# Patient Record
Sex: FEMALE | Race: BLACK OR AFRICAN AMERICAN | NOT HISPANIC OR LATINO | Employment: OTHER | URBAN - METROPOLITAN AREA
[De-identification: names, ages, dates, MRNs, and addresses within clinical notes are randomized per-mention and may not be internally consistent; named-entity substitution may affect disease eponyms.]

---

## 2017-06-01 ENCOUNTER — LAB (OUTPATIENT)
Dept: INTERNAL MEDICINE | Facility: CLINIC | Age: 77
End: 2017-06-01

## 2017-06-01 DIAGNOSIS — E78.5 HYPERLIPIDEMIA, UNSPECIFIED HYPERLIPIDEMIA TYPE: Primary | ICD-10-CM

## 2017-06-01 DIAGNOSIS — E11.9 TYPE 2 DIABETES MELLITUS WITHOUT COMPLICATION, UNSPECIFIED LONG TERM INSULIN USE STATUS: ICD-10-CM

## 2017-06-01 LAB
ALBUMIN SERPL-MCNC: 4.2 G/DL (ref 3.5–5.2)
ALBUMIN/GLOB SERPL: 1.5 G/DL
ALP SERPL-CCNC: 55 U/L (ref 40–129)
ALT SERPL-CCNC: 11 U/L (ref 5–33)
AST SERPL-CCNC: 16 U/L (ref 5–32)
BASOPHILS # BLD AUTO: 0.02 10*3/MM3 (ref 0–0.2)
BASOPHILS NFR BLD AUTO: 0.5 % (ref 0–2)
BILIRUB SERPL-MCNC: 0.8 MG/DL (ref 0.2–1.2)
BUN SERPL-MCNC: 21 MG/DL (ref 8–23)
BUN/CREAT SERPL: 20 (ref 7–25)
CALCIUM SERPL-MCNC: 9.6 MG/DL (ref 8.8–10.5)
CHLORIDE SERPL-SCNC: 101 MMOL/L (ref 98–107)
CHOLEST SERPL-MCNC: 161 MG/DL (ref 0–200)
CHOLEST/HDLC SERPL: 2.01 {RATIO}
CO2 SERPL-SCNC: 27.7 MMOL/L (ref 22–29)
CREAT SERPL-MCNC: 1.05 MG/DL (ref 0.57–1)
EOSINOPHIL # BLD AUTO: 0.13 10*3/MM3 (ref 0.1–0.3)
EOSINOPHIL NFR BLD AUTO: 3.1 % (ref 0–4)
ERYTHROCYTE [DISTWIDTH] IN BLOOD BY AUTOMATED COUNT: 13.1 % (ref 11.5–14.5)
GLOBULIN SER CALC-MCNC: 2.8 GM/DL
GLUCOSE SERPL-MCNC: 124 MG/DL (ref 65–99)
HBA1C MFR BLD: 6.2 % (ref 4.8–5.6)
HCT VFR BLD AUTO: 40.3 % (ref 37–47)
HDLC SERPL-MCNC: 80 MG/DL (ref 40–60)
HGB BLD-MCNC: 13.6 G/DL (ref 12–16)
IMM GRANULOCYTES # BLD: 0.01 10*3/MM3 (ref 0–0.03)
IMM GRANULOCYTES NFR BLD: 0.2 % (ref 0–0.5)
LDLC SERPL CALC-MCNC: 69 MG/DL (ref 0–100)
LYMPHOCYTES # BLD AUTO: 1.8 10*3/MM3 (ref 0.6–4.8)
LYMPHOCYTES NFR BLD AUTO: 43.6 % (ref 20–45)
MCH RBC QN AUTO: 27.8 PG (ref 27–31)
MCHC RBC AUTO-ENTMCNC: 33.7 G/DL (ref 31–37)
MCV RBC AUTO: 82.2 FL (ref 81–99)
MONOCYTES # BLD AUTO: 0.35 10*3/MM3 (ref 0–1)
MONOCYTES NFR BLD AUTO: 8.5 % (ref 3–8)
NEUTROPHILS # BLD AUTO: 1.82 10*3/MM3 (ref 1.5–8.3)
NEUTROPHILS NFR BLD AUTO: 44.1 % (ref 45–70)
NRBC BLD AUTO-RTO: 0 /100 WBC (ref 0–0)
PLATELET # BLD AUTO: 202 10*3/MM3 (ref 140–500)
POTASSIUM SERPL-SCNC: 3.7 MMOL/L (ref 3.5–5.2)
PROT SERPL-MCNC: 7 G/DL (ref 6–8.5)
RBC # BLD AUTO: 4.9 10*6/MM3 (ref 4.2–5.4)
SODIUM SERPL-SCNC: 142 MMOL/L (ref 136–145)
TRIGL SERPL-MCNC: 60 MG/DL (ref 0–150)
VLDLC SERPL CALC-MCNC: 12 MG/DL (ref 7–27)
WBC # BLD AUTO: 4.13 10*3/MM3 (ref 4.8–10.8)

## 2017-06-02 ENCOUNTER — TELEPHONE (OUTPATIENT)
Dept: INTERNAL MEDICINE | Facility: CLINIC | Age: 77
End: 2017-06-02

## 2017-06-02 NOTE — TELEPHONE ENCOUNTER
----- Message from Cyril Quiroga MD sent at 6/1/2017  4:43 PM EDT -----  Labs are really good, need to watch the carbs.  Still under diabetes range. Chol excellent.

## 2017-06-08 ENCOUNTER — HOSPITAL ENCOUNTER (OUTPATIENT)
Dept: CARDIOLOGY | Facility: HOSPITAL | Age: 77
Discharge: HOME | End: 2017-06-08
Admitting: INTERNAL MEDICINE

## 2017-06-08 ENCOUNTER — OFFICE VISIT (OUTPATIENT)
Dept: INTERNAL MEDICINE | Facility: CLINIC | Age: 77
End: 2017-06-08

## 2017-06-08 ENCOUNTER — HOSPITAL ENCOUNTER (OUTPATIENT)
Dept: CARDIOLOGY | Facility: HOSPITAL | Age: 77
Discharge: HOME | End: 2017-06-08

## 2017-06-08 ENCOUNTER — APPOINTMENT (OUTPATIENT)
Dept: CARDIOLOGY | Facility: HOSPITAL | Age: 77
End: 2017-06-08

## 2017-06-08 VITALS
OXYGEN SATURATION: 95 % | WEIGHT: 210.8 LBS | HEART RATE: 92 BPM | RESPIRATION RATE: 18 BRPM | DIASTOLIC BLOOD PRESSURE: 82 MMHG | SYSTOLIC BLOOD PRESSURE: 130 MMHG | HEIGHT: 67 IN | BODY MASS INDEX: 33.09 KG/M2

## 2017-06-08 DIAGNOSIS — R06.02 SHORTNESS OF BREATH: Primary | ICD-10-CM

## 2017-06-08 DIAGNOSIS — I49.3 FREQUENT PVCS: ICD-10-CM

## 2017-06-08 DIAGNOSIS — R06.02 SHORTNESS OF BREATH: ICD-10-CM

## 2017-06-08 DIAGNOSIS — E78.2 MIXED HYPERLIPIDEMIA: ICD-10-CM

## 2017-06-08 DIAGNOSIS — E11.9 TYPE 2 DIABETES MELLITUS WITHOUT COMPLICATION, WITHOUT LONG-TERM CURRENT USE OF INSULIN (HCC): ICD-10-CM

## 2017-06-08 LAB
BH CV ECHO MEAS - AO MAX PG (FULL): 3.6 MMHG
BH CV ECHO MEAS - AO MAX PG: 8.6 MMHG
BH CV ECHO MEAS - AO MEAN PG (FULL): 2 MMHG
BH CV ECHO MEAS - AO MEAN PG: 4 MMHG
BH CV ECHO MEAS - AO ROOT AREA (BSA CORRECTED): 1.6
BH CV ECHO MEAS - AO ROOT AREA: 9.1 CM^2
BH CV ECHO MEAS - AO ROOT DIAM: 3.4 CM
BH CV ECHO MEAS - AO V2 MAX: 147 CM/SEC
BH CV ECHO MEAS - AO V2 MEAN: 87.3 CM/SEC
BH CV ECHO MEAS - AO V2 VTI: 29.5 CM
BH CV ECHO MEAS - ASC AORTA: 2.8 CM
BH CV ECHO MEAS - AVA(I,A): 3.3 CM^2
BH CV ECHO MEAS - AVA(I,D): 3.3 CM^2
BH CV ECHO MEAS - AVA(V,A): 3.2 CM^2
BH CV ECHO MEAS - AVA(V,D): 3.2 CM^2
BH CV ECHO MEAS - BSA(HAYCOCK): 2.2 M^2
BH CV ECHO MEAS - BSA: 2.1 M^2
BH CV ECHO MEAS - BZI_BMI: 33 KILOGRAMS/M^2
BH CV ECHO MEAS - BZI_METRIC_HEIGHT: 170.2 CM
BH CV ECHO MEAS - BZI_METRIC_WEIGHT: 95.7 KG
BH CV ECHO MEAS - CONTRAST EF (2CH): 55.6 ML/M^2
BH CV ECHO MEAS - CONTRAST EF 4CH: 53 ML/M^2
BH CV ECHO MEAS - EDV(CUBED): 101.2 ML
BH CV ECHO MEAS - EDV(MOD-SP2): 81.8 ML
BH CV ECHO MEAS - EDV(MOD-SP4): 74.2 ML
BH CV ECHO MEAS - EDV(TEICH): 100.3 ML
BH CV ECHO MEAS - EF(CUBED): 84 %
BH CV ECHO MEAS - EF(MOD-SP2): 55.6 %
BH CV ECHO MEAS - EF(MOD-SP4): 53 %
BH CV ECHO MEAS - EF(TEICH): 77.1 %
BH CV ECHO MEAS - ESV(CUBED): 16.2 ML
BH CV ECHO MEAS - ESV(MOD-SP2): 36.3 ML
BH CV ECHO MEAS - ESV(MOD-SP4): 34.9 ML
BH CV ECHO MEAS - ESV(TEICH): 23 ML
BH CV ECHO MEAS - FS: 45.7 %
BH CV ECHO MEAS - IVS/LVPW: 1.1
BH CV ECHO MEAS - IVSD: 0.78 CM
BH CV ECHO MEAS - LA DIMENSION: 3.6 CM
BH CV ECHO MEAS - LA/AO: 1.1
BH CV ECHO MEAS - LAT PEAK E' VEL: 7 CM/SEC
BH CV ECHO MEAS - LV DIASTOLIC VOL/BSA (35-75): 35.9 ML/M^2
BH CV ECHO MEAS - LV MASS(C)D: 112.1 GRAMS
BH CV ECHO MEAS - LV MASS(C)DI: 54.2 GRAMS/M^2
BH CV ECHO MEAS - LV MAX PG: 5 MMHG
BH CV ECHO MEAS - LV MEAN PG: 2 MMHG
BH CV ECHO MEAS - LV SYSTOLIC VOL/BSA (12-30): 16.9 ML/M^2
BH CV ECHO MEAS - LV V1 MAX: 112 CM/SEC
BH CV ECHO MEAS - LV V1 MEAN: 60.8 CM/SEC
BH CV ECHO MEAS - LV V1 VTI: 23.5 CM
BH CV ECHO MEAS - LVIDD: 4.7 CM
BH CV ECHO MEAS - LVIDS: 2.5 CM
BH CV ECHO MEAS - LVLD AP2: 7.5 CM
BH CV ECHO MEAS - LVLD AP4: 7 CM
BH CV ECHO MEAS - LVLS AP2: 6.1 CM
BH CV ECHO MEAS - LVLS AP4: 6.1 CM
BH CV ECHO MEAS - LVOT AREA (M): 4.2 CM^2
BH CV ECHO MEAS - LVOT AREA: 4.2 CM^2
BH CV ECHO MEAS - LVOT DIAM: 2.3 CM
BH CV ECHO MEAS - LVPWD: 0.74 CM
BH CV ECHO MEAS - MED PEAK E' VEL: 6 CM/SEC
BH CV ECHO MEAS - MV A DUR: 0.15 SEC
BH CV ECHO MEAS - MV A MAX VEL: 82.5 CM/SEC
BH CV ECHO MEAS - MV DEC SLOPE: 592 CM/SEC^2
BH CV ECHO MEAS - MV DEC TIME: 0.13 SEC
BH CV ECHO MEAS - MV E MAX VEL: 81 CM/SEC
BH CV ECHO MEAS - MV E/A: 0.98
BH CV ECHO MEAS - MV MAX PG: 3.9 MMHG
BH CV ECHO MEAS - MV MEAN PG: 2 MMHG
BH CV ECHO MEAS - MV P1/2T MAX VEL: 98.2 CM/SEC
BH CV ECHO MEAS - MV P1/2T: 48.6 MSEC
BH CV ECHO MEAS - MV V2 MAX: 99.1 CM/SEC
BH CV ECHO MEAS - MV V2 MEAN: 64.6 CM/SEC
BH CV ECHO MEAS - MV V2 VTI: 23.6 CM
BH CV ECHO MEAS - MVA P1/2T LCG: 2.2 CM^2
BH CV ECHO MEAS - MVA(P1/2T): 4.5 CM^2
BH CV ECHO MEAS - MVA(VTI): 4.1 CM^2
BH CV ECHO MEAS - PA ACC TIME: 0.13 SEC
BH CV ECHO MEAS - PA MAX PG (FULL): 3.1 MMHG
BH CV ECHO MEAS - PA MAX PG: 4.5 MMHG
BH CV ECHO MEAS - PA PR(ACCEL): 18.7 MMHG
BH CV ECHO MEAS - PA V2 MAX: 106 CM/SEC
BH CV ECHO MEAS - PI END-D VEL: 78.2 CM/SEC
BH CV ECHO MEAS - PULM A REVS DUR: 0.14 SEC
BH CV ECHO MEAS - PULM A REVS VEL: 51.9 CM/SEC
BH CV ECHO MEAS - PULM DIAS VEL: 48.5 CM/SEC
BH CV ECHO MEAS - PULM S/D: 1.4
BH CV ECHO MEAS - PULM SYS VEL: 70.3 CM/SEC
BH CV ECHO MEAS - PVA(V,A): 2.3 CM^2
BH CV ECHO MEAS - PVA(V,D): 2.3 CM^2
BH CV ECHO MEAS - QP/QS: 0.45
BH CV ECHO MEAS - RAP SYSTOLE: 8 MMHG
BH CV ECHO MEAS - RV MAX PG: 1.4 MMHG
BH CV ECHO MEAS - RV MEAN PG: 1 MMHG
BH CV ECHO MEAS - RV V1 MAX: 59.1 CM/SEC
BH CV ECHO MEAS - RV V1 MEAN: 37.1 CM/SEC
BH CV ECHO MEAS - RV V1 VTI: 10.6 CM
BH CV ECHO MEAS - RVOT AREA: 4.2 CM^2
BH CV ECHO MEAS - RVOT DIAM: 2.3 CM
BH CV ECHO MEAS - RVSP: 49.5 MMHG
BH CV ECHO MEAS - SI(AO): 129.5 ML/M^2
BH CV ECHO MEAS - SI(CUBED): 41.1 ML/M^2
BH CV ECHO MEAS - SI(LVOT): 47.2 ML/M^2
BH CV ECHO MEAS - SI(MOD-SP2): 22 ML/M^2
BH CV ECHO MEAS - SI(MOD-SP4): 19 ML/M^2
BH CV ECHO MEAS - SI(TEICH): 37.4 ML/M^2
BH CV ECHO MEAS - SV(AO): 267.8 ML
BH CV ECHO MEAS - SV(CUBED): 85 ML
BH CV ECHO MEAS - SV(LVOT): 97.6 ML
BH CV ECHO MEAS - SV(MOD-SP2): 45.5 ML
BH CV ECHO MEAS - SV(MOD-SP4): 39.3 ML
BH CV ECHO MEAS - SV(RVOT): 44 ML
BH CV ECHO MEAS - SV(TEICH): 77.3 ML
BH CV ECHO MEAS - TAPSE (>1.6): 2.9 CM2
BH CV ECHO MEAS - TR MAX VEL: 322 CM/SEC
BH CV VAS BP RIGHT ARM: NORMAL MMHG
BH CV XLRA - RV BASE: 4.1 CM
BH CV XLRA - RV LENGTH: 6.5 CM
BH CV XLRA - RV MID: 3.4 CM
BH CV XLRA - TDI S': 26 CM/SEC
E/E' RATIO: 12
LEFT ATRIUM VOLUME INDEX: 30 ML/M2
LEFT ATRIUM VOLUME: 57 CM3
LV EF 2D ECHO EST: 53 %

## 2017-06-08 PROCEDURE — 93306 TTE W/DOPPLER COMPLETE: CPT

## 2017-06-08 PROCEDURE — 93226 XTRNL ECG REC<48 HR SCAN A/R: CPT

## 2017-06-08 PROCEDURE — 0399T HC MYOCARDL STRAIN IMAG QUAN ASSMT PER SESS: CPT

## 2017-06-08 PROCEDURE — 93000 ELECTROCARDIOGRAM COMPLETE: CPT | Performed by: INTERNAL MEDICINE

## 2017-06-08 PROCEDURE — 93225 XTRNL ECG REC<48 HRS REC: CPT

## 2017-06-08 PROCEDURE — 93306 TTE W/DOPPLER COMPLETE: CPT | Performed by: INTERNAL MEDICINE

## 2017-06-08 PROCEDURE — 0399T ADULT TRANSTHORACIC ECHO COMPLETE: CPT | Performed by: INTERNAL MEDICINE

## 2017-06-08 PROCEDURE — 99215 OFFICE O/P EST HI 40 MIN: CPT | Performed by: INTERNAL MEDICINE

## 2017-06-08 NOTE — PROGRESS NOTES
Subjective   Radha Silverio is a 76 y.o. female.     History of Present Illness   77 yo female with HTN and HL. Having intermittent episodes of shortness of breath, often yawning to help her get a good breath. She denies chest pain.  Her episodes of shortness of breath that are lasting longer, occurring more at rest.     She is having touble with her lower back, does not take any regular medication. She is having trouble walking regularly due to her pain but does not want to take any medications. Her legs are better this week.   The following portions of the patient's history were reviewed and updated as appropriate: allergies, current medications, past family history, past medical history, past social history, past surgical history and problem list.    Review of Systems   Constitutional: Negative.  Negative for appetite change, fatigue, fever and unexpected weight change.   HENT: Negative.  Negative for sinus pressure and trouble swallowing.    Respiratory: Positive for shortness of breath. Negative for cough and chest tightness.    Cardiovascular: Positive for palpitations. Negative for chest pain and leg swelling.   Gastrointestinal: Negative.  Negative for constipation and diarrhea.   Endocrine:        Poor diabettes compliance, knows what to eat   Genitourinary: Negative for dysuria, frequency, hematuria, pelvic pain and vaginal discharge.   Musculoskeletal: Negative.    Skin: Negative.    Neurological: Negative.  Negative for dizziness, light-headedness and headaches.   Hematological: Negative.    Psychiatric/Behavioral: Negative.        Objective   Physical Exam   Constitutional: She is oriented to person, place, and time. She appears well-developed and well-nourished.   HENT:   Head: Normocephalic and atraumatic.   Right Ear: External ear normal.   Left Ear: External ear normal.   Eyes: EOM are normal. Pupils are equal, round, and reactive to light.   Neck: Normal range of motion. Neck supple.    Cardiovascular: Normal rate, regular rhythm and normal heart sounds.  Exam reveals no friction rub.    No murmur heard.  Pulmonary/Chest: Effort normal and breath sounds normal. No respiratory distress.   Neurological: She is alert and oriented to person, place, and time.   Skin: Skin is warm and dry.   Psychiatric: She has a normal mood and affect. Her behavior is normal.   Nursing note and vitals reviewed.       ECG 12 Lead  Date/Time: 6/8/2017 12:58 PM  Performed by: MELISSA MORENO  Authorized by: MELISSA MORENO   Comparison: compared with previous ECG from 6/1/2015  Similar to previous ECG  Rhythm: sinus rhythm  Ectopy: PVCs and frequent PVCs  Rate: normal  Conduction: conduction normal  T Waves: T waves normal  QRS axis: normal  Comments: PVcs frequent, with more symptoms            Assessment/Plan   Radha was seen today for hypertension, hyperlipidemia and leg pain.    Diagnoses and all orders for this visit:    Shortness of breath  -     Ambulatory Referral to Cardiology  -     Holter monitor - 24 hour; Future  -     Ambulatory Referral to Cardiology  -     Echocardiogram stress test; Future    Frequent PVCs  -     Ambulatory Referral to Cardiology  -     Holter monitor - 24 hour; Future  -     Ambulatory Referral to Cardiology  -     Echocardiogram stress test; Future    Mixed hyperlipidemia    Type 2 diabetes mellitus without complication, without long-term current use of insulin             I called [place] [facility]  Will see Dr. Urbina June 15th 2 pm at [place]   i personally arranged

## 2017-06-08 NOTE — PATIENT INSTRUCTIONS
Tylenol arthritis when needed for low back pain  Start prilosec 20 mg daily  Avoid ibuprofen because of risk to stomach pain/bleed.        Radha was seen today for hypertension, hyperlipidemia and leg pain.     Diagnoses and all orders for this visit:     Shortness of breath  - Ambulatory Referral to Cardiology  - Holter monitor - 24 hour; Future  - Ambulatory Referral to Cardiology  - Echocardiogram stress test; Future     Frequent PVCs  - Ambulatory Referral to Cardiology  - Holter monitor - 24 hour; Future  - Ambulatory Referral to Cardiology  - Echocardiogram stress test; Future     Mixed hyperlipidemia     Type 2 diabetes mellitus without complication, without long-term current use of insulin                    I called [facility]  Will see Dr. Urbina June 15th 2 pm at [place]   i personally arranged

## 2017-06-13 PROCEDURE — 93227 XTRNL ECG REC<48 HR R&I: CPT | Performed by: INTERNAL MEDICINE

## 2017-06-14 ENCOUNTER — TELEPHONE (OUTPATIENT)
Dept: INTERNAL MEDICINE | Facility: CLINIC | Age: 77
End: 2017-06-14

## 2017-06-14 NOTE — TELEPHONE ENCOUNTER
Patient notified, she has an appointment tomorrow with Dr. Trudi Urbina, cardiologist and she plans to keep that appt.  ----- Message from Cyril Quiroga MD sent at 6/14/2017 12:53 PM EDT -----  Echo is good.  She has some findings. Make sure she has a cardiology appt.  I think its scheduled.

## 2017-06-15 ENCOUNTER — OFFICE VISIT (OUTPATIENT)
Dept: CARDIOLOGY | Facility: CLINIC | Age: 77
End: 2017-06-15

## 2017-06-15 VITALS
WEIGHT: 210 LBS | HEART RATE: 68 BPM | SYSTOLIC BLOOD PRESSURE: 136 MMHG | DIASTOLIC BLOOD PRESSURE: 90 MMHG | HEIGHT: 67 IN | RESPIRATION RATE: 16 BRPM | BODY MASS INDEX: 32.96 KG/M2

## 2017-06-15 DIAGNOSIS — I49.8 ATRIAL BIGEMINY: ICD-10-CM

## 2017-06-15 DIAGNOSIS — I49.1 PAC (PREMATURE ATRIAL CONTRACTION): Primary | ICD-10-CM

## 2017-06-15 DIAGNOSIS — I49.3 FREQUENT PVCS: ICD-10-CM

## 2017-06-15 DIAGNOSIS — E11.9 TYPE 2 DIABETES MELLITUS WITHOUT COMPLICATION, WITHOUT LONG-TERM CURRENT USE OF INSULIN (HCC): ICD-10-CM

## 2017-06-15 DIAGNOSIS — R06.02 SHORTNESS OF BREATH: ICD-10-CM

## 2017-06-15 DIAGNOSIS — I10 ESSENTIAL HYPERTENSION: ICD-10-CM

## 2017-06-15 DIAGNOSIS — E78.2 MIXED HYPERLIPIDEMIA: ICD-10-CM

## 2017-06-15 PROCEDURE — 99204 OFFICE O/P NEW MOD 45 MIN: CPT | Performed by: INTERNAL MEDICINE

## 2017-06-15 PROCEDURE — 93000 ELECTROCARDIOGRAM COMPLETE: CPT | Performed by: INTERNAL MEDICINE

## 2017-06-15 RX ORDER — METOPROLOL SUCCINATE 25 MG/1
25 TABLET, EXTENDED RELEASE ORAL DAILY
Qty: 30 TABLET | Refills: 11 | Status: SHIPPED | OUTPATIENT
Start: 2017-06-15 | End: 2018-06-16 | Stop reason: SDUPTHER

## 2017-06-15 NOTE — PROGRESS NOTES
PATIENTINFORMATION    Date of Office Visit: 06/15/2017  Encounter Provider: Trudi Urbina MD  Place of Service: [facility] CARDIOLOGY  Patient Name: Radha Silverio  : 1940    Subjective:     Encounter Date:06/15/2017      Patient ID: Radha Silverio is a 76 y.o. female.      History of Present Illness     This is a pleasant lady with diabetes, hypertension and hyperlipidemia who is referred to see me for irregular heart rhythm. She denies any palpitation sensation, but she does have these episodes of breathlessness where she feels short of breath. If she yawns, she feels better. She has not noted any exacerbating factors. About a month ago she was having a lot more of these, but they have settled back down. It occurs multiple times a day. She also describes fatigue and dyspnea on exertion. She does not exercise on a regular basis. Dr. Quiroga saw her and noted the irregularity and checked an EKG which showed frequent premature ventricular contractions. The patient had an echocardiogram on 2017 which showed normal LV systolic and diastolic function with mild-to-moderate tricuspid regurgitation with a right ventricular systolic pressure of about 50 mmHg. She wore a Holter monitor which showed frequent premature atrial contractions at about 23% of the tracing. There were frequent bouts of atrial bigeminy. She had some PVCs, but the PACs far outweighed it. She had a little focal stinging pain that she made note of in her diary which did not correspond to any arrhythmia. She did not make note of the breathless episodes in her diary.      Review of Systems   Constitution: Positive for malaise/fatigue and weight gain. Negative for fever and weight loss.   HENT: Positive for headaches. Negative for ear pain, hearing loss, nosebleeds and sore throat.    Eyes: Positive for vision loss in left eye and vision loss in right eye. Negative for double vision and pain.  "  Cardiovascular:        See history of present illness.   Respiratory: Positive for cough and shortness of breath. Negative for sleep disturbances due to breathing, snoring and wheezing.    Endocrine: Negative for cold intolerance, heat intolerance and polyuria.   Skin: Negative for itching, poor wound healing and rash.   Musculoskeletal: Negative for joint pain, joint swelling and myalgias.   Gastrointestinal: Negative for abdominal pain, diarrhea, hematochezia, nausea and vomiting.   Genitourinary: Negative for hematuria and hesitancy.   Neurological: Negative for numbness, paresthesias and seizures.   Psychiatric/Behavioral: Negative for depression. The patient is not nervous/anxious.            ECG 12 Lead  Date/Time: 6/15/2017 3:35 PM  Performed by: HERRERA ABBOTT  Authorized by: HERRERA ABBOTT   Comparison: compared with previous ECG from 6/8/2017  Comparison to previous ECG: No longer having premature ventricular contractions  Rhythm: sinus rhythm  BPM: 68  Conduction: conduction normal  ST Segments: ST segments normal  T Waves: T waves normal  Clinical impression: normal ECG               Objective:     /90 (BP Location: Right arm, Patient Position: Sitting, Cuff Size: Adult)  Pulse 68  Resp 16  Ht 67\" (170.2 cm)  Wt 210 lb (95.3 kg)  BMI 32.89 kg/m2 Body mass index is 32.89 kg/(m^2).     Physical Exam   Constitutional: She appears well-developed.   HENT:   Head: Normocephalic and atraumatic.   Eyes: Conjunctivae and lids are normal. Pupils are equal, round, and reactive to light. Lids are everted and swept, no foreign bodies found.   Neck: Normal range of motion. No JVD present. Carotid bruit is not present. No tracheal deviation present. No thyroid mass present.   Cardiovascular: Normal rate, regular rhythm and normal heart sounds.  Frequent extrasystoles are present.   Pulses:       Dorsalis pedis pulses are 2+ on the right side, and 2+ on the left side.   Mild nonpitting edema of the " right lower extremity   Pulmonary/Chest: Effort normal and breath sounds normal.   Abdominal: Normal appearance and bowel sounds are normal.   Musculoskeletal: Normal range of motion.   Neurological: She is alert. She has normal strength.   Skin: Skin is warm, dry and intact.   Psychiatric: She has a normal mood and affect. Her behavior is normal.   Vitals reviewed.         Assessment/Plan:        1. Episodes of breathlessness. I wonder if these may be during her times of atrial bigeminy and yawning helps to break it. I am going to stop the amlodipine and put her on Toprol-XL 25 mg a day and see how she does with that. She will come back and see me in 6 weeks.   2. Hypertension.   3. Hyperlipidemia.   4. Diabetes.   5. Obesity. This certainly can be contributing to her ectopy, and I recommended that she exercise more and try to lose some weight.     I will see her back in 6 weeks.       Orders Placed This Encounter   Procedures   • ECG 12 Lead     This order was created via procedure documentation      Radha Silverio   Home Medication Instructions DELGADO:    Printed on:06/15/17 2239   Medication Information                      aspirin tablet  Take 81 mg by mouth daily.             hydrocortisone-pramoxine (ANALPRAM HC) 2.5-1 % rectal cream  Insert  into the rectum 3 (Three) Times a Day.             lisinopril-hydrochlorothiazide (PRINZIDE,ZESTORETIC) 20-25 MG per tablet  Take 1 tablet by mouth daily.             metFORMIN (GLUCOPHAGE) 500 MG tablet  Take 1 tablet by mouth 2 (two) times a day with meals.             metoprolol succinate XL (TOPROL-XL) 25 MG 24 hr tablet  Take 1 tablet by mouth Daily.             simvastatin (ZOCOR) 40 MG tablet  Take 1 tablet by mouth every night.                        Trudi Urbina MD  06/15/17  3:37 PM

## 2017-07-17 RX ORDER — LISINOPRIL AND HYDROCHLOROTHIAZIDE 25; 20 MG/1; MG/1
TABLET ORAL
Qty: 90 TABLET | Refills: 1 | Status: SHIPPED | OUTPATIENT
Start: 2017-07-17 | End: 2018-01-24 | Stop reason: SDUPTHER

## 2017-08-03 ENCOUNTER — OFFICE VISIT (OUTPATIENT)
Dept: CARDIOLOGY | Facility: CLINIC | Age: 77
End: 2017-08-03

## 2017-08-03 VITALS
HEIGHT: 68 IN | WEIGHT: 207.5 LBS | HEART RATE: 61 BPM | SYSTOLIC BLOOD PRESSURE: 104 MMHG | DIASTOLIC BLOOD PRESSURE: 64 MMHG | BODY MASS INDEX: 31.45 KG/M2

## 2017-08-03 DIAGNOSIS — I49.3 FREQUENT PVCS: ICD-10-CM

## 2017-08-03 DIAGNOSIS — I49.1 PAC (PREMATURE ATRIAL CONTRACTION): Primary | ICD-10-CM

## 2017-08-03 DIAGNOSIS — I10 ESSENTIAL HYPERTENSION: ICD-10-CM

## 2017-08-03 DIAGNOSIS — R06.02 SHORTNESS OF BREATH: ICD-10-CM

## 2017-08-03 DIAGNOSIS — I49.8 ATRIAL BIGEMINY: ICD-10-CM

## 2017-08-03 PROCEDURE — 93000 ELECTROCARDIOGRAM COMPLETE: CPT | Performed by: INTERNAL MEDICINE

## 2017-08-03 PROCEDURE — 99213 OFFICE O/P EST LOW 20 MIN: CPT | Performed by: INTERNAL MEDICINE

## 2017-08-03 NOTE — PROGRESS NOTES
PATIENTINFORMATION    Date of Office Visit: 2017  Encounter Provider: Trudi Urbina MD  Place of Service: [facility] CARDIOLOGY  Patient Name: Radha Silverio  : 1940    Subjective:     Encounter Date:2017      Patient ID: Radha Silverio is a 76 y.o. female.      History of Present Illness    This is a pleasant lady with diabetes, hypertension and hyperlipidemia who is referred to see me for irregular heart rhythm. She denies any palpitation sensation, but she does have these episodes of breathlessness where she feels short of breath. If she yawns, she feels better. She has not noted any exacerbating factors. About a month ago she was having a lot more of these, but they have settled back down. It occurs multiple times a day. She also describes fatigue and dyspnea on exertion. She does not exercise on a regular basis. Dr. Quiroga saw her and noted the irregularity and checked an EKG which showed frequent premature ventricular contractions. The patient had an echocardiogram on 2017 which showed normal LV systolic and diastolic function with mild-to-moderate tricuspid regurgitation with a right ventricular systolic pressure of about 50 mmHg. She wore a Holter monitor which showed frequent premature atrial contractions at about 23% of the tracing. There were frequent bouts of atrial bigeminy. She had some PVCs, but the PACs far outweighed it. She had a little focal stinging pain that she made note of in her diary which did not correspond to any arrhythmia. She did not make note of the breathless episodes in her diary.    I suspected that her episodes of breathlessness were related to atrial bigeminy.  I stopped amlodipine and put her on Toprol-XL 25 mg a day.  She comes in today for follow-up and is feeling well.  Her blood pressure is controlled.  She is no longer having episodes of breathlessness.  She denies palpitations or shortness of breath.  She  "does still have some mild lower extremity edema.  She has some lightheadedness on occasion but nothing sustained.    Review of Systems   Constitution: Negative for fever, malaise/fatigue, weight gain and weight loss.   HENT: Negative for ear pain, hearing loss, nosebleeds and sore throat.    Eyes: Negative for double vision, pain, vision loss in left eye and vision loss in right eye.   Cardiovascular:        See history of present illness.   Respiratory: Negative for cough, shortness of breath, sleep disturbances due to breathing, snoring and wheezing.    Endocrine: Negative for cold intolerance, heat intolerance and polyuria.   Skin: Negative for itching, poor wound healing and rash.   Musculoskeletal: Negative for joint pain, joint swelling and myalgias.   Gastrointestinal: Negative for abdominal pain, diarrhea, hematochezia, nausea and vomiting.   Genitourinary: Negative for hematuria and hesitancy.   Neurological: Negative for numbness, paresthesias and seizures.   Psychiatric/Behavioral: Negative for depression. The patient is not nervous/anxious.            ECG 12 Lead  Date/Time: 8/3/2017 12:50 PM  Performed by: HERRERA ABBOTT  Authorized by: HERRERA ABBOTT   Comparison: compared with previous ECG from 6/15/2017  Similar to previous ECG  Rhythm: sinus rhythm  Ectopy: infrequent PVCs and atrial premature contractions  BPM: 61  Conduction: conduction normal  ST Segments: ST segments normal  Clinical impression: abnormal ECG               Objective:     /64  Pulse 61  Ht 67.5\" (171.5 cm)  Wt 207 lb 8 oz (94.1 kg)  BMI 32.02 kg/m2 Body mass index is 32.02 kg/(m^2).     Physical Exam   Constitutional: She appears well-developed.   HENT:   Head: Normocephalic and atraumatic.   Eyes: Conjunctivae and lids are normal. Pupils are equal, round, and reactive to light. Lids are everted and swept, no foreign bodies found.   Neck: Normal range of motion. No JVD present. Carotid bruit is not present. No " tracheal deviation present. No thyroid mass present.   Cardiovascular: Normal rate, regular rhythm and normal heart sounds.   Extrasystoles are present.   Pulses:       Dorsalis pedis pulses are 2+ on the right side, and 2+ on the left side.   Pulmonary/Chest: Effort normal and breath sounds normal.   Abdominal: Normal appearance and bowel sounds are normal.   Musculoskeletal: Normal range of motion.   Neurological: She is alert. She has normal strength.   Skin: Skin is warm, dry and intact.   Psychiatric: She has a normal mood and affect. Her behavior is normal.   Vitals reviewed.      Lab Review:       Assessment/Plan:       1.  Atrial bigeminy.  I suspect this was symptomatic and associated with her feeling breathless which got better when she yawns.  She has not had this episode since starting Toprol-XL.  I am not going to adjust her medications today.  2.  Hypertension  3.  Hyperlipidemia  4.  Diabetes  5.  Obesity.    I will see her back as needed.  She will follow-up with Dr. Junaid Venegas Placed This Encounter   Procedures   • ECG 12 Lead     This order was created via procedure documentation      Radha Silverio   Home Medication Instructions DELGADO:    Printed on:08/03/17 9741   Medication Information                      aspirin tablet  Take 81 mg by mouth daily.             lisinopril-hydrochlorothiazide (PRINZIDE,ZESTORETIC) 20-25 MG per tablet  TAKE ONE TABLET BY MOUTH ONCE DAILY             metFORMIN (GLUCOPHAGE) 500 MG tablet  Take 1 tablet by mouth 2 (two) times a day with meals.             metoprolol succinate XL (TOPROL-XL) 25 MG 24 hr tablet  Take 1 tablet by mouth Daily.             simvastatin (ZOCOR) 40 MG tablet  Take 1 tablet by mouth every night.                        Turdi Urbina MD  08/03/17  12:53 PM

## 2017-10-16 RX ORDER — SIMVASTATIN 40 MG
TABLET ORAL
Qty: 90 TABLET | Refills: 2 | Status: SHIPPED | OUTPATIENT
Start: 2017-10-16 | End: 2018-10-27 | Stop reason: SDUPTHER

## 2017-11-13 ENCOUNTER — TRANSCRIBE ORDERS (OUTPATIENT)
Dept: INTERNAL MEDICINE | Facility: CLINIC | Age: 77
End: 2017-11-13

## 2017-11-13 DIAGNOSIS — Z12.31 SCREENING MAMMOGRAM, ENCOUNTER FOR: Primary | ICD-10-CM

## 2017-11-20 RX ORDER — BLOOD SUGAR DIAGNOSTIC
STRIP MISCELLANEOUS
Qty: 50 EACH | Refills: 11 | Status: SHIPPED | OUTPATIENT
Start: 2017-11-20 | End: 2019-03-01 | Stop reason: SDUPTHER

## 2017-12-08 ENCOUNTER — HOSPITAL ENCOUNTER (OUTPATIENT)
Dept: GENERAL RADIOLOGY | Facility: HOSPITAL | Age: 77
Discharge: HOME | End: 2017-12-08
Admitting: NURSE PRACTITIONER

## 2017-12-08 ENCOUNTER — TELEPHONE (OUTPATIENT)
Dept: INTERNAL MEDICINE | Facility: CLINIC | Age: 77
End: 2017-12-08

## 2017-12-08 ENCOUNTER — OFFICE VISIT (OUTPATIENT)
Dept: INTERNAL MEDICINE | Facility: CLINIC | Age: 77
End: 2017-12-08

## 2017-12-08 VITALS
DIASTOLIC BLOOD PRESSURE: 80 MMHG | HEART RATE: 71 BPM | SYSTOLIC BLOOD PRESSURE: 140 MMHG | BODY MASS INDEX: 31.71 KG/M2 | WEIGHT: 202 LBS | OXYGEN SATURATION: 99 % | TEMPERATURE: 98.3 F | HEIGHT: 67 IN

## 2017-12-08 DIAGNOSIS — M17.0 PRIMARY OSTEOARTHRITIS OF BOTH KNEES: ICD-10-CM

## 2017-12-08 DIAGNOSIS — M17.0 PRIMARY OSTEOARTHRITIS OF BOTH KNEES: Primary | ICD-10-CM

## 2017-12-08 PROBLEM — M17.10 PRIMARY OSTEOARTHRITIS OF KNEE: Status: ACTIVE | Noted: 2017-12-08

## 2017-12-08 PROCEDURE — 99213 OFFICE O/P EST LOW 20 MIN: CPT | Performed by: NURSE PRACTITIONER

## 2017-12-08 PROCEDURE — 73560 X-RAY EXAM OF KNEE 1 OR 2: CPT

## 2017-12-08 NOTE — PATIENT INSTRUCTIONS
Osteoarthritis  Osteoarthritis is a disease that causes soreness and inflammation of a joint. It occurs when the cartilage at the affected joint wears down. Cartilage acts as a cushion, covering the ends of bones where they meet to form a joint. Osteoarthritis is the most common form of arthritis. It often occurs in older people. The joints affected most often by this condition include those in the:  · Ends of the fingers.  · Thumbs.  · Neck.  · Lower back.  · Knees.  · Hips.  CAUSES   Over time, the cartilage that covers the ends of bones begins to wear away. This causes bone to rub on bone, producing pain and stiffness in the affected joints.   RISK FACTORS  Certain factors can increase your chances of having osteoarthritis, including:  · Older age.  · Excessive body weight.  · Overuse of joints.  · Previous joint injury.  SIGNS AND SYMPTOMS   · Pain, swelling, and stiffness in the joint.  · Over time, the joint may lose its normal shape.  · Small deposits of bone (osteophytes) may grow on the edges of the joint.  · Bits of bone or cartilage can break off and float inside the joint space. This may cause more pain and damage.  DIAGNOSIS   Your health care provider will do a physical exam and ask about your symptoms. Various tests may be ordered, such as:  · X-rays of the affected joint.  · Blood tests to rule out other types of arthritis.  Additional tests may be used to diagnose your condition.  TREATMENT   Goals of treatment are to control pain and improve joint function. Treatment plans may include:  · A prescribed exercise program that allows for rest and joint relief.  · A weight control plan.  · Pain relief techniques, such as:    Properly applied heat and cold.    Electric pulses delivered to nerve endings under the skin (transcutaneous electrical nerve stimulation [TENS]).    Massage.    Certain nutritional supplements.  · Medicines to control pain, such as:    Acetaminophen.    Nonsteroidal  anti-inflammatory drugs (NSAIDs), such as naproxen.    Narcotic or central-acting agents, such as tramadol.    Corticosteroids. These can be given orally or as an injection.  · Surgery to reposition the bones and relieve pain (osteotomy) or to remove loose pieces of bone and cartilage. Joint replacement may be needed in advanced states of osteoarthritis.  HOME CARE INSTRUCTIONS   · Take medicines only as directed by your health care provider.  · Maintain a healthy weight. Follow your health care provider's instructions for weight control. This may include dietary instructions.  · Exercise as directed. Your health care provider can recommend specific types of exercise. These may include:    Strengthening exercises. These are done to strengthen the muscles that support joints affected by arthritis. They can be performed with weights or with exercise bands to add resistance.    Aerobic activities. These are exercises, such as brisk walking or low-impact aerobics, that get your heart pumping.    Range-of-motion activities. These keep your joints limber.    Balance and agility exercises. These help you maintain daily living skills.  · Rest your affected joints as directed by your health care provider.  · Keep all follow-up visits as directed by your health care provider.  SEEK MEDICAL CARE IF:   · Your skin turns red.  · You develop a rash in addition to your joint pain.  · You have worsening joint pain.  · You have a fever along with joint or muscle aches.  SEEK IMMEDIATE MEDICAL CARE IF:  · You have a significant loss of weight or appetite.  · You have night sweats.  FOR MORE INFORMATION   · National Cross of Arthritis and Musculoskeletal and Skin Diseases: www.niams.nih.gov  · National Cross on Aging: www.monika.nih.gov  · American College of Rheumatology: www.rheumatology.org     This information is not intended to replace advice given to you by your health care provider. Make sure you discuss any questions you  have with your health care provider.     Document Released: 12/18/2006 Document Revised: 01/08/2016 Document Reviewed: 08/25/2014  Elsevier Interactive Patient Education ©2017 [employer]

## 2017-12-08 NOTE — TELEPHONE ENCOUNTER
----- Message from SLAVA Ruiz sent at 12/8/2017  2:45 PM EST -----  She cannot take oral meds due to risk of kidney issues. She may use OTC biofreeze and await ortho eval.  ----- Message -----     From: Shayla Camarillo MA     Sent: 12/8/2017   2:33 PM       To: SLAVA Ruiz    Pt  Went to get  volataren gel insurance   Wanted  51.00 dollars for this gel.  Do you  Think pill would work better.       37.00 out of pocket   Can do teir  Exception  [facility]   [phone]     Cell#  [phone]       Wants to get   teir   Exception    To 15.00  Pt  Aware

## 2017-12-08 NOTE — TELEPHONE ENCOUNTER
Patient advised.     ---- Message from SLAVA Ruiz sent at 12/8/2017 11:46 AM EST -----  XR of knee consistent with Arthritis as discussed. Sent to ortho

## 2017-12-08 NOTE — PROGRESS NOTES
"Chief Complaint   Patient presents with   • Joint Swelling     x 1week had some in right knee before       Subjective     Radha Silverio is a 77 y.o. female being seen for a follow up appointment today regarding knee pain and swelling. She is a patient of Dr. Quiroga, seen acutely today. She reports daily bilateral knee pain for 1 weeks. Described as \"stiff \" in the morning and \"soreness thru the day\". Pain rated 6 of 10. Worse in the morning. She is taking tylenol as needed without relief.       History of Present Illness     No Known Allergies      Current Outpatient Prescriptions:   •  Acetaminophen (TYLENOL ARTHRITIS EXT RELIEF PO), Take  by mouth., Disp: , Rfl:   •  aspirin tablet, Take 81 mg by mouth daily., Disp: , Rfl:   •  lisinopril-hydrochlorothiazide (PRINZIDE,ZESTORETIC) 20-25 MG per tablet, TAKE ONE TABLET BY MOUTH ONCE DAILY, Disp: 90 tablet, Rfl: 1  •  metFORMIN (GLUCOPHAGE) 500 MG tablet, Take 1 tablet by mouth 2 (two) times a day with meals., Disp: 180 tablet, Rfl: 3  •  metoprolol succinate XL (TOPROL-XL) 25 MG 24 hr tablet, Take 1 tablet by mouth Daily., Disp: 30 tablet, Rfl: 11  •  PRODIGY NO CODING BLOOD GLUC test strip, USE ONE STRIP TO CHECK GLUCOSE ONCE DAILY AS DIRECTED, Disp: 50 each, Rfl: 11  •  simvastatin (ZOCOR) 40 MG tablet, TAKE ONE TABLET BY MOUTH ONCE DAILY AT NIGHT, Disp: 90 tablet, Rfl: 2    The following portions of the patient's history were reviewed and updated as appropriate: allergies, current medications, past family history, past medical history, past social history, past surgical history and problem list.    Review of Systems   Cardiovascular: Negative for palpitations and leg swelling.   Endocrine: Negative.    Musculoskeletal: Positive for arthralgias and joint swelling.   Skin: Negative.    Allergic/Immunologic: Negative.    Neurological: Negative.    Hematological: Negative.    Psychiatric/Behavioral: Negative.        Assessment     Physical Exam   Constitutional: " She appears well-developed and well-nourished.   Cardiovascular: Normal rate, regular rhythm and normal heart sounds.    No murmur heard.  Pulmonary/Chest: Effort normal and breath sounds normal.   Musculoskeletal:        Right knee: She exhibits decreased range of motion and swelling (PPP). She exhibits no effusion, no deformity, no erythema, normal alignment, no LCL laxity, no bony tenderness and normal meniscus. No tenderness found. No medial joint line tenderness noted.   Skin: Skin is warm and dry. No erythema.   Psychiatric: She has a normal mood and affect. Her behavior is normal.   Vitals reviewed.      Tiffani Garcia was seen today for joint swelling.    Diagnoses and all orders for this visit:    Primary osteoarthritis of both knees       Diagnosis Plan   1. Primary osteoarthritis of both knees  XR Knee 1 or 2 View Right    diclofenac (VOLTAREN) 1 % gel gel    Ambulatory Referral to Orthopedic Surgery       Start on stationary bike 10 minutes daily. Unable to tolerate oral  NSAIDS (diabetic).

## 2017-12-13 ENCOUNTER — OFFICE VISIT (OUTPATIENT)
Dept: ORTHOPEDIC SURGERY | Facility: CLINIC | Age: 77
End: 2017-12-13

## 2017-12-13 VITALS
HEART RATE: 78 BPM | SYSTOLIC BLOOD PRESSURE: 156 MMHG | BODY MASS INDEX: 31.86 KG/M2 | HEIGHT: 67 IN | WEIGHT: 203 LBS | DIASTOLIC BLOOD PRESSURE: 81 MMHG

## 2017-12-13 DIAGNOSIS — M17.11 PRIMARY OSTEOARTHRITIS OF RIGHT KNEE: Primary | ICD-10-CM

## 2017-12-13 PROCEDURE — 99213 OFFICE O/P EST LOW 20 MIN: CPT | Performed by: NURSE PRACTITIONER

## 2017-12-27 ENCOUNTER — HOSPITAL ENCOUNTER (OUTPATIENT)
Dept: MAMMOGRAPHY | Facility: HOSPITAL | Age: 77
Discharge: HOME | End: 2017-12-27
Attending: INTERNAL MEDICINE | Admitting: INTERNAL MEDICINE

## 2017-12-27 DIAGNOSIS — Z12.31 SCREENING MAMMOGRAM, ENCOUNTER FOR: ICD-10-CM

## 2017-12-27 PROCEDURE — G0202 SCR MAMMO BI INCL CAD: HCPCS

## 2017-12-27 PROCEDURE — 77063 BREAST TOMOSYNTHESIS BI: CPT

## 2017-12-29 ENCOUNTER — TELEPHONE (OUTPATIENT)
Dept: INTERNAL MEDICINE | Facility: CLINIC | Age: 77
End: 2017-12-29

## 2017-12-29 NOTE — TELEPHONE ENCOUNTER
----- Message from Cyril Quiroga MD sent at 12/29/2017 10:43 AM EST -----  mammmogram normal    Pt given results.dg

## 2018-01-24 RX ORDER — LISINOPRIL AND HYDROCHLOROTHIAZIDE 25; 20 MG/1; MG/1
TABLET ORAL
Qty: 90 TABLET | Refills: 1 | Status: SHIPPED | OUTPATIENT
Start: 2018-01-24 | End: 2018-08-22 | Stop reason: SDUPTHER

## 2018-04-26 ENCOUNTER — OFFICE VISIT (OUTPATIENT)
Dept: INTERNAL MEDICINE | Facility: CLINIC | Age: 78
End: 2018-04-26

## 2018-04-26 ENCOUNTER — HOSPITAL ENCOUNTER (OUTPATIENT)
Dept: GENERAL RADIOLOGY | Facility: HOSPITAL | Age: 78
Discharge: HOME | End: 2018-04-26
Attending: INTERNAL MEDICINE | Admitting: INTERNAL MEDICINE

## 2018-04-26 VITALS
HEIGHT: 67 IN | BODY MASS INDEX: 31.8 KG/M2 | SYSTOLIC BLOOD PRESSURE: 128 MMHG | WEIGHT: 202.6 LBS | OXYGEN SATURATION: 96 % | RESPIRATION RATE: 18 BRPM | DIASTOLIC BLOOD PRESSURE: 80 MMHG | HEART RATE: 72 BPM

## 2018-04-26 DIAGNOSIS — I10 ESSENTIAL HYPERTENSION: Primary | ICD-10-CM

## 2018-04-26 DIAGNOSIS — N30.00 ACUTE CYSTITIS WITHOUT HEMATURIA: ICD-10-CM

## 2018-04-26 DIAGNOSIS — L08.9 FOREIGN BODY OF LEFT INDEX FINGER WITH INFECTION: ICD-10-CM

## 2018-04-26 DIAGNOSIS — E11.9 TYPE 2 DIABETES MELLITUS WITHOUT COMPLICATION, WITHOUT LONG-TERM CURRENT USE OF INSULIN (HCC): ICD-10-CM

## 2018-04-26 DIAGNOSIS — S60.451A FOREIGN BODY OF LEFT INDEX FINGER WITH INFECTION: ICD-10-CM

## 2018-04-26 DIAGNOSIS — E78.2 MIXED HYPERLIPIDEMIA: ICD-10-CM

## 2018-04-26 PROBLEM — H93.13 TINNITUS OF BOTH EARS: Status: ACTIVE | Noted: 2018-04-26

## 2018-04-26 LAB
ALBUMIN SERPL-MCNC: 4.4 G/DL (ref 3.5–5.2)
ALBUMIN/GLOB SERPL: 1.4 G/DL
ALP SERPL-CCNC: 55 U/L (ref 40–129)
ALT SERPL-CCNC: 11 U/L (ref 5–33)
AST SERPL-CCNC: 15 U/L (ref 5–32)
BASOPHILS # BLD AUTO: 0.03 10*3/MM3 (ref 0–0.2)
BASOPHILS NFR BLD AUTO: 0.7 % (ref 0–2)
BILIRUB BLD-MCNC: NEGATIVE MG/DL
BILIRUB SERPL-MCNC: 0.3 MG/DL (ref 0.2–1.2)
BUN SERPL-MCNC: 16 MG/DL (ref 8–23)
BUN/CREAT SERPL: 14.5 (ref 7–25)
CALCIUM SERPL-MCNC: 10.2 MG/DL (ref 8.8–10.5)
CHLORIDE SERPL-SCNC: 99 MMOL/L (ref 98–107)
CHOLEST SERPL-MCNC: 202 MG/DL (ref 0–200)
CLARITY, POC: CLEAR
CO2 SERPL-SCNC: 30.6 MMOL/L (ref 22–29)
COLOR UR: YELLOW
CREAT SERPL-MCNC: 1.1 MG/DL (ref 0.57–1)
EOSINOPHIL # BLD AUTO: 0.14 10*3/MM3 (ref 0.1–0.3)
EOSINOPHIL NFR BLD AUTO: 3.4 % (ref 0–4)
ERYTHROCYTE [DISTWIDTH] IN BLOOD BY AUTOMATED COUNT: 13.2 % (ref 11.5–14.5)
GFR SERPLBLD CREATININE-BSD FMLA CKD-EPI: 48 ML/MIN/1.73
GFR SERPLBLD CREATININE-BSD FMLA CKD-EPI: 59 ML/MIN/1.73
GLOBULIN SER CALC-MCNC: 3.2 GM/DL
GLUCOSE SERPL-MCNC: 124 MG/DL (ref 65–99)
GLUCOSE UR STRIP-MCNC: NEGATIVE MG/DL
HBA1C MFR BLD: 6.2 % (ref 4.8–5.6)
HCT VFR BLD AUTO: 42.1 % (ref 37–47)
HDLC SERPL-MCNC: 85 MG/DL (ref 40–60)
HGB BLD-MCNC: 14.1 G/DL (ref 12–16)
IMM GRANULOCYTES # BLD: 0.01 10*3/MM3 (ref 0–0.03)
IMM GRANULOCYTES NFR BLD: 0.2 % (ref 0–0.5)
KETONES UR QL: NEGATIVE
LDLC SERPL CALC-MCNC: 101 MG/DL (ref 0–100)
LDLC/HDLC SERPL: 1.19 {RATIO}
LEUKOCYTE EST, POC: NEGATIVE
LYMPHOCYTES # BLD AUTO: 1.67 10*3/MM3 (ref 0.6–4.8)
LYMPHOCYTES NFR BLD AUTO: 40 % (ref 20–45)
MCH RBC QN AUTO: 28.8 PG (ref 27–31)
MCHC RBC AUTO-ENTMCNC: 33.5 G/DL (ref 31–37)
MCV RBC AUTO: 85.9 FL (ref 81–99)
MONOCYTES # BLD AUTO: 0.33 10*3/MM3 (ref 0–1)
MONOCYTES NFR BLD AUTO: 7.9 % (ref 3–8)
NEUTROPHILS # BLD AUTO: 1.99 10*3/MM3 (ref 1.5–8.3)
NEUTROPHILS NFR BLD AUTO: 47.8 % (ref 45–70)
NITRITE UR-MCNC: NEGATIVE MG/ML
NRBC BLD AUTO-RTO: 0 /100 WBC (ref 0–0)
PH UR: 6.5 [PH] (ref 5–8)
PLATELET # BLD AUTO: 194 10*3/MM3 (ref 140–500)
POC CREATININE URINE: 300
POC MICROALBUMIN URINE: 30
POTASSIUM SERPL-SCNC: 4.3 MMOL/L (ref 3.5–5.2)
PROT SERPL-MCNC: 7.6 G/DL (ref 6–8.5)
PROT UR STRIP-MCNC: NEGATIVE MG/DL
RBC # BLD AUTO: 4.9 10*6/MM3 (ref 4.2–5.4)
RBC # UR STRIP: NEGATIVE /UL
SODIUM SERPL-SCNC: 139 MMOL/L (ref 136–145)
SP GR UR: 1.03 (ref 1–1.03)
TRIGL SERPL-MCNC: 79 MG/DL (ref 0–150)
TSH SERPL DL<=0.005 MIU/L-ACNC: 1.84 MIU/ML (ref 0.27–4.2)
UROBILINOGEN UR QL: NORMAL
VLDLC SERPL CALC-MCNC: 15.8 MG/DL (ref 7–27)
WBC # BLD AUTO: 4.17 10*3/MM3 (ref 4.8–10.8)

## 2018-04-26 PROCEDURE — 99214 OFFICE O/P EST MOD 30 MIN: CPT | Performed by: INTERNAL MEDICINE

## 2018-04-26 PROCEDURE — 73140 X-RAY EXAM OF FINGER(S): CPT

## 2018-04-26 PROCEDURE — G0439 PPPS, SUBSEQ VISIT: HCPCS | Performed by: INTERNAL MEDICINE

## 2018-04-26 PROCEDURE — 81003 URINALYSIS AUTO W/O SCOPE: CPT | Performed by: INTERNAL MEDICINE

## 2018-04-26 PROCEDURE — 90471 IMMUNIZATION ADMIN: CPT | Performed by: INTERNAL MEDICINE

## 2018-04-26 PROCEDURE — 90632 HEPA VACCINE ADULT IM: CPT | Performed by: INTERNAL MEDICINE

## 2018-04-26 PROCEDURE — 82044 UR ALBUMIN SEMIQUANTITATIVE: CPT | Performed by: INTERNAL MEDICINE

## 2018-04-26 NOTE — PATIENT INSTRUCTIONS
Medicare Wellness  Personal Prevention Plan of Service     Date of Office Visit:  2018  Encounter Provider:  Cyril Quiroga MD  Place of Service:  [facility]  Patient Name: Radha Silverio  :  1940    As part of the Medicare Wellness portion of your visit today, we are providing you with this personalized preventive plan of services (PPPS). This plan is based upon recommendations of the [place] Preventive Services Task Force (USPSTF) and the Advisory Committee on Immunization Practices (ACIP).    This lists the preventive care services that should be considered, and provides dates of when you are due. Items listed as completed are up-to-date and do not require any further intervention.    Health Maintenance   Topic Date Due   • TDAP/TD VACCINES (1 - Tdap) 1961   • ZOSTER VACCINE  2016   • PNEUMOCOCCAL VACCINES (65+ LOW/MEDIUM RISK) (2 of 2 - PPSV23) 2017   • LIPID PANEL  2018   • INFLUENZA VACCINE  2018   • HEMOGLOBIN A1C  10/26/2018   • DIABETIC EYE EXAM  2019   • MEDICARE ANNUAL WELLNESS  2019   • DIABETIC FOOT EXAM  2019   • URINE MICROALBUMIN  2019   • MAMMOGRAM  2019   • COLONOSCOPY  10/11/2026       Orders Placed This Encounter   Procedures   • Microalbumin / Creatinine Urine Ratio - Urine, Clean Catch   • Comprehensive metabolic panel   • Lipid Panel With LDL/HDL Ratio   • TSH   • Hemoglobin A1c   • POC Urinalysis Dipstick, Automated   • CBC w AUTO Differential     Order Specific Question:   Manual Differential     Answer:   No       Return in about 6 months (around 10/26/2018) for Recheck.

## 2018-04-26 NOTE — PROGRESS NOTES
QUICK REFERENCE INFORMATION:  The ABCs of the Annual Wellness Visit    Subsequent Medicare Wellness Visit    HEALTH RISK ASSESSMENT    1940    Recent Hospitalizations:  No hospitalization(s) within the last year..        Current Medical Providers:  Patient Care Team:  Cyril Quiroga MD as PCP - General  Cyril Quiroga MD as PCP - Family Medicine  Cyril Quiroga MD as PCP - Claims Attributed        Smoking Status:  History   Smoking Status   • Never Smoker   Smokeless Tobacco   • Never Used       Alcohol Consumption:  History   Alcohol Use No       Depression Screen:   PHQ-2/PHQ-9 Depression Screening 12/6/2016   Little interest or pleasure in doing things 0   Feeling down, depressed, or hopeless 0   Trouble falling or staying asleep, or sleeping too much 3   Feeling tired or having little energy 3   Poor appetite or overeating 1   Feeling bad about yourself - or that you are a failure or have let yourself or your family down 0   Trouble concentrating on things, such as reading the newspaper or watching television 2   Moving or speaking so slowly that other people could have noticed. Or the opposite - being so fidgety or restless that you have been moving around a lot more than usual 0   Thoughts that you would be better off dead, or of hurting yourself in some way 0   Total Score 9   If you checked off any problems, how difficult have these problems made it for you to do your work, take care of things at home, or get along with other people? Somewhat difficult       Health Habits and Functional and Cognitive Screening:  Functional & Cognitive Status 12/6/2016   Do you have difficulty preparing food and eating? No   Do you have difficulty bathing yourself, getting dressed or grooming yourself? No   Do you have difficulty using the toilet? No   Do you have difficulty moving around from place to place? No   In the past year have you fallen or experienced a near fall? Yes   Do you need help using the phone?  No    Are you deaf or do you have serious difficulty hearing?  Yes   Do you need help with transportation? No   Do you need help shopping? No   Do you need help preparing meals?  No   Do you need help with housework?  No   Do you need help with laundry? No   Do you need help taking your medications? No   Do you need help managing money? No   Do you have difficulty concentrating, remembering or making decisions? Yes           Does the patient have evidence of cognitive impairment? No    Aspirin use counseling: Taking ASA appropriately as indicated      Recent Lab Results:  CMP:  Lab Results   Component Value Date     (H) 06/01/2017    BUN 21 06/01/2017    CREATININE 1.05 (H) 06/01/2017    EGFRIFNONA 51 (L) 06/01/2017    EGFRIFAFRI 62 06/01/2017    BCR 20.0 06/01/2017     06/01/2017    K 3.7 06/01/2017    CO2 27.7 06/01/2017    CALCIUM 9.6 06/01/2017    PROTENTOTREF 7.0 06/01/2017    ALBUMIN 4.20 06/01/2017    LABGLOBREF 2.8 06/01/2017    LABIL2 1.5 06/01/2017    BILITOT 0.8 06/01/2017    ALKPHOS 55 06/01/2017    AST 16 06/01/2017    ALT 11 06/01/2017     Lipid Panel:  Lab Results   Component Value Date    TRIG 60 06/01/2017    HDL 80 (H) 06/01/2017    VLDL 12 06/01/2017     HbA1c:  Lab Results   Component Value Date    HGBA1C 6.20 (H) 06/01/2017       Visual Acuity:  No exam data present    Age-appropriate Screening Schedule:  Refer to the list below for future screening recommendations based on patient's age, sex and/or medical conditions. Orders for these recommended tests are listed in the plan section. The patient has been provided with a written plan.    Health Maintenance   Topic Date Due   • TDAP/TD VACCINES (1 - Tdap) 11/19/1961   • ZOSTER VACCINE  05/24/2016   • PNEUMOCOCCAL VACCINES (65+ LOW/MEDIUM RISK) (2 of 2 - PPSV23) 12/06/2017   • LIPID PANEL  06/01/2018   • INFLUENZA VACCINE  08/01/2018   • HEMOGLOBIN A1C  10/26/2018   • DIABETIC EYE EXAM  04/19/2019   • DIABETIC FOOT EXAM  04/26/2019   •  URINE MICROALBUMIN  04/26/2019   • MAMMOGRAM  12/27/2019   • COLONOSCOPY  10/11/2026        Subjective   History of Present Illness    Radha Silverio is a 77 y.o. female who presents for an Subsequent Wellness Visit.  78 yo female here for both medicare wellness, FU of chronic conditions including dm, hl, htn    Also has new acute problem of L finger injury - 3 years ago while messing with [place] decor.  Intermittent episodes x 3 since that time of pustule formation, better with drainage, minimal sorenss, but always reappears. No significant tenderness.     No cp or dyspnea,     Having new trouble with bilateral tinnitus as well.  She does not have vertigo.  No wax that she knows of. She is living alone.  Does have fatigue but no congestion  She has stopped taking her medication.     The following portions of the patient's history were reviewed and updated as appropriate: allergies, current medications, past family history, past medical history, past social history, past surgical history and problem list.    Outpatient Medications Prior to Visit   Medication Sig Dispense Refill   • Acetaminophen (TYLENOL ARTHRITIS EXT RELIEF PO) Take  by mouth.     • aspirin tablet Take 81 mg by mouth daily.     • diclofenac (VOLTAREN) 1 % gel gel Apply 4 g topically 4 (Four) Times a Day As Needed (knee pain). 100 g 2   • lisinopril-hydrochlorothiazide (PRINZIDE,ZESTORETIC) 20-25 MG per tablet TAKE ONE TABLET BY MOUTH ONCE DAILY 90 tablet 1   • metFORMIN (GLUCOPHAGE) 500 MG tablet TAKE ONE TABLET BY MOUTH TWICE DAILY WITH MEALS 180 tablet 3   • metoprolol succinate XL (TOPROL-XL) 25 MG 24 hr tablet Take 1 tablet by mouth Daily. 30 tablet 11   • PRODIGY NO CODING BLOOD GLUC test strip USE ONE STRIP TO CHECK GLUCOSE ONCE DAILY AS DIRECTED 50 each 11   • simvastatin (ZOCOR) 40 MG tablet TAKE ONE TABLET BY MOUTH ONCE DAILY AT NIGHT 90 tablet 2     No facility-administered medications prior to visit.        Patient Active Problem  "List   Diagnosis   • Essential hypertension   • Hyperlipidemia   • Dysuria   • Urinary tract infection without hematuria   • Chronic constipation   • Acid reflux   • Solis esophagus   • Hematochezia   • Type 2 diabetes mellitus without complication, without long-term current use of insulin   • Hemorrhoids   • Atypical chest pain   • Shortness of breath   • Frequent PVCs   • Atrial bigeminy   • PAC (premature atrial contraction)   • Primary osteoarthritis of knee   • Primary osteoarthritis of right knee   • Tinnitus of both ears   • Foreign body of left index finger with infection       Advance Care Planning:  power of  for healthcare on file, daughter    Identification of Risk Factors:  Risk factors include: weight  and inactivity.    Review of Systems    Compared to one year ago, the patient feels her physical health is the same.  Compared to one year ago, the patient feels her mental health is the same.    Objective     Physical Exam   Constitutional: She appears well-developed and well-nourished.   HENT:   Head: Normocephalic and atraumatic.   Right Ear: External ear normal.   Left Ear: External ear normal.   Eyes: Conjunctivae are normal. Pupils are equal, round, and reactive to light. Right eye exhibits no discharge. Left eye exhibits no discharge.   Cardiovascular: Normal rate and regular rhythm.    No murmur heard.  Pulmonary/Chest: Effort normal.   Abdominal: Soft.   Musculoskeletal:   Small punctum nail bed L 3rd digit, painful     Neurological: She is alert.   Skin: Skin is warm and dry. Capillary refill takes less than 2 seconds.   Psychiatric: She has a normal mood and affect. Her behavior is normal.   Nursing note and vitals reviewed.      Vitals:    04/26/18 0806   BP: 128/80   Pulse: 72   Resp: 18   SpO2: 96%   Weight: 91.9 kg (202 lb 9.6 oz)   Height: 170.2 cm (67\")       Patient's Body mass index is 31.73 kg/m². BMI is above normal parameters. Follow-up plan includes:  nutrition counseling " and continue healthy diet.      Assessment/Plan   Patient Self-Management and Personalized Health Advice  The patient has been provided with information about: fall prevention, supplements and mental health concerns and preventive services including:   · Diabetes screening, see lab orders.    Visit Diagnoses:    ICD-10-CM ICD-9-CM   1. Essential hypertension I10 401.9   2. Mixed hyperlipidemia E78.2 272.2   3. Type 2 diabetes mellitus without complication, without long-term current use of insulin E11.9 250.00   4. Acute cystitis without hematuria N30.00 595.0   5. Foreign body of left index finger with infection S60.451A 915.7    L08.9        Orders Placed This Encounter   Procedures   • XR Finger 2+ View Left (In Office)     Scheduling Instructions:      3rd digit     Order Specific Question:   Reason for Exam:     Answer:   recurrent infection, concern fb   • Hepatitis A Vaccine Adult IM   • Comprehensive metabolic panel   • Lipid Panel With LDL/HDL Ratio   • TSH   • Hemoglobin A1c   • POC Urinalysis Dipstick, Automated   • POCT microalbumin   • CBC w AUTO Differential     Order Specific Question:   Manual Differential     Answer:   No       Outpatient Encounter Prescriptions as of 4/26/2018   Medication Sig Dispense Refill   • Acetaminophen (TYLENOL ARTHRITIS EXT RELIEF PO) Take  by mouth.     • aspirin tablet Take 81 mg by mouth daily.     • diclofenac (VOLTAREN) 1 % gel gel Apply 4 g topically 4 (Four) Times a Day As Needed (knee pain). 100 g 2   • lisinopril-hydrochlorothiazide (PRINZIDE,ZESTORETIC) 20-25 MG per tablet TAKE ONE TABLET BY MOUTH ONCE DAILY 90 tablet 1   • metFORMIN (GLUCOPHAGE) 500 MG tablet TAKE ONE TABLET BY MOUTH TWICE DAILY WITH MEALS 180 tablet 3   • metoprolol succinate XL (TOPROL-XL) 25 MG 24 hr tablet Take 1 tablet by mouth Daily. 30 tablet 11   • PRODIGY NO CODING BLOOD GLUC test strip USE ONE STRIP TO CHECK GLUCOSE ONCE DAILY AS DIRECTED 50 each 11   • simvastatin (ZOCOR) 40 MG tablet  TAKE ONE TABLET BY MOUTH ONCE DAILY AT NIGHT 90 tablet 2     No facility-administered encounter medications on file as of 4/26/2018.        Reviewed use of high risk medication in the elderly: yes  Reviewed for potential of harmful drug interactions in the elderly: yes    Follow Up:  Return in about 6 months (around 10/26/2018) for Recheck.     An After Visit Summary and PPPS with all of these plans were given to the patient.    Hepatitis A  Prevnar done 2016

## 2018-04-27 ENCOUNTER — TELEPHONE (OUTPATIENT)
Dept: INTERNAL MEDICINE | Facility: CLINIC | Age: 78
End: 2018-04-27

## 2018-04-27 NOTE — TELEPHONE ENCOUNTER
04/27/2018 Pt given the info and also the info for the doctor and a copy of report faxed to her house.[email or web address]

## 2018-05-01 ENCOUNTER — TELEPHONE (OUTPATIENT)
Dept: INTERNAL MEDICINE | Facility: CLINIC | Age: 78
End: 2018-05-01

## 2018-05-01 NOTE — TELEPHONE ENCOUNTER
Patient has been advised and voiced understanding. Results mailed to patient at her request.     ----- Message from Cyril Quiroga MD sent at 5/1/2018  8:50 AM EDT -----  Labs are okay. Her kidney and sugar are stable.

## 2018-06-17 RX ORDER — METOPROLOL SUCCINATE 25 MG/1
TABLET, EXTENDED RELEASE ORAL
Qty: 30 TABLET | Refills: 1 | Status: SHIPPED | OUTPATIENT
Start: 2018-06-17 | End: 2018-08-22 | Stop reason: SDUPTHER

## 2018-06-20 ENCOUNTER — OFFICE VISIT (OUTPATIENT)
Dept: INTERNAL MEDICINE | Facility: CLINIC | Age: 78
End: 2018-06-20

## 2018-06-20 VITALS
OXYGEN SATURATION: 98 % | SYSTOLIC BLOOD PRESSURE: 118 MMHG | RESPIRATION RATE: 16 BRPM | HEIGHT: 67 IN | HEART RATE: 78 BPM | BODY MASS INDEX: 31.99 KG/M2 | WEIGHT: 203.8 LBS | DIASTOLIC BLOOD PRESSURE: 70 MMHG

## 2018-06-20 DIAGNOSIS — R82.998 LEUKOCYTES IN URINE: ICD-10-CM

## 2018-06-20 DIAGNOSIS — R82.90 ABNORMAL URINALYSIS: ICD-10-CM

## 2018-06-20 DIAGNOSIS — M17.11 PRIMARY OSTEOARTHRITIS OF RIGHT KNEE: ICD-10-CM

## 2018-06-20 DIAGNOSIS — R60.9 DEPENDENT EDEMA: ICD-10-CM

## 2018-06-20 DIAGNOSIS — I10 ESSENTIAL HYPERTENSION: Primary | ICD-10-CM

## 2018-06-20 DIAGNOSIS — R05.3 CHRONIC COUGH: ICD-10-CM

## 2018-06-20 DIAGNOSIS — I49.1 PAC (PREMATURE ATRIAL CONTRACTION): ICD-10-CM

## 2018-06-20 LAB
BILIRUB BLD-MCNC: NEGATIVE MG/DL
CLARITY, POC: ABNORMAL
COLOR UR: YELLOW
GLUCOSE UR STRIP-MCNC: NEGATIVE MG/DL
KETONES UR QL: NEGATIVE
LEUKOCYTE EST, POC: ABNORMAL
NITRITE UR-MCNC: NEGATIVE MG/ML
PH UR: 6 [PH] (ref 5–8)
PROT UR STRIP-MCNC: NEGATIVE MG/DL
RBC # UR STRIP: ABNORMAL /UL
SP GR UR: 1.01 (ref 1–1.03)
UROBILINOGEN UR QL: NORMAL

## 2018-06-20 PROCEDURE — 99214 OFFICE O/P EST MOD 30 MIN: CPT | Performed by: INTERNAL MEDICINE

## 2018-06-20 PROCEDURE — 81003 URINALYSIS AUTO W/O SCOPE: CPT | Performed by: INTERNAL MEDICINE

## 2018-06-20 PROCEDURE — 93000 ELECTROCARDIOGRAM COMPLETE: CPT | Performed by: INTERNAL MEDICINE

## 2018-06-20 RX ORDER — FUROSEMIDE 20 MG/1
20 TABLET ORAL DAILY
Qty: 30 TABLET | Refills: 1 | Status: SHIPPED | OUTPATIENT
Start: 2018-06-20 | End: 2019-02-08

## 2018-06-20 NOTE — PATIENT INSTRUCTIONS
Radha was seen today for foot swelling.    Diagnoses and all orders for this visit:    Essential hypertension    PAC (premature atrial contraction)    Dependent edema  -     furosemide (LASIX) 20 MG tablet; Take 1 tablet by mouth Daily. At 2-5 pm as needed for leg swelling    Primary osteoarthritis of right knee    Abnormal urinalysis    Chronic cough  -     Full Pulmonary Function Test With Bronchodilator; Future      Will add diuretic as needed at 2-5 pm, add banana daily if take water pill.   If taking 2-3 times per week, let me know because we need to check potassium  Check weights several times per week.    EKG today with sinus arrhythmia.    No need for x ray at this time.   UA with trace blood, send for counts.

## 2018-06-23 LAB
APPEARANCE UR: CLEAR
BACTERIA #/AREA URNS HPF: ABNORMAL /HPF
BACTERIA UR CULT: NORMAL
BACTERIA UR CULT: NORMAL
BILIRUB UR QL STRIP: NEGATIVE
CASTS URNS MICRO: ABNORMAL
CASTS URNS QL MICRO: PRESENT /LPF
COLOR UR: YELLOW
EPI CELLS #/AREA URNS HPF: ABNORMAL /HPF
GLUCOSE UR QL: NEGATIVE
HGB UR QL STRIP: NEGATIVE
KETONES UR QL STRIP: NEGATIVE
LEUKOCYTE ESTERASE UR QL STRIP: ABNORMAL
MICRO URNS: ABNORMAL
MUCOUS THREADS URNS QL MICRO: PRESENT /HPF
NITRITE UR QL STRIP: NEGATIVE
PH UR STRIP: 6 [PH] (ref 5–7.5)
PROT UR QL STRIP: NEGATIVE
RBC #/AREA URNS HPF: ABNORMAL /HPF
SP GR UR: 1.01 (ref 1–1.03)
URINALYSIS REFLEX: ABNORMAL
UROBILINOGEN UR STRIP-MCNC: 0.2 MG/DL (ref 0.2–1)
WBC #/AREA URNS HPF: ABNORMAL /HPF

## 2018-06-25 ENCOUNTER — OFFICE VISIT (OUTPATIENT)
Dept: ORTHOPEDIC SURGERY | Facility: CLINIC | Age: 78
End: 2018-06-25

## 2018-06-25 VITALS — WEIGHT: 203 LBS | BODY MASS INDEX: 31.86 KG/M2 | HEIGHT: 67 IN

## 2018-06-25 DIAGNOSIS — M17.11 PRIMARY OSTEOARTHRITIS OF RIGHT KNEE: Primary | ICD-10-CM

## 2018-06-25 PROCEDURE — 99212 OFFICE O/P EST SF 10 MIN: CPT | Performed by: NURSE PRACTITIONER

## 2018-06-25 PROCEDURE — 73562 X-RAY EXAM OF KNEE 3: CPT | Performed by: NURSE PRACTITIONER

## 2018-06-25 NOTE — PROGRESS NOTES
Subjective:     Patient ID: Radha Silverio is a 77 y.o. female.    Chief Complaint: Follow-up right knee pain, primary osteoarthritis right knee    History of Present Illness    Ms. Bermudez resents to clinic for follow-up of right knee.  Reports approximately 1 month ago after tripping over weed eater.  Reports that her right knee bent back behind her and she landed on the top of her left knee.  States the pain has decreased and continues to experience mild swelling over bilateral knees.  She does report popping and grinding at bilateral knees and mild pain present over the medial joint line.  Denies that the knees are giving out on her, buckling or locking.  Denies that she is falling because of the knee pain or giving out on her.  She does not wish to proceed with any steroid injections secondary to possible muscle mass decrease when she is received steroid injections in the past.  Denies that she has been wearing any knee braces her knee sleeves.  Denies presence of numbness and tingling at right lower extremity.  Denies other concerns present this time.     Social History     Occupational History   • homemaker      Social History Main Topics   • Smoking status: Never Smoker   • Smokeless tobacco: Never Used   • Alcohol use No   • Drug use: No   • Sexual activity: No      Past Medical History:   Diagnosis Date   • Abdominal pain, LLQ (left lower quadrant)     with radiation to right back. Burning, cramping and sharp & is 4/10 in intensity.   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   • GERD (gastroesophageal reflux disease)    • GI problem    • Headache    • Hematochezia 12/6/2016   • High cholesterol    • Hyperlipidemia  5/24/2016   • Hypertension    • Lumbosacral disc disease    • Palpitations    • SOB (shortness of breath)    • Urinary tract infection without hematuria 5/24/2016     Past Surgical History:   Procedure Laterality Date   • BREAST BIOPSY Left     cyst at 11 y/o   • CHOLECYSTECTOMY     • COLONOSCOPY N/A 10/11/2016    Procedure: COLONOSCOPY TO CECUM, TO TERMINAL ILEUM WITH HOT SNARE POLYPECTOMY;  Surgeon: Palmira Calix MD;  Location: [facility] ENDOSCOPY;  Service:    • CYST REMOVAL Right     breast   • ENDOSCOPY N/A 10/11/2016    Procedure: ESOPHAGOGASTRODUODENOSCOPY WITH BIOPSY;  Surgeon: Palmira Calix MD;  Location: [facility] ENDOSCOPY;  Service:    • LAPAROSCOPIC CHOLECYSTECTOMY W/ CHOLANGIOGRAPHY     • MOLE REMOVAL      on foot    • OOPHORECTOMY Left    • TUBAL ABDOMINAL LIGATION         Family History   Problem Relation Age of Onset   • Diabetes Mother    • Heart disease Father    • Diabetes Sister    • Heart disease Sister    • Breast cancer Sister    • Cancer Sister    • Stroke Sister    • Diabetes Brother    • Heart disease Brother    • Cancer Brother    • Breast cancer Sister          Review of Systems   Constitutional: Negative for chills, diaphoresis, fever and unexpected weight change.   HENT: Negative for hearing loss, nosebleeds, sore throat and tinnitus.    Eyes: Negative for pain and visual disturbance.   Respiratory: Negative for cough, shortness of breath and wheezing.    Cardiovascular: Negative for chest pain and palpitations.   Gastrointestinal: Negative for abdominal pain, diarrhea, nausea and vomiting.   Endocrine: Negative for cold intolerance, heat intolerance and polydipsia.   Genitourinary: Negative for difficulty urinating, dysuria and hematuria.   Musculoskeletal: Positive for arthralgias and myalgias. Negative for joint swelling.   Skin: Negative for rash and wound.   Allergic/Immunologic: Negative for environmental allergies.   Neurological: Negative for dizziness, syncope and numbness.  "  Hematological: Does not bruise/bleed easily.   Psychiatric/Behavioral: Negative for dysphoric mood and sleep disturbance. The patient is not nervous/anxious.            Objective:  Physical Exam    General: No acute distress.  Eyes: conjunctiva clear; pupils equally round and reactive  ENT: external ears and nose atraumatic; oropharynx clear  CV: no peripheral edema  Resp: normal respiratory effort  Skin: no rashes or wounds; normal turgor  Psych: mood and affect appropriate; recent and remote memory intact    Vitals:    06/25/18 1019   Weight: 92.1 kg (203 lb)   Height: 170.2 cm (67\")     1    06/25/18  1019   Weight: 92.1 kg (203 lb)     Body mass index is 31.79 kg/m².     Right Knee Exam     Range of Motion   Extension: 5   Flexion: 120     Tests   Chao:  Medial - negative Lateral - negative  Lachman:  Anterior - positive    Posterior - negative  Drawer:       Anterior - negative    Posterior - negative  Varus: negative  Valgus: negative    Other   Erythema: absent  Sensation: normal  Pulse: present  Swelling: mild    Comments:  Mild crepitus throughout arc of motion           Imaging:  Right Knee X-Ray  Indication: Pain    AP, Lateral, and [place] views    Findings:  No fracture  No bony lesion  Normal soft tissues  Osteoarthritis, no changes since last x-ray right knee completed 12/8/2017    Prior studies were available for comparison.    Assessment:       1. Primary osteoarthritis of right knee          Plan:  1.  Discussed plan of care with patient.  Again she does not wish to proceed with any steroid injections.  Did discuss briefly about monovisc encouraged her to call back to clinic for pricing.  Did again remind her to  an over-the-counter knee sleeve for support to assist with pain.  We'll plan to see her back as needed.  Patient verbalized understanding of all information agrees with plan of care.  Denies other concerns present this time.  Orders:  Orders Placed This Encounter   Procedures "   • XR Knee 3 View Right     Dictated utilizing Dragon dictation

## 2018-07-18 ENCOUNTER — HOSPITAL ENCOUNTER (OUTPATIENT)
Dept: PULMONOLOGY | Facility: HOSPITAL | Age: 78
Discharge: HOME | End: 2018-07-18
Attending: INTERNAL MEDICINE | Admitting: INTERNAL MEDICINE

## 2018-07-18 DIAGNOSIS — R05.3 CHRONIC COUGH: ICD-10-CM

## 2018-07-18 PROCEDURE — 94729 DIFFUSING CAPACITY: CPT

## 2018-07-18 PROCEDURE — 94726 PLETHYSMOGRAPHY LUNG VOLUMES: CPT

## 2018-07-18 PROCEDURE — 94010 BREATHING CAPACITY TEST: CPT

## 2018-07-20 ENCOUNTER — TELEPHONE (OUTPATIENT)
Dept: INTERNAL MEDICINE | Facility: CLINIC | Age: 78
End: 2018-07-20

## 2018-08-22 RX ORDER — METOPROLOL SUCCINATE 25 MG/1
TABLET, EXTENDED RELEASE ORAL
Qty: 30 TABLET | Refills: 1 | Status: SHIPPED | OUTPATIENT
Start: 2018-08-22 | End: 2018-10-27 | Stop reason: SDUPTHER

## 2018-08-22 RX ORDER — LISINOPRIL AND HYDROCHLOROTHIAZIDE 25; 20 MG/1; MG/1
TABLET ORAL
Qty: 90 TABLET | Refills: 1 | Status: SHIPPED | OUTPATIENT
Start: 2018-08-22 | End: 2019-03-01 | Stop reason: SDUPTHER

## 2018-10-04 ENCOUNTER — HOSPITAL ENCOUNTER (OUTPATIENT)
Dept: GENERAL RADIOLOGY | Facility: HOSPITAL | Age: 78
Discharge: HOME | End: 2018-10-04
Attending: INTERNAL MEDICINE | Admitting: INTERNAL MEDICINE

## 2018-10-04 ENCOUNTER — OFFICE VISIT (OUTPATIENT)
Dept: INTERNAL MEDICINE | Facility: CLINIC | Age: 78
End: 2018-10-04

## 2018-10-04 VITALS
BODY MASS INDEX: 31.55 KG/M2 | HEIGHT: 67 IN | HEART RATE: 78 BPM | RESPIRATION RATE: 14 BRPM | DIASTOLIC BLOOD PRESSURE: 86 MMHG | WEIGHT: 201 LBS | SYSTOLIC BLOOD PRESSURE: 142 MMHG | OXYGEN SATURATION: 98 %

## 2018-10-04 DIAGNOSIS — M25.552 LEFT HIP PAIN: ICD-10-CM

## 2018-10-04 DIAGNOSIS — Z23 NEEDS FLU SHOT: ICD-10-CM

## 2018-10-04 DIAGNOSIS — M25.552 LEFT HIP PAIN: Primary | ICD-10-CM

## 2018-10-04 PROCEDURE — G0008 ADMIN INFLUENZA VIRUS VAC: HCPCS | Performed by: INTERNAL MEDICINE

## 2018-10-04 PROCEDURE — 73502 X-RAY EXAM HIP UNI 2-3 VIEWS: CPT

## 2018-10-04 PROCEDURE — 99214 OFFICE O/P EST MOD 30 MIN: CPT | Performed by: INTERNAL MEDICINE

## 2018-10-04 PROCEDURE — 90662 IIV NO PRSV INCREASED AG IM: CPT | Performed by: INTERNAL MEDICINE

## 2018-10-04 RX ORDER — METHYLPREDNISOLONE 4 MG/1
TABLET ORAL
Qty: 1 EACH | Refills: 0 | Status: SHIPPED | OUTPATIENT
Start: 2018-10-04 | End: 2018-10-11

## 2018-10-04 NOTE — PROGRESS NOTES
Radha Silverio is a 77 y.o. female, who presents with a chief complaint of   Chief Complaint   Patient presents with   • Sciatica     left side       HPI   78 yo female with pmhx as noted. Here for acute visit for new L sided pain in buttock. Pain started last Thursday, unable to sleep on that side. She hurts into the groin.  She has pain when. Sitting   She had fall in June that hurt her R side (ankle and knee sprain).      Chronic LBP taking tylenol only. Very resistent to any medication. Did take prn diuretic with banana on none now.     R ankle much better.    The following portions of the patient's history were reviewed and updated as appropriate: allergies, current medications, past family history, past medical history, past social history, past surgical history and problem list.    Allergies: Patient has no known allergies.    Current Outpatient Prescriptions:   •  Acetaminophen (TYLENOL ARTHRITIS EXT RELIEF PO), Take  by mouth., Disp: , Rfl:   •  aspirin tablet, Take 81 mg by mouth daily., Disp: , Rfl:   •  diclofenac (VOLTAREN) 1 % gel gel, Apply 4 g topically 4 (Four) Times a Day As Needed (knee pain)., Disp: 100 g, Rfl: 2  •  furosemide (LASIX) 20 MG tablet, Take 1 tablet by mouth Daily. At 2-5 pm as needed for leg swelling, Disp: 30 tablet, Rfl: 1  •  lisinopril-hydrochlorothiazide (PRINZIDE,ZESTORETIC) 20-25 MG per tablet, TAKE ONE TABLET BY MOUTH ONCE DAILY, Disp: 90 tablet, Rfl: 1  •  metFORMIN (GLUCOPHAGE) 500 MG tablet, TAKE ONE TABLET BY MOUTH TWICE DAILY WITH MEALS, Disp: 180 tablet, Rfl: 3  •  metoprolol succinate XL (TOPROL-XL) 25 MG 24 hr tablet, TAKE 1 TABLET BY MOUTH ONCE DAILY, Disp: 30 tablet, Rfl: 1  •  PRODIGY NO CODING BLOOD GLUC test strip, USE ONE STRIP TO CHECK GLUCOSE ONCE DAILY AS DIRECTED, Disp: 50 each, Rfl: 11  •  simvastatin (ZOCOR) 40 MG tablet, TAKE ONE TABLET BY MOUTH ONCE DAILY AT NIGHT, Disp: 90 tablet, Rfl: 2  •  MethylPREDNISolone (MEDROL, GALDINO,) 4 MG tablet,  "Take as directed on package instructions., Disp: 1 each, Rfl: 0  There are no discontinued medications.    Review of Systems   Constitutional: Negative.    HENT: Negative.    Respiratory: Negative.    Cardiovascular: Negative for leg swelling.   Genitourinary: Negative.    Musculoskeletal: Positive for back pain.        Pain anterior L hip.  FROM but pain with flexed adduction   Skin: Negative.    Neurological: Negative for dizziness and headaches.   All other systems reviewed and are negative.            /86   Pulse 78   Resp 14   Ht 170.2 cm (67\")   Wt 91.2 kg (201 lb)   SpO2 98%   BMI 31.48 kg/m²       Physical Exam   Constitutional: She appears well-developed and well-nourished.   HENT:   Head: Normocephalic and atraumatic.   Right Ear: External ear normal.   Left Ear: External ear normal.   Mouth/Throat: No oropharyngeal exudate.   Pulmonary/Chest: Effort normal. No respiratory distress.   Abdominal: Soft.   No hernia identified.   Musculoskeletal: She exhibits no edema.   FRom L hip but pain with flexed adduction, neg slr, no buttock pain. NO trochanteric pain. No knee pain. =+patellar crepitus L and R   Neurological: She is alert.   Skin: Capillary refill takes less than 2 seconds. No rash noted. No erythema.   Psychiatric: She has a normal mood and affect. Her behavior is normal.   Vitals reviewed.      Lab Results (most recent)     None          Results for orders placed or performed in visit on 06/20/18   Urine culture, Comprehensive   Result Value Ref Range    Urine Culture Final report     Result 1 Comment    Urinalysis With / Culture If Indicated - Urine, Clean Catch   Result Value Ref Range    Specific Gravity, UA 1.013 1.005 - 1.030    pH, UA 6.0 5.0 - 7.5    Color, UA Yellow Yellow    Appearance, UA Clear Clear    Leukocytes, UA 1+ (A) Negative    Protein Negative Negative/Trace    Glucose, UA Negative Negative    Ketones Negative Negative    Blood, UA Negative Negative    Bilirubin, UA " Negative Negative    Urobilinogen, UA 0.2 0.2 - 1.0 mg/dL    Nitrite, UA Negative Negative    Microscopic Examination See below:     Urinalysis Reflex Comment    Microscopic Examination   Result Value Ref Range    WBC, UA 0-5 0 - 5 /hpf    RBC, UA 0-2 0 - 2 /hpf    Epithelial Cells (non renal) 0-10 0 - 10 /hpf    Casts Present (A) None seen /lpf    Cast Type Hyaline casts N/A    Mucus, UA Present Not Estab. /hpf    Bacteria, UA None seen None seen/Few /hpf   POC Urinalysis Dipstick, Automated   Result Value Ref Range    Color Yellow Yellow, Straw, Dark Yellow, Rina    Clarity, UA Cloudy (A) Clear    Specific Gravity  1.015 1.005 - 1.030    pH, Urine 6.0 5.0 - 8.0    Leukocytes Small (1+) (A) Negative    Nitrite, UA Negative Negative    Protein, POC Negative Negative mg/dL    Glucose, UA Negative Negative, 1000 mg/dL (3+) mg/dL    Ketones, UA Negative Negative    Urobilinogen, UA Normal Normal    Bilirubin Negative Negative    Blood, UA Trace (A) Negative           Radha was seen today for sciatica.    Diagnoses and all orders for this visit:    Left hip pain  -     XR Hip With or Without Pelvis 2 - 3 View Left; Future  -     MethylPREDNISolone (MEDROL, GALDINO,) 4 MG tablet; Take as directed on package instructions.  -     Ambulatory Referral to Orthopedic Surgery    Needs flu shot  -     Fluzone High Dose =>65Years    Discussed need for ortho consult  Medrol today reassurance will not gain weight  Will keep her care local.       No Follow-up on file.    Cyril Quiroga MD  10/04/2018

## 2018-10-08 ENCOUNTER — TELEPHONE (OUTPATIENT)
Dept: INTERNAL MEDICINE | Facility: CLINIC | Age: 78
End: 2018-10-08

## 2018-10-08 NOTE — TELEPHONE ENCOUNTER
Patient advised of results she is willing to go through with ortho consult. Referral placed.     ----- Message from Cyril Quiroga MD sent at 10/5/2018  1:25 PM EDT -----  X ray actually looks pretty good.  Has OA there as I suspected.I think should proceed with ortho consult as we talked about.

## 2018-10-11 ENCOUNTER — OFFICE VISIT (OUTPATIENT)
Dept: ORTHOPEDIC SURGERY | Facility: CLINIC | Age: 78
End: 2018-10-11

## 2018-10-11 DIAGNOSIS — M70.62 GREATER TROCHANTERIC BURSITIS OF LEFT HIP: ICD-10-CM

## 2018-10-11 DIAGNOSIS — M16.12 PRIMARY OSTEOARTHRITIS OF LEFT HIP: Primary | ICD-10-CM

## 2018-10-11 PROCEDURE — 99213 OFFICE O/P EST LOW 20 MIN: CPT | Performed by: NURSE PRACTITIONER

## 2018-10-11 NOTE — PROGRESS NOTES
Subjective:     Patient ID: Radha Silverio is a 77 y.o. female.    Chief Complaint:  Primary osteoarthritis left hip  History of Present Illness  Radha Silverio presents to clinic with left hip pain.  She was seen by her primary care provider who sent her for x-rays at [facility], started her on steroid pack and encouraged her to follow-up in our office.  Positive for pain radiating into groin, left side as well as pain on the lateral aspect of her leg which does radiate down to her knee.  Denies presence of numbness and tingling radiating down the entire aspect of the left lower extremity.  Denies that the hip is giving out on her.  Rates pain today at a 4 out of a 10 mainly aching in nature but has significantly improved after the corticosteroid pack.  She does not wish to proceed with any corticosteroid injections as was stated at her last visit.  States that the knee has significantly improved since she was last seen by this APRN on 6/25/2018.  Denies that the hip is giving out on her, locking and does not feel unstable ambulating at the left lower extremity.  Denies all other concerns present this time.       Social History     Occupational History   • homemaker      Social History Main Topics   • Smoking status: Never Smoker   • Smokeless tobacco: Never Used   • Alcohol use No   • Drug use: No   • Sexual activity: No      Past Medical History:   Diagnosis Date   • Abdominal pain, LLQ (left lower quadrant)     with radiation to right back. Burning, cramping and sharp & is 4/10 in intensity.   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   • GERD (gastroesophageal reflux disease)    • GI problem    •  Headache    • Hematochezia 12/6/2016   • High cholesterol    • Hyperlipidemia 5/24/2016   • Hypertension    • Lumbosacral disc disease    • Palpitations    • SOB (shortness of breath)    • Urinary tract infection without hematuria 5/24/2016     Past Surgical History:   Procedure Laterality Date   • BREAST BIOPSY Left     cyst at 11 y/o   • CHOLECYSTECTOMY     • COLONOSCOPY N/A 10/11/2016    Procedure: COLONOSCOPY TO CECUM, TO TERMINAL ILEUM WITH HOT SNARE POLYPECTOMY;  Surgeon: Palmira Calix MD;  Location: [facility] ENDOSCOPY;  Service:    • CYST REMOVAL Right     breast   • ENDOSCOPY N/A 10/11/2016    Procedure: ESOPHAGOGASTRODUODENOSCOPY WITH BIOPSY;  Surgeon: Palmira Calix MD;  Location: [facility] ENDOSCOPY;  Service:    • LAPAROSCOPIC CHOLECYSTECTOMY W/ CHOLANGIOGRAPHY     • MOLE REMOVAL      on foot    • OOPHORECTOMY Left    • TUBAL ABDOMINAL LIGATION         Family History   Problem Relation Age of Onset   • Diabetes Mother    • Heart disease Father    • Diabetes Sister    • Heart disease Sister    • Breast cancer Sister    • Cancer Sister    • Stroke Sister    • Diabetes Brother    • Heart disease Brother    • Cancer Brother    • Breast cancer Sister          Review of Systems   Constitutional: Negative for chills, diaphoresis, fever and unexpected weight change.   HENT: Negative for hearing loss, nosebleeds, sore throat and tinnitus.    Eyes: Negative for pain and visual disturbance.   Respiratory: Negative for cough, shortness of breath and wheezing.    Cardiovascular: Negative for chest pain and palpitations.   Gastrointestinal: Negative for abdominal pain, diarrhea, nausea and vomiting.   Endocrine: Negative for cold intolerance, heat intolerance and polydipsia.   Genitourinary: Negative for difficulty urinating, dysuria and hematuria.   Musculoskeletal: Positive for arthralgias. Negative for joint swelling and myalgias.   Skin: Negative for rash and wound.   Allergic/Immunologic: Negative for  environmental allergies.   Neurological: Negative for dizziness, syncope and numbness.   Hematological: Does not bruise/bleed easily.   Psychiatric/Behavioral: Negative for dysphoric mood and sleep disturbance. The patient is not nervous/anxious.            Objective:  Physical Exam    General: No acute distress.  Eyes: conjunctiva clear; pupils equally round and reactive  ENT: external ears and nose atraumatic; oropharynx clear  CV: no peripheral edema  Resp: normal respiratory effort  Skin: no rashes or wounds; normal turgor  Psych: mood and affect appropriate; recent and remote memory intact    There were no vitals filed for this visit.  There were no vitals filed for this visit.  There is no height or weight on file to calculate BMI.      Left Hip Exam     Tenderness   The patient is experiencing tenderness in the greater trochanter and anterior.    Range of Motion   Extension: 0   Flexion: 90   Internal Rotation: 20   External Rotation: 40   Abduction: 45   Adduction: 25     Muscle Strength   Abduction: 4/5   Adduction: 4/5   Flexion: 4/5     Tests   KASEY: positive  Maryana: positive  Fadir:  Positive FADIR test    Other   Erythema: absent  Scars: absent  Sensation: normal  Pulse: present    Comments:  Positive logroll exam  positive stinchfield exam                 Imaging:  Reviewed x-rays previously completed BH lag  LEFT HIP, 10/4/2018         HISTORY:  77-year-old female complaining of one week history of left hip pain.     TECHNIQUE:  Two view left hip series.     FINDINGS:  No acute or chronic fracture deformity is demonstrated. No evidence of  insufficiency fracture. Mild degenerative changes at the hip joint. Exam  otherwise negative.     IMPRESSION:  1. No acute osseous abnormality.  2. Mild degenerative arthropathy at the hip joint.     This report was finalized on 10/4/2018 1:00 PM by Dr. Romain Ordoñez MD.       Assessment:        1. Primary osteoarthritis of left hip    2. Greater trochanteric  bursitis of left hip           Plan:  1.  Discussed the care with patient.  Since she is improved significantly does not wish to proceed with any corticosteroid injections.  Discussed with patient that the local corticosteroid injection that she would receive does not cause weight gain edges her care.  2.  Did discuss with patient that she can apply Aspercreme topical over-the-counter pain reliever to the lateral aspect of the hip for symptom relief.  We'll plan to see her back as needed.  Patient verbalized understanding of all information agrees plan of care.  Denies other concerns that she has at this time.  Orders:  No orders of the defined types were placed in this encounter.      Dictated utilizing Dragon dictation

## 2018-10-25 ENCOUNTER — TELEPHONE (OUTPATIENT)
Dept: OBSTETRICS AND GYNECOLOGY | Facility: CLINIC | Age: 78
End: 2018-10-25

## 2018-10-25 ENCOUNTER — OFFICE VISIT (OUTPATIENT)
Dept: INTERNAL MEDICINE | Facility: CLINIC | Age: 78
End: 2018-10-25

## 2018-10-25 VITALS
SYSTOLIC BLOOD PRESSURE: 152 MMHG | RESPIRATION RATE: 14 BRPM | OXYGEN SATURATION: 98 % | DIASTOLIC BLOOD PRESSURE: 82 MMHG | WEIGHT: 202.6 LBS | HEIGHT: 67 IN | HEART RATE: 79 BPM | BODY MASS INDEX: 31.8 KG/M2

## 2018-10-25 DIAGNOSIS — N81.10 VAGINAL PROLAPSE: ICD-10-CM

## 2018-10-25 DIAGNOSIS — E78.2 MIXED HYPERLIPIDEMIA: ICD-10-CM

## 2018-10-25 DIAGNOSIS — E11.9 TYPE 2 DIABETES MELLITUS WITHOUT COMPLICATION, WITHOUT LONG-TERM CURRENT USE OF INSULIN (HCC): Primary | ICD-10-CM

## 2018-10-25 DIAGNOSIS — I10 ESSENTIAL HYPERTENSION: ICD-10-CM

## 2018-10-25 DIAGNOSIS — N18.30 STAGE 3 CHRONIC KIDNEY DISEASE (HCC): ICD-10-CM

## 2018-10-25 LAB
25(OH)D3+25(OH)D2 SERPL-MCNC: 24.6 NG/ML
ALBUMIN SERPL-MCNC: 4.5 G/DL (ref 3.5–5.2)
ALBUMIN/GLOB SERPL: 1.9 G/DL
ALP SERPL-CCNC: 51 U/L (ref 40–129)
ALT SERPL-CCNC: 21 U/L (ref 5–33)
AST SERPL-CCNC: 21 U/L (ref 5–32)
BASOPHILS # BLD AUTO: 0.02 10*3/MM3 (ref 0–0.2)
BASOPHILS NFR BLD AUTO: 0.6 % (ref 0–2)
BILIRUB SERPL-MCNC: 0.5 MG/DL (ref 0.2–1.2)
BUN SERPL-MCNC: 13 MG/DL (ref 8–23)
BUN/CREAT SERPL: 12.3 (ref 7–25)
CALCIUM SERPL-MCNC: 9.8 MG/DL (ref 8.8–10.5)
CHLORIDE SERPL-SCNC: 99 MMOL/L (ref 98–107)
CO2 SERPL-SCNC: 30.8 MMOL/L (ref 22–29)
CREAT SERPL-MCNC: 1.06 MG/DL (ref 0.57–1)
EOSINOPHIL # BLD AUTO: 0.1 10*3/MM3 (ref 0.1–0.3)
EOSINOPHIL NFR BLD AUTO: 3 % (ref 0–4)
ERYTHROCYTE [DISTWIDTH] IN BLOOD BY AUTOMATED COUNT: 12.9 % (ref 11.5–14.5)
GLOBULIN SER CALC-MCNC: 2.4 GM/DL
GLUCOSE SERPL-MCNC: 110 MG/DL (ref 65–99)
HBA1C MFR BLD: 6.3 % (ref 4.8–5.6)
HCT VFR BLD AUTO: 38.6 % (ref 37–47)
HGB BLD-MCNC: 12.9 G/DL (ref 12–16)
IMM GRANULOCYTES # BLD: 0 10*3/MM3 (ref 0–0.03)
IMM GRANULOCYTES NFR BLD: 0 % (ref 0–0.5)
LYMPHOCYTES # BLD AUTO: 1.45 10*3/MM3 (ref 0.6–4.8)
LYMPHOCYTES NFR BLD AUTO: 43.5 % (ref 20–45)
MAGNESIUM SERPL-MCNC: 1.9 MG/DL (ref 1.7–2.5)
MCH RBC QN AUTO: 28.9 PG (ref 27–31)
MCHC RBC AUTO-ENTMCNC: 33.4 G/DL (ref 31–37)
MCV RBC AUTO: 86.5 FL (ref 81–99)
MONOCYTES # BLD AUTO: 0.28 10*3/MM3 (ref 0–1)
MONOCYTES NFR BLD AUTO: 8.4 % (ref 3–8)
NEUTROPHILS # BLD AUTO: 1.48 10*3/MM3 (ref 1.5–8.3)
NEUTROPHILS NFR BLD AUTO: 44.5 % (ref 45–70)
NRBC BLD AUTO-RTO: 0 /100 WBC (ref 0–0)
PHOSPHATE SERPL-MCNC: 2.8 MG/DL (ref 2.7–4.5)
PLATELET # BLD AUTO: 181 10*3/MM3 (ref 140–500)
POTASSIUM SERPL-SCNC: 4 MMOL/L (ref 3.5–5.2)
PROT SERPL-MCNC: 6.9 G/DL (ref 6–8.5)
RBC # BLD AUTO: 4.46 10*6/MM3 (ref 4.2–5.4)
SODIUM SERPL-SCNC: 140 MMOL/L (ref 136–145)
TSH SERPL DL<=0.005 MIU/L-ACNC: 2.01 MIU/ML (ref 0.27–4.2)
WBC # BLD AUTO: 3.33 10*3/MM3 (ref 4.8–10.8)

## 2018-10-25 PROCEDURE — 99214 OFFICE O/P EST MOD 30 MIN: CPT | Performed by: INTERNAL MEDICINE

## 2018-10-25 NOTE — PROGRESS NOTES
Radha Silverio is a 77 y.o. female, who presents with a chief complaint of   Chief Complaint   Patient presents with   • Female  Problem   • Hypertension       HPI   76yo female with pmhx HTN, HL, DM - all well controlled, here for 6 month evaluation - last labs 4/2018 and reviewed.    She has new problem as well.   She is noticing her female parts may be falling out. Now noticing her rectum may be falling as well. Last gyn care with Dr. Gautam.    She would like to limit number of perineal exams. Knows she has hemorrhoids but this is definitely not the same sensatin.             The following portions of the patient's history were reviewed and updated as appropriate: allergies, current medications, past family history, past medical history, past social history, past surgical history and problem list.    Allergies: Patient has no known allergies.    Current Outpatient Prescriptions:   •  Acetaminophen (TYLENOL ARTHRITIS EXT RELIEF PO), Take  by mouth., Disp: , Rfl:   •  aspirin tablet, Take 81 mg by mouth daily., Disp: , Rfl:   •  furosemide (LASIX) 20 MG tablet, Take 1 tablet by mouth Daily. At 2-5 pm as needed for leg swelling, Disp: 30 tablet, Rfl: 1  •  lisinopril-hydrochlorothiazide (PRINZIDE,ZESTORETIC) 20-25 MG per tablet, TAKE ONE TABLET BY MOUTH ONCE DAILY, Disp: 90 tablet, Rfl: 1  •  metFORMIN (GLUCOPHAGE) 500 MG tablet, TAKE ONE TABLET BY MOUTH TWICE DAILY WITH MEALS, Disp: 180 tablet, Rfl: 3  •  metoprolol succinate XL (TOPROL-XL) 25 MG 24 hr tablet, TAKE 1 TABLET BY MOUTH ONCE DAILY, Disp: 30 tablet, Rfl: 1  •  PRODIGY NO CODING BLOOD GLUC test strip, USE ONE STRIP TO CHECK GLUCOSE ONCE DAILY AS DIRECTED, Disp: 50 each, Rfl: 11  •  simvastatin (ZOCOR) 40 MG tablet, TAKE ONE TABLET BY MOUTH ONCE DAILY AT NIGHT, Disp: 90 tablet, Rfl: 2  There are no discontinued medications.    Review of Systems   Constitutional: Negative.    HENT: Negative.    Gastrointestinal: Positive for constipation.  "       Using stool softeners several times per week with good result.   Genitourinary: Positive for pelvic pain. Negative for dysuria, urgency, vaginal bleeding, vaginal discharge and vaginal pain.   Musculoskeletal: Negative.              /82   Pulse 79   Resp 14   Ht 170.2 cm (67\")   Wt 91.9 kg (202 lb 9.6 oz)   SpO2 98%   BMI 31.73 kg/m²       Physical Exam   Constitutional: She is oriented to person, place, and time. She appears well-developed and well-nourished.   HENT:   Head: Normocephalic and atraumatic.   Right Ear: External ear normal.   Left Ear: External ear normal.   Mouth/Throat: Oropharynx is clear and moist.   Eyes: Pupils are equal, round, and reactive to light. EOM are normal.   Neck: Normal range of motion. Neck supple. No thyromegaly present.   Cardiovascular: Normal rate, regular rhythm and normal heart sounds.    No murmur heard.  Pulmonary/Chest: Effort normal and breath sounds normal.   Abdominal: Soft. She exhibits no distension.   Neurological: She is alert and oriented to person, place, and time.   Skin: Skin is warm and dry. Capillary refill takes less than 2 seconds.   Psychiatric: She has a normal mood and affect. Her behavior is normal.   Vitals reviewed.      Lab Results (most recent)     None          Results for orders placed or performed in visit on 06/20/18   Urine culture, Comprehensive   Result Value Ref Range    Urine Culture Final report     Result 1 Comment    Urinalysis With / Culture If Indicated - Urine, Clean Catch   Result Value Ref Range    Specific Gravity, UA 1.013 1.005 - 1.030    pH, UA 6.0 5.0 - 7.5    Color, UA Yellow Yellow    Appearance, UA Clear Clear    Leukocytes, UA 1+ (A) Negative    Protein Negative Negative/Trace    Glucose, UA Negative Negative    Ketones Negative Negative    Blood, UA Negative Negative    Bilirubin, UA Negative Negative    Urobilinogen, UA 0.2 0.2 - 1.0 mg/dL    Nitrite, UA Negative Negative    Microscopic Examination See " below:     Urinalysis Reflex Comment    Microscopic Examination   Result Value Ref Range    WBC, UA 0-5 0 - 5 /hpf    RBC, UA 0-2 0 - 2 /hpf    Epithelial Cells (non renal) 0-10 0 - 10 /hpf    Casts Present (A) None seen /lpf    Cast Type Hyaline casts N/A    Mucus, UA Present Not Estab. /hpf    Bacteria, UA None seen None seen/Few /hpf   POC Urinalysis Dipstick, Automated   Result Value Ref Range    Color Yellow Yellow, Straw, Dark Yellow, Rina    Clarity, UA Cloudy (A) Clear    Specific Gravity  1.015 1.005 - 1.030    pH, Urine 6.0 5.0 - 8.0    Leukocytes Small (1+) (A) Negative    Nitrite, UA Negative Negative    Protein, POC Negative Negative mg/dL    Glucose, UA Negative Negative, 1000 mg/dL (3+) mg/dL    Ketones, UA Negative Negative    Urobilinogen, UA Normal Normal    Bilirubin Negative Negative    Blood, UA Trace (A) Negative           Radha was seen today for female gu problem and hypertension.    Diagnoses and all orders for this visit:    Type 2 diabetes mellitus without complication, without long-term current use of insulin (CMS/[facility])  -     Hemoglobin A1c    Essential hypertension  -     Comprehensive Metabolic Panel  -     CBC & Differential  -     TSH  -     Magnesium  -     Phosphorus  -     Vitamin D 25 Hydroxy    Mixed hyperlipidemia    Vaginal prolapse    Stage 3 chronic kidney disease (CMS/[facility])  -     Comprehensive Metabolic Panel  -     CBC & Differential  -     TSH  -     Magnesium  -     Phosphorus  -     Vitamin D 25 Hydroxy      Will scheduled with Dr. Gautam.  12/10/2018  2:30 here in [place]. Will be on cancellation list.  Defer GYN exam, set up appt with GYN myself for patient.   Return in about 3 months (around 1/25/2019).    Cyril Quiroga MD  10/25/2018

## 2018-10-25 NOTE — TELEPHONE ENCOUNTER
Dr. Quiroga called and scheduled this patient for vaginal and possible rectal prolapse. First available for NEW GYN was 12/10/18. I gave her that and she stated the patient should be fine with it, but I wanted to check and see if you felt like that was to long?

## 2018-10-29 RX ORDER — METOPROLOL SUCCINATE 25 MG/1
TABLET, EXTENDED RELEASE ORAL
Qty: 30 TABLET | Refills: 0 | Status: SHIPPED | OUTPATIENT
Start: 2018-10-29 | End: 2018-12-02 | Stop reason: SDUPTHER

## 2018-10-29 RX ORDER — SIMVASTATIN 40 MG
TABLET ORAL
Qty: 90 TABLET | Refills: 2 | Status: SHIPPED | OUTPATIENT
Start: 2018-10-29 | End: 2020-03-03 | Stop reason: SDUPTHER

## 2018-11-01 ENCOUNTER — TELEPHONE (OUTPATIENT)
Dept: INTERNAL MEDICINE | Facility: CLINIC | Age: 78
End: 2018-11-01

## 2018-11-01 NOTE — TELEPHONE ENCOUNTER
[facility] to call me back.      ----- Message from Marizol Hinds sent at 10/29/2018  4:49 PM EDT -----  Regarding: call back  jerardo    Requesting to have a call back at [phone]

## 2018-11-06 ENCOUNTER — TELEPHONE (OUTPATIENT)
Dept: INTERNAL MEDICINE | Facility: CLINIC | Age: 78
End: 2018-11-06

## 2018-11-06 NOTE — TELEPHONE ENCOUNTER
----- Message from Cyril Quiroga MD sent at 10/26/2018  9:23 AM EDT -----  Labs are really good, no changes based on these labs. Make sure to stay hydrated.  Calcium and vitamin D supplements    Patient made aware of lab results.dg

## 2018-11-19 ENCOUNTER — TRANSCRIBE ORDERS (OUTPATIENT)
Dept: INTERNAL MEDICINE | Facility: CLINIC | Age: 78
End: 2018-11-19

## 2018-11-19 DIAGNOSIS — Z12.31 SCREENING MAMMOGRAM, ENCOUNTER FOR: Primary | ICD-10-CM

## 2018-11-30 NOTE — PROGRESS NOTES
Subjective:     Patient ID: Radha Silverio is a 77 y.o. female.    Chief Complaint: right knee pain    History of Present Illness    Ms. Bermudez s a 77-year-old female presents with a reported two-week history of pain at her right knee.  Greatest pain present at the medial joint line, over patella,t posterior aspect. Increased pain noted with changes of position such as from lying down and attempting to stand and walk.  She also notices it when she is seated with knees flexed for any length of time and attempts to walk.  Reports increased pain noted at posterior aspect of her right knee with deep flexion.  Denies known injury to her right knee  She's been seen by her primary care who has referred her to our office. She has completed x-rays at [facility] which are available for viewing the Epic. She was also started on Voltaren gel topical use 4 times a day however has not been able to  medication from pharmacy due to pricing.he reports that she's having difficulty transferring into her car with a twisting motion which seems to aggravate the knee. Reports that this weeks and he has actually improved and is not experiencing the severity of pain that she was within the last week.  Rates pain at a 6-7 out of a 10 aching in nature.  She does also report that she's had bursitis of her right hip in the past and has received corticosteroid injections which she is not interested in receiving due to fear of weight gain. She denies presence of numbness and tingling radiating down her right lower extremity.  She denies other concerns present this time.     Social History     Occupational History   • homemaker      Social History Main Topics   • Smoking status: Never Smoker   • Smokeless tobacco: Never Used   • Alcohol use No   • Drug use: No   • Sexual activity: No      Past Medical History:   Diagnosis Date   • Abdominal pain, LLQ (left lower quadrant)     with radiation to right back. Burning,  cramping and sharp & is 4/10 in intensity.   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   • GERD (gastroesophageal reflux disease)    • GI problem    • Headache    • Hematochezia 12/6/2016   • High cholesterol    • Hyperlipidemia 5/24/2016   • Hypertension    • Lumbosacral disc disease    • Palpitations    • SOB (shortness of breath)    • Urinary tract infection without hematuria 5/24/2016     Past Surgical History:   Procedure Laterality Date   • BREAST BIOPSY Left     cyst at 9 y/o   • CHOLECYSTECTOMY     • COLONOSCOPY N/A 10/11/2016    Procedure: COLONOSCOPY TO CECUM, TO TERMINAL ILEUM WITH HOT SNARE POLYPECTOMY;  Surgeon: Palmira Calix MD;  Location: [facility] ENDOSCOPY;  Service:    • CYST REMOVAL Right     breast   • ENDOSCOPY N/A 10/11/2016    Procedure: ESOPHAGOGASTRODUODENOSCOPY WITH BIOPSY;  Surgeon: Palmira Calix MD;  Location: [facility] ENDOSCOPY;  Service:    • LAPAROSCOPIC CHOLECYSTECTOMY W/ CHOLANGIOGRAPHY     • MOLE REMOVAL      on foot    • OOPHORECTOMY Left    • TUBAL ABDOMINAL LIGATION         Family History   Problem Relation Age of Onset   • Diabetes Mother    • Heart disease Father    • Diabetes Sister    • Heart disease Sister    • Breast cancer Sister    • Cancer Sister    • Stroke Sister    • Diabetes Brother    • Heart disease Brother    • Cancer Brother          Review of Systems   Constitutional: Negative for chills, diaphoresis, fever and unexpected weight change.   HENT: Positive for tinnitus. Negative for hearing loss, nosebleeds and sore throat.    Eyes: Positive for pain. Negative for visual disturbance.   Respiratory: Positive for shortness of breath. Negative for cough and wheezing.   "  Cardiovascular: Negative for chest pain and palpitations.   Gastrointestinal: Negative for abdominal pain, diarrhea, nausea and vomiting.   Endocrine: Negative for cold intolerance, heat intolerance and polydipsia.   Genitourinary: Negative for difficulty urinating, dysuria and hematuria.   Musculoskeletal: Positive for arthralgias and joint swelling. Negative for myalgias.   Skin: Negative for rash and wound.   Allergic/Immunologic: Negative for environmental allergies.   Neurological: Positive for numbness. Negative for dizziness and syncope.   Hematological: Bruises/bleeds easily.   Psychiatric/Behavioral: Negative for dysphoric mood and sleep disturbance. The patient is not nervous/anxious.            Objective:  Physical Exam    Vital signs reviewed.   General: No acute distress.  Eyes: conjunctiva clear; pupils equally round and reactive  ENT: external ears and nose atraumatic; oropharynx clear  CV: no peripheral edema  Resp: normal respiratory effort  Skin: no rashes or wounds; normal turgor  Psych: mood and affect appropriate; recent and remote memory intact    Vitals:    12/13/17 0846   BP: 156/81   Pulse: 78   Weight: 92.1 kg (203 lb)   Height: 170.2 cm (67\")     Last 2 weights    12/13/17  0846   Weight: 92.1 kg (203 lb)     Body mass index is 31.79 kg/(m^2).     Right Knee Exam     Tenderness   The patient is experiencing tenderness in the medial joint line and patella.    Range of Motion   Extension: 0   Flexion: 120     Tests   Chao:  Medial - negative Lateral - negative  Lachman:  Anterior - negative    Posterior - negative  Drawer:       Anterior - negative    Posterior - negative  Varus: negative  Valgus: negative  Patellar Apprehension: negative    Other   Erythema: absent  Scars: absent  Sensation: normal  Pulse: present  Swelling: mild  Other tests: no effusion present    Comments:  Mild crepitus throughout arc of motion               Imaging:  Reviewed all imaging previously completed at " [facility] on 12/082017 reviewed imaging and report by this APRN:  INDICATION: Right knee pain and swelling for 7 days.      COMPARISON: 03/22/2011.      FINDINGS:  2 view(s) of the right knee.  No fracture, dislocation, or effusion.  There is mild osteoarthritis of the right knee..  No foreign body.      IMPRESSION:  No acute findings. Mild osteoarthritis.      This report was finalized on 12/8/2017 10:59 AM by Dr. Tristian Eddy MD.    Assessment:       1. Primary osteoarthritis of right knee          Plan:  1.  Discussed plan of care with patient.  She does not wish to proceed with corticosteroid injections at this time.  She does not wish to proceed with injections at all until that the pain gets worse. Discussed that the cortisone injections that she's been a received in our office do not week to weight gain therefore she that is not one thing she is to worry about. Since she is feeling better, we will proceed with the application of topical diclofenac that she is to  her pharmacy.  She was also provided with sample packets of NSAID that were provided in our office to apply twice daily however instructed with the Voltaren she will need to apply that 4 times daily.  Encouraged to continue taking the Tylenol arthritis as needed for symptom relief.  2.  We'll plan to see her back when pain increases.  Also encouraged her to  an over-the-counter knee sleeve that she can wear for support.  She was also provided with home strengthening activities and encouraged to continue with her bike activity.  Patient verbalized understanding of all information agrees with plan of care.  Denies other concerns present this time.  Orders:  No orders of the defined types were placed in this encounter.      MARY query complete.    Dragon transcription disclaimer     Much of this encounter note is an electronic transcription/translation of spoken language to printed text. The electronic translation of spoken language may  Detail Level: Detailed permit erroneous, or at times, nonsensical words or phrases to be inadvertently transcribed. Although I have reviewed the note for such errors, some may still exist.   Include Location In Plan?: No Detail Level: Generalized

## 2018-12-03 RX ORDER — METOPROLOL SUCCINATE 25 MG/1
TABLET, EXTENDED RELEASE ORAL
Qty: 30 TABLET | Refills: 0 | Status: SHIPPED | OUTPATIENT
Start: 2018-12-03 | End: 2019-01-03 | Stop reason: SDUPTHER

## 2018-12-10 ENCOUNTER — OFFICE VISIT (OUTPATIENT)
Dept: OBSTETRICS AND GYNECOLOGY | Facility: CLINIC | Age: 78
End: 2018-12-10

## 2018-12-10 VITALS — HEIGHT: 67 IN | BODY MASS INDEX: 31.48 KG/M2 | WEIGHT: 200.6 LBS

## 2018-12-10 DIAGNOSIS — Z12.39 SCREENING FOR BREAST CANCER: ICD-10-CM

## 2018-12-10 DIAGNOSIS — R10.2 PELVIC PRESSURE IN FEMALE: ICD-10-CM

## 2018-12-10 DIAGNOSIS — N81.9 FEMALE GENITAL PROLAPSE, UNSPECIFIED TYPE: ICD-10-CM

## 2018-12-10 DIAGNOSIS — R14.0 BLOATING SYMPTOM: Primary | ICD-10-CM

## 2018-12-10 PROCEDURE — 99203 OFFICE O/P NEW LOW 30 MIN: CPT | Performed by: OBSTETRICS & GYNECOLOGY

## 2018-12-10 NOTE — PROGRESS NOTES
"New GYN Exam    CC- Here for prolapse    Radha Silverio is a 78 y.o. female previous patient not seen in the last three years who presents for vaginal prolapse. She has felt some low back pain and \"something falling out\" over the past few weeks. She has had no bladder issues and feels that her constipation is unchanged. She has had no VB or discharge.She is having some bloating.        OB History      Para Term  AB Living    2 2 2     2    SAB TAB Ectopic Molar Multiple Live Births                       Obstetric Comments    2           Menarche:14  Menopause:53  HRT:none  Current contraception: post menopausal status and tubal ligation  History of abnormal Pap smear: no  History of abnormal mammogram: yes - s/p B9 breast bx  Family history of uterine, colon or ovarian cancer: no  Family history of breast cancer: yes - sister age 44, no genetics. second sister age 74  STD's: none    Health Maintenance   Topic Date Due   • TDAP/TD VACCINES (1 - Tdap) 1961   • ZOSTER VACCINE (1 of 2) 1992   • PNEUMOCOCCAL VACCINES (65+ LOW/MEDIUM RISK) (2 of 2 - PPSV23) 2017   • HEPATITIS A VACCINE ADULT (2 of 2) 10/26/2018   • HEMOGLOBIN A1C  2019   • MEDICARE ANNUAL WELLNESS  2019   • LIPID PANEL  2019   • URINE MICROALBUMIN  2019   • MAMMOGRAM  2019   • COLONOSCOPY  10/11/2026   • INFLUENZA VACCINE  Completed       Past Medical History:   Diagnosis Date   • Abdominal pain, LLQ (left lower quadrant)     with radiation to right back. Burning, cramping and sharp & is 4/10 in intensity.   • Acid reflux    • Anxiety    • Arthritis    • Arthritis of back    • Solis's esophagus    • Cataract    • Chest pain    • Chronic constipation    • Colon polyps    • Diabetes type 2, controlled (CMS/HCC)    • Dizziness    • DVT (deep venous thrombosis) (CMS/[facility])    • Dysuria 2016   • Essential hypertension 2016   • Frequent PVCs 2017   • GERD (gastroesophageal reflux " disease)    • GI problem    • Headache    • Hematochezia 12/6/2016   • High cholesterol    • Hyperlipidemia 5/24/2016   • Hypertension    • Lumbosacral disc disease    • Palpitations    • SOB (shortness of breath)    • Urinary tract infection without hematuria 5/24/2016       Past Surgical History:   Procedure Laterality Date   • BREAST BIOPSY Left     cyst at 11 y/o   • CHOLECYSTECTOMY     • COLONOSCOPY N/A 10/11/2016    Procedure: COLONOSCOPY TO CECUM, TO TERMINAL ILEUM WITH HOT SNARE POLYPECTOMY;  Surgeon: Palmira Calix MD;  Location: [facility] ENDOSCOPY;  Service:    • CYST REMOVAL Right     breast   • ENDOSCOPY N/A 10/11/2016    Procedure: ESOPHAGOGASTRODUODENOSCOPY WITH BIOPSY;  Surgeon: Palmira Calix MD;  Location: [facility] ENDOSCOPY;  Service:    • LAPAROSCOPIC CHOLECYSTECTOMY W/ CHOLANGIOGRAPHY     • MOLE REMOVAL      on foot    • OOPHORECTOMY Left     B9   • TUBAL ABDOMINAL LIGATION           Current Outpatient Medications:   •  Acetaminophen (TYLENOL ARTHRITIS EXT RELIEF PO), Take  by mouth., Disp: , Rfl:   •  aspirin tablet, Take 81 mg by mouth daily., Disp: , Rfl:   •  furosemide (LASIX) 20 MG tablet, Take 1 tablet by mouth Daily. At 2-5 pm as needed for leg swelling, Disp: 30 tablet, Rfl: 1  •  lisinopril-hydrochlorothiazide (PRINZIDE,ZESTORETIC) 20-25 MG per tablet, TAKE ONE TABLET BY MOUTH ONCE DAILY, Disp: 90 tablet, Rfl: 1  •  metFORMIN (GLUCOPHAGE) 500 MG tablet, TAKE ONE TABLET BY MOUTH TWICE DAILY WITH MEALS, Disp: 180 tablet, Rfl: 3  •  metoprolol succinate XL (TOPROL-XL) 25 MG 24 hr tablet, TAKE 1 TABLET BY MOUTH ONCE DAILY, Disp: 30 tablet, Rfl: 0  •  PRODIGY NO CODING BLOOD GLUC test strip, USE ONE STRIP TO CHECK GLUCOSE ONCE DAILY AS DIRECTED, Disp: 50 each, Rfl: 11  •  simvastatin (ZOCOR) 40 MG tablet, TAKE ONE TABLET BY MOUTH ONCE DAILY IN THE EVENING, Disp: 90 tablet, Rfl: 2    No Known Allergies    Social History     Tobacco Use   • Smoking status: Never Smoker   • Smokeless tobacco:  "Never Used   Substance Use Topics   • Alcohol use: No   • Drug use: No       Family History   Problem Relation Age of Onset   • Diabetes Mother    • Heart disease Father    • Diabetes Sister    • Heart disease Sister    • Breast cancer Sister    • Cancer Sister    • Stroke Sister    • Diabetes Brother    • Heart disease Brother    • Cancer Brother    • Breast cancer Sister    • Ovarian cancer Neg Hx    • Colon cancer Neg Hx    • Deep vein thrombosis Neg Hx    • Pulmonary embolism Neg Hx        Review of Systems   Gastrointestinal: Positive for abdominal distention and constipation. Negative for diarrhea, nausea and vomiting.   Genitourinary: Positive for pelvic pain and vaginal pain. Negative for dyspareunia, frequency, menstrual problem, vaginal bleeding and vaginal discharge.   Musculoskeletal: Positive for back pain.   All other systems reviewed and are negative.      Ht 170.2 cm (67.01\")   Wt 91 kg (200 lb 9.6 oz)   BMI 31.41 kg/m²     Physical Exam   Constitutional: She is oriented to person, place, and time. She appears well-developed and well-nourished.   HENT:   Head: Normocephalic and atraumatic.   Eyes: Conjunctivae are normal. No scleral icterus.   Neck: Neck supple. No thyromegaly present.   Cardiovascular: Normal rate, regular rhythm and normal heart sounds.   Pulmonary/Chest: Effort normal and breath sounds normal.   Abdominal: Soft. Bowel sounds are normal. She exhibits no distension and no mass. There is no tenderness. There is no rebound and no guarding. No hernia.   Genitourinary: Uterus normal. Pelvic exam was performed with patient supine. There is no rash, tenderness, lesion or injury on the right labia. There is no rash, tenderness, lesion or injury on the left labia. Cervix exhibits no motion tenderness and no friability. Right adnexum displays no mass, no tenderness and no fullness. Left adnexum displays no mass, no tenderness and no fullness. No erythema, tenderness or bleeding in the " vagina. No foreign body in the vagina. No signs of injury around the vagina. No vaginal discharge found.   Genitourinary Comments: Grade 2 prolapse  No masses, mild TTP in midline   Neurological: She is alert and oriented to person, place, and time.   Skin: Skin is warm and dry.   Psychiatric: She has a normal mood and affect. Her behavior is normal. Judgment and thought content normal.   Nursing note and vitals reviewed.         Assessment/Plan    1) POP with bloating- check TVUS and visit.   2) Family h/o breast cancer- pt declines genetic testing  3) STD screening: declines Condoms encouraged.  4) Bone health - Weight bearing exercise, dietary calcium recommendations and vitamin D reviewed.   5) Diet and Exercise discussed  6) Smoking Status: non smoker  7) Social: no issues  8)MMG: dustin Garcia was seen today for vaginal prolapse.    Diagnoses and all orders for this visit:    Bloating symptom    Female genital prolapse, unspecified type    Pelvic pressure in female    Screening for breast cancer  -     Mammo Screening Bilateral With CAD; Future        Frances Gautam MD  12/10/18  8:04 PM

## 2018-12-25 PROBLEM — N81.9 FEMALE GENITAL PROLAPSE: Status: ACTIVE | Noted: 2018-12-25

## 2018-12-27 ENCOUNTER — PROCEDURE VISIT (OUTPATIENT)
Dept: OBSTETRICS AND GYNECOLOGY | Facility: CLINIC | Age: 78
End: 2018-12-27

## 2018-12-27 ENCOUNTER — TELEPHONE (OUTPATIENT)
Dept: OBSTETRICS AND GYNECOLOGY | Facility: CLINIC | Age: 78
End: 2018-12-27

## 2018-12-27 ENCOUNTER — OFFICE VISIT (OUTPATIENT)
Dept: OBSTETRICS AND GYNECOLOGY | Facility: CLINIC | Age: 78
End: 2018-12-27

## 2018-12-27 VITALS
SYSTOLIC BLOOD PRESSURE: 140 MMHG | BODY MASS INDEX: 31.23 KG/M2 | WEIGHT: 199 LBS | HEIGHT: 67 IN | DIASTOLIC BLOOD PRESSURE: 82 MMHG

## 2018-12-27 DIAGNOSIS — R10.2 PELVIC PRESSURE IN FEMALE: Primary | ICD-10-CM

## 2018-12-27 DIAGNOSIS — N81.2 INCOMPLETE UTEROVAGINAL PROLAPSE: Primary | ICD-10-CM

## 2018-12-27 DIAGNOSIS — N85.8 ABNORMAL COLLECTION OF FLUID IN UTERINE CAVITY: ICD-10-CM

## 2018-12-27 DIAGNOSIS — N88.2 CERVICAL STENOSIS (UTERINE CERVIX): ICD-10-CM

## 2018-12-27 PROCEDURE — 76830 TRANSVAGINAL US NON-OB: CPT | Performed by: OBSTETRICS & GYNECOLOGY

## 2018-12-27 PROCEDURE — 99213 OFFICE O/P EST LOW 20 MIN: CPT | Performed by: OBSTETRICS & GYNECOLOGY

## 2018-12-27 PROCEDURE — 58100 BIOPSY OF UTERUS LINING: CPT | Performed by: OBSTETRICS & GYNECOLOGY

## 2018-12-27 NOTE — TELEPHONE ENCOUNTER
Patient would like for you to call and talk to her daughter about your plans as she says that she gets mixed up and won't tell her correctly.

## 2018-12-31 ENCOUNTER — HOSPITAL ENCOUNTER (OUTPATIENT)
Dept: MAMMOGRAPHY | Facility: HOSPITAL | Age: 78
Discharge: HOME | End: 2018-12-31
Attending: INTERNAL MEDICINE | Admitting: INTERNAL MEDICINE

## 2018-12-31 DIAGNOSIS — Z12.31 SCREENING MAMMOGRAM, ENCOUNTER FOR: ICD-10-CM

## 2018-12-31 PROCEDURE — 77067 SCR MAMMO BI INCL CAD: CPT

## 2018-12-31 PROCEDURE — 77063 BREAST TOMOSYNTHESIS BI: CPT

## 2019-01-02 NOTE — PROGRESS NOTES
PROCEDURE NOTE    Procedures      Diagnosis  pelvic pressure, flui in cervix on US       No LMP recorded. Patient is not currently having periods (Reason: Other).         UCG  Not done    Menopausal Yes   HRT  negative  Contraception:      Applying Universal Precautions I properly identified the patient and sought her signature with informed consent.  The reason for the biopsy was discussed.  Using a betadine prep on the cervix the cervix was grasped with a tenaculum and the cervix was extremely stenotic. Dilators were used to try to open the cervix without success. After multiple attempts the procedure was abandoned.     Additional procedures necessary were Cervical Dilators      Instructions were given on vaginal rest, OTC tylenol, Motrin or Aleve, and expected future bleeding.  Resulting and follow up were discussed.    Frances Gautam MD

## 2019-01-03 RX ORDER — METOPROLOL SUCCINATE 25 MG/1
TABLET, EXTENDED RELEASE ORAL
Qty: 30 TABLET | Refills: 0 | Status: SHIPPED | OUTPATIENT
Start: 2019-01-03 | End: 2019-01-08

## 2019-01-04 RX ORDER — METOPROLOL SUCCINATE 25 MG/1
TABLET, EXTENDED RELEASE ORAL
Qty: 30 TABLET | Refills: 0 | Status: SHIPPED | OUTPATIENT
Start: 2019-01-04 | End: 2019-01-08

## 2019-01-08 ENCOUNTER — OFFICE VISIT (OUTPATIENT)
Dept: CARDIOLOGY | Facility: CLINIC | Age: 79
End: 2019-01-08

## 2019-01-08 VITALS
BODY MASS INDEX: 31.08 KG/M2 | DIASTOLIC BLOOD PRESSURE: 86 MMHG | HEIGHT: 67 IN | WEIGHT: 198 LBS | SYSTOLIC BLOOD PRESSURE: 124 MMHG | RESPIRATION RATE: 16 BRPM | HEART RATE: 58 BPM

## 2019-01-08 DIAGNOSIS — I49.1 PAC (PREMATURE ATRIAL CONTRACTION): Primary | ICD-10-CM

## 2019-01-08 DIAGNOSIS — I49.3 PVC (PREMATURE VENTRICULAR CONTRACTION): ICD-10-CM

## 2019-01-08 DIAGNOSIS — E78.2 MIXED HYPERLIPIDEMIA: ICD-10-CM

## 2019-01-08 DIAGNOSIS — I10 ESSENTIAL HYPERTENSION: ICD-10-CM

## 2019-01-08 PROCEDURE — 99213 OFFICE O/P EST LOW 20 MIN: CPT | Performed by: INTERNAL MEDICINE

## 2019-01-08 PROCEDURE — 93000 ELECTROCARDIOGRAM COMPLETE: CPT | Performed by: INTERNAL MEDICINE

## 2019-01-08 RX ORDER — METOPROLOL SUCCINATE 25 MG/1
25 TABLET, EXTENDED RELEASE ORAL DAILY
Qty: 90 TABLET | Refills: 3 | Status: SHIPPED | OUTPATIENT
Start: 2019-01-08 | End: 2020-02-14

## 2019-01-08 NOTE — PROGRESS NOTES
PATIENTINFORMATION    Date of Office Visit: 2019  Encounter Provider: Trudi Urbina MD  Place of Service: [facility] CARDIOLOGY  Patient Name: Radha Silverio  : 1940    Subjective:     Encounter Date:2019      Patient ID: Radha Silverio is a 78 y.o. female.      History of Present Illness    This is a lady I saw for an irregular heartbeat and episodes of breathlessness 6/15/17.  Dr. Quiroga saw her and noted the irregularity and checked an EKG which showed frequent premature ventricular contractions. The patient had an echocardiogram on 2017 which showed normal LV systolic and diastolic function with mild-to-moderate tricuspid regurgitation with a right ventricular systolic pressure of about 50 mmHg. She wore a Holter monitor which showed frequent premature atrial contractions at about 23% of the tracing. There were frequent bouts of atrial bigeminy. She had some PVCs, but the PACs far outweighed it. She had a little focal stinging pain that she made note of in her diary which did not correspond to any arrhythmia. She did not make note of the breathless episodes in her diary. I suspected that her episodes of breathlessness were related to atrial bigeminy.  I stopped amlodipine and put her on Toprol-XL 25 mg a day.    She comes in today for follow-up and for a refill of her metoprolol.  She has remained asymptomatic from a heart standpoint.  She does not have any palpitation sensation.  She does feel like her shortness of breath has improved, though she does still have dyspnea with talking too much.  She has not had any chest pains.  She has had some mild swelling in her ankles.  Her biggest complaint is arthritis pain in her hip and leg.      Review of Systems   Constitution: Negative for fever, malaise/fatigue, weight gain and weight loss.   HENT: Positive for ear pain. Negative for hearing loss, nosebleeds and sore throat.    Eyes: Positive for blurred  "vision. Negative for double vision, vision loss in left eye and vision loss in right eye.   Cardiovascular:        See history of present illness.   Respiratory: Positive for cough and shortness of breath. Negative for sleep disturbances due to breathing, snoring and wheezing.    Endocrine: Negative for cold intolerance, heat intolerance and polyuria.   Skin: Negative for itching, poor wound healing and rash.   Musculoskeletal: Negative for joint pain, joint swelling and myalgias.   Gastrointestinal: Negative for abdominal pain, diarrhea, hematochezia, nausea and vomiting.   Genitourinary: Negative for hematuria and hesitancy.   Neurological: Negative for numbness, paresthesias and seizures.   Psychiatric/Behavioral: Negative for depression. The patient is not nervous/anxious.            ECG 12 Lead  Date/Time: 1/8/2019 12:42 PM  Performed by: Trudi Urbina MD  Authorized by: Trudi Urbina MD   Comparison: compared with previous ECG from 6/20/2018  Similar to previous ECG  Rhythm: sinus rhythm  Ectopy: infrequent PVCs and atrial premature contractions  BPM: 58  Clinical impression: normal ECG               Objective:     /86 (BP Location: Right arm, Patient Position: Sitting, Cuff Size: Adult)   Pulse 58   Resp 16   Ht 170.2 cm (67\")   Wt 89.8 kg (198 lb)   BMI 31.01 kg/m²  Body mass index is 31.01 kg/m².     Physical Exam   Constitutional: She appears well-developed.   HENT:   Head: Normocephalic and atraumatic.   Eyes: Conjunctivae and lids are normal. Pupils are equal, round, and reactive to light. Lids are everted and swept, no foreign bodies found.   Neck: Normal range of motion. No JVD present. Carotid bruit is not present. No tracheal deviation present. No thyroid mass present.   Cardiovascular: Normal rate, regular rhythm and normal heart sounds.  Extrasystoles are present.   Pulses:       Dorsalis pedis pulses are 2+ on the right side, and 2+ on the left side.   Pulmonary/Chest: Effort " normal and breath sounds normal.   Abdominal: Normal appearance and bowel sounds are normal.   Musculoskeletal: Normal range of motion.   Neurological: She is alert. She has normal strength.   Skin: Skin is warm, dry and intact.   Psychiatric: She has a normal mood and affect. Her behavior is normal.   Vitals reviewed.          Assessment/Plan:       1.  Atrial bigeminy.  She continues to have some atrial and ventricular extrasystoles.  These are asymptomatic.  Continue current dose of metoprolol.  2.  Hypertension  3.  Hyperlipidemia  4.  Diabetes  5.  Obesity.     I will see her back as needed.  She will follow-up with Dr. Quiroga    Orders Placed This Encounter   Procedures   • ECG 12 Lead     This order was created via procedure documentation        Discharge Medications           Accurate as of 1/8/19  1:06 PM. If you have any questions, ask your nurse or doctor.               Changes to Medications      Instructions Start Date   metoprolol succinate XL 25 MG 24 hr tablet  Commonly known as:  TOPROL-XL  What changed:  Another medication with the same name was removed. Continue taking this medication, and follow the directions you see here.  Changed by:  Trudi Urbina MD   25 mg, Oral, Daily         Continue These Medications      Instructions Start Date   aspirin tablet   81 mg, Oral, Daily      furosemide 20 MG tablet  Commonly known as:  LASIX   20 mg, Oral, Daily, At 2-5 pm as needed for leg swelling      lisinopril-hydrochlorothiazide 20-25 MG per tablet  Commonly known as:  PRINZIDE,ZESTORETIC   TAKE ONE TABLET BY MOUTH ONCE DAILY      metFORMIN 500 MG tablet  Commonly known as:  GLUCOPHAGE   TAKE ONE TABLET BY MOUTH TWICE DAILY WITH MEALS      PRODIGY NO CODING BLOOD GLUC test strip  Generic drug:  glucose blood   USE ONE STRIP TO CHECK GLUCOSE ONCE DAILY AS DIRECTED      simvastatin 40 MG tablet  Commonly known as:  ZOCOR   TAKE ONE TABLET BY MOUTH ONCE DAILY IN THE EVENING      TYLENOL ARTHRITIS EXT  RELIEF PO   Oral                    Trudi Urbina MD  01/08/19  1:06 PM

## 2019-01-15 ENCOUNTER — TELEPHONE (OUTPATIENT)
Dept: OBSTETRICS AND GYNECOLOGY | Facility: CLINIC | Age: 79
End: 2019-01-15

## 2019-01-15 NOTE — TELEPHONE ENCOUNTER
OK, that makes sense. I have not had any luck reaching her. Can she come and see me this Friday to discuss options? Ok to overbook me to get this done. Thanks, AKR

## 2019-01-15 NOTE — TELEPHONE ENCOUNTER
Lizette Strong called to get info about her mothers visit from 12/27. And her mothers options for treatment. She is a therapist and has appts until Friday. She said you could call her after pts on Friday or she could come in and meet with you.Please let me know what u would like to do and I will call her and let her know.

## 2019-01-18 ENCOUNTER — TELEPHONE (OUTPATIENT)
Dept: OBSTETRICS AND GYNECOLOGY | Facility: CLINIC | Age: 79
End: 2019-01-18

## 2019-01-18 ENCOUNTER — OFFICE VISIT (OUTPATIENT)
Dept: OBSTETRICS AND GYNECOLOGY | Facility: CLINIC | Age: 79
End: 2019-01-18

## 2019-01-18 DIAGNOSIS — N81.2 INCOMPLETE UTEROVAGINAL PROLAPSE: Primary | ICD-10-CM

## 2019-01-18 DIAGNOSIS — N81.10 VAGINAL PROLAPSE: ICD-10-CM

## 2019-01-18 PROCEDURE — 99214 OFFICE O/P EST MOD 30 MIN: CPT | Performed by: OBSTETRICS & GYNECOLOGY

## 2019-01-20 DIAGNOSIS — N81.2 INCOMPLETE UTEROVAGINAL PROLAPSE: Primary | ICD-10-CM

## 2019-01-21 NOTE — PROGRESS NOTES
Radha Silverio is a 78 y.o. patient who presents for follow up of   Chief Complaint   Patient presents with   • Pre-op Exam     DISCUSSIN WITH PT DAUGHTER     77 yo est pt here for discussion about treatment options. Her daughter is present to consider all options and help her mom decide. She has some prolapse and pelvic pressure and pain. No  VB. Her US showed an intrauterine fluid collection.         The following portions of the patient's history were reviewed and updated as appropriate: allergies, current medications and problem list.    Review of Systems   Genitourinary: Positive for pelvic pain and vaginal pain. Negative for decreased urine volume, genital sores, hematuria, menstrual problem, vaginal bleeding and vaginal discharge.   Musculoskeletal: Positive for back pain.   All other systems reviewed and are negative.      There were no vitals taken for this visit.    Physical Exam   Constitutional: She is oriented to person, place, and time. She appears well-developed and well-nourished.   HENT:   Head: Normocephalic and atraumatic.   Abdominal: Soft. Bowel sounds are normal. She exhibits no distension and no mass. There is no tenderness. There is no rebound and no guarding. No hernia.   Neurological: She is alert and oriented to person, place, and time.   Skin: Skin is warm and dry.   Psychiatric: She has a normal mood and affect. Her behavior is normal. Judgment and thought content normal.   Nursing note and vitals reviewed.    A/P:  1. Pelvic prolapse- d/w pt and her daughter options including HS D&C and with evacuation of intrauterine fluid collection, pessary , pelvic floor PT and/or referral to urogyn for surgical evaluation. Risks, benefits and alternatives of each option were discussed with the patient and her daughter in detail. They would like some time to think about it and will let me know what they decide to do. Counseling was given to patient and family for the following topics:  diagnostic results, patient and family education, impressions and risks and benefits of treatment options . Total time of the encounter was 30 minutes and 25 minutes was spend counseling.    Assessment/Plan   Radha was seen today for pre-op exam.    Diagnoses and all orders for this visit:    Incomplete uterovaginal prolapse    Vaginal prolapse                   No Follow-up on file.      Frances Gautam MD    1/18/19  7:58 PM

## 2019-02-08 ENCOUNTER — OFFICE VISIT (OUTPATIENT)
Dept: INTERNAL MEDICINE | Facility: CLINIC | Age: 79
End: 2019-02-08

## 2019-02-08 VITALS
BODY MASS INDEX: 31.23 KG/M2 | WEIGHT: 199 LBS | OXYGEN SATURATION: 99 % | DIASTOLIC BLOOD PRESSURE: 84 MMHG | HEART RATE: 65 BPM | SYSTOLIC BLOOD PRESSURE: 136 MMHG | RESPIRATION RATE: 16 BRPM | HEIGHT: 67 IN

## 2019-02-08 DIAGNOSIS — R06.02 SHORTNESS OF BREATH: ICD-10-CM

## 2019-02-08 DIAGNOSIS — I49.8 ATRIAL BIGEMINY: ICD-10-CM

## 2019-02-08 DIAGNOSIS — K59.09 CHRONIC CONSTIPATION: ICD-10-CM

## 2019-02-08 DIAGNOSIS — E11.9 TYPE 2 DIABETES MELLITUS WITHOUT COMPLICATION, WITHOUT LONG-TERM CURRENT USE OF INSULIN (HCC): Primary | ICD-10-CM

## 2019-02-08 DIAGNOSIS — E78.2 MIXED HYPERLIPIDEMIA: ICD-10-CM

## 2019-02-08 DIAGNOSIS — I10 ESSENTIAL HYPERTENSION: ICD-10-CM

## 2019-02-08 LAB
ALBUMIN SERPL-MCNC: 4.6 G/DL (ref 3.5–5.2)
ALBUMIN/GLOB SERPL: 1.7 G/DL
ALP SERPL-CCNC: 55 U/L (ref 40–129)
ALT SERPL-CCNC: 15 U/L (ref 5–33)
AST SERPL-CCNC: 18 U/L (ref 5–32)
BASOPHILS # BLD AUTO: 0.01 10*3/MM3 (ref 0–0.2)
BASOPHILS NFR BLD AUTO: 0.3 % (ref 0–2)
BILIRUB SERPL-MCNC: 0.5 MG/DL (ref 0.2–1.2)
BUN SERPL-MCNC: 15 MG/DL (ref 8–23)
BUN/CREAT SERPL: 15.2 (ref 7–25)
CALCIUM SERPL-MCNC: 9.9 MG/DL (ref 8.8–10.5)
CHLORIDE SERPL-SCNC: 101 MMOL/L (ref 98–107)
CO2 SERPL-SCNC: 28.1 MMOL/L (ref 22–29)
CREAT SERPL-MCNC: 0.99 MG/DL (ref 0.57–1)
EOSINOPHIL # BLD AUTO: 0.09 10*3/MM3 (ref 0.1–0.3)
EOSINOPHIL NFR BLD AUTO: 2.7 % (ref 0–4)
ERYTHROCYTE [DISTWIDTH] IN BLOOD BY AUTOMATED COUNT: 12.7 % (ref 11.5–14.5)
GLOBULIN SER CALC-MCNC: 2.7 GM/DL
GLUCOSE SERPL-MCNC: 115 MG/DL (ref 65–99)
HBA1C MFR BLD: 6.8 %
HCT VFR BLD AUTO: 41.9 % (ref 37–47)
HGB BLD-MCNC: 13.8 G/DL (ref 12–16)
IMM GRANULOCYTES # BLD AUTO: 0 10*3/MM3 (ref 0–0.03)
IMM GRANULOCYTES NFR BLD AUTO: 0 % (ref 0–0.5)
LYMPHOCYTES # BLD AUTO: 1.44 10*3/MM3 (ref 0.6–4.8)
LYMPHOCYTES NFR BLD AUTO: 42.6 % (ref 20–45)
MCH RBC QN AUTO: 28.2 PG (ref 27–31)
MCHC RBC AUTO-ENTMCNC: 32.9 G/DL (ref 31–37)
MCV RBC AUTO: 85.5 FL (ref 81–99)
MONOCYTES # BLD AUTO: 0.3 10*3/MM3 (ref 0–1)
MONOCYTES NFR BLD AUTO: 8.9 % (ref 3–8)
NEUTROPHILS # BLD AUTO: 1.54 10*3/MM3 (ref 1.5–8.3)
NEUTROPHILS NFR BLD AUTO: 45.5 % (ref 45–70)
NRBC BLD AUTO-RTO: 0 /100 WBC (ref 0–0)
PLATELET # BLD AUTO: 215 10*3/MM3 (ref 140–500)
POTASSIUM SERPL-SCNC: 4.1 MMOL/L (ref 3.5–5.2)
PROT SERPL-MCNC: 7.3 G/DL (ref 6–8.5)
RBC # BLD AUTO: 4.9 10*6/MM3 (ref 4.2–5.4)
SODIUM SERPL-SCNC: 140 MMOL/L (ref 136–145)
TSH SERPL DL<=0.005 MIU/L-ACNC: 1.63 MIU/ML (ref 0.27–4.2)
WBC # BLD AUTO: 3.38 10*3/MM3 (ref 4.8–10.8)

## 2019-02-08 PROCEDURE — 99214 OFFICE O/P EST MOD 30 MIN: CPT | Performed by: INTERNAL MEDICINE

## 2019-02-08 PROCEDURE — 83036 HEMOGLOBIN GLYCOSYLATED A1C: CPT | Performed by: INTERNAL MEDICINE

## 2019-02-08 NOTE — PATIENT INSTRUCTIONS
Radha was seen today for diabetes.    Diagnoses and all orders for this visit:    Type 2 diabetes mellitus without complication, without long-term current use of insulin (CMS/[facility])  -     POC Glycosylated Hemoglobin (Hb A1C)    Shortness of breath  -     CBC & Differential  -     Comprehensive Metabolic Panel    Essential hypertension  -     Comprehensive Metabolic Panel    Mixed hyperlipidemia    Atrial bigeminy  -     TSH    Chronic constipation      HTN - well controlled   DM - well controlled Hba1c 6.8 today  Would like her to start silver sneakers  Will help bowels  miralax daily will help with constipation  Calcium and vitamin D - chewable viactiv  B complex - hair skin and nails

## 2019-02-08 NOTE — PROGRESS NOTES
Radha Silverio is a 78 y.o. female, who presents with a chief complaint of   Chief Complaint   Patient presents with   • Diabetes       HPI     79 yo female with pmhx HTN, HL, PAcs.  Complaining again of dyspnea.  Has atrial bigeminy on metoprolol.  Just saw Dr. Urbina 1/8/19.  PFTs  In January were normal.   Her dyspnea is only with exertion. She blames it on her weight.     Has frequent waking and poor sleep duration.  Going to bathroom twice at night. Would like urine checked due to frequency.     The following portions of the patient's history were reviewed and updated as appropriate: allergies, current medications, past family history, past medical history, past social history, past surgical history and problem list.    Allergies: Patient has no known allergies.    Current Outpatient Medications:   •  Acetaminophen (TYLENOL ARTHRITIS EXT RELIEF PO), Take  by mouth., Disp: , Rfl:   •  aspirin tablet, Take 81 mg by mouth daily., Disp: , Rfl:   •  lisinopril-hydrochlorothiazide (PRINZIDE,ZESTORETIC) 20-25 MG per tablet, TAKE ONE TABLET BY MOUTH ONCE DAILY, Disp: 90 tablet, Rfl: 1  •  metFORMIN (GLUCOPHAGE) 500 MG tablet, TAKE ONE TABLET BY MOUTH TWICE DAILY WITH MEALS, Disp: 180 tablet, Rfl: 3  •  metoprolol succinate XL (TOPROL-XL) 25 MG 24 hr tablet, Take 1 tablet by mouth Daily., Disp: 90 tablet, Rfl: 3  •  PRODIGY NO CODING BLOOD GLUC test strip, USE ONE STRIP TO CHECK GLUCOSE ONCE DAILY AS DIRECTED, Disp: 50 each, Rfl: 11  •  simvastatin (ZOCOR) 40 MG tablet, TAKE ONE TABLET BY MOUTH ONCE DAILY IN THE EVENING, Disp: 90 tablet, Rfl: 2  Medications Discontinued During This Encounter   Medication Reason   • furosemide (LASIX) 20 MG tablet *Therapy completed       Review of Systems   Constitutional: Negative.    HENT: Positive for congestion.    Eyes:        Eye exam due   Respiratory: Positive for shortness of breath.         Exertional   Gastrointestinal: Positive for blood in stool and constipation.       "  Known hemorrhoid   Musculoskeletal: Negative.    Neurological: Positive for headaches.        Occasional   Psychiatric/Behavioral: Negative.    All other systems reviewed and are negative.            /84   Pulse 65   Resp 16   Ht 170.2 cm (67\")   Wt 90.3 kg (199 lb)   SpO2 99%   BMI 31.17 kg/m²       Physical Exam   Constitutional: She is oriented to person, place, and time. She appears well-developed and well-nourished.   HENT:   Head: Normocephalic and atraumatic.   Right Ear: External ear normal.   Left Ear: External ear normal.   Eyes: EOM are normal. Pupils are equal, round, and reactive to light.   Neck: Normal range of motion. Neck supple. No JVD present. No tracheal deviation present. No thyromegaly present.   Cardiovascular: Normal rate, regular rhythm and normal heart sounds.   No murmur heard.  Pulmonary/Chest: Effort normal and breath sounds normal. No respiratory distress.   Abdominal: Soft. She exhibits no distension.   Lymphadenopathy:     She has no cervical adenopathy.   Neurological: She is alert and oriented to person, place, and time.   Skin: Skin is warm and dry. Capillary refill takes less than 2 seconds.   Psychiatric: She has a normal mood and affect. Her behavior is normal.   Vitals reviewed.      Lab Results (most recent)     None          Results for orders placed or performed in visit on 02/08/19   POC Glycosylated Hemoglobin (Hb A1C)   Result Value Ref Range    Hemoglobin A1C 6.8 %           Radha was seen today for diabetes.    Diagnoses and all orders for this visit:    Type 2 diabetes mellitus without complication, without long-term current use of insulin (CMS/[facility])  -     POC Glycosylated Hemoglobin (Hb A1C)    Shortness of breath  -     CBC & Differential  -     Comprehensive Metabolic Panel    Essential hypertension  -     Comprehensive Metabolic Panel    Mixed hyperlipidemia    Atrial bigeminy  -     TSH    Chronic constipation      HTN - well controlled   DM - well " controlled Hba1c 6.8 today  Would like her to start silver sneakers  Will help bowels  miralax daily will help with constipation  Calcium and vitamin D - chewable viactiv  B complex - hair skin and nails  I have sent a message to Dr. Urbina regarding her MOHAN.      Return in about 3 months (around 5/8/2019).    Cyril Quiroga MD  02/08/2019

## 2019-02-11 ENCOUNTER — TELEPHONE (OUTPATIENT)
Dept: INTERNAL MEDICINE | Facility: CLINIC | Age: 79
End: 2019-02-11

## 2019-02-11 NOTE — TELEPHONE ENCOUNTER
Patient has been advised and voiced understanding.     ----- Message from Cyril Quiroga MD sent at 2/9/2019 11:02 AM EST -----  White count is stable. Rest of her labs are good. Kidney function and GFR stable. Would not recommend nephrology at this point.  Remind her to take her miralax daily.

## 2019-02-12 ENCOUNTER — TELEPHONE (OUTPATIENT)
Dept: INTERNAL MEDICINE | Facility: CLINIC | Age: 79
End: 2019-02-12

## 2019-02-12 DIAGNOSIS — R06.09 OTHER FORM OF DYSPNEA: Primary | ICD-10-CM

## 2019-02-12 NOTE — TELEPHONE ENCOUNTER
Unable to reach patient,  left on home phone. Per dr quiroga - patients urine was normal and she can take the suppository, however, should be taking miralax DAILY (was not doing previously).    ----- Message from Najma Molina CMA sent at 2/11/2019  1:35 PM EST -----  To ask Dr. Quiroga tomorrow. Patient asking if she can use a suppository with her daily Miralax. Last BM was Friday.     Patient also asking about POC urine dip done at last visit, I didn't see this in her chart so I was unsure. Please advise    Thank you dear!

## 2019-02-13 DIAGNOSIS — R06.00 DYSPNEA, UNSPECIFIED TYPE: Primary | ICD-10-CM

## 2019-02-20 ENCOUNTER — HOSPITAL ENCOUNTER (OUTPATIENT)
Dept: CARDIOLOGY | Facility: HOSPITAL | Age: 79
Discharge: HOME | End: 2019-02-20
Admitting: INTERNAL MEDICINE

## 2019-02-20 VITALS
DIASTOLIC BLOOD PRESSURE: 78 MMHG | OXYGEN SATURATION: 96 % | HEART RATE: 58 BPM | HEIGHT: 67 IN | BODY MASS INDEX: 31.23 KG/M2 | WEIGHT: 199 LBS | SYSTOLIC BLOOD PRESSURE: 144 MMHG

## 2019-02-20 DIAGNOSIS — R06.00 DYSPNEA, UNSPECIFIED TYPE: ICD-10-CM

## 2019-02-20 LAB
BH CV ECHO MEAS - ACS: 2 CM
BH CV ECHO MEAS - AO MAX PG (FULL): 1.1 MMHG
BH CV ECHO MEAS - AO MAX PG: 5.5 MMHG
BH CV ECHO MEAS - AO MEAN PG (FULL): 1 MMHG
BH CV ECHO MEAS - AO MEAN PG: 3 MMHG
BH CV ECHO MEAS - AO ROOT AREA (BSA CORRECTED): 1.4
BH CV ECHO MEAS - AO ROOT AREA: 6.6 CM^2
BH CV ECHO MEAS - AO ROOT DIAM: 2.9 CM
BH CV ECHO MEAS - AO V2 MAX: 117 CM/SEC
BH CV ECHO MEAS - AO V2 MEAN: 79.5 CM/SEC
BH CV ECHO MEAS - AO V2 VTI: 29.6 CM
BH CV ECHO MEAS - AVA(I,A): 3.1 CM^2
BH CV ECHO MEAS - AVA(I,D): 3.1 CM^2
BH CV ECHO MEAS - AVA(V,A): 3.7 CM^2
BH CV ECHO MEAS - AVA(V,D): 3.7 CM^2
BH CV ECHO MEAS - BSA(HAYCOCK): 2.1 M^2
BH CV ECHO MEAS - BSA: 2 M^2
BH CV ECHO MEAS - BZI_BMI: 31.2 KILOGRAMS/M^2
BH CV ECHO MEAS - BZI_METRIC_HEIGHT: 170.2 CM
BH CV ECHO MEAS - BZI_METRIC_WEIGHT: 90.3 KG
BH CV ECHO MEAS - EDV(CUBED): 80.6 ML
BH CV ECHO MEAS - EDV(MOD-SP2): 65.8 ML
BH CV ECHO MEAS - EDV(MOD-SP4): 95.1 ML
BH CV ECHO MEAS - EDV(TEICH): 84 ML
BH CV ECHO MEAS - EF(CUBED): 77.4 %
BH CV ECHO MEAS - EF(MOD-BP): 62 %
BH CV ECHO MEAS - EF(MOD-SP2): 60.6 %
BH CV ECHO MEAS - EF(MOD-SP4): 64 %
BH CV ECHO MEAS - EF(TEICH): 69.9 %
BH CV ECHO MEAS - ESV(CUBED): 18.2 ML
BH CV ECHO MEAS - ESV(MOD-SP2): 25.9 ML
BH CV ECHO MEAS - ESV(MOD-SP4): 34.2 ML
BH CV ECHO MEAS - ESV(TEICH): 25.3 ML
BH CV ECHO MEAS - FS: 39.1 %
BH CV ECHO MEAS - IVS/LVPW: 1.4
BH CV ECHO MEAS - IVSD: 1.2 CM
BH CV ECHO MEAS - LA DIMENSION: 3.3 CM
BH CV ECHO MEAS - LA/AO: 1.1
BH CV ECHO MEAS - LAT PEAK E' VEL: 6 CM/SEC
BH CV ECHO MEAS - LV DIASTOLIC VOL/BSA (35-75): 47.1 ML/M^2
BH CV ECHO MEAS - LV MASS(C)D: 153.4 GRAMS
BH CV ECHO MEAS - LV MASS(C)DI: 76 GRAMS/M^2
BH CV ECHO MEAS - LV MAX PG: 4.3 MMHG
BH CV ECHO MEAS - LV MEAN PG: 2 MMHG
BH CV ECHO MEAS - LV SYSTOLIC VOL/BSA (12-30): 16.9 ML/M^2
BH CV ECHO MEAS - LV V1 MAX: 104 CM/SEC
BH CV ECHO MEAS - LV V1 MEAN: 55.1 CM/SEC
BH CV ECHO MEAS - LV V1 VTI: 21.9 CM
BH CV ECHO MEAS - LVIDD: 4.3 CM
BH CV ECHO MEAS - LVIDS: 2.6 CM
BH CV ECHO MEAS - LVLD AP2: 6.3 CM
BH CV ECHO MEAS - LVLD AP4: 6.6 CM
BH CV ECHO MEAS - LVLS AP2: 5.9 CM
BH CV ECHO MEAS - LVLS AP4: 5.5 CM
BH CV ECHO MEAS - LVOT AREA (M): 4.2 CM^2
BH CV ECHO MEAS - LVOT AREA: 4.2 CM^2
BH CV ECHO MEAS - LVOT DIAM: 2.3 CM
BH CV ECHO MEAS - LVPWD: 0.86 CM
BH CV ECHO MEAS - MED PEAK E' VEL: 4 CM/SEC
BH CV ECHO MEAS - MV A DUR: 0.09 SEC
BH CV ECHO MEAS - MV A MAX VEL: 68.4 CM/SEC
BH CV ECHO MEAS - MV DEC SLOPE: 260 CM/SEC^2
BH CV ECHO MEAS - MV DEC TIME: 0.16 SEC
BH CV ECHO MEAS - MV E MAX VEL: 57.2 CM/SEC
BH CV ECHO MEAS - MV E/A: 0.84
BH CV ECHO MEAS - MV MAX PG: 3.9 MMHG
BH CV ECHO MEAS - MV MEAN PG: 2 MMHG
BH CV ECHO MEAS - MV P1/2T MAX VEL: 80.2 CM/SEC
BH CV ECHO MEAS - MV P1/2T: 90.3 MSEC
BH CV ECHO MEAS - MV V2 MAX: 99.2 CM/SEC
BH CV ECHO MEAS - MV V2 MEAN: 58.2 CM/SEC
BH CV ECHO MEAS - MV V2 VTI: 27.4 CM
BH CV ECHO MEAS - MVA P1/2T LCG: 2.7 CM^2
BH CV ECHO MEAS - MVA(P1/2T): 2.4 CM^2
BH CV ECHO MEAS - MVA(VTI): 3.3 CM^2
BH CV ECHO MEAS - PA ACC TIME: 0.08 SEC
BH CV ECHO MEAS - PA MAX PG (FULL): 1.1 MMHG
BH CV ECHO MEAS - PA MAX PG: 2.1 MMHG
BH CV ECHO MEAS - PA PR(ACCEL): 44.4 MMHG
BH CV ECHO MEAS - PA V2 MAX: 71.8 CM/SEC
BH CV ECHO MEAS - PULM A REVS DUR: 0.12 SEC
BH CV ECHO MEAS - PULM A REVS VEL: 27.6 CM/SEC
BH CV ECHO MEAS - PULM DIAS VEL: 41.2 CM/SEC
BH CV ECHO MEAS - PULM S/D: 0.97
BH CV ECHO MEAS - PULM SYS VEL: 39.8 CM/SEC
BH CV ECHO MEAS - PVA(V,A): 2.9 CM^2
BH CV ECHO MEAS - PVA(V,D): 2.9 CM^2
BH CV ECHO MEAS - QP/QS: 0.47
BH CV ECHO MEAS - RAP SYSTOLE: 3 MMHG
BH CV ECHO MEAS - RV MAX PG: 0.98 MMHG
BH CV ECHO MEAS - RV MEAN PG: 1 MMHG
BH CV ECHO MEAS - RV V1 MAX: 49.5 CM/SEC
BH CV ECHO MEAS - RV V1 MEAN: 33.6 CM/SEC
BH CV ECHO MEAS - RV V1 VTI: 10.2 CM
BH CV ECHO MEAS - RVOT AREA: 4.2 CM^2
BH CV ECHO MEAS - RVOT DIAM: 2.3 CM
BH CV ECHO MEAS - RVSP: 26 MMHG
BH CV ECHO MEAS - SI(AO): 96.9 ML/M^2
BH CV ECHO MEAS - SI(CUBED): 30.9 ML/M^2
BH CV ECHO MEAS - SI(LVOT): 45.1 ML/M^2
BH CV ECHO MEAS - SI(MOD-SP2): 19.8 ML/M^2
BH CV ECHO MEAS - SI(MOD-SP4): 30.2 ML/M^2
BH CV ECHO MEAS - SI(TEICH): 29.1 ML/M^2
BH CV ECHO MEAS - SV(AO): 195.5 ML
BH CV ECHO MEAS - SV(CUBED): 62.4 ML
BH CV ECHO MEAS - SV(LVOT): 91 ML
BH CV ECHO MEAS - SV(MOD-SP2): 39.9 ML
BH CV ECHO MEAS - SV(MOD-SP4): 60.9 ML
BH CV ECHO MEAS - SV(RVOT): 42.4 ML
BH CV ECHO MEAS - SV(TEICH): 58.7 ML
BH CV ECHO MEAS - TAPSE (>1.6): 2.3 CM2
BH CV ECHO MEAS - TR MAX VEL: 256 CM/SEC
BH CV ECHO MEASUREMENTS AVERAGE E/E' RATIO: 11.44
BH CV STRESS BP STAGE 1: NORMAL
BH CV STRESS BP STAGE 2: NORMAL
BH CV STRESS DURATION MIN STAGE 1: 3
BH CV STRESS DURATION MIN STAGE 2: 1
BH CV STRESS DURATION SEC STAGE 1: 0
BH CV STRESS DURATION SEC STAGE 2: 26
BH CV STRESS GRADE STAGE 1: 10
BH CV STRESS GRADE STAGE 2: 12
BH CV STRESS HR STAGE 1: 118
BH CV STRESS HR STAGE 2: 143
BH CV STRESS METS STAGE 1: 5
BH CV STRESS METS STAGE 2: 7.5
BH CV STRESS PROTOCOL 1: NORMAL
BH CV STRESS RECOVERY BP: NORMAL MMHG
BH CV STRESS RECOVERY HR: 70 BPM
BH CV STRESS SPEED STAGE 1: 1.7
BH CV STRESS SPEED STAGE 2: 2.5
BH CV STRESS STAGE 1: 1
BH CV STRESS STAGE 2: 2
BH CV VAS BP RIGHT ARM: NORMAL MMHG
BH CV XLRA - RV BASE: 3.6 CM
BH CV XLRA - TDI S': 14 CM/SEC
LEFT ATRIUM VOLUME INDEX: 24 ML/M2
LV EF 2D ECHO EST: 62 %
MAXIMAL PREDICTED HEART RATE: 144 BPM
PERCENT MAX PREDICTED HR: 111.11 %
STRESS BASELINE BP: NORMAL MMHG
STRESS BASELINE HR: 58 BPM
STRESS O2 SAT REST: 96 %
STRESS PERCENT HR: 131 %
STRESS POST ESTIMATED WORKLOAD: 6.4 METS
STRESS POST EXERCISE DUR MIN: 4 MIN
STRESS POST EXERCISE DUR SEC: 26 SEC
STRESS POST O2 SAT PEAK: 96 %
STRESS POST PEAK BP: NORMAL MMHG
STRESS POST PEAK HR: 160 BPM
STRESS TARGET HR: 122 BPM

## 2019-02-20 PROCEDURE — 93325 DOPPLER ECHO COLOR FLOW MAPG: CPT | Performed by: INTERNAL MEDICINE

## 2019-02-20 PROCEDURE — 93350 STRESS TTE ONLY: CPT | Performed by: INTERNAL MEDICINE

## 2019-02-20 PROCEDURE — 93325 DOPPLER ECHO COLOR FLOW MAPG: CPT

## 2019-02-20 PROCEDURE — 93350 STRESS TTE ONLY: CPT

## 2019-02-20 PROCEDURE — 93017 CV STRESS TEST TRACING ONLY: CPT

## 2019-02-20 PROCEDURE — 93016 CV STRESS TEST SUPVJ ONLY: CPT | Performed by: INTERNAL MEDICINE

## 2019-02-20 PROCEDURE — 93320 DOPPLER ECHO COMPLETE: CPT

## 2019-02-20 PROCEDURE — 93320 DOPPLER ECHO COMPLETE: CPT | Performed by: INTERNAL MEDICINE

## 2019-02-20 PROCEDURE — 93018 CV STRESS TEST I&R ONLY: CPT | Performed by: INTERNAL MEDICINE

## 2019-02-22 ENCOUNTER — TELEPHONE (OUTPATIENT)
Dept: INTERNAL MEDICINE | Facility: CLINIC | Age: 79
End: 2019-02-22

## 2019-02-22 NOTE — TELEPHONE ENCOUNTER
LVM to call me back.     ----- Message from Cyril Quiroga MD sent at 2/20/2019 12:05 PM EST -----  Great news her stress testing is normal. This means dyspnea may be deconditioning but very unlikely her heart. She may want further testing or even some reconditioning PT if she is willing.  Should have FU in a few months.

## 2019-03-01 RX ORDER — LISINOPRIL AND HYDROCHLOROTHIAZIDE 25; 20 MG/1; MG/1
TABLET ORAL
Qty: 90 TABLET | Refills: 1 | Status: SHIPPED | OUTPATIENT
Start: 2019-03-01 | End: 2019-09-17 | Stop reason: SDUPTHER

## 2019-03-01 RX ORDER — BLOOD SUGAR DIAGNOSTIC
STRIP MISCELLANEOUS
Qty: 100 EACH | Refills: 3 | Status: SHIPPED | OUTPATIENT
Start: 2019-03-01 | End: 2020-08-27

## 2019-04-12 ENCOUNTER — OFFICE VISIT (OUTPATIENT)
Dept: INTERNAL MEDICINE | Facility: CLINIC | Age: 79
End: 2019-04-12

## 2019-04-12 VITALS
HEART RATE: 54 BPM | TEMPERATURE: 98 F | SYSTOLIC BLOOD PRESSURE: 130 MMHG | RESPIRATION RATE: 16 BRPM | DIASTOLIC BLOOD PRESSURE: 72 MMHG | BODY MASS INDEX: 31.23 KG/M2 | WEIGHT: 199 LBS | OXYGEN SATURATION: 99 % | HEIGHT: 67 IN

## 2019-04-12 DIAGNOSIS — Z86.19 HISTORY OF HELICOBACTER PYLORI INFECTION: ICD-10-CM

## 2019-04-12 DIAGNOSIS — K29.50 CHRONIC GASTRITIS WITHOUT BLEEDING, UNSPECIFIED GASTRITIS TYPE: ICD-10-CM

## 2019-04-12 DIAGNOSIS — N81.10 VAGINAL PROLAPSE: ICD-10-CM

## 2019-04-12 DIAGNOSIS — R05.3 CHRONIC COUGH: ICD-10-CM

## 2019-04-12 DIAGNOSIS — Z01.810 ENCOUNTER FOR PRE-OPERATIVE CARDIOVASCULAR CLEARANCE: Primary | ICD-10-CM

## 2019-04-12 PROCEDURE — 99214 OFFICE O/P EST MOD 30 MIN: CPT | Performed by: INTERNAL MEDICINE

## 2019-04-12 RX ORDER — OMEPRAZOLE 40 MG/1
40 CAPSULE, DELAYED RELEASE ORAL DAILY
Qty: 30 CAPSULE | Refills: 3 | Status: SHIPPED | OUTPATIENT
Start: 2019-04-12 | End: 2019-11-13 | Stop reason: SDUPTHER

## 2019-04-12 NOTE — PROGRESS NOTES
"      Radha Silverio is a 78 y.o. female, who presents with a chief complaint of   Chief Complaint   Patient presents with   • Pre-op Exam     Dr. Quiroga patient, EKG in chart    • Cough     patient worried cough is caused by lisinopril        79 yo F with HTN, Type 2 diabetes, Solis's esophagus, and HLD here for cardiac clearance for D&C and hysterectomy in preparation for prolapse repair. She has never had a MI and doesn't use insulin. No CP currently. She had stress test in 2/2019 that was negative and EKG that showed PVC's and PAC's. Labs showed some minor lower kidney function. No CP or SOB.     She has had cough for close to 12 years that worries her. Doesn't cough every week or day, just intermittently.Her voice has changed. It sounds deeper and huskier to her. Dry cough. She doesn't feel SOB. No labored breathing. Not worse during times of the day. No drainage that she has noted. She does occasionally does have mucous in her throat. She is not taking PPI daily. EGD noted gastritis and H. Pylori and she never test of cure for this two years ago. Her stomach does \"growl\" a lot per her. No reflux symptoms.                  The following portions of the patient's history were reviewed and updated as appropriate: allergies, current medications, past family history, past medical history, past social history, past surgical history and problem list.    Allergies: Patient has no known allergies.    Current Outpatient Medications:   •  ACCU-CHEK KATHARINE PLUS test strip, USE ONE STRIP TO CHECK GLUCOSE ONCE DAILY, Disp: 100 each, Rfl: 3  •  Acetaminophen (TYLENOL ARTHRITIS EXT RELIEF PO), Take  by mouth., Disp: , Rfl:   •  aspirin tablet, Take 81 mg by mouth daily., Disp: , Rfl:   •  lisinopril-hydrochlorothiazide (PRINZIDE,ZESTORETIC) 20-25 MG per tablet, TAKE 1 TABLET BY MOUTH ONCE DAILY, Disp: 90 tablet, Rfl: 1  •  metFORMIN (GLUCOPHAGE) 500 MG tablet, TAKE ONE TABLET BY MOUTH TWICE DAILY WITH MEALS, Disp: 180 " "tablet, Rfl: 3  •  metoprolol succinate XL (TOPROL-XL) 25 MG 24 hr tablet, Take 1 tablet by mouth Daily., Disp: 90 tablet, Rfl: 3  •  Polyethylene Glycol 3350 (MIRALAX PO), Take  by mouth Daily., Disp: , Rfl:   •  simvastatin (ZOCOR) 40 MG tablet, TAKE ONE TABLET BY MOUTH ONCE DAILY IN THE EVENING, Disp: 90 tablet, Rfl: 2  •  omeprazole (PRILOSEC) 40 MG capsule, Take 1 capsule by mouth Daily., Disp: 30 capsule, Rfl: 3  There are no discontinued medications.    Review of Systems   Constitutional: Negative for chills, fatigue and fever.   HENT: Negative for congestion.    Respiratory: Positive for cough. Negative for shortness of breath.    Cardiovascular: Negative for chest pain, palpitations and leg swelling.             /72 (BP Location: Left arm, Patient Position: Sitting, Cuff Size: Large Adult)   Pulse 54   Temp 98 °F (36.7 °C) (Oral)   Resp 16   Ht 170.2 cm (67\")   Wt 90.3 kg (199 lb)   SpO2 99%   BMI 31.17 kg/m²       Physical Exam   Constitutional: She is oriented to person, place, and time. She appears well-developed and well-nourished. No distress.   HENT:   Head: Normocephalic and atraumatic.   Right Ear: Hearing, tympanic membrane and external ear normal.   Left Ear: Hearing, tympanic membrane and external ear normal.   Nose: Nose normal.   Mouth/Throat: Uvula is midline and mucous membranes are normal. Posterior oropharyngeal erythema present. No oropharyngeal exudate or posterior oropharyngeal edema. Tonsils are 0 on the right. Tonsils are 0 on the left. No tonsillar exudate.   Eyes: Conjunctivae are normal. Right eye exhibits no discharge. Left eye exhibits no discharge. No scleral icterus.   Cardiovascular: Normal rate, regular rhythm and normal heart sounds. Exam reveals no gallop and no friction rub.   No murmur heard.  Pulmonary/Chest: Effort normal and breath sounds normal. No respiratory distress. She has no wheezes. She has no rales.   Neurological: She is alert and oriented to " person, place, and time.   Skin: Skin is warm. No rash noted.   Psychiatric: She has a normal mood and affect. Her behavior is normal.   Nursing note and vitals reviewed.        Results for orders placed or performed during the hospital encounter of 02/20/19   Adult Stress Echo W/ Cont or Stress Agent if Necessary Per Protocol   Result Value Ref Range    BH CV STRESS PROTOCOL 1 Richard     Stage 1 1     Duration Min Stage 1 3     Duration Sec Stage 1 0     Grade Stage 1 10     Speed Stage 1 1.7     BH CV STRESS METS STAGE 1 5     HR Stage 1 118     BP Stage 1 133/72     Stage 2 2     HR Stage 2 143     BP Stage 2 159/85     Duration Min Stage 2 1     Duration Sec Stage 2 26     Grade Stage 2 12     Speed Stage 2 2.5     BH CV STRESS METS STAGE 2 7.5     Baseline HR 58 bpm    Baseline /78 mmHg    O2 sat rest 96 %    Peak  bpm    Peak /106 mmHg    O2 sat peak 96 %    Recovery HR 70 bpm    Recovery /97 mmHg    Exercise duration (min) 4 min    Exercise duration (sec) 26 sec    Estimated workload 6.4 METS    BSA 2.0 m^2    IVSd 1.2 cm    LVIDd 4.3 cm    LVIDs 2.6 cm    LVPWd 0.86 cm    IVS/LVPW 1.4     FS 39.1 %    EDV(Teich) 84.0 ml    ESV(Teich) 25.3 ml    EF(Teich) 69.9 %    EDV(cubed) 80.6 ml    ESV(cubed) 18.2 ml    EF(cubed) 77.4 %    LV mass(C)d 153.4 grams    LV mass(C)dI 76.0 grams/m^2    SV(Teich) 58.7 ml    SI(Teich) 29.1 ml/m^2    SV(cubed) 62.4 ml    SI(cubed) 30.9 ml/m^2    Ao root diam 2.9 cm    Ao root area 6.6 cm^2    ACS 2.0 cm    LA dimension 3.3 cm    LA/Ao 1.1     LVOT diam 2.3 cm    LVOT area 4.2 cm^2    LVOT area(traced) 4.2 cm^2    RVOT diam 2.3 cm    RVOT area 4.2 cm^2    LVLd ap4 6.6 cm    EDV(MOD-sp4) 95.1 ml    LVLs ap4 5.5 cm    ESV(MOD-sp4) 34.2 ml    EF(MOD-sp4) 64.0 %    LVLd ap2 6.3 cm    EDV(MOD-sp2) 65.8 ml    LVLs ap2 5.9 cm    ESV(MOD-sp2) 25.9 ml    EF(MOD-sp2) 60.6 %    SV(MOD-sp4) 60.9 ml    SI(MOD-sp4) 30.2 ml/m^2    SV(MOD-sp2) 39.9 ml    SI(MOD-sp2) 19.8  ml/m^2    Ao root area (BSA corrected) 1.4     LV [place] Vol (BSA corrected) 47.1 ml/m^2    LV Sys Vol (BSA corrected) 16.9 ml/m^2    MV A dur 0.09 sec    MV E max angel 57.2 cm/sec    MV A max angel 68.4 cm/sec    MV E/A 0.84     MV V2 max 99.2 cm/sec    MV max PG 3.9 mmHg    MV V2 mean 58.2 cm/sec    MV mean PG 2.0 mmHg    MV V2 VTI 27.4 cm    MVA(VTI) 3.3 cm^2    MV P1/2t max angel 80.2 cm/sec    MV P1/2t 90.3 msec    MVA(P1/2t) 2.4 cm^2    MV dec slope 260.0 cm/sec^2    MV dec time 0.16 sec    Ao pk angel 117.0 cm/sec    Ao max PG 5.5 mmHg    Ao max PG (full) 1.1 mmHg    Ao V2 mean 79.5 cm/sec    Ao mean PG 3.0 mmHg    Ao mean PG (full) 1.0 mmHg    Ao V2 VTI 29.6 cm    QUENTIN(I,A) 3.1 cm^2    QUENTIN(I,D) 3.1 cm^2    QUENTIN(V,A) 3.7 cm^2    QUENTIN(V,D) 3.7 cm^2    LV V1 max PG 4.3 mmHg    LV V1 mean PG 2.0 mmHg    LV V1 max 104.0 cm/sec    LV V1 mean 55.1 cm/sec    LV V1 VTI 21.9 cm    SV(Ao) 195.5 ml    SI(Ao) 96.9 ml/m^2    SV(LVOT) 91.0 ml    SV(RVOT) 42.4 ml    SI(LVOT) 45.1 ml/m^2    PA V2 max 71.8 cm/sec    PA max PG 2.1 mmHg    PA max PG (full) 1.1 mmHg    BH CV ECHO CORTEZ - PVA(V,A) 2.9 cm^2    BH CV ECHO CORTEZ - PVA(V,D) 2.9 cm^2    PA acc time 0.08 sec    RV V1 max PG 0.98 mmHg    RV V1 mean PG 1.0 mmHg    RV V1 max 49.5 cm/sec    RV V1 mean 33.6 cm/sec    RV V1 VTI 10.2 cm    TR max angel 256.0 cm/sec    PA pr(Accel) 44.4 mmHg    Pulm Sys Angel 39.8 cm/sec    Pulm [place] Angel 41.2 cm/sec    Pulm S/D 0.97     Qp/Qs 0.47     Pulm A Revs Dur 0.12 sec    Pulm A Revs Angel 27.6 cm/sec    MVA P1/2T LCG 2.7 cm^2    BH CV ECHO CORTEZ - BZI_BMI 31.2 kilograms/m^2     CV ECHO CORTEZ - BSA(HAYCOCK) 2.1 m^2     CV ECHO CORTEZ - BZI_METRIC_WEIGHT 90.3 kg     CV ECHO CORTEZ - BZI_METRIC_HEIGHT 170.2 cm    Percent Target  %    Percent Max Pred .11 %    Target HR (85%) 122 bpm    Max. Pred. HR (100%) 144 bpm     CV VAS BP RIGHT /84 mmHg    TDI S' 14.00 cm/sec    RV Base 3.60 cm    LA Volume Index 24.0 mL/m2    Avg E/e' ratio 11.44      EF(MOD-bp) 62.0 %    Lat Peak E' Angel 6.0 cm/sec    Med Peak E' Angel 4.00 cm/sec    RAP systole 3 mmHg    RVSP(TR) 26 mmHg    TAPSE (>1.6) 2.30 cm2    Echo EF Estimated 62 %           Radha was seen today for pre-op exam and cough.    Diagnoses and all orders for this visit:    Encounter for pre-operative cardiovascular clearance    Vaginal prolapse    Chronic cough    History of Helicobacter pylori infection  -     H. Pylori Antigen, Stool - Stool, Per Rectum    Chronic gastritis without bleeding, unspecified gastritis type  -     H. Pylori Antigen, Stool - Stool, Per Rectum    Other orders  -     omeprazole (PRILOSEC) 40 MG capsule; Take 1 capsule by mouth Daily.    She is cleared for surgery. Reviewed labs, EKG and stress test that were all done in the last 2 months. RCRI is 0 which correlates to a 3.9% risks of death, MI or cardiac event in 30 days after surgery which is the lowest risks. Discussed this with patient.     I think that her cough is related to gastritis and GERD based on her EGD in 11/2016. Will test her for H. Pylori again as well with her vague abdominal pain and the fact that she never had test of cure. Will see back as scheduled next month. ACE is not causing this cough since it's not occurring every day.     Return for Next scheduled follow up.    Miranda Huynh MD  04/12/2019

## 2019-04-17 ENCOUNTER — TELEPHONE (OUTPATIENT)
Dept: INTERNAL MEDICINE | Facility: CLINIC | Age: 79
End: 2019-04-17

## 2019-04-17 LAB — H PYLORI AG STL QL IA: NEGATIVE

## 2019-04-17 NOTE — PROGRESS NOTES
Bacteria test for her stomach is all normal. Stay on stomach pill that I prescribed and will see her back as scheduled.

## 2019-04-17 NOTE — TELEPHONE ENCOUNTER
Patient has been advised and voiced understanding.     ----- Message from Miranda Huynh MD sent at 4/17/2019 12:47 PM EDT -----  Bacteria test for her stomach is all normal. Stay on stomach pill that I prescribed and will see her back as scheduled.

## 2019-05-06 ENCOUNTER — TELEPHONE (OUTPATIENT)
Dept: INTERNAL MEDICINE | Facility: CLINIC | Age: 79
End: 2019-05-06

## 2019-05-06 NOTE — TELEPHONE ENCOUNTER
Patient confirmed appointment at 2:45      ----- Message from Najma Molina CMA sent at 5/6/2019  9:14 AM EDT -----  Regarding: RE: PAIN UNDER LEFT BREAST  Contact: [phone]  245 tomorrow on aleta schedule.   Please call and schedule. Thanks     ----- Message -----  From: Nash Lieberman  Sent: 5/6/2019   8:55 AM  To: [facility]  Subject: PAIN UNDER LEFT BREAST                           [facility]    Patient called to say that she wanted to come in to see dr teague. She has been sick since last Wednesday. She said that she has been having a pain under her left breast down her side when she lays down. It is not a constant pain.  She has an appointment on may 16th. Can we see her sooner, or can she see someone else here?    Thanks!  nash

## 2019-05-16 ENCOUNTER — OFFICE VISIT (OUTPATIENT)
Dept: INTERNAL MEDICINE | Facility: CLINIC | Age: 79
End: 2019-05-16

## 2019-05-16 VITALS
SYSTOLIC BLOOD PRESSURE: 130 MMHG | BODY MASS INDEX: 31.23 KG/M2 | HEIGHT: 67 IN | DIASTOLIC BLOOD PRESSURE: 78 MMHG | RESPIRATION RATE: 16 BRPM | HEART RATE: 52 BPM | OXYGEN SATURATION: 98 % | WEIGHT: 199 LBS | TEMPERATURE: 98 F

## 2019-05-16 DIAGNOSIS — R05.3 CHRONIC COUGH: ICD-10-CM

## 2019-05-16 DIAGNOSIS — K21.9 GASTROESOPHAGEAL REFLUX DISEASE WITHOUT ESOPHAGITIS: ICD-10-CM

## 2019-05-16 DIAGNOSIS — R10.12 LUQ PAIN: Primary | ICD-10-CM

## 2019-05-16 PROCEDURE — 99214 OFFICE O/P EST MOD 30 MIN: CPT | Performed by: INTERNAL MEDICINE

## 2019-05-16 NOTE — PROGRESS NOTES
"      Radha Silverio is a 78 y.o. female, who presents with a chief complaint of   Chief Complaint   Patient presents with   • Follow-up     3 x month f/u   • Diabetes       79 yo F with diabetes and HTN here for follow up of her cough. She had a belly pain on the left side that moved up into her LUQ that started on 4/24/2018. Hurt for two weeks and went away on it's own. The pain was bothersome. Hurt all day long and slept okay, but still there at night. She has been taking Omeprazole and stopped for a few days and it didn't help (started this 4/12). The pain stopped last week on it's own and hasn't come back. No n/v/d. Left side doesn't feel the same as the right side. She had c-scope in 10/2016 that showed one polyp and some hemorrhoids, but no diverticulosis. H. plyori test was negative.     She does feel that her cough is better. The \"grumbling\" in her stomach is better.              The following portions of the patient's history were reviewed and updated as appropriate: allergies, current medications, past family history, past medical history, past social history, past surgical history and problem list.    Allergies: Patient has no known allergies.    Current Outpatient Medications:   •  ACCU-CHEK KATHARINE PLUS test strip, USE ONE STRIP TO CHECK GLUCOSE ONCE DAILY, Disp: 100 each, Rfl: 3  •  Acetaminophen (TYLENOL ARTHRITIS EXT RELIEF PO), Take  by mouth., Disp: , Rfl:   •  aspirin tablet, Take 81 mg by mouth daily., Disp: , Rfl:   •  lisinopril-hydrochlorothiazide (PRINZIDE,ZESTORETIC) 20-25 MG per tablet, TAKE 1 TABLET BY MOUTH ONCE DAILY, Disp: 90 tablet, Rfl: 1  •  metFORMIN (GLUCOPHAGE) 500 MG tablet, Take 1 tablet by mouth 2 (Two) Times a Day With Meals., Disp: 180 tablet, Rfl: 3  •  metoprolol succinate XL (TOPROL-XL) 25 MG 24 hr tablet, Take 1 tablet by mouth Daily., Disp: 90 tablet, Rfl: 3  •  omeprazole (PRILOSEC) 40 MG capsule, Take 1 capsule by mouth Daily., Disp: 30 capsule, Rfl: 3  •  " "Polyethylene Glycol 3350 (MIRALAX PO), Take  by mouth Daily., Disp: , Rfl:   •  simvastatin (ZOCOR) 40 MG tablet, TAKE ONE TABLET BY MOUTH ONCE DAILY IN THE EVENING, Disp: 90 tablet, Rfl: 2  There are no discontinued medications.    Review of Systems   Constitutional: Negative for chills and fatigue.   Respiratory: Positive for cough (better on PPI ).    Gastrointestinal: Positive for abdominal pain (resolved now ) and constipation (better on Miralax ). Negative for diarrhea, nausea and vomiting.   Genitourinary: Negative for difficulty urinating and dysuria.             /78 (BP Location: Left arm, Patient Position: Sitting, Cuff Size: Large Adult)   Pulse 52   Temp 98 °F (36.7 °C) (Oral)   Resp 16   Ht 170.2 cm (67\")   Wt 90.3 kg (199 lb)   SpO2 98%   BMI 31.17 kg/m²       Physical Exam   Constitutional: She is oriented to person, place, and time. She appears well-developed and well-nourished. No distress.   HENT:   Head: Normocephalic and atraumatic.   Right Ear: External ear normal.   Left Ear: External ear normal.   Mouth/Throat: Oropharynx is clear and moist. No oropharyngeal exudate.   Eyes: Conjunctivae are normal. Right eye exhibits no discharge. Left eye exhibits no discharge. No scleral icterus.   Neck: Neck supple.   Cardiovascular: Normal rate, regular rhythm and normal heart sounds. Exam reveals no gallop and no friction rub.   No murmur heard.  Pulmonary/Chest: Effort normal and breath sounds normal. No respiratory distress. She has no wheezes. She has no rales.   Abdominal: Soft. Bowel sounds are normal. She exhibits no distension and no mass. There is no tenderness. There is no guarding.   Lymphadenopathy:     She has no cervical adenopathy.   Neurological: She is alert and oriented to person, place, and time.   Skin: Skin is warm. No rash noted.   Psychiatric: She has a normal mood and affect. Her behavior is normal.   Nursing note and vitals reviewed.        Results for orders placed " or performed in visit on 04/12/19   H. Pylori Antigen, Stool - Stool, Per Rectum   Result Value Ref Range    H pylori Ag, Stl Negative Negative           Radha was seen today for follow-up and diabetes.    Diagnoses and all orders for this visit:    LUQ pain    Gastroesophageal reflux disease without esophagitis    Chronic cough      I think that her LUQ now that it has resolved, may have been gas or constipation. If returns, will check u/s.She knows to call me if fever, chills or other worrisome abdominal pain. Stay on Miralax for her constipation.     Continue her PPI for her chronic cough and GERD. Based on what she is telling me today, she feels better on PPI. Will call me if worsens. H. Pylori was negative.     Return in about 2 months (around 7/30/2019) for Recheck, Medicare Wellness.    Miranda Huynh MD  05/16/2019

## 2019-06-17 ENCOUNTER — OFFICE VISIT (OUTPATIENT)
Dept: OBSTETRICS AND GYNECOLOGY | Facility: CLINIC | Age: 79
End: 2019-06-17

## 2019-06-17 VITALS
DIASTOLIC BLOOD PRESSURE: 76 MMHG | WEIGHT: 202 LBS | SYSTOLIC BLOOD PRESSURE: 132 MMHG | BODY MASS INDEX: 31.71 KG/M2 | HEIGHT: 67 IN

## 2019-06-17 DIAGNOSIS — Z01.419 PAP SMEAR, LOW-RISK: Primary | ICD-10-CM

## 2019-06-17 DIAGNOSIS — Z12.31 ENCOUNTER FOR SCREENING MAMMOGRAM FOR MALIGNANT NEOPLASM OF BREAST: ICD-10-CM

## 2019-06-17 DIAGNOSIS — Z78.0 ASYMPTOMATIC MENOPAUSAL STATE: ICD-10-CM

## 2019-06-17 DIAGNOSIS — N81.10 VAGINAL PROLAPSE: ICD-10-CM

## 2019-06-17 DIAGNOSIS — Z13.9 SCREENING FOR CONDITION: ICD-10-CM

## 2019-06-17 DIAGNOSIS — N81.2 INCOMPLETE UTEROVAGINAL PROLAPSE: ICD-10-CM

## 2019-06-17 DIAGNOSIS — Z01.411 ENCOUNTER FOR GYNECOLOGICAL EXAMINATION WITH ABNORMAL FINDING: ICD-10-CM

## 2019-06-17 LAB
BILIRUB BLD-MCNC: NEGATIVE MG/DL
CLARITY, POC: CLEAR
COLOR UR: YELLOW
GLUCOSE UR STRIP-MCNC: NEGATIVE MG/DL
KETONES UR QL: NEGATIVE
LEUKOCYTE EST, POC: NEGATIVE
NITRITE UR-MCNC: NEGATIVE MG/ML
PH UR: 5 [PH] (ref 5–8)
PROT UR STRIP-MCNC: NEGATIVE MG/DL
RBC # UR STRIP: NEGATIVE /UL
SP GR UR: 1.02 (ref 1–1.03)
UROBILINOGEN UR QL: NORMAL

## 2019-06-17 PROCEDURE — 81002 URINALYSIS NONAUTO W/O SCOPE: CPT | Performed by: OBSTETRICS & GYNECOLOGY

## 2019-06-17 PROCEDURE — G0101 CA SCREEN;PELVIC/BREAST EXAM: HCPCS | Performed by: OBSTETRICS & GYNECOLOGY

## 2019-06-17 NOTE — PROGRESS NOTES
GYN Annual Exam     CC- Here for annual exam.     Radha Silverio is a 78 y.o. female established patient who presents for annual well woman exam.  She has had an evaluation by Dr. Gleason for possible prolapse surgery.  She states she is not sure when the surgery is going to occur, she started to feel a little bit better.  She denies any postmenopausal vaginal bleeding, bloating or pressure. She still has occ dribbling if her bladder is very full.       OB History      Para Term  AB Living    2 2 2     2    SAB TAB Ectopic Molar Multiple Live Births                       Obstetric Comments    2           Menarche:14  Menopause:53  HRT:non  Current contraception: post menopausal status and tubal ligation  History of abnormal Pap smear: no  History of abnormal mammogram: yes - s/p B9 breast bx  Family history of uterine, colon or ovarian cancer: no  Family history of breast cancer: yes - sister age 44, no genettics done, second sister age 74  STD's: none  Last pap smear:?   RAFAELA: none   POP      Health Maintenance   Topic Date Due   • ANNUAL GYN EXAM  1940   • TDAP/TD VACCINES (1 - Tdap) 1961   • ZOSTER VACCINE (1 of 2) 1992   • PNEUMOCOCCAL VACCINES (65+ LOW/MEDIUM RISK) (2 of 2 - PPSV23) 2017   • MEDICARE ANNUAL WELLNESS  2019   • LIPID PANEL  2019   • URINE MICROALBUMIN  2019   • INFLUENZA VACCINE  2019   • HEMOGLOBIN A1C  2019   • MAMMOGRAM  2020   • COLONOSCOPY  10/11/2021       Past Medical History:   Diagnosis Date   • Abdominal pain, LLQ (left lower quadrant)     with radiation to right back. Burning, cramping and sharp & is 4/10 in intensity.   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   • GERD (gastroesophageal reflux disease)    • GI problem    • Headache    • Hematochezia 12/6/2016   • High cholesterol    • Hyperlipidemia 5/24/2016   • Hypertension    • Lumbosacral disc disease    • Palpitations    • Pelvic prolapse    • SOB (shortness of breath)    • Urinary tract infection without hematuria 5/24/2016       Past Surgical History:   Procedure Laterality Date   • BREAST BIOPSY Left     cyst at 9 y/o   • CHOLECYSTECTOMY     • COLONOSCOPY N/A 10/11/2016    Procedure: COLONOSCOPY TO CECUM, TO TERMINAL ILEUM WITH HOT SNARE POLYPECTOMY;  Surgeon: Palmira Calix MD;  Location: [facility] ENDOSCOPY;  Service:    • CYST REMOVAL Right     breast   • ENDOSCOPY N/A 10/11/2016    Procedure: ESOPHAGOGASTRODUODENOSCOPY WITH BIOPSY;  Surgeon: Palmira Calix MD;  Location: [facility] ENDOSCOPY;  Service:    • LAPAROSCOPIC CHOLECYSTECTOMY W/ CHOLANGIOGRAPHY     • MOLE REMOVAL      on foot    • OOPHORECTOMY Left     B9   • TUBAL ABDOMINAL LIGATION           Current Outpatient Medications:   •  ACCU-CHEK KATHARINE PLUS test strip, USE ONE STRIP TO CHECK GLUCOSE ONCE DAILY, Disp: 100 each, Rfl: 3  •  Acetaminophen (TYLENOL ARTHRITIS EXT RELIEF PO), Take  by mouth., Disp: , Rfl:   •  aspirin tablet, Take 81 mg by mouth daily., Disp: , Rfl:   •  lisinopril-hydrochlorothiazide (PRINZIDE,ZESTORETIC) 20-25 MG per tablet, TAKE 1 TABLET BY MOUTH ONCE DAILY, Disp: 90 tablet, Rfl: 1  •  metFORMIN (GLUCOPHAGE) 500 MG tablet, Take 1 tablet by mouth 2 (Two) Times a Day With Meals., Disp: 180 tablet, Rfl: 3  •  metoprolol succinate XL (TOPROL-XL) 25 MG 24 hr tablet, Take 1 tablet by mouth Daily., Disp: 90 tablet, Rfl: 3  •  omeprazole (PRILOSEC) 40 MG capsule, Take 1 capsule by mouth Daily., Disp: 30 capsule, Rfl: 3  •  Polyethylene Glycol 3350 (MIRALAX PO), Take  by mouth Daily., Disp: , Rfl:   •  simvastatin (ZOCOR) 40 MG tablet, TAKE ONE TABLET BY MOUTH ONCE DAILY IN THE  "EVENING, Disp: 90 tablet, Rfl: 2    No Known Allergies    Social History     Tobacco Use   • Smoking status: Never Smoker   • Smokeless tobacco: Never Used   • Tobacco comment: no caffiene   Substance Use Topics   • Alcohol use: No   • Drug use: No       Family History   Problem Relation Age of Onset   • Diabetes Mother    • Heart disease Father    • Diabetes Sister    • Heart disease Sister    • Breast cancer Sister    • Cancer Sister    • Stroke Sister    • Diabetes Brother    • Heart disease Brother    • Cancer Brother    • Breast cancer Sister    • Ovarian cancer Neg Hx    • Colon cancer Neg Hx    • Deep vein thrombosis Neg Hx    • Pulmonary embolism Neg Hx        Review of Systems   Constitutional: Positive for unexpected weight change (gain). Negative for appetite change, fatigue and fever.   Eyes: Negative for photophobia and visual disturbance.   Respiratory: Positive for shortness of breath (with exercise, unchanged.). Negative for cough.    Cardiovascular: Negative for chest pain and palpitations.   Gastrointestinal: Positive for constipation (unchanged). Negative for abdominal distention, abdominal pain, diarrhea and nausea.   Endocrine: Negative for cold intolerance and heat intolerance.   Genitourinary: Positive for urgency (if bladder full). Negative for dyspareunia, dysuria, flank pain, hematuria, menstrual problem, pelvic pain, vaginal discharge and vaginal pain.   Musculoskeletal: Negative for back pain.   Skin: Negative for color change and rash.   Neurological: Negative for headaches.   Hematological: Negative for adenopathy. Does not bruise/bleed easily.   Psychiatric/Behavioral: Negative for dysphoric mood. The patient is not nervous/anxious.    All other systems reviewed and are negative.      /76   Ht 170.2 cm (67\")   Wt 91.6 kg (202 lb)   BMI 31.64 kg/m²     Physical Exam   Constitutional: She is oriented to person, place, and time. She appears well-developed and well-nourished. "   HENT:   Head: Normocephalic and atraumatic.   Eyes: Conjunctivae are normal. No scleral icterus.   Neck: Neck supple. No thyromegaly present.   Cardiovascular: Normal rate and regular rhythm.   Pulmonary/Chest: Effort normal and breath sounds normal. Right breast exhibits no inverted nipple, no mass, no nipple discharge, no skin change and no tenderness. Left breast exhibits no inverted nipple, no mass, no nipple discharge, no skin change and no tenderness.   Abdominal: Soft. Bowel sounds are normal. She exhibits no distension and no mass. There is no tenderness. There is no rebound and no guarding. No hernia.   Genitourinary: Pelvic exam was performed with patient supine. There is no rash, tenderness or lesion on the right labia. There is no rash, tenderness or lesion on the left labia. Uterus is not deviated, not enlarged, not fixed and not tender. Cervix exhibits no motion tenderness, no discharge and no friability. Right adnexum displays no mass, no tenderness and no fullness. Left adnexum displays no mass, no tenderness and no fullness. No erythema, tenderness or bleeding in the vagina. No foreign body in the vagina. No signs of injury around the vagina. No vaginal discharge found.   Genitourinary Comments: Grade 2 prolapse, unchanged  No TTP, no masses   Neurological: She is alert and oriented to person, place, and time.   Skin: Skin is warm and dry.   Psychiatric: She has a normal mood and affect. Her behavior is normal. Judgment and thought content normal.   Nursing note and vitals reviewed.         Assessment/Plan    1) GYN HM: pap  SBE demonstrated and encouraged. Last pap  2) STD screening: declines Condoms encouraged.  3) Bone health - Weight bearing exercise, dietary calcium recommendations and vitamin D reviewed.   4) Diet and Exercise discussed  5) Smoking Status: No  6) POP- exam unchanged but uterus is not tender this time.  She is seen Dr. Gleason and was set up for prolapse surgery, however, she  may put this off for a while S her symptoms of prolapse seem to have resolved without intervention.  We also discussed again doing pelvic floor physical therapy she feels like her symptoms are starting to come back.  She is trying to avoid surgery if possible.  7)MMG: UTD 12/31/18- repeat 1/2020  8) DEXA-UTD 2014, repeat 1/2020  9)C scope-UTD 10/2016- repeat 5 years, Dr Hurt   Follow up prn and 1 year       Radha was seen today for gynecologic exam.    Diagnoses and all orders for this visit:    Pap smear, low-risk  -     Pap IG (Image Guided)    Screening for condition  -     POC Urinalysis Dipstick    Vaginal prolapse    Incomplete uterovaginal prolapse    Encounter for gynecological examination with abnormal finding        Frances Gautam MD  6/17/2019  2:53 PM

## 2019-06-19 LAB
CYTOLOGIST CVX/VAG CYTO: NORMAL
CYTOLOGY CVX/VAG DOC CYTO: NORMAL
CYTOLOGY CVX/VAG DOC THIN PREP: NORMAL
DX ICD CODE: NORMAL
HIV 1 & 2 AB SER-IMP: NORMAL
OTHER STN SPEC: NORMAL
STAT OF ADQ CVX/VAG CYTO-IMP: NORMAL

## 2019-06-20 RX ORDER — METRONIDAZOLE 7.5 MG/G
GEL VAGINAL NIGHTLY
Qty: 70 G | Refills: 0 | Status: SHIPPED | OUTPATIENT
Start: 2019-06-20 | End: 2019-06-25

## 2019-07-02 RX ORDER — METRONIDAZOLE 500 MG/1
500 TABLET ORAL 2 TIMES DAILY
Qty: 14 TABLET | Refills: 0 | Status: SHIPPED | OUTPATIENT
Start: 2019-07-02 | End: 2019-07-07 | Stop reason: SDUPTHER

## 2019-07-08 RX ORDER — METRONIDAZOLE 500 MG/1
TABLET ORAL
Qty: 14 TABLET | Refills: 0 | Status: SHIPPED | OUTPATIENT
Start: 2019-07-08 | End: 2019-11-21

## 2019-07-23 ENCOUNTER — OFFICE VISIT (OUTPATIENT)
Dept: INTERNAL MEDICINE | Facility: CLINIC | Age: 79
End: 2019-07-23

## 2019-07-23 VITALS
DIASTOLIC BLOOD PRESSURE: 76 MMHG | OXYGEN SATURATION: 98 % | TEMPERATURE: 98 F | RESPIRATION RATE: 16 BRPM | WEIGHT: 201 LBS | HEART RATE: 49 BPM | SYSTOLIC BLOOD PRESSURE: 126 MMHG | BODY MASS INDEX: 30.46 KG/M2 | HEIGHT: 68 IN

## 2019-07-23 DIAGNOSIS — R06.02 SHORTNESS OF BREATH: ICD-10-CM

## 2019-07-23 DIAGNOSIS — E78.2 MIXED HYPERLIPIDEMIA: ICD-10-CM

## 2019-07-23 DIAGNOSIS — K21.9 GASTROESOPHAGEAL REFLUX DISEASE WITHOUT ESOPHAGITIS: ICD-10-CM

## 2019-07-23 DIAGNOSIS — I10 ESSENTIAL HYPERTENSION: ICD-10-CM

## 2019-07-23 DIAGNOSIS — Z00.00 MEDICARE ANNUAL WELLNESS VISIT, SUBSEQUENT: Primary | ICD-10-CM

## 2019-07-23 DIAGNOSIS — E11.9 TYPE 2 DIABETES MELLITUS WITHOUT COMPLICATION, WITHOUT LONG-TERM CURRENT USE OF INSULIN (HCC): ICD-10-CM

## 2019-07-23 PROCEDURE — 99214 OFFICE O/P EST MOD 30 MIN: CPT | Performed by: INTERNAL MEDICINE

## 2019-07-23 PROCEDURE — G0439 PPPS, SUBSEQ VISIT: HCPCS | Performed by: INTERNAL MEDICINE

## 2019-07-23 NOTE — PROGRESS NOTES
QUICK REFERENCE INFORMATION:  The ABCs of the Annual Wellness Visit    Subsequent Medicare Wellness Visit     HEALTH RISK ASSESSMENT    : 1940    Recent Hospitalizations:  No hospitalization(s) within the last year..        Current Medical Providers:  Patient Care Team:  Miranda Huynh MD as PCP - General (Internal Medicine)  Cyril Quiroga MD as PCP - Family Medicine  Cyril Quiroga MD as PCP - Claims Attributed  Raisa Gleason MD as Consulting Physician (Urogynecology)  Trudi Urbina MD as Consulting Physician (Cardiology)        Smoking Status:  Social History     Tobacco Use   Smoking Status Never Smoker   Smokeless Tobacco Never Used   Tobacco Comment    no caffiene       Alcohol Consumption:  Social History     Substance and Sexual Activity   Alcohol Use No       Depression Screen:   PHQ-2/PHQ-9 Depression Screening 2019   Little interest or pleasure in doing things 1   Feeling down, depressed, or hopeless 0   Trouble falling or staying asleep, or sleeping too much -   Feeling tired or having little energy -   Poor appetite or overeating -   Feeling bad about yourself - or that you are a failure or have let yourself or your family down -   Trouble concentrating on things, such as reading the newspaper or watching television -   Moving or speaking so slowly that other people could have noticed. Or the opposite - being so fidgety or restless that you have been moving around a lot more than usual -   Thoughts that you would be better off dead, or of hurting yourself in some way -   Total Score 1   If you checked off any problems, how difficult have these problems made it for you to do your work, take care of things at home, or get along with other people? -       Health Habits and Functional and Cognitive Screening:  Functional & Cognitive Status 2019   Do you have difficulty preparing food and eating? No   Do you have difficulty bathing yourself, getting dressed or grooming  yourself? No   Do you have difficulty using the toilet? No   Do you have difficulty moving around from place to place? No   Do you have trouble with steps or getting out of a bed or a chair? No   Current Diet Unhealthy Diet   Dental Exam Not up to date   Eye Exam Up to date   Exercise (times per week) 2 times per week   Current Exercise Activities Include Housecleaning   Do you need help using the phone?  No   Are you deaf or do you have serious difficulty hearing?  No   Do you need help with transportation? No   Do you need help shopping? No   Do you need help preparing meals?  No   Do you need help with housework?  No   Do you need help with laundry? No   Do you need help taking your medications? No   Do you need help managing money? No   Do you ever drive or ride in a car without wearing a seat belt? No   Have you felt unusual stress, anger or loneliness in the last month? No   Who do you live with? Alone   If you need help, do you have trouble finding someone available to you? No   Have you been bothered in the last four weeks by sexual problems? No   Do you have difficulty concentrating, remembering or making decisions? Yes           Does the patient have evidence of cognitive impairment? No    Asiprin use counseling: Taking ASA appropriately as indicated      Recent Lab Results:    Lab Results   Component Value Date     (H) 02/08/2019     Lab Results   Component Value Date    HGBA1C 6.8 02/08/2019     Lab Results   Component Value Date    TRIG 79 04/26/2018    HDL 85 (H) 04/26/2018    VLDL 15.8 04/26/2018    LDLHDL 1.19 04/26/2018           Age-appropriate Screening Schedule:  Refer to the list below for future screening recommendations based on patient's age, sex and/or medical conditions. Orders for these recommended tests are listed in the plan section. The patient has been provided with a written plan.    Health Maintenance   Topic Date Due   • TDAP/TD VACCINES (1 - Tdap) 11/19/1961   • ZOSTER  VACCINE (1 of 2) 11/19/1992   • PNEUMOCOCCAL VACCINES (65+ LOW/MEDIUM RISK) (2 of 2 - PPSV23) 12/06/2017   • LIPID PANEL  04/26/2019   • URINE MICROALBUMIN  04/26/2019   • INFLUENZA VACCINE  08/01/2019   • HEMOGLOBIN A1C  08/08/2019   • MAMMOGRAM  12/31/2020   • COLONOSCOPY  10/11/2021        Subjective   History of Present Illness    Radha Silverio is a 78 y.o. female who presents for an Annual Wellness Visit.    She is doing well today with no concerns.     She has chronic HTN and is doing well on Prinzide. No CP or SOB. No dizziness.     She has Type 2 diabetes. She is taking 500mg BID (usually mises three times per week) and is not eating as well as she should. She is not exercising.     She is being followed by Dr. Urbina for heart. No palpations.     She is doing great in her stomach pill. Her LUQ pain has totally resolved on PPI. No abdominal pain.       The following portions of the patient's history were reviewed and updated as appropriate: allergies, current medications, past family history, past medical history, past social history, past surgical history and problem list.    Outpatient Medications Prior to Visit   Medication Sig Dispense Refill   • ACCU-CHEK KATHARINE PLUS test strip USE ONE STRIP TO CHECK GLUCOSE ONCE DAILY 100 each 3   • Acetaminophen (TYLENOL ARTHRITIS EXT RELIEF PO) Take  by mouth.     • aspirin tablet Take 81 mg by mouth daily.     • lisinopril-hydrochlorothiazide (PRINZIDE,ZESTORETIC) 20-25 MG per tablet TAKE 1 TABLET BY MOUTH ONCE DAILY 90 tablet 1   • metFORMIN (GLUCOPHAGE) 500 MG tablet Take 1 tablet by mouth 2 (Two) Times a Day With Meals. 180 tablet 3   • metoprolol succinate XL (TOPROL-XL) 25 MG 24 hr tablet Take 1 tablet by mouth Daily. 90 tablet 3   • metroNIDAZOLE (FLAGYL) 500 MG tablet TAKE 1 TABLET BY MOUTH TWICE DAILY FOR 7 DAYS 14 tablet 0   • omeprazole (PRILOSEC) 40 MG capsule Take 1 capsule by mouth Daily. 30 capsule 3   • Polyethylene Glycol 3350 (MIRALAX PO) Take  by  mouth Daily.     • simvastatin (ZOCOR) 40 MG tablet TAKE ONE TABLET BY MOUTH ONCE DAILY IN THE EVENING 90 tablet 2     No facility-administered medications prior to visit.        Patient Active Problem List   Diagnosis   • Essential hypertension   • Hyperlipidemia   • Dysuria   • Urinary tract infection without hematuria   • Chronic constipation   • Acid reflux   • Solis esophagus   • Hematochezia   • Type 2 diabetes mellitus without complication, without long-term current use of insulin (CMS/[facility])   • Hemorrhoids   • Atypical chest pain   • Shortness of breath   • Frequent PVCs   • Atrial bigeminy   • PAC (premature atrial contraction)   • Primary osteoarthritis of knee   • Primary osteoarthritis of right knee   • Tinnitus of both ears   • Foreign body of left index finger with infection   • Abnormal urinalysis   • Dependent edema   • Chronic cough   • Left hip pain   • Primary osteoarthritis of left hip   • Greater trochanteric bursitis of left hip   • Vaginal prolapse   • Stage 3 chronic kidney disease (CMS/[facility])   • Female genital prolapse   • History of Helicobacter pylori infection   • Chronic gastritis without bleeding       Advance Care Planning:  Patient has an advance directive - a copy has not been provided. Have asked the patient to send this to us to add to record    Identification of Risk Factors:  Risk factors include: Advance Directive Discussion  Cardiovascular risk  Fall Risk  Polypharmacy.    Review of Systems   Constitutional: Negative for chills and fatigue.   HENT: Negative for congestion and rhinorrhea.    Respiratory: Positive for shortness of breath (occasionally on exertion and with sitting, normal stress test). Negative for cough.    Cardiovascular: Negative for chest pain, palpitations and leg swelling.   Gastrointestinal: Negative for abdominal pain, constipation, diarrhea, nausea and vomiting.   Endocrine: Negative for cold intolerance and heat intolerance.   Genitourinary: Negative for  difficulty urinating and dysuria.   Musculoskeletal: Negative for arthralgias.   Skin: Negative for rash.   Allergic/Immunologic: Negative for environmental allergies.   Neurological: Positive for numbness (occasionally in hands and feet) and headaches (off and on for years at the back of her head. Not worsening. Go away on own. ). Negative for dizziness, tremors and weakness.       Compared to one year ago, the patient feels her physical health is the same.  Compared to one year ago, the patient feels her mental health is the same.    Objective     Physical Exam   Constitutional: She is oriented to person, place, and time. She appears well-developed and well-nourished. No distress.   HENT:   Head: Normocephalic and atraumatic.   Right Ear: Hearing, tympanic membrane, external ear and ear canal normal.   Left Ear: Hearing, tympanic membrane, external ear and ear canal normal.   Nose: Nose normal.   Mouth/Throat: Uvula is midline, oropharynx is clear and moist and mucous membranes are normal. No oropharyngeal exudate, posterior oropharyngeal edema or posterior oropharyngeal erythema. Tonsils are 0 on the right. Tonsils are 0 on the left. No tonsillar exudate.   Eyes: Conjunctivae are normal. Right eye exhibits no discharge. Left eye exhibits no discharge. No scleral icterus.   Neck: Neck supple. Carotid bruit is not present. No thyroid mass and no thyromegaly present.   Cardiovascular: Normal rate, regular rhythm and normal heart sounds.  Occasional extrasystoles are present. Exam reveals no gallop and no friction rub.   No murmur heard.  Pulmonary/Chest: Effort normal and breath sounds normal. No respiratory distress. She has no wheezes. She has no rales.   Abdominal: Soft. Bowel sounds are normal. She exhibits no distension and no mass. There is no tenderness. There is no guarding.   Lymphadenopathy:     She has no cervical adenopathy.   Neurological: She is alert and oriented to person, place, and time.   Skin:  "Skin is warm. No rash noted.   Psychiatric: She has a normal mood and affect. Her behavior is normal.   Nursing note and vitals reviewed.      Vitals:    07/23/19 1016   BP: 126/76   BP Location: Left arm   Patient Position: Sitting   Cuff Size: Large Adult   Pulse: (!) 49   Resp: 16   Temp: 98 °F (36.7 °C)   TempSrc: Oral   SpO2: 98%   Weight: 91.2 kg (201 lb)   Height: 171.5 cm (67.5\")   PainSc: 0-No pain       Patient's Body mass index is 31.02 kg/m². BMI is above normal parameters. Recommendations include: exercise counseling and nutrition counseling.      Assessment/Plan   Patient Self-Management and Personalized Health Advice  The patient has been provided with information about: diet, exercise, weight management, prevention of cardiac or vascular disease, the relationship between weight and GERD, fall prevention and designing advance directives and preventive services including:   · Annual Wellness Visit (AWV)  · Diabetes Screening-Lab Order for either glucose quantitative blood (except reagent strip), glucose;post glucose dose(includes glucose), or glucose tolerance test-3 specimens(includes glucose)  · Screening Mammography .    Visit Diagnoses:    ICD-10-CM ICD-9-CM   1. Medicare annual wellness visit, subsequent Z00.00 V70.0   2. Essential hypertension I10 401.9   3. Mixed hyperlipidemia E78.2 272.2   4. Gastroesophageal reflux disease without esophagitis K21.9 530.81   5. Type 2 diabetes mellitus without complication, without long-term current use of insulin (CMS/[facility]) E11.9 250.00   6. Shortness of breath R06.02 786.05     HTN is under good control and patient will continue to monitor with home BP cuff. No side effects from medications other than borderline bradycardia that is not symptomatic and is stable. HR has been around 50 on my review since starting Toprol and tolerating well. Will continue current regimen of Prinzide and Toprol. Will follow up in 6 months      Patient's cholesterol is under good " control. Will continue current regimen of Zocor. No side effects from medications reported. Will follow up in 6 months to monitor levels.     Patient's type 2 diabetes is  under good control . Will continue current regimen of Metformin and can take once per day due to her age. Check B12 with future labs due to tingling of her hands/feet occasionally.No reported side effects from medications. Will follow up in 6 months     Stay on PPI. She is doing well on this. Will continue.     Shingrix at local pharmacy. Will clarify if she has had all her pneumonia shots.     Monitor these head twinges. Sound as though they may be [place]. MRI normal in 2014.     SOB is stable. Stress test, CXR, and PFT's have all been normal. Discussed continued exercise and weight loss.     With neck labs needs CBC, CMP, B12, HgA1c, TSH with reflex, and micro albumin.     No orders of the defined types were placed in this encounter.      Outpatient Encounter Medications as of 7/23/2019   Medication Sig Dispense Refill   • ACCU-CHEK KATHARINE PLUS test strip USE ONE STRIP TO CHECK GLUCOSE ONCE DAILY 100 each 3   • Acetaminophen (TYLENOL ARTHRITIS EXT RELIEF PO) Take  by mouth.     • aspirin tablet Take 81 mg by mouth daily.     • lisinopril-hydrochlorothiazide (PRINZIDE,ZESTORETIC) 20-25 MG per tablet TAKE 1 TABLET BY MOUTH ONCE DAILY 90 tablet 1   • metFORMIN (GLUCOPHAGE) 500 MG tablet Take 1 tablet by mouth 2 (Two) Times a Day With Meals. 180 tablet 3   • metoprolol succinate XL (TOPROL-XL) 25 MG 24 hr tablet Take 1 tablet by mouth Daily. 90 tablet 3   • metroNIDAZOLE (FLAGYL) 500 MG tablet TAKE 1 TABLET BY MOUTH TWICE DAILY FOR 7 DAYS 14 tablet 0   • omeprazole (PRILOSEC) 40 MG capsule Take 1 capsule by mouth Daily. 30 capsule 3   • Polyethylene Glycol 3350 (MIRALAX PO) Take  by mouth Daily.     • simvastatin (ZOCOR) 40 MG tablet TAKE ONE TABLET BY MOUTH ONCE DAILY IN THE EVENING 90 tablet 2     No facility-administered encounter  medications on file as of 7/23/2019.        Reviewed use of high risk medication in the elderly: yes  Reviewed for potential of harmful drug interactions in the elderly: yes    Follow Up:  Return in about 6 months (around 1/23/2020) for Recheck.     An After Visit Summary and PPPS with all of these plans were given to the patient.

## 2019-09-17 RX ORDER — LISINOPRIL AND HYDROCHLOROTHIAZIDE 25; 20 MG/1; MG/1
1 TABLET ORAL DAILY
Qty: 90 TABLET | Refills: 1 | Status: SHIPPED | OUTPATIENT
Start: 2019-09-17 | End: 2020-03-02

## 2019-10-15 ENCOUNTER — CLINICAL SUPPORT (OUTPATIENT)
Dept: INTERNAL MEDICINE | Facility: CLINIC | Age: 79
End: 2019-10-15

## 2019-10-15 DIAGNOSIS — Z23 IMMUNIZATION, PNEUMOCOCCUS AND INFLUENZA: ICD-10-CM

## 2019-10-15 PROCEDURE — G0008 ADMIN INFLUENZA VIRUS VAC: HCPCS | Performed by: INTERNAL MEDICINE

## 2019-10-15 PROCEDURE — 90653 IIV ADJUVANT VACCINE IM: CPT | Performed by: INTERNAL MEDICINE

## 2019-11-14 RX ORDER — OMEPRAZOLE 40 MG/1
CAPSULE, DELAYED RELEASE ORAL
Qty: 30 CAPSULE | Refills: 3 | Status: SHIPPED | OUTPATIENT
Start: 2019-11-14 | End: 2020-11-20

## 2019-11-21 ENCOUNTER — OFFICE VISIT (OUTPATIENT)
Dept: ORTHOPEDIC SURGERY | Facility: CLINIC | Age: 79
End: 2019-11-21

## 2019-11-21 VITALS
HEART RATE: 62 BPM | HEIGHT: 67 IN | WEIGHT: 201 LBS | SYSTOLIC BLOOD PRESSURE: 118 MMHG | BODY MASS INDEX: 31.55 KG/M2 | DIASTOLIC BLOOD PRESSURE: 74 MMHG

## 2019-11-21 DIAGNOSIS — R52 PAIN: ICD-10-CM

## 2019-11-21 DIAGNOSIS — M51.36 DEGENERATIVE DISC DISEASE, LUMBAR: ICD-10-CM

## 2019-11-21 DIAGNOSIS — M16.12 PRIMARY OSTEOARTHRITIS OF LEFT HIP: Primary | ICD-10-CM

## 2019-11-21 DIAGNOSIS — R29.898 WEAKNESS OF LEFT LOWER EXTREMITY: ICD-10-CM

## 2019-11-21 PROBLEM — M51.369 DEGENERATIVE DISC DISEASE, LUMBAR: Status: ACTIVE | Noted: 2019-11-21

## 2019-11-21 PROCEDURE — 73502 X-RAY EXAM HIP UNI 2-3 VIEWS: CPT | Performed by: NURSE PRACTITIONER

## 2019-11-21 PROCEDURE — 99214 OFFICE O/P EST MOD 30 MIN: CPT | Performed by: NURSE PRACTITIONER

## 2019-11-21 PROCEDURE — 72100 X-RAY EXAM L-S SPINE 2/3 VWS: CPT | Performed by: NURSE PRACTITIONER

## 2019-11-21 NOTE — PROGRESS NOTES
Subjective:     Patient ID: Radha Silverio is a 79 y.o. female.    Chief Complaint:  Left hip pain  Low back pain, new issue to examiner  History of Present Illness  Radha Silverio presents to clinic for evaluation of left hip pain, posterior aspect, lateral aspect radiating to the anterior aspect present on and off for the last 1 year with associated weakness left lower extremity.  She was seen approximately 1 year ago pain had subsided the left hip and has refused any steroid injections.  States over the last year she has noted pain which initially began after a fall she had in the 1990s but the last 2 weeks she fell again while trying to put up lights on a tree striking the low back and ever since has been experiencing significant pain at the anterior aspect of the thigh.  Pain is not radiating to her groin.  She is not experiencing numbness or tingling radiating down the left lower extremity but is experiencing significant pain with transitional changes such as from seated standing attempting to walk, ambulating long distances and again weakness left lower extremity.  Denies any recent x-ray of the hip nor the lumbar spine denies any recent MRI or CT of hip and lumbar spine.  She is continue taking Tylenol with mild symptom relief.  Rates discomfort an 8-9 aching throbbing in nature.  Denies other concerns present this time.       Social History     Occupational History   • Occupation: homemaker   Tobacco Use   • Smoking status: Never Smoker   • Smokeless tobacco: Never Used   • Tobacco comment: no caffiene   Substance and Sexual Activity   • Alcohol use: No   • Drug use: No   • Sexual activity: No     Partners: Male     Birth control/protection: Post-menopausal, Surgical     Comment: BTL      Past Medical History:   Diagnosis Date   • Abdominal pain, LLQ (left lower quadrant)     with radiation to right back. Burning, cramping and sharp & is 4/10 in intensity.   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   • GERD (gastroesophageal reflux disease)    • GI problem    • Headache    • Hematochezia 12/6/2016   • High cholesterol    • Hyperlipidemia 5/24/2016   • Hypertension    • Lumbosacral disc disease    • Palpitations    • Pelvic prolapse    • SOB (shortness of breath)    • Urinary tract infection without hematuria 5/24/2016     Past Surgical History:   Procedure Laterality Date   • BREAST BIOPSY Left     cyst at 11 y/o   • CHOLECYSTECTOMY     • COLONOSCOPY N/A 10/11/2016    Procedure: COLONOSCOPY TO CECUM, TO TERMINAL ILEUM WITH HOT SNARE POLYPECTOMY;  Surgeon: Palmira Calix MD;  Location: [facility] ENDOSCOPY;  Service:    • CYST REMOVAL Right     breast   • ENDOSCOPY N/A 10/11/2016    Procedure: ESOPHAGOGASTRODUODENOSCOPY WITH BIOPSY;  Surgeon: Palmira Calix MD;  Location: [facility] ENDOSCOPY;  Service:    • LAPAROSCOPIC CHOLECYSTECTOMY W/ CHOLANGIOGRAPHY     • MOLE REMOVAL      on foot    • OOPHORECTOMY Left     B9   • TUBAL ABDOMINAL LIGATION         Family History   Problem Relation Age of Onset   • Diabetes Mother    • Heart disease Father    • Diabetes Sister    • Heart disease Sister    • Breast cancer Sister    • Cancer Sister    • Stroke Sister    • Diabetes Brother    • Heart disease Brother    • Cancer Brother    • Breast cancer Sister    • Ovarian cancer Neg Hx    • Colon cancer Neg Hx    • Deep vein thrombosis Neg Hx    • Pulmonary embolism Neg Hx          Review of Systems   Constitutional: Negative for chills, diaphoresis, fever and unexpected weight change.   HENT: Negative for hearing loss, nosebleeds, sore throat and tinnitus.    Eyes: Negative for pain and visual disturbance.   Respiratory:  "Negative for cough, shortness of breath and wheezing.    Cardiovascular: Negative for chest pain and palpitations.   Gastrointestinal: Negative for abdominal pain, diarrhea, nausea and vomiting.   Endocrine: Negative for cold intolerance, heat intolerance and polydipsia.   Genitourinary: Negative for difficulty urinating, dysuria and hematuria.   Musculoskeletal: Positive for arthralgias and myalgias. Negative for joint swelling.   Skin: Negative for rash and wound.   Allergic/Immunologic: Negative for environmental allergies.   Neurological: Negative for dizziness, syncope and numbness.   Hematological: Does not bruise/bleed easily.   Psychiatric/Behavioral: Negative for dysphoric mood and sleep disturbance. The patient is not nervous/anxious.            Objective:  Physical Exam    Vital signs reviewed.   General: No acute distress.  Eyes: conjunctiva clear; pupils equally round and reactive  ENT: external ears and nose atraumatic; oropharynx clear  CV: no peripheral edema  Resp: normal respiratory effort  Skin: no rashes or wounds; normal turgor  Psych: mood and affect appropriate; recent and remote memory intact    Vitals:    11/21/19 1110   BP: 118/74   BP Location: Left arm   Pulse: 62   Weight: 91.2 kg (201 lb)   Height: 170.2 cm (67\")         11/21/19  1110   Weight: 91.2 kg (201 lb)     Body mass index is 31.48 kg/m².      Left Hip Exam     Tenderness   The patient is experiencing tenderness in the greater trochanter, anterior and posterior.    Range of Motion   Abduction: 45   Adduction: 30   Extension: 0   Flexion: 90   External rotation: 40   Internal rotation: 25     Muscle Strength   Abduction: 3/5   Adduction: 3/5   Flexion: 3/5     Tests   KASEY: negative  Maryana: negative  Fadir:  Negative FADIR test    Other   Erythema: absent  Scars: absent  Sensation: normal  Pulse: present    Comments:  Negative logroll exam  negative stinchfield exam             Imaging:  Lumbar Spine X-Ray  Indication: Pain  AP " and Lateral views    Findings:  No fracture  No bony lesion  Normal soft tissues  Multilevel degenerative changes    No prior studies were available for comparison.  Left Hip X-Ray  Indication: Pain  AP and Frog Leg views    Findings:  No fracture  No bony lesion  Normal soft tissues  Mild degenerative arthropathy at the hip joint  prior studies were available for comparison.    Assessment:        1. Primary osteoarthritis of left hip    2. Pain    3. Degenerative disc disease, lumbar    4. Weakness of left lower extremity           Plan:  1.  Discussed plan of care with patient.  She wishes to proceed with MRI of the lumbar spine to evaluate nerve involvement.  Plan to see her back in clinic after completion of testing to discuss results and further plan of care.  I do recommend that she continue taking the Tylenol as needed for symptom relief.  She verbalized understanding of all information agrees with plan of care.  Denies of concerns present at this time.  Orders:  Orders Placed This Encounter   Procedures   • XR Hip With or Without Pelvis 2 - 3 View Left   • XR Spine Lumbar 2 or 3 View   • MRI Lumbar Spine Without Contrast         I ordered and reviewed the MARY today.     Dictated utilizing Dragon dictation

## 2019-12-06 ENCOUNTER — HOSPITAL ENCOUNTER (OUTPATIENT)
Dept: MRI IMAGING | Facility: HOSPITAL | Age: 79
Discharge: HOME | End: 2019-12-06
Admitting: NURSE PRACTITIONER

## 2019-12-06 DIAGNOSIS — R29.898 WEAKNESS OF LEFT LOWER EXTREMITY: ICD-10-CM

## 2019-12-06 DIAGNOSIS — M51.36 DEGENERATIVE DISC DISEASE, LUMBAR: ICD-10-CM

## 2019-12-06 PROCEDURE — 72148 MRI LUMBAR SPINE W/O DYE: CPT

## 2019-12-10 ENCOUNTER — TELEPHONE (OUTPATIENT)
Dept: ORTHOPEDIC SURGERY | Facility: CLINIC | Age: 79
End: 2019-12-10

## 2019-12-10 NOTE — TELEPHONE ENCOUNTER
Daughter, Ligia Silverio would like for you to call her about her mothers MRI. Said her mom will not understand. At your convenience

## 2019-12-12 DIAGNOSIS — M54.16 LUMBAR RADICULOPATHY: ICD-10-CM

## 2019-12-12 DIAGNOSIS — M51.36 DEGENERATIVE DISC DISEASE, LUMBAR: Primary | ICD-10-CM

## 2019-12-12 DIAGNOSIS — R29.898 WEAKNESS OF LEFT LOWER EXTREMITY: ICD-10-CM

## 2019-12-16 DIAGNOSIS — R29.898 WEAKNESS OF LEFT LOWER EXTREMITY: ICD-10-CM

## 2019-12-16 DIAGNOSIS — M54.16 LUMBAR RADICULOPATHY: ICD-10-CM

## 2019-12-16 DIAGNOSIS — M51.36 DEGENERATIVE DISC DISEASE, LUMBAR: Primary | ICD-10-CM

## 2019-12-17 ENCOUNTER — TELEPHONE (OUTPATIENT)
Dept: ORTHOPEDIC SURGERY | Facility: CLINIC | Age: 79
End: 2019-12-17

## 2019-12-17 DIAGNOSIS — M51.36 DEGENERATIVE DISC DISEASE, LUMBAR: ICD-10-CM

## 2019-12-17 DIAGNOSIS — M54.16 LUMBAR RADICULOPATHY: Primary | ICD-10-CM

## 2019-12-17 DIAGNOSIS — M51.36 DEGENERATIVE DISC DISEASE, LUMBAR: Primary | ICD-10-CM

## 2019-12-17 DIAGNOSIS — M54.16 LUMBAR RADICULOPATHY: ICD-10-CM

## 2020-01-02 ENCOUNTER — HOSPITAL ENCOUNTER (OUTPATIENT)
Dept: MAMMOGRAPHY | Facility: HOSPITAL | Age: 80
Discharge: HOME | End: 2020-01-02
Admitting: OBSTETRICS & GYNECOLOGY

## 2020-01-02 ENCOUNTER — APPOINTMENT (OUTPATIENT)
Dept: BONE DENSITY | Facility: HOSPITAL | Age: 80
End: 2020-01-02

## 2020-01-02 DIAGNOSIS — Z12.31 ENCOUNTER FOR SCREENING MAMMOGRAM FOR MALIGNANT NEOPLASM OF BREAST: ICD-10-CM

## 2020-01-02 DIAGNOSIS — Z13.9 SCREENING FOR CONDITION: ICD-10-CM

## 2020-01-02 DIAGNOSIS — Z78.0 ASYMPTOMATIC MENOPAUSAL STATE: ICD-10-CM

## 2020-01-02 PROCEDURE — 77063 BREAST TOMOSYNTHESIS BI: CPT

## 2020-01-02 PROCEDURE — 77080 DXA BONE DENSITY AXIAL: CPT

## 2020-01-02 PROCEDURE — 77067 SCR MAMMO BI INCL CAD: CPT

## 2020-01-22 NOTE — PROGRESS NOTES
Subjective   Patient ID: Radha Silverio is a 79 y.o. female is being seen for consultation today at the request of SLAVA Shane for low back, left buttock and left leg pain, she is unsure of leg weakness.  She has numbness and tingling toes on right foot.  She denies problems with her balance and gait and no urinary incontinence. She has not had PT, MARLYN or pain management.    History of Present Illness    This patient has been having pain in her left buttock radiating down her posterior lateral thigh posterior lateral calf to the anterior lateral aspect of her left ankle.  This is been going on for several years.  The pain will come on and last for a month or 2 and then go away for a while.  It began last October and was quite severe until Christmas when he began to improve again.  Currently she has some pain across her lower back but not a lot of radiating pain.  The right leg is okay and she has no difficulty with bowel bladder control or other associated symptoms.  She has been treated with some oral medications but no other treatment so far.  Sometimes sitting for a long time will make the pain worse.    The following portions of the patient's history were reviewed and updated as appropriate: allergies, current medications, past family history, past medical history, past social history, past surgical history and problem list.    Review of Systems   Musculoskeletal: Positive for back pain. Negative for arthralgias, gait problem and myalgias.   Neurological: Positive for numbness. Negative for weakness.   All other systems reviewed and are negative.      Objective   Physical Exam   Constitutional: She is oriented to person, place, and time. She appears well-developed and well-nourished.   Neurological: She is oriented to person, place, and time.     Neurologic Exam     Mental Status   Oriented to person, place, and time.       Assessment/Plan   Independent Review of Radiographic Studies:      I reviewed  an MRI done at [facility] on 6 December of last year.  This shows some degenerative change at L5-S1 with some disc bulging but no obvious compression of the nerves.  The foramina are fairly open.  L4-5 shows a left-sided disc herniation that is fairly large.  L3-4, L2-3 and L1 to all look okay for the most part.    Medical Decision Making:      I told the patient and her family about the imaging.  I told him that from my point of view I would consider surgery simply because she has had the problem for so long and she is wax and wane in terms of the pain.  I explained that the primary purpose of nonsurgical treatment of pain is to break the pain cycle but she is already done that on her own.  Consequently the chances of nonsurgical treatment helping her long-term is pretty low.  I told her the other option would be to proceed with surgery.  I told the patient about the risks, complications and expected outcome of the lumbar surgery.  I explained that there was an 80% chance of getting rid of the pain in the leg.  I explained that there would still be back pain after the surgery.  Initially this will be quite severe but will improve over time.  There is a 2 or 3% chance of infection, bleeding, CSF leak, damage to the nerve as a result of surgery, paralysis, as well as anesthetic risk.  There is a 5% chance of late instability which could require a fusion later on and a 10% chance of recurrent disc herniation.  We discussed the postoperative hospital and home course.  Since he is not hurting that much right now I recommended that she go ahead and try some physical therapy.  I told her to call me if the pain gets worse or less tolerable or if she decides it is become bad enough that she wants to consider surgery.  We can always get her back into the office.    Radha was seen today for back pain, leg pain and numbness.    Diagnoses and all orders for this visit:    Neuritis or radiculitis due to rupture of lumbar  intervertebral disc  -     Ambulatory Referral to Physical Therapy      Return for Recheck and call after treatment or consultation.

## 2020-01-23 ENCOUNTER — OFFICE VISIT (OUTPATIENT)
Dept: NEUROSURGERY | Facility: CLINIC | Age: 80
End: 2020-01-23

## 2020-01-23 VITALS
BODY MASS INDEX: 30.48 KG/M2 | HEART RATE: 64 BPM | SYSTOLIC BLOOD PRESSURE: 140 MMHG | DIASTOLIC BLOOD PRESSURE: 77 MMHG | HEIGHT: 67 IN | WEIGHT: 194.2 LBS

## 2020-01-23 DIAGNOSIS — M51.16 NEURITIS OR RADICULITIS DUE TO RUPTURE OF LUMBAR INTERVERTEBRAL DISC: Primary | ICD-10-CM

## 2020-01-23 PROCEDURE — 99202 OFFICE O/P NEW SF 15 MIN: CPT | Performed by: NEUROLOGICAL SURGERY

## 2020-01-24 ENCOUNTER — OFFICE VISIT (OUTPATIENT)
Dept: INTERNAL MEDICINE | Facility: CLINIC | Age: 80
End: 2020-01-24

## 2020-01-24 VITALS
SYSTOLIC BLOOD PRESSURE: 120 MMHG | HEIGHT: 67 IN | TEMPERATURE: 98.1 F | HEART RATE: 59 BPM | OXYGEN SATURATION: 98 % | DIASTOLIC BLOOD PRESSURE: 80 MMHG | WEIGHT: 195 LBS | RESPIRATION RATE: 16 BRPM | BODY MASS INDEX: 30.61 KG/M2

## 2020-01-24 DIAGNOSIS — E11.9 TYPE 2 DIABETES MELLITUS WITHOUT COMPLICATION, WITHOUT LONG-TERM CURRENT USE OF INSULIN (HCC): Primary | ICD-10-CM

## 2020-01-24 DIAGNOSIS — E55.9 VITAMIN D DEFICIENCY: ICD-10-CM

## 2020-01-24 DIAGNOSIS — E78.2 MIXED HYPERLIPIDEMIA: ICD-10-CM

## 2020-01-24 DIAGNOSIS — R42 DIZZINESS: ICD-10-CM

## 2020-01-24 DIAGNOSIS — K21.9 GASTROESOPHAGEAL REFLUX DISEASE WITHOUT ESOPHAGITIS: ICD-10-CM

## 2020-01-24 DIAGNOSIS — I10 ESSENTIAL HYPERTENSION: ICD-10-CM

## 2020-01-24 DIAGNOSIS — L85.3 DRY SKIN DERMATITIS: ICD-10-CM

## 2020-01-24 DIAGNOSIS — R05.3 CHRONIC COUGH: ICD-10-CM

## 2020-01-24 PROBLEM — R60.9 DEPENDENT EDEMA: Status: RESOLVED | Noted: 2018-06-20 | Resolved: 2020-01-24

## 2020-01-24 PROBLEM — N81.10 VAGINAL PROLAPSE: Status: RESOLVED | Noted: 2018-10-25 | Resolved: 2020-01-24

## 2020-01-24 PROBLEM — L08.9: Status: RESOLVED | Noted: 2018-04-26 | Resolved: 2020-01-24

## 2020-01-24 PROBLEM — R82.90 ABNORMAL URINALYSIS: Status: RESOLVED | Noted: 2018-06-20 | Resolved: 2020-01-24

## 2020-01-24 PROBLEM — S60.451A: Status: RESOLVED | Noted: 2018-04-26 | Resolved: 2020-01-24

## 2020-01-24 PROBLEM — Z86.19 HISTORY OF HELICOBACTER PYLORI INFECTION: Status: RESOLVED | Noted: 2019-04-12 | Resolved: 2020-01-24

## 2020-01-24 PROCEDURE — 99214 OFFICE O/P EST MOD 30 MIN: CPT | Performed by: NURSE PRACTITIONER

## 2020-01-24 NOTE — PROGRESS NOTES
Subjective    Radha Silverio is a 79 y.o. female presenting today for   Chief Complaint   Patient presents with   • Follow-up     Lino transfer, fasting for lab work today    • Hyperlipidemia   • Hypertension   • Diabetes       History of Present Illness     Radha Silverio presents today as a new patient to me to establish care.   Prior PCP was Miranda Huynh, and her current/chronic health conditions include:    Patient Active Problem List   Diagnosis   • Essential hypertension   • Hyperlipidemia   • Dysuria   • Urinary tract infection without hematuria   • Chronic constipation   • Acid reflux   • Solis esophagus   • Hematochezia   • Type 2 diabetes mellitus without complication, without long-term current use of insulin (CMS/[facility])   • Hemorrhoids   • Atypical chest pain   • Shortness of breath   • Frequent PVCs   • Atrial bigeminy   • PAC (premature atrial contraction)   • Primary osteoarthritis of knee   • Primary osteoarthritis of right knee   • Tinnitus of both ears   • Foreign body of left index finger with infection   • Abnormal urinalysis   • Dependent edema   • Chronic cough   • Left hip pain   • Primary osteoarthritis of left hip   • Greater trochanteric bursitis of left hip   • Vaginal prolapse   • Stage 3 chronic kidney disease (CMS/[facility])   • Female genital prolapse   • History of Helicobacter pylori infection   • Chronic gastritis without bleeding   • Degenerative disc disease, lumbar   • Weakness of left lower extremity   • Neuritis or radiculitis due to rupture of lumbar intervertebral disc       Patient Care Team:  Arielle Johnston APRN as PCP - General (Family Medicine)  Raisa Gleason MD as Consulting Physician (Urogynecology)  Trudi Urbina MD as Consulting Physician (Cardiology)      Current Outpatient Medications:   •  ACCU-CHEK KATHARINE PLUS test strip, USE ONE STRIP TO CHECK GLUCOSE ONCE DAILY, Disp: 100 each, Rfl: 3  •  Acetaminophen (TYLENOL ARTHRITIS EXT RELIEF PO), Take  by  "mouth., Disp: , Rfl:   •  Calcium Carb-Cholecalciferol (CALCIUM 1000 + D PO), Take  by mouth., Disp: , Rfl:   •  lisinopril-hydrochlorothiazide (PRINZIDE,ZESTORETIC) 20-25 MG per tablet, Take 1 tablet by mouth Daily., Disp: 90 tablet, Rfl: 1  •  metFORMIN (GLUCOPHAGE) 500 MG tablet, Take 1 tablet by mouth 2 (Two) Times a Day With Meals., Disp: 180 tablet, Rfl: 3  •  metoprolol succinate XL (TOPROL-XL) 25 MG 24 hr tablet, Take 1 tablet by mouth Daily., Disp: 90 tablet, Rfl: 3  •  omeprazole (priLOSEC) 40 MG capsule, TAKE 1 CAPSULE BY MOUTH ONCE DAILY, Disp: 30 capsule, Rfl: 3  •  Polyethylene Glycol 3350 (MIRALAX PO), Take  by mouth Daily., Disp: , Rfl:   •  simvastatin (ZOCOR) 40 MG tablet, TAKE ONE TABLET BY MOUTH ONCE DAILY IN THE EVENING, Disp: 90 tablet, Rfl: 2  •  vitamin k 100 MCG tablet, Take 100 mcg by mouth Daily., Disp: , Rfl:     She has not been checking blood sugars in a while d/t issues w/ her meter. She has gotten this fixed and was \"120-something\" fasting this morning. \"I am not a good eater llike I should.\" Snacking often and consuming sugary foods and beverages. Sedentary a lot.    She is not checking blood pressures at home although she does have a cuff. Denies CP, palpitations or SOB.    LBP for 2 yrs. Radiating to L leg. Worse with standing for along time. This always seems to flare up in the fall. Saw Dr. Lyles yesterday for her back and L leg pain. He has ordered PT prior to considering injections or surgery.    Rarely takes tylenol for OA    H/O Solis's. Taking PPI consistently and cough has subsided.      New complaints today include:    Skin problem - face and legs flaking began over the summer. She exfoliates regularly but does not moisturize.    Balance problem - this has been intermittent but chronic for yrs. She has noticed it more in the last few weeks. There is a ringing in her L ear. She has be dizzy lying down, sitting up, standing, and rolling over in bed.    The following " "portions of the patient's history were reviewed and updated as appropriate: allergies, current medications, past family history, past medical history, past social history, past surgical history and problem list.    Review of Systems   Constitutional: Negative.    HENT: Negative.    Eyes: Negative.    Respiratory: Negative.    Cardiovascular: Negative.    Gastrointestinal: Negative.    Endocrine: Negative.    Genitourinary: Negative.    Musculoskeletal: Positive for arthralgias, back pain and myalgias.   Skin: Positive for dry skin.   Neurological: Positive for dizziness and headache.   Hematological: Negative.    Psychiatric/Behavioral: Negative.        Objective    Vitals:    01/24/20 1002   BP: 120/80   BP Location: Left arm   Patient Position: Sitting   Cuff Size: Large Adult   Pulse: 59   Resp: 16   Temp: 98.1 °F (36.7 °C)   TempSrc: Oral   SpO2: 98%   Weight: 88.5 kg (195 lb)   Height: 170.2 cm (67.01\")     Body mass index is 30.53 kg/m².  Nursing notes and vitals reviewed.    Physical Exam   Constitutional: She is oriented to person, place, and time. She appears well-developed and well-nourished. No distress.   HENT:   Right Ear: Hearing, tympanic membrane, external ear and ear canal normal.   Left Ear: Hearing, tympanic membrane, external ear and ear canal normal.   Nose: Nose normal.   Mouth/Throat: Uvula is midline, oropharynx is clear and moist and mucous membranes are normal.   Neck: Neck supple. No thyroid mass and no thyromegaly present.   Cardiovascular: Regular rhythm, S1 normal, S2 normal and normal pulses. Exam reveals no gallop and no friction rub.   No murmur heard.  Pulmonary/Chest: Effort normal and breath sounds normal. She has no wheezes. She has no rhonchi. She has no rales.   Lymphadenopathy:     She has no cervical adenopathy.   Neurological: She is alert and oriented to person, place, and time. No cranial nerve deficit or sensory deficit.   Psychiatric: She has a normal mood and affect. " Her speech is normal and behavior is normal. She is attentive.       Assessment and Plan    Radha was seen today for follow-up, hyperlipidemia, hypertension and diabetes.    Diagnoses and all orders for this visit:    Type 2 diabetes mellitus without complication, without long-term current use of insulin (CMS/[facility])  -     CBC & Differential  -     Comprehensive Metabolic Panel  -     Hemoglobin A1c  -     Microalbumin / Creatinine Urine Ratio - Urine, Clean Catch  -     Vitamin B12  -     Urinalysis With Culture If Indicated -  -     Thyroid Cascade Profile    Essential hypertension    Mixed hyperlipidemia  -     Lipid Panel With / Chol / HDL Ratio    Gastroesophageal reflux disease without esophagitis    Chronic cough    Vitamin D deficiency  -     Vitamin D 25 Hydroxy    Dizziness  -     MRI Brain With & Without Contrast; Future  -     US carotid bilateral; Future    Dry skin dermatitis        Medications Discontinued During This Encounter   Medication Reason   • aspirin tablet *Therapy completed     No labs in almost a yr. Will get these drawn today.  Advised to try Amalactin.  Shingles and Tdap at pharmacy.  Except as noted above, pt will continue current medications as noted in the medication list.  Continue to follow with specialty care as directed by them.      I would like her to follow up after diagnostic imaging.

## 2020-01-27 ENCOUNTER — TELEPHONE (OUTPATIENT)
Dept: INTERNAL MEDICINE | Facility: CLINIC | Age: 80
End: 2020-01-27

## 2020-01-29 LAB
25(OH)D3+25(OH)D2 SERPL-MCNC: 27.6 NG/ML (ref 30–100)
ALBUMIN SERPL-MCNC: 4.6 G/DL (ref 3.5–5.2)
ALBUMIN/CREAT UR: 4 MG/G CREAT (ref 0–29)
ALBUMIN/GLOB SERPL: 1.9 G/DL
ALP SERPL-CCNC: 55 U/L (ref 39–117)
ALT SERPL-CCNC: 16 U/L (ref 1–33)
APPEARANCE UR: CLEAR
AST SERPL-CCNC: 20 U/L (ref 1–32)
BACTERIA #/AREA URNS HPF: NORMAL /HPF
BACTERIA UR CULT: NORMAL
BACTERIA UR CULT: NORMAL
BASOPHILS # BLD AUTO: 0.01 10*3/MM3 (ref 0–0.2)
BASOPHILS NFR BLD AUTO: 0.2 % (ref 0–1.5)
BILIRUB SERPL-MCNC: 0.7 MG/DL (ref 0.2–1.2)
BILIRUB UR QL STRIP: NEGATIVE
BUN SERPL-MCNC: 14 MG/DL (ref 8–23)
BUN/CREAT SERPL: 11.8 (ref 7–25)
CALCIUM SERPL-MCNC: 10.1 MG/DL (ref 8.6–10.5)
CHLORIDE SERPL-SCNC: 100 MMOL/L (ref 98–107)
CHOLEST SERPL-MCNC: 175 MG/DL (ref 0–200)
CHOLEST/HDLC SERPL: 2.13 {RATIO}
CO2 SERPL-SCNC: 27.8 MMOL/L (ref 22–29)
COLOR UR: YELLOW
CREAT SERPL-MCNC: 1.19 MG/DL (ref 0.57–1)
CREAT UR-MCNC: 138.7 MG/DL
EOSINOPHIL # BLD AUTO: 0.07 10*3/MM3 (ref 0–0.4)
EOSINOPHIL NFR BLD AUTO: 1.7 % (ref 0.3–6.2)
EPI CELLS #/AREA URNS HPF: NORMAL /HPF (ref 0–10)
ERYTHROCYTE [DISTWIDTH] IN BLOOD BY AUTOMATED COUNT: 12.8 % (ref 12.3–15.4)
GLOBULIN SER CALC-MCNC: 2.4 GM/DL
GLUCOSE SERPL-MCNC: 123 MG/DL (ref 65–99)
GLUCOSE UR QL: NEGATIVE
HBA1C MFR BLD: 6.6 % (ref 4.8–5.6)
HCT VFR BLD AUTO: 41 % (ref 34–46.6)
HDLC SERPL-MCNC: 82 MG/DL (ref 40–60)
HGB BLD-MCNC: 13.4 G/DL (ref 12–15.9)
HGB UR QL STRIP: NEGATIVE
IMM GRANULOCYTES # BLD AUTO: 0 10*3/MM3 (ref 0–0.05)
IMM GRANULOCYTES NFR BLD AUTO: 0 % (ref 0–0.5)
KETONES UR QL STRIP: NEGATIVE
LDLC SERPL CALC-MCNC: 78 MG/DL (ref 0–100)
LEUKOCYTE ESTERASE UR QL STRIP: ABNORMAL
LYMPHOCYTES # BLD AUTO: 1.57 10*3/MM3 (ref 0.7–3.1)
LYMPHOCYTES NFR BLD AUTO: 39.2 % (ref 19.6–45.3)
MCH RBC QN AUTO: 28 PG (ref 26.6–33)
MCHC RBC AUTO-ENTMCNC: 32.7 G/DL (ref 31.5–35.7)
MCV RBC AUTO: 85.8 FL (ref 79–97)
MICRO URNS: ABNORMAL
MICROALBUMIN UR-MCNC: 6 UG/ML
MONOCYTES # BLD AUTO: 0.3 10*3/MM3 (ref 0.1–0.9)
MONOCYTES NFR BLD AUTO: 7.5 % (ref 5–12)
MUCOUS THREADS URNS QL MICRO: PRESENT /HPF
NEUTROPHILS # BLD AUTO: 2.06 10*3/MM3 (ref 1.7–7)
NEUTROPHILS NFR BLD AUTO: 51.4 % (ref 42.7–76)
NITRITE UR QL STRIP: NEGATIVE
NRBC BLD AUTO-RTO: 0 /100 WBC (ref 0–0.2)
PH UR STRIP: 6 [PH] (ref 5–7.5)
PLATELET # BLD AUTO: 208 10*3/MM3 (ref 140–450)
POTASSIUM SERPL-SCNC: 4.2 MMOL/L (ref 3.5–5.2)
PROT SERPL-MCNC: 7 G/DL (ref 6–8.5)
PROT UR QL STRIP: NEGATIVE
RBC # BLD AUTO: 4.78 10*6/MM3 (ref 3.77–5.28)
RBC #/AREA URNS HPF: NORMAL /HPF (ref 0–2)
SODIUM SERPL-SCNC: 143 MMOL/L (ref 136–145)
SP GR UR: 1.01 (ref 1–1.03)
TRIGL SERPL-MCNC: 73 MG/DL (ref 0–150)
TSH SERPL DL<=0.005 MIU/L-ACNC: 1.34 UIU/ML (ref 0.45–4.5)
URINALYSIS REFLEX: ABNORMAL
UROBILINOGEN UR STRIP-MCNC: 0.2 MG/DL (ref 0.2–1)
VIT B12 SERPL-MCNC: 558 PG/ML (ref 211–946)
VLDLC SERPL CALC-MCNC: 14.6 MG/DL
WBC # BLD AUTO: 4.01 10*3/MM3 (ref 3.4–10.8)
WBC #/AREA URNS HPF: NORMAL /HPF (ref 0–5)

## 2020-01-31 ENCOUNTER — APPOINTMENT (OUTPATIENT)
Dept: MRI IMAGING | Facility: HOSPITAL | Age: 80
End: 2020-01-31

## 2020-02-05 ENCOUNTER — APPOINTMENT (OUTPATIENT)
Dept: ULTRASOUND IMAGING | Facility: HOSPITAL | Age: 80
End: 2020-02-05

## 2020-02-10 ENCOUNTER — APPOINTMENT (OUTPATIENT)
Dept: PHYSICAL THERAPY | Facility: HOSPITAL | Age: 80
End: 2020-02-10

## 2020-02-11 ENCOUNTER — TELEPHONE (OUTPATIENT)
Dept: INTERNAL MEDICINE | Facility: CLINIC | Age: 80
End: 2020-02-11

## 2020-02-11 NOTE — TELEPHONE ENCOUNTER
Patient called to get results from her labs. After we talk to her about the results, she would like for us to mail her a copy of them.

## 2020-02-12 NOTE — TELEPHONE ENCOUNTER
Yes, I had asked her to f/u after her labs a diagnostic imaging. I had ordered and MRI and carotid studies. It looks like she cancelled both. Does she intend to get these done?

## 2020-02-13 NOTE — TELEPHONE ENCOUNTER
"In cancel comments it states \"Pt cancelled, she has a lot going on medically will call back to schedule in a few months.\" Pending scheduled f/u apt with Arielle to discuss labs and why imaging cancelled.   "

## 2020-02-14 RX ORDER — METOPROLOL SUCCINATE 25 MG/1
TABLET, EXTENDED RELEASE ORAL
Qty: 90 TABLET | Refills: 2 | Status: SHIPPED | OUTPATIENT
Start: 2020-02-14 | End: 2020-12-31 | Stop reason: SDUPTHER

## 2020-03-02 RX ORDER — LISINOPRIL AND HYDROCHLOROTHIAZIDE 25; 20 MG/1; MG/1
TABLET ORAL
Qty: 90 TABLET | Refills: 0 | Status: SHIPPED | OUTPATIENT
Start: 2020-03-02 | End: 2020-07-13

## 2020-03-03 RX ORDER — SIMVASTATIN 40 MG
40 TABLET ORAL EVERY EVENING
Qty: 90 TABLET | Refills: 0 | Status: SHIPPED | OUTPATIENT
Start: 2020-03-03 | End: 2020-09-24

## 2020-06-18 ENCOUNTER — OFFICE VISIT (OUTPATIENT)
Dept: OBSTETRICS AND GYNECOLOGY | Facility: CLINIC | Age: 80
End: 2020-06-18

## 2020-06-18 VITALS
DIASTOLIC BLOOD PRESSURE: 82 MMHG | HEIGHT: 67 IN | SYSTOLIC BLOOD PRESSURE: 132 MMHG | BODY MASS INDEX: 31.08 KG/M2 | WEIGHT: 198 LBS

## 2020-06-18 DIAGNOSIS — Z01.419 ROUTINE GYNECOLOGICAL EXAMINATION: Primary | ICD-10-CM

## 2020-06-18 LAB
BILIRUB BLD-MCNC: NEGATIVE MG/DL
CLARITY, POC: CLEAR
COLOR UR: YELLOW
GLUCOSE UR STRIP-MCNC: NEGATIVE MG/DL
KETONES UR QL: NEGATIVE
LEUKOCYTE EST, POC: NEGATIVE
NITRITE UR-MCNC: NEGATIVE MG/ML
PH UR: 5 [PH] (ref 5–8)
PROT UR STRIP-MCNC: NEGATIVE MG/DL
RBC # UR STRIP: NEGATIVE /UL
SP GR UR: 1 (ref 1–1.03)
UROBILINOGEN UR QL: NORMAL

## 2020-06-18 PROCEDURE — G0101 CA SCREEN;PELVIC/BREAST EXAM: HCPCS | Performed by: OBSTETRICS & GYNECOLOGY

## 2020-06-18 PROCEDURE — 81002 URINALYSIS NONAUTO W/O SCOPE: CPT | Performed by: OBSTETRICS & GYNECOLOGY

## 2020-06-18 NOTE — PROGRESS NOTES
GYN Annual Exam     CC- Here for annual exam.     Radha Silverio is a 79 y.o. female established patient who presents for annual well woman exam.  She has had an evaluation by Dr. Gleason for possible prolapse surgery but she has decided to hold off on that as her prolapse has stopped bothering her.  She has had some back and hip pain.  She plans on going to do some physical therapy but COVID-19 has interrupted that plan.  He denies any postmenopausal vaginal bleeding.  Her oldest sister recently .    OB History        2    Para   2    Term   2            AB        Living   2       SAB        TAB        Ectopic        Molar        Multiple        Live Births              Obstetric Comments   2              Menarche:14  Menopause:53  HRT:non  Current contraception: post menopausal status and tubal ligation  History of abnormal Pap smear: no  History of abnormal mammogram: yes - s/p B9 breast bx  Family history of uterine, colon or ovarian cancer: no  Family history of breast cancer: yes - sister age 44, no genettics done, second sister age 74  STD's: none  Last pap smear: 2019-normal Pap smear  RAFAELA: none   POP      Health Maintenance   Topic Date Due   • TDAP/TD VACCINES (1 - Tdap) 1953   • HEPATITIS C SCREENING  2016   • ZOSTER VACCINE (1 of 2) 2021 (Originally 1992)   • MEDICARE ANNUAL WELLNESS  2020   • HEMOGLOBIN A1C  2020   • INFLUENZA VACCINE  2020   • DIABETIC EYE EXAM  2021   • LIPID PANEL  2021   • URINE MICROALBUMIN  2021   • COLONOSCOPY  10/11/2021   • MAMMOGRAM  2022   • Pneumococcal Vaccine Once at 65 Years Old  Completed       Past Medical History:   Diagnosis Date   • Anxiety    • Arthritis    • Arthritis of back    • Solis's esophagus    • Cataract    • Chronic constipation    • Colon polyps    • Diabetes type 2, controlled (CMS/HCC)    • Dizziness    • DVT (deep venous thrombosis) (CMS/HCC)    • Essential  hypertension 5/24/2016   • 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   • GERD (gastroesophageal reflux disease)    • Hematochezia 12/6/2016   • Hyperlipidemia 5/24/2016   • Hypertension    • Lumbosacral disc disease    • Palpitations    • Pelvic prolapse        Past Surgical History:   Procedure Laterality Date   • BREAST BIOPSY Left     cyst at 11 y/o   • CHOLECYSTECTOMY     • COLONOSCOPY N/A 10/11/2016    Procedure: COLONOSCOPY TO CECUM, TO TERMINAL ILEUM WITH HOT SNARE POLYPECTOMY;  Surgeon: Palmira Calix MD;  Location:  [facility];  Service:    • CYST REMOVAL Right     breast   • ENDOSCOPY N/A 10/11/2016    Procedure: ESOPHAGOGASTRODUODENOSCOPY WITH BIOPSY;  Surgeon: Palmira Calix MD;  Location:  [facility];  Service:    • LAPAROSCOPIC CHOLECYSTECTOMY W/ CHOLANGIOGRAPHY     • MOLE REMOVAL      on foot    • OOPHORECTOMY Left     B9   • TUBAL ABDOMINAL LIGATION           Current Outpatient Medications:   •  ACCU-CHEK KATHARINE PLUS test strip, USE ONE STRIP TO CHECK GLUCOSE ONCE DAILY, Disp: 100 each, Rfl: 3  •  Acetaminophen (TYLENOL ARTHRITIS EXT RELIEF PO), Take  by mouth., Disp: , Rfl:   •  Calcium Carb-Cholecalciferol (CALCIUM 1000 + D PO), Take  by mouth., Disp: , Rfl:   •  lisinopril-hydrochlorothiazide (PRINZIDE,ZESTORETIC) 20-25 MG per tablet, Take 1 tablet by mouth once daily, Disp: 90 tablet, Rfl: 0  •  metFORMIN (GLUCOPHAGE) 500 MG tablet, TAKE 1 TABLET BY MOUTH TWICE DAILY WITH MEALS, Disp: 180 tablet, Rfl: 0  •  metoprolol succinate XL (TOPROL-XL) 25 MG 24 hr tablet, TAKE 1 TABLET BY MOUTH ONCE DAILY, Disp: 90 tablet, Rfl: 2  •  omeprazole (priLOSEC) 40 MG capsule, TAKE 1 CAPSULE BY MOUTH ONCE DAILY, Disp: 30 capsule, Rfl: 3  •  Polyethylene Glycol 3350 (MIRALAX PO), Take  by mouth Daily., Disp: , Rfl:   •  simvastatin (ZOCOR) 40 MG tablet, Take 1 tablet by mouth Every Evening., Disp: 90 tablet, Rfl: 0  •  vitamin k 100 MCG tablet, Take 100 mcg by mouth Daily., Disp: , Rfl:     No Known Allergies    Social  "History     Tobacco Use   • Smoking status: Never Smoker   • Smokeless tobacco: Never Used   • Tobacco comment: no caffiene   Substance Use Topics   • Alcohol use: No   • Drug use: No       Family History   Problem Relation Age of Onset   • Diabetes Mother    • Heart disease Father    • Diabetes Sister    • Heart disease Sister    • Breast cancer Sister    • Cancer Sister    • Stroke Sister    • Diabetes Brother    • Heart disease Brother    • Cancer Brother    • Breast cancer Sister    • Ovarian cancer Neg Hx    • Colon cancer Neg Hx    • Deep vein thrombosis Neg Hx    • Pulmonary embolism Neg Hx        Review of Systems   Constitutional: Positive for activity change. Negative for appetite change, fatigue, fever and unexpected weight change.   Eyes: Negative for photophobia and visual disturbance.   Respiratory: Negative for cough.    Cardiovascular: Negative for chest pain and palpitations.   Gastrointestinal: Positive for constipation (unchanged). Negative for abdominal distention, abdominal pain, diarrhea and nausea.   Endocrine: Negative for cold intolerance and heat intolerance.   Genitourinary: Negative for dyspareunia, dysuria, flank pain, hematuria, menstrual problem, pelvic pain, urgency, vaginal discharge and vaginal pain.   Musculoskeletal: Positive for back pain and gait problem.   Skin: Negative for color change and rash.   Neurological: Negative for headaches.   Hematological: Negative for adenopathy. Does not bruise/bleed easily.   Psychiatric/Behavioral: Negative for dysphoric mood. The patient is not nervous/anxious.    All other systems reviewed and are negative.      /82   Ht 170.2 cm (67\")   Wt 89.8 kg (198 lb)   Breastfeeding No   BMI 31.01 kg/m²     Physical Exam   Constitutional: She is oriented to person, place, and time. She appears well-developed and well-nourished.   HENT:   Head: Normocephalic and atraumatic.   Eyes: Conjunctivae are normal. No scleral icterus.   Neck: Neck " supple. No thyromegaly present.   Cardiovascular: Normal rate and regular rhythm.   Pulmonary/Chest: Effort normal and breath sounds normal. Right breast exhibits no inverted nipple, no mass, no nipple discharge, no skin change and no tenderness. Left breast exhibits no inverted nipple, no mass, no nipple discharge, no skin change and no tenderness.   Abdominal: Soft. Bowel sounds are normal. She exhibits no distension and no mass. There is no tenderness. There is no rebound and no guarding. No hernia.   Genitourinary: Pelvic exam was performed with patient supine. There is no rash, tenderness or lesion on the right labia. There is no rash, tenderness or lesion on the left labia. Uterus is not deviated, not enlarged, not fixed and not tender. Cervix exhibits no motion tenderness, no discharge and no friability. Right adnexum displays no mass, no tenderness and no fullness. Left adnexum displays no mass, no tenderness and no fullness. No erythema, tenderness or bleeding in the vagina. No foreign body in the vagina. No signs of injury around the vagina. No vaginal discharge found.   Genitourinary Comments: Grade 2 prolapse, unchanged  No TTP, no masses   Neurological: She is alert and oriented to person, place, and time.   Skin: Skin is warm and dry.   Psychiatric: She has a normal mood and affect. Her behavior is normal. Judgment and thought content normal.   Nursing note and vitals reviewed.         Assessment/Plan    1) GYN HM: normal pap smear 6/2019.  SBE demonstrated and encouraged. Last pap  2) STD screening: declines Condoms encouraged.  3) Bone health - Weight bearing exercise, dietary calcium recommendations and vitamin D reviewed.   4) Diet and Exercise discussed  5) Smoking Status: No  6) POP- exam unchanged. She is trying to avoid surgery if possible.  7)MMG: UTD 1/2020- B1  8) DEXA- UTD 1/2020- normal , repeat 3-5 years  9)C scope-UTD 10/2016- repeat 5 years, Dr Hurt   10)I saw the patient with a face  mask, gloves and a face shield.  The patient herself was masked.  Social distancing was observed as appropriate.  11) Parts of this document have been copied or forwarded from her previous visits and have been reviewed, updated and edited as indicated.   12)Follow up prn and 2 years annual       Radha was seen today for gynecologic exam.    Diagnoses and all orders for this visit:    Routine gynecological examination  -     POC Urinalysis Dipstick        Frances Gautam MD  6/18/2020  14:32

## 2020-07-13 RX ORDER — LISINOPRIL AND HYDROCHLOROTHIAZIDE 25; 20 MG/1; MG/1
TABLET ORAL
Qty: 30 TABLET | Refills: 0 | Status: SHIPPED | OUTPATIENT
Start: 2020-07-13 | End: 2020-08-18

## 2020-07-28 ENCOUNTER — LAB (OUTPATIENT)
Dept: INTERNAL MEDICINE | Facility: CLINIC | Age: 80
End: 2020-07-28

## 2020-07-28 DIAGNOSIS — E11.8 CONTROLLED DIABETES MELLITUS TYPE 2 WITH COMPLICATIONS, UNSPECIFIED WHETHER LONG TERM INSULIN USE (HCC): ICD-10-CM

## 2020-07-28 DIAGNOSIS — Z79.899 HIGH RISK MEDICATION USE: ICD-10-CM

## 2020-07-28 DIAGNOSIS — N18.9 CHRONIC KIDNEY DISEASE, UNSPECIFIED CKD STAGE: ICD-10-CM

## 2020-07-28 DIAGNOSIS — E78.5 HYPERLIPIDEMIA, UNSPECIFIED HYPERLIPIDEMIA TYPE: ICD-10-CM

## 2020-07-28 DIAGNOSIS — E11.8 CONTROLLED DIABETES MELLITUS TYPE 2 WITH COMPLICATIONS, UNSPECIFIED WHETHER LONG TERM INSULIN USE (HCC): Primary | ICD-10-CM

## 2020-07-28 DIAGNOSIS — I10 HYPERTENSION, ESSENTIAL: ICD-10-CM

## 2020-07-29 LAB
ALBUMIN SERPL-MCNC: 4.5 G/DL (ref 3.5–5.2)
ALBUMIN/GLOB SERPL: 1.8 G/DL
ALP SERPL-CCNC: 50 U/L (ref 39–117)
ALT SERPL-CCNC: 11 U/L (ref 1–33)
AST SERPL-CCNC: 13 U/L (ref 1–32)
BASOPHILS # BLD AUTO: 0.03 10*3/MM3 (ref 0–0.2)
BASOPHILS NFR BLD AUTO: 0.9 % (ref 0–1.5)
BILIRUB SERPL-MCNC: 0.5 MG/DL (ref 0–1.2)
BUN SERPL-MCNC: 14 MG/DL (ref 8–23)
BUN/CREAT SERPL: 13.6 (ref 7–25)
CALCIUM SERPL-MCNC: 9.8 MG/DL (ref 8.6–10.5)
CHLORIDE SERPL-SCNC: 101 MMOL/L (ref 98–107)
CHOLEST SERPL-MCNC: 220 MG/DL (ref 0–200)
CHOLEST/HDLC SERPL: 2.89 {RATIO}
CO2 SERPL-SCNC: 26.6 MMOL/L (ref 22–29)
CREAT SERPL-MCNC: 1.03 MG/DL (ref 0.57–1)
EOSINOPHIL # BLD AUTO: 0.2 10*3/MM3 (ref 0–0.4)
EOSINOPHIL NFR BLD AUTO: 5.7 % (ref 0.3–6.2)
ERYTHROCYTE [DISTWIDTH] IN BLOOD BY AUTOMATED COUNT: 13.1 % (ref 12.3–15.4)
GLOBULIN SER CALC-MCNC: 2.5 GM/DL
GLUCOSE SERPL-MCNC: 138 MG/DL (ref 65–99)
HBA1C MFR BLD: 6.7 % (ref 4.8–5.6)
HCT VFR BLD AUTO: 40.9 % (ref 34–46.6)
HDLC SERPL-MCNC: 76 MG/DL (ref 40–60)
HGB BLD-MCNC: 13.8 G/DL (ref 12–15.9)
IMM GRANULOCYTES # BLD AUTO: 0 10*3/MM3 (ref 0–0.05)
IMM GRANULOCYTES NFR BLD AUTO: 0 % (ref 0–0.5)
LDLC SERPL CALC-MCNC: 130 MG/DL (ref 0–100)
LYMPHOCYTES # BLD AUTO: 1.6 10*3/MM3 (ref 0.7–3.1)
LYMPHOCYTES NFR BLD AUTO: 45.8 % (ref 19.6–45.3)
MCH RBC QN AUTO: 28.8 PG (ref 26.6–33)
MCHC RBC AUTO-ENTMCNC: 33.7 G/DL (ref 31.5–35.7)
MCV RBC AUTO: 85.4 FL (ref 79–97)
MONOCYTES # BLD AUTO: 0.28 10*3/MM3 (ref 0.1–0.9)
MONOCYTES NFR BLD AUTO: 8 % (ref 5–12)
NEUTROPHILS # BLD AUTO: 1.38 10*3/MM3 (ref 1.7–7)
NEUTROPHILS NFR BLD AUTO: 39.6 % (ref 42.7–76)
NRBC BLD AUTO-RTO: 0 /100 WBC (ref 0–0.2)
PLATELET # BLD AUTO: 180 10*3/MM3 (ref 140–450)
POTASSIUM SERPL-SCNC: 3.9 MMOL/L (ref 3.5–5.2)
PROT SERPL-MCNC: 7 G/DL (ref 6–8.5)
RBC # BLD AUTO: 4.79 10*6/MM3 (ref 3.77–5.28)
SODIUM SERPL-SCNC: 140 MMOL/L (ref 136–145)
T4 FREE SERPL-MCNC: 1.27 NG/DL (ref 0.93–1.7)
TRIGL SERPL-MCNC: 71 MG/DL (ref 0–150)
TSH SERPL DL<=0.005 MIU/L-ACNC: 1.48 UIU/ML (ref 0.27–4.2)
VLDLC SERPL CALC-MCNC: 14.2 MG/DL
WBC # BLD AUTO: 3.49 10*3/MM3 (ref 3.4–10.8)

## 2020-07-31 ENCOUNTER — TELEPHONE (OUTPATIENT)
Dept: INTERNAL MEDICINE | Facility: CLINIC | Age: 80
End: 2020-07-31

## 2020-07-31 NOTE — TELEPHONE ENCOUNTER
PATIENT CALLED TO REQUEST A HARD COPY OF THE LAB RESULTS FROM THE BLOOD WORK THAT SHE HAD PERFORMED ON TUESDAY (7/28/20). THE PATIENT STATED THAT SHE WOULD LIKE FOR THIS TO BE READY FOR HER TO  WHEN SHE COMES IN OFFICE NEXT TUESDAY (8/4/20) FOR HER APPOINTMENT WITH [facility] AT 9:45 AM.     IF THERE ARE ANY QUESTIONS OR CONCERNS PLEASE CALL THE PATIENT [phone].

## 2020-08-04 ENCOUNTER — OFFICE VISIT (OUTPATIENT)
Dept: INTERNAL MEDICINE | Facility: CLINIC | Age: 80
End: 2020-08-04

## 2020-08-04 VITALS
TEMPERATURE: 97.8 F | WEIGHT: 195 LBS | SYSTOLIC BLOOD PRESSURE: 130 MMHG | OXYGEN SATURATION: 99 % | BODY MASS INDEX: 30.61 KG/M2 | HEART RATE: 55 BPM | RESPIRATION RATE: 16 BRPM | HEIGHT: 67 IN | DIASTOLIC BLOOD PRESSURE: 82 MMHG

## 2020-08-04 DIAGNOSIS — N18.30 STAGE 3 CHRONIC KIDNEY DISEASE (HCC): ICD-10-CM

## 2020-08-04 DIAGNOSIS — R35.0 URINARY FREQUENCY: ICD-10-CM

## 2020-08-04 DIAGNOSIS — I10 ESSENTIAL HYPERTENSION: Primary | ICD-10-CM

## 2020-08-04 DIAGNOSIS — E11.9 TYPE 2 DIABETES MELLITUS WITHOUT COMPLICATION, WITHOUT LONG-TERM CURRENT USE OF INSULIN (HCC): ICD-10-CM

## 2020-08-04 DIAGNOSIS — K21.9 GASTROESOPHAGEAL REFLUX DISEASE WITHOUT ESOPHAGITIS: ICD-10-CM

## 2020-08-04 DIAGNOSIS — E78.2 MIXED HYPERLIPIDEMIA: ICD-10-CM

## 2020-08-04 DIAGNOSIS — G25.81 RESTLESS LEG SYNDROME: ICD-10-CM

## 2020-08-04 DIAGNOSIS — R42 DIZZINESS: ICD-10-CM

## 2020-08-04 DIAGNOSIS — H93.12 TINNITUS OF LEFT EAR: ICD-10-CM

## 2020-08-04 DIAGNOSIS — K22.70 BARRETT'S ESOPHAGUS WITHOUT DYSPLASIA: ICD-10-CM

## 2020-08-04 DIAGNOSIS — Z00.00 ROUTINE HEALTH MAINTENANCE: ICD-10-CM

## 2020-08-04 LAB
BILIRUB BLD-MCNC: NEGATIVE MG/DL
CLARITY, POC: CLEAR
COLOR UR: YELLOW
GLUCOSE UR STRIP-MCNC: NEGATIVE MG/DL
KETONES UR QL: NEGATIVE
LEUKOCYTE EST, POC: ABNORMAL
NITRITE UR-MCNC: NEGATIVE MG/ML
PH UR: 5 [PH] (ref 5–8)
PROT UR STRIP-MCNC: NEGATIVE MG/DL
RBC # UR STRIP: ABNORMAL /UL
SP GR UR: 1.01 (ref 1–1.03)
UROBILINOGEN UR QL: NORMAL

## 2020-08-04 PROCEDURE — 99214 OFFICE O/P EST MOD 30 MIN: CPT | Performed by: NURSE PRACTITIONER

## 2020-08-04 PROCEDURE — 81003 URINALYSIS AUTO W/O SCOPE: CPT | Performed by: NURSE PRACTITIONER

## 2020-08-04 RX ORDER — ROPINIROLE 0.5 MG/1
0.5 TABLET, FILM COATED ORAL NIGHTLY
Qty: 90 TABLET | Refills: 1 | Status: SHIPPED | OUTPATIENT
Start: 2020-08-04 | End: 2021-05-18

## 2020-08-04 NOTE — PROGRESS NOTES
Subjective   Radha Silverio is a 79 y.o. female presenting today for follow up of   Chief Complaint   Patient presents with   • Follow-up   • Hypertension   • Hyperlipidemia   • Diabetes       History of Present Illness     Patient Active Problem List   Diagnosis   • Essential hypertension   • Hyperlipidemia   • Chronic constipation   • Acid reflux   • Solis esophagus   • Type 2 diabetes mellitus without complication, without long-term current use of insulin (CMS/HCC)   • Hemorrhoids   • Shortness of breath   • Frequent PVCs   • Atrial bigeminy   • PAC (premature atrial contraction)   • Primary osteoarthritis of knee   • Primary osteoarthritis of right knee   • Tinnitus of both ears   • Chronic cough   • Left hip pain   • Primary osteoarthritis of left hip   • Greater trochanteric bursitis of left hip   • Stage 3 chronic kidney disease (CMS/HCC)   • Female genital prolapse   • Chronic gastritis without bleeding   • Degenerative disc disease, lumbar   • Weakness of left lower extremity   • Neuritis or radiculitis due to rupture of lumbar intervertebral disc         Current Outpatient Medications:   •  ACCU-CHEK KATHARINE PLUS test strip, USE ONE STRIP TO CHECK GLUCOSE ONCE DAILY, Disp: 100 each, Rfl: 3  •  Acetaminophen (TYLENOL ARTHRITIS EXT RELIEF PO), Take  by mouth., Disp: , Rfl:   •  Calcium Carb-Cholecalciferol (CALCIUM 1000 + D PO), Take  by mouth., Disp: , Rfl:   •  lisinopril-hydrochlorothiazide (PRINZIDE,ZESTORETIC) 20-25 MG per tablet, Take 1 tablet by mouth once daily, Disp: 30 tablet, Rfl: 0  •  metFORMIN (GLUCOPHAGE) 500 MG tablet, TAKE 1 TABLET BY MOUTH TWICE DAILY WITH MEALS, Disp: 180 tablet, Rfl: 0  •  metoprolol succinate XL (TOPROL-XL) 25 MG 24 hr tablet, TAKE 1 TABLET BY MOUTH ONCE DAILY, Disp: 90 tablet, Rfl: 2  •  omeprazole (priLOSEC) 40 MG capsule, TAKE 1 CAPSULE BY MOUTH ONCE DAILY, Disp: 30 capsule, Rfl: 3  •  Polyethylene Glycol 3350 (MIRALAX PO), Take  by mouth Daily., Disp: , Rfl:   •   "rOPINIRole (Requip) 0.5 MG tablet, Take 1 tablet by mouth Every Night. Take 1 hour before bedtime., Disp: 90 tablet, Rfl: 1  •  simvastatin (ZOCOR) 40 MG tablet, Take 1 tablet by mouth Every Evening., Disp: 90 tablet, Rfl: 0  •  vitamin k 100 MCG tablet, Take 100 mcg by mouth Daily., Disp: , Rfl:     Ms. Silverio is an AA female w/ HTN, HLD, DM, CKD, and GERD w/ Solis's.    She is tolerating her medication regimen w/o difficulty.    Pt denies CP, SOB, HA, or dizziness. She does not self monitor BP or BG. No episodes of hypoglycemia.    New c/o today:  Pt reports her urine is a dark yellow and she reports increased frequency over the last 3-4 weeks. + urgency, - burning, - fever/chills, - hematuria, - abd pain    Pt c/o tinnitus in her L ear for many years.    Pt c/o intermittent dizziness.    Pt c/o of restless legs in the evening. This is every night. Duration is years.      The following portions of the patient's history were reviewed and updated as appropriate: allergies, current medications, past family history, past medical history, past social history, past surgical history and problem list.    Review of Systems   Constitutional: Negative.    HENT:        Ringing in L ear   Eyes: Negative.    Respiratory: Negative.    Cardiovascular: Negative.    Gastrointestinal: Negative.    Endocrine: Negative.    Genitourinary: Positive for frequency and urgency. Negative for dysuria.   Musculoskeletal: Negative.    Skin: Negative.    Neurological: Positive for dizziness.   Hematological: Negative.    Psychiatric/Behavioral: Negative.        Objective   Vitals:    08/04/20 0954   BP: 130/82   BP Location: Left arm   Patient Position: Sitting   Cuff Size: Large Adult   Pulse: 55   Resp: 16   Temp: 97.8 °F (36.6 °C)   TempSrc: Temporal   SpO2: 99%   Weight: 88.5 kg (195 lb)   Height: 170.2 cm (67.01\")     Body mass index is 30.53 kg/m².  Nursing notes and vital reviewed.    Physical Exam   Constitutional: She is oriented " to person, place, and time. She appears well-developed and well-nourished. No distress.   HENT:   Right Ear: Hearing, tympanic membrane, external ear and ear canal normal.   Left Ear: Hearing, tympanic membrane, external ear and ear canal normal.   Nose: Nose normal.   Mouth/Throat: Uvula is midline, oropharynx is clear and moist and mucous membranes are normal.   Neck: Neck supple. No thyroid mass and no thyromegaly present.   Cardiovascular: Regular rhythm, S1 normal, S2 normal and normal pulses. Exam reveals no gallop and no friction rub.   No murmur heard.  Pulmonary/Chest: Effort normal and breath sounds normal. She has no wheezes. She has no rhonchi. She has no rales.   Lymphadenopathy:     She has no cervical adenopathy.   Neurological: She is alert and oriented to person, place, and time. No cranial nerve deficit or sensory deficit.   Psychiatric: She has a normal mood and affect. Her speech is normal and behavior is normal. She is attentive.       Recent Results (from the past 672 hour(s))   T4, free    Collection Time: 07/28/20 10:18 AM   Result Value Ref Range    Free T4 1.27 0.93 - 1.70 ng/dL   TSH    Collection Time: 07/28/20 10:18 AM   Result Value Ref Range    TSH 1.480 0.270 - 4.200 uIU/mL   Lipid Panel With / Chol / HDL Ratio    Collection Time: 07/28/20 10:18 AM   Result Value Ref Range    Total Cholesterol 220 (H) 0 - 200 mg/dL    Triglycerides 71 0 - 150 mg/dL    HDL Cholesterol 76 (H) 40 - 60 mg/dL    VLDL Cholesterol 14.2 mg/dL    LDL Cholesterol  130 (H) 0 - 100 mg/dL    Chol/HDL Ratio 2.89    CBC & Differential    Collection Time: 07/28/20 10:18 AM   Result Value Ref Range    WBC 3.49 3.40 - 10.80 10*3/mm3    RBC 4.79 3.77 - 5.28 10*6/mm3    Hemoglobin 13.8 12.0 - 15.9 g/dL    Hematocrit 40.9 34.0 - 46.6 %    MCV 85.4 79.0 - 97.0 fL    MCH 28.8 26.6 - 33.0 pg    MCHC 33.7 31.5 - 35.7 g/dL    RDW 13.1 12.3 - 15.4 %    Platelets 180 140 - 450 10*3/mm3    Neutrophil Rel % 39.6 (L) 42.7 - 76.0 %     Lymphocyte Rel % 45.8 (H) 19.6 - 45.3 %    Monocyte Rel % 8.0 5.0 - 12.0 %    Eosinophil Rel % 5.7 0.3 - 6.2 %    Basophil Rel % 0.9 0.0 - 1.5 %    Neutrophils Absolute 1.38 (L) 1.70 - 7.00 10*3/mm3    Lymphocytes Absolute 1.60 0.70 - 3.10 10*3/mm3    Monocytes Absolute 0.28 0.10 - 0.90 10*3/mm3    Eosinophils Absolute 0.20 0.00 - 0.40 10*3/mm3    Basophils Absolute 0.03 0.00 - 0.20 10*3/mm3    Immature Granulocyte Rel % 0.0 0.0 - 0.5 %    Immature Grans Absolute 0.00 0.00 - 0.05 10*3/mm3    nRBC 0.0 0.0 - 0.2 /100 WBC   Comprehensive metabolic panel    Collection Time: 07/28/20 10:18 AM   Result Value Ref Range    Glucose 138 (H) 65 - 99 mg/dL    BUN 14 8 - 23 mg/dL    Creatinine 1.03 (H) 0.57 - 1.00 mg/dL    eGFR Non African Am 52 (L) >60 mL/min/1.73    eGFR African Am 63 >60 mL/min/1.73    BUN/Creatinine Ratio 13.6 7.0 - 25.0    Sodium 140 136 - 145 mmol/L    Potassium 3.9 3.5 - 5.2 mmol/L    Chloride 101 98 - 107 mmol/L    Total CO2 26.6 22.0 - 29.0 mmol/L    Calcium 9.8 8.6 - 10.5 mg/dL    Total Protein 7.0 6.0 - 8.5 g/dL    Albumin 4.50 3.50 - 5.20 g/dL    Globulin 2.5 gm/dL    A/G Ratio 1.8 g/dL    Total Bilirubin 0.5 0.0 - 1.2 mg/dL    Alkaline Phosphatase 50 39 - 117 U/L    AST (SGOT) 13 1 - 32 U/L    ALT (SGPT) 11 1 - 33 U/L   Hemoglobin A1c    Collection Time: 07/28/20 10:18 AM   Result Value Ref Range    Hemoglobin A1C 6.70 (H) 4.80 - 5.60 %         Assessment/Plan   Diagnoses and all orders for this visit:    1. Essential hypertension (Primary)    2. Mixed hyperlipidemia    3. Type 2 diabetes mellitus without complication, without long-term current use of insulin (CMS/HCC)    4. Stage 3 chronic kidney disease (CMS/HCC)    5. Gastroesophageal reflux disease without esophagitis    6. 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    7. Urinary frequency  -     POC Urinalysis Dipstick, Automated  -     Urine Culture - , Urine, Clean Catch    8. Tinnitus of left ear  -     Ambulatory Referral to ENT  (Otolaryngology)  -     Ambulatory Referral to Physical Therapy Vestibular    9. Dizziness  -     Ambulatory Referral to Physical Therapy Vestibular    10. Restless leg syndrome  -     rOPINIRole (Requip) 0.5 MG tablet; Take 1 tablet by mouth Every Night. Take 1 hour before bedtime.  Dispense: 90 tablet; Refill: 1        Lab results noted above discussed with patient if applicable.    Except as noted above, pt will continue current medications as noted in the medication list.      Medications, including side effects, were discussed with the patient. Patient verbalized understanding.  The plan of care was discussed. All questions were answered. Patient verbalized understanding.      Return in about 6 months (around 2/4/2021) for Medicare Wellness.

## 2020-08-06 ENCOUNTER — TREATMENT (OUTPATIENT)
Dept: PHYSICAL THERAPY | Facility: CLINIC | Age: 80
End: 2020-08-06

## 2020-08-06 ENCOUNTER — OFFICE VISIT (OUTPATIENT)
Dept: INTERNAL MEDICINE | Facility: CLINIC | Age: 80
End: 2020-08-06

## 2020-08-06 VITALS
BODY MASS INDEX: 29.55 KG/M2 | RESPIRATION RATE: 16 BRPM | HEIGHT: 68 IN | WEIGHT: 195 LBS | DIASTOLIC BLOOD PRESSURE: 86 MMHG | SYSTOLIC BLOOD PRESSURE: 120 MMHG | HEART RATE: 61 BPM | TEMPERATURE: 98 F | OXYGEN SATURATION: 99 %

## 2020-08-06 DIAGNOSIS — H93.13 TINNITUS OF BOTH EARS: ICD-10-CM

## 2020-08-06 DIAGNOSIS — R42 DIZZINESS: ICD-10-CM

## 2020-08-06 DIAGNOSIS — S09.90XA INJURY OF HEAD, INITIAL ENCOUNTER: Primary | ICD-10-CM

## 2020-08-06 DIAGNOSIS — G44.319 ACUTE POST-TRAUMATIC HEADACHE, NOT INTRACTABLE: ICD-10-CM

## 2020-08-06 DIAGNOSIS — H81.13 BPPV (BENIGN PAROXYSMAL POSITIONAL VERTIGO), BILATERAL: Primary | ICD-10-CM

## 2020-08-06 LAB
BACTERIA UR CULT: NORMAL
BACTERIA UR CULT: NORMAL

## 2020-08-06 PROCEDURE — 99213 OFFICE O/P EST LOW 20 MIN: CPT | Performed by: NURSE PRACTITIONER

## 2020-08-06 PROCEDURE — 97161 PT EVAL LOW COMPLEX 20 MIN: CPT | Performed by: PHYSICAL THERAPIST

## 2020-08-06 PROCEDURE — 95992 CANALITH REPOSITIONING PROC: CPT | Performed by: PHYSICAL THERAPIST

## 2020-08-06 NOTE — PROGRESS NOTES
Physical Therapy Initial Evaluation-  Vestibular Therapy    Patient Name: Radha Silverio       Patient MRN: [id number]  : 1940  Physician:Arielle Johnston APRN  Date: 2020  Encounter Diagnoses   Name Primary?   • BPPV (benign paroxysmal positional vertigo), bilateral Yes   • Tinnitus of both ears    • Dizziness        Objective Testing:  Positional Testing  Positional Testing: With infrared goggles  Vertebrobasilar Artery Screen - Right: Negative  Vertebrobasilar Artery Screen - Left: Negative  Dundalk-Hallpike Right: Upbeat Nystagmus(No symptoms)  Dundalk-Hallpike Right Onset Time : 3 sec  Karyna-Hallpike Right Duration Time : 25 sec  Karyna-Hallpike Left: Upbeat, left rotatory nystagmus(No symptoms)  Dundalk-Hallpike Left Onset Time : 3 sec  Karyna-Hallpike Left Duration Time : 1 min  Horizontal Roll Test Right: (NA)  Horizontal Roll Test Left: (NA)     Occulomotor Exam Fixation Present  Spontaneous Nystagmus: Absent  VOR with Occulomotor Exam Fixation Absent   Spontaneous Nystagmus: Absent   DHI: 6/100, low perception of handicap    THERAPY ASSESSMENT: Patient is a 79 y.o. female. Patient presents to physical therapy due to complaints of episodes of dizziness for about 5-6 years. She denies treatment for this problem. She endorses vertigo like symptoms with lying down in bed and turning over in bed lasting seconds. Signs and symptoms are consistent with B PC canalithiasis BPPV. Patient tolerated treatment well today for L. Deferred treatment for R and testing of LCs.  Patient is a good candidate for physical therapy to address the following:    Functional Limitations: Walking, Difficulty moving, Decreased ability to perform ADL's   Length of Therapy: (6 weeks)   PT Frequency: (1-2 x a week)   PT Interventions: Home Exercise Program, CRP  Patient Agrees with Plan of Care: Yes    REHAB POTENTIAL: excellent            SHORT TERM GOALS: To be met in 2 weeks:  1. Patient is independent with HEP.  2. Patient will  report at least 30% improvement in overall condition.  LONG TERM GOALS:To be met in 4 weeks:  1. Patient will report decreased disequilibrium/dizziness by at least 90%.  2. Patient will report no loss of balance with ADLs to demonstrate improved functional balance.  3. Patient denies dizziness with daily activity.  4. DHI score is less than 10.     Other Procedure CPT 39509 minutes 0    Timed Code Treatment: 0   Minutes     Total Treatment Time: 60      Minutes      PT SIGNATURE: Arielle Conner PT, DPT, CHT   [place] license #[id number]  DATE TREATMENT INITIATED: 8/7/2020     Medicare Initial Certification  Certification Period: 11/5/2020  I certify that the therapy services are furnished while this patient is under my care.  The services outlined above are required by this patient, and will be reviewed every 90 days.     PHYSICIAN:     DATE:     Please sign and return via fax to [phone].. Thank you, [facility].

## 2020-08-06 NOTE — PROGRESS NOTES
"Chief Complaint   Patient presents with   • Headache     On Aug 2nd, hit head on car door       Subjective     Radha Silverio is a 79 y.o. female being seen for an acute visit for headache. She reports that on 8-2-2020, she was getting groceries from the car, and hit her head on the corner of the car door. Described as a dull aching to right side of head. Rated 2 of 10. She denies any nausea, dizziness or visual changes at the time if the event. No cut or laceration or bump felt/seen. She did not loose consciousness, have visual changes, nausea or lightheadedness.  She reports that \"I did not take anything because it wasn't bad enough.\"  She was concerned because the H/a has not resolved and she reports \"my head doesn't feel right.\" .       History of Present Illness     No Known Allergies      Current Outpatient Medications:   •  ACCU-CHEK KATHARINE PLUS test strip, USE ONE STRIP TO CHECK GLUCOSE ONCE DAILY, Disp: 100 each, Rfl: 3  •  Acetaminophen (TYLENOL ARTHRITIS EXT RELIEF PO), Take  by mouth., Disp: , Rfl:   •  Calcium Carb-Cholecalciferol (CALCIUM 1000 + D PO), Take  by mouth., Disp: , Rfl:   •  lisinopril-hydrochlorothiazide (PRINZIDE,ZESTORETIC) 20-25 MG per tablet, Take 1 tablet by mouth once daily, Disp: 30 tablet, Rfl: 0  •  metFORMIN (GLUCOPHAGE) 500 MG tablet, TAKE 1 TABLET BY MOUTH TWICE DAILY WITH MEALS, Disp: 180 tablet, Rfl: 0  •  metoprolol succinate XL (TOPROL-XL) 25 MG 24 hr tablet, TAKE 1 TABLET BY MOUTH ONCE DAILY, Disp: 90 tablet, Rfl: 2  •  omeprazole (priLOSEC) 40 MG capsule, TAKE 1 CAPSULE BY MOUTH ONCE DAILY, Disp: 30 capsule, Rfl: 3  •  Polyethylene Glycol 3350 (MIRALAX PO), Take  by mouth Daily., Disp: , Rfl:   •  rOPINIRole (Requip) 0.5 MG tablet, Take 1 tablet by mouth Every Night. Take 1 hour before bedtime., Disp: 90 tablet, Rfl: 1  •  simvastatin (ZOCOR) 40 MG tablet, Take 1 tablet by mouth Every Evening., Disp: 90 tablet, Rfl: 0  •  vitamin k 100 MCG tablet, Take 100 mcg by mouth " Daily., Disp: , Rfl:     The following portions of the patient's history were reviewed and updated as appropriate: allergies, current medications, past family history, past medical history, past social history, past surgical history and problem list.    Review of Systems   Respiratory: Negative.    Cardiovascular: Negative for chest pain and leg swelling.   Genitourinary: Negative.    Musculoskeletal: Positive for gait problem.   Neurological: Positive for headaches.   Hematological: Negative.    Psychiatric/Behavioral: Negative.  Negative for agitation.       Assessment     Physical Exam   Constitutional: She is oriented to person, place, and time. She appears well-developed and well-nourished.   Cardiovascular: Normal rate, regular rhythm and normal heart sounds.   Pulmonary/Chest: Effort normal and breath sounds normal.   Neurological: She is alert and oriented to person, place, and time.   Speech clear. Gait slow, ambulating with cane. PERRLA. Strength in BUE 3/5 and BLE 3/5   Skin: Skin is warm and dry.   Psychiatric: She has a normal mood and affect. Her behavior is normal.   Vitals reviewed.      Plan     Her fasting labs were reviewed with the patient from last week.     Radha was seen today for headache.    Diagnoses and all orders for this visit:    Injury of head, initial encounter  -     CT Head Without Contrast; Future    Acute post-traumatic headache, not intractable  -     CT Head Without Contrast; Future      No neuro deficits, concern for subdural hematoma. Set up a CT head wihtout contrast.     Follow up as needed

## 2020-08-09 ENCOUNTER — RESULTS ENCOUNTER (OUTPATIENT)
Dept: INTERNAL MEDICINE | Facility: CLINIC | Age: 80
End: 2020-08-09

## 2020-08-09 DIAGNOSIS — N18.30 STAGE 3 CHRONIC KIDNEY DISEASE (HCC): ICD-10-CM

## 2020-08-09 DIAGNOSIS — E78.2 MIXED HYPERLIPIDEMIA: ICD-10-CM

## 2020-08-09 DIAGNOSIS — E11.9 TYPE 2 DIABETES MELLITUS WITHOUT COMPLICATION, WITHOUT LONG-TERM CURRENT USE OF INSULIN (HCC): ICD-10-CM

## 2020-08-09 DIAGNOSIS — Z00.00 ROUTINE HEALTH MAINTENANCE: ICD-10-CM

## 2020-08-09 DIAGNOSIS — I10 ESSENTIAL HYPERTENSION: ICD-10-CM

## 2020-08-13 ENCOUNTER — TREATMENT (OUTPATIENT)
Dept: PHYSICAL THERAPY | Facility: CLINIC | Age: 80
End: 2020-08-13

## 2020-08-13 ENCOUNTER — APPOINTMENT (OUTPATIENT)
Dept: CT IMAGING | Facility: HOSPITAL | Age: 80
End: 2020-08-13

## 2020-08-13 DIAGNOSIS — R42 DIZZINESS: ICD-10-CM

## 2020-08-13 DIAGNOSIS — H93.13 TINNITUS OF BOTH EARS: ICD-10-CM

## 2020-08-13 DIAGNOSIS — H81.13 BPPV (BENIGN PAROXYSMAL POSITIONAL VERTIGO), BILATERAL: Primary | ICD-10-CM

## 2020-08-13 PROCEDURE — 95992 CANALITH REPOSITIONING PROC: CPT | Performed by: PHYSICAL THERAPIST

## 2020-08-13 NOTE — PROGRESS NOTES
Vestibular Daily Progress Note    Visit#:2  Subjective   I feel significantly better. Less ringing in my ears. About 90% improvement.  Objective         Positional Testing  Positional Testing: With infrared goggles  Karyna-Hallpike Right: Upbeat Nystagmus  Karyna-Hallpike Left: Upbeat, left rotatory nystagmus  Giddings-Hallpike Left Duration Time :  >1 min  Horizontal Roll Test Right: (NA)  Horizontal Roll Test Left: (NA)          PROCEDURES AND MODALITIES:  Paraffin:    Moist Heat:    Ice:    E-Stim:    Ultrasound:    Ionto:   Traction:    See Exercise, Manual, and Modality Logs for complete treatment.   Other Procedure CPT 26732 minutes 0       Timed Code Treatment: 0 Minutes  Total Treatment Time: 30 Minutes    Assessment/Plan   L PC canalithiasis is improved. She presents with L PC cupulolithiasis BPPV today. Treated with CRP and tolerated well. She is reporting significant improvement after first treatment.    Progress per Plan of Care    Arielle Conner, PT, DPT, CHT  Physical Therapist

## 2020-08-18 ENCOUNTER — HOSPITAL ENCOUNTER (OUTPATIENT)
Dept: CT IMAGING | Facility: HOSPITAL | Age: 80
Discharge: HOME | End: 2020-08-18
Admitting: NURSE PRACTITIONER

## 2020-08-18 DIAGNOSIS — G44.319 ACUTE POST-TRAUMATIC HEADACHE, NOT INTRACTABLE: ICD-10-CM

## 2020-08-18 DIAGNOSIS — S09.90XA INJURY OF HEAD, INITIAL ENCOUNTER: ICD-10-CM

## 2020-08-18 PROCEDURE — 70450 CT HEAD/BRAIN W/O DYE: CPT

## 2020-08-18 RX ORDER — LANCETS
EACH MISCELLANEOUS
Qty: 100 EACH | Refills: 0 | Status: SHIPPED | OUTPATIENT
Start: 2020-08-18 | End: 2022-02-17

## 2020-08-18 RX ORDER — LISINOPRIL AND HYDROCHLOROTHIAZIDE 25; 20 MG/1; MG/1
TABLET ORAL
Qty: 90 TABLET | Refills: 3 | Status: SHIPPED | OUTPATIENT
Start: 2020-08-18 | End: 2021-09-23

## 2020-08-21 ENCOUNTER — TELEPHONE (OUTPATIENT)
Dept: INTERNAL MEDICINE | Facility: CLINIC | Age: 80
End: 2020-08-21

## 2020-08-21 NOTE — TELEPHONE ENCOUNTER
PT SAID THAT SHE HAD HER URINE CHECKED LAST WEEK AND HAS NOT BEEN CONTACTED WITH THE RESULTS YET.  PT WANTS TO BE CALLED BACK      PT CALL BACK   [phone]

## 2020-08-31 ENCOUNTER — APPOINTMENT (OUTPATIENT)
Dept: CT IMAGING | Facility: HOSPITAL | Age: 80
End: 2020-08-31

## 2020-09-04 ENCOUNTER — TELEPHONE (OUTPATIENT)
Dept: INTERNAL MEDICINE | Facility: CLINIC | Age: 80
End: 2020-09-04

## 2020-09-04 NOTE — TELEPHONE ENCOUNTER
PT ADVISED THAT FOR HER PHARMACY ADVISED THAT HER glucose blood (Accu-Chek Wandy Plus) test strip IS NEEDING AN ICU DIAGNOSIS CODE.

## 2020-09-24 RX ORDER — SIMVASTATIN 40 MG
TABLET ORAL
Qty: 90 TABLET | Refills: 0 | Status: SHIPPED | OUTPATIENT
Start: 2020-09-24 | End: 2021-02-12

## 2020-09-30 ENCOUNTER — CLINICAL SUPPORT (OUTPATIENT)
Dept: INTERNAL MEDICINE | Facility: CLINIC | Age: 80
End: 2020-09-30

## 2020-09-30 DIAGNOSIS — Z23 NEED FOR INFLUENZA VACCINATION: ICD-10-CM

## 2020-09-30 PROCEDURE — G0008 ADMIN INFLUENZA VIRUS VAC: HCPCS | Performed by: INTERNAL MEDICINE

## 2020-09-30 PROCEDURE — 90694 VACC AIIV4 NO PRSRV 0.5ML IM: CPT | Performed by: INTERNAL MEDICINE

## 2020-10-12 ENCOUNTER — TELEPHONE (OUTPATIENT)
Dept: INTERNAL MEDICINE | Facility: CLINIC | Age: 80
End: 2020-10-12

## 2020-10-12 NOTE — TELEPHONE ENCOUNTER
Patient stated she heard on the news that something is wrong with metFORMIN (GLUCOPHAGE) 500 MG tablet. Patient stated she didn't catch what it was about and wants to know if there is anything she needs to worry about     Please advise      [phone]

## 2020-10-12 NOTE — TELEPHONE ENCOUNTER
There were a few companies that had to recall Metformin ER d/t contamination. If she is not taking ER, this does not apply to her. If she is taking ER, then she should call pharmacy to see if her supply is affected by the recall. Otherwise, she should continue to take her Metformin as prescribed.

## 2020-11-20 RX ORDER — OMEPRAZOLE 40 MG/1
CAPSULE, DELAYED RELEASE ORAL
Qty: 90 CAPSULE | Refills: 3 | Status: SHIPPED | OUTPATIENT
Start: 2020-11-20 | End: 2022-05-24

## 2020-12-11 ENCOUNTER — TRANSCRIBE ORDERS (OUTPATIENT)
Dept: ADMINISTRATIVE | Facility: HOSPITAL | Age: 80
End: 2020-12-11

## 2020-12-11 DIAGNOSIS — Z12.39 SCREENING BREAST EXAMINATION: Primary | ICD-10-CM

## 2020-12-31 RX ORDER — METOPROLOL SUCCINATE 25 MG/1
25 TABLET, EXTENDED RELEASE ORAL DAILY
Qty: 30 TABLET | Refills: 0 | Status: SHIPPED | OUTPATIENT
Start: 2020-12-31 | End: 2021-02-09

## 2021-01-07 ENCOUNTER — HOSPITAL ENCOUNTER (OUTPATIENT)
Dept: MAMMOGRAPHY | Facility: HOSPITAL | Age: 81
Discharge: HOME | End: 2021-01-07
Admitting: NURSE PRACTITIONER

## 2021-01-07 DIAGNOSIS — Z12.39 SCREENING BREAST EXAMINATION: ICD-10-CM

## 2021-01-07 PROCEDURE — 77067 SCR MAMMO BI INCL CAD: CPT

## 2021-01-07 PROCEDURE — 77063 BREAST TOMOSYNTHESIS BI: CPT

## 2021-02-04 LAB
ALBUMIN SERPL-MCNC: 4.7 G/DL (ref 3.5–5.2)
ALBUMIN/CREAT UR: 7 MG/G CREAT (ref 0–29)
ALBUMIN/GLOB SERPL: 1.8 G/DL
ALP SERPL-CCNC: 52 U/L (ref 39–117)
ALT SERPL-CCNC: 14 U/L (ref 1–33)
APPEARANCE UR: CLEAR
AST SERPL-CCNC: 17 U/L (ref 1–32)
BACTERIA #/AREA URNS HPF: ABNORMAL /HPF
BACTERIA UR CULT: NORMAL
BACTERIA UR CULT: NORMAL
BILIRUB SERPL-MCNC: 0.5 MG/DL (ref 0–1.2)
BILIRUB UR QL STRIP: NEGATIVE
BUN SERPL-MCNC: 22 MG/DL (ref 8–23)
BUN/CREAT SERPL: 20.4 (ref 7–25)
CALCIUM SERPL-MCNC: 9.7 MG/DL (ref 8.6–10.5)
CASTS URNS MICRO: ABNORMAL
CASTS URNS QL MICRO: PRESENT /LPF
CHLORIDE SERPL-SCNC: 102 MMOL/L (ref 98–107)
CHOLEST SERPL-MCNC: 205 MG/DL (ref 0–200)
CHOLEST/HDLC SERPL: 2.36 {RATIO}
CO2 SERPL-SCNC: 26.4 MMOL/L (ref 22–29)
COLOR UR: YELLOW
CREAT SERPL-MCNC: 1.08 MG/DL (ref 0.57–1)
CREAT UR-MCNC: 84.7 MG/DL
EPI CELLS #/AREA URNS HPF: ABNORMAL /HPF (ref 0–10)
GLOBULIN SER CALC-MCNC: 2.6 GM/DL
GLUCOSE SERPL-MCNC: 123 MG/DL (ref 65–99)
GLUCOSE UR QL: NEGATIVE
HBA1C MFR BLD: 6.3 % (ref 4.8–5.6)
HCV AB S/CO SERPL IA: <0.1 S/CO RATIO (ref 0–0.9)
HDLC SERPL-MCNC: 87 MG/DL (ref 40–60)
HGB UR QL STRIP: NEGATIVE
KETONES UR QL STRIP: NEGATIVE
LDLC SERPL CALC-MCNC: 105 MG/DL (ref 0–100)
LEUKOCYTE ESTERASE UR QL STRIP: ABNORMAL
MICRO URNS: ABNORMAL
MICROALBUMIN UR-MCNC: 5.9 UG/ML
MUCOUS THREADS URNS QL MICRO: PRESENT /HPF
NITRITE UR QL STRIP: NEGATIVE
PH UR STRIP: 5.5 [PH] (ref 5–7.5)
POTASSIUM SERPL-SCNC: 3.9 MMOL/L (ref 3.5–5.2)
PROT SERPL-MCNC: 7.3 G/DL (ref 6–8.5)
PROT UR QL STRIP: NEGATIVE
RBC #/AREA URNS HPF: ABNORMAL /HPF (ref 0–2)
SODIUM SERPL-SCNC: 139 MMOL/L (ref 136–145)
SP GR UR: 1.01 (ref 1–1.03)
TRIGL SERPL-MCNC: 73 MG/DL (ref 0–150)
URINALYSIS REFLEX: ABNORMAL
UROBILINOGEN UR STRIP-MCNC: 0.2 MG/DL (ref 0.2–1)
VLDLC SERPL CALC-MCNC: 13 MG/DL (ref 5–40)
WBC #/AREA URNS HPF: ABNORMAL /HPF (ref 0–5)

## 2021-02-09 RX ORDER — METOPROLOL SUCCINATE 25 MG/1
TABLET, EXTENDED RELEASE ORAL
Qty: 30 TABLET | Refills: 5 | Status: SHIPPED | OUTPATIENT
Start: 2021-02-09 | End: 2021-05-18 | Stop reason: SDUPTHER

## 2021-02-09 NOTE — TELEPHONE ENCOUNTER
MAIN patient. Approved refill but overdue for follow up. Please call to discuss scheduling her in 1-2 months for future refills.

## 2021-02-12 RX ORDER — SIMVASTATIN 40 MG
TABLET ORAL
Qty: 90 TABLET | Refills: 0 | Status: SHIPPED | OUTPATIENT
Start: 2021-02-12 | End: 2021-07-29

## 2021-03-26 ENCOUNTER — OFFICE VISIT (OUTPATIENT)
Dept: INTERNAL MEDICINE | Facility: CLINIC | Age: 81
End: 2021-03-26

## 2021-03-26 VITALS
TEMPERATURE: 97.5 F | DIASTOLIC BLOOD PRESSURE: 80 MMHG | RESPIRATION RATE: 16 BRPM | SYSTOLIC BLOOD PRESSURE: 130 MMHG | HEART RATE: 80 BPM | HEIGHT: 68 IN | WEIGHT: 193.8 LBS | OXYGEN SATURATION: 98 % | BODY MASS INDEX: 29.37 KG/M2

## 2021-03-26 DIAGNOSIS — E11.9 ENCOUNTER FOR DIABETIC FOOT EXAM (HCC): ICD-10-CM

## 2021-03-26 DIAGNOSIS — K22.70 BARRETT'S ESOPHAGUS WITHOUT DYSPLASIA: Chronic | ICD-10-CM

## 2021-03-26 DIAGNOSIS — N18.30 STAGE 3 CHRONIC KIDNEY DISEASE, UNSPECIFIED WHETHER STAGE 3A OR 3B CKD (HCC): Chronic | ICD-10-CM

## 2021-03-26 DIAGNOSIS — E11.9 TYPE 2 DIABETES MELLITUS WITHOUT COMPLICATION, WITHOUT LONG-TERM CURRENT USE OF INSULIN (HCC): Chronic | ICD-10-CM

## 2021-03-26 DIAGNOSIS — E78.2 MIXED HYPERLIPIDEMIA: Chronic | ICD-10-CM

## 2021-03-26 DIAGNOSIS — I10 ESSENTIAL HYPERTENSION: Chronic | ICD-10-CM

## 2021-03-26 DIAGNOSIS — K21.9 GASTROESOPHAGEAL REFLUX DISEASE WITHOUT ESOPHAGITIS: Chronic | ICD-10-CM

## 2021-03-26 DIAGNOSIS — D36.9 ADENOMATOUS POLYP: ICD-10-CM

## 2021-03-26 DIAGNOSIS — Z00.00 ENCOUNTER FOR MEDICARE ANNUAL WELLNESS EXAM: Primary | ICD-10-CM

## 2021-03-26 PROCEDURE — 99214 OFFICE O/P EST MOD 30 MIN: CPT | Performed by: NURSE PRACTITIONER

## 2021-03-26 PROCEDURE — G0439 PPPS, SUBSEQ VISIT: HCPCS | Performed by: NURSE PRACTITIONER

## 2021-03-26 NOTE — PROGRESS NOTES
The ABCs of the Annual Wellness Visit  Subsequent Medicare Wellness Visit    Chief Complaint   Patient presents with   • Medicare Wellness-subsequent       Subjective   History of Present Illness:  Radha Silverio is a 80 y.o. female who presents for a Subsequent Medicare Wellness Visit.        Patient Active Problem List   Diagnosis   • Essential hypertension   • Hyperlipidemia   • Chronic constipation   • Acid reflux   • Solis esophagus   • Type 2 diabetes mellitus without complication, without long-term current use of insulin (CMS/[facility])   • Hemorrhoids   • Shortness of breath   • Frequent PVCs   • Atrial bigeminy   • PAC (premature atrial contraction)   • Primary osteoarthritis of knee   • Primary osteoarthritis of right knee   • Tinnitus of both ears   • Chronic cough   • Left hip pain   • Primary osteoarthritis of left hip   • Greater trochanteric bursitis of left hip   • Stage 3 chronic kidney disease (CMS/[facility])   • Female genital prolapse   • Chronic gastritis without bleeding   • Degenerative disc disease, lumbar   • Weakness of left lower extremity   • Neuritis or radiculitis due to rupture of lumbar intervertebral disc       Outpatient Medications Prior to Visit   Medication Sig Dispense Refill   • Accu-Chek Softclix Lancets lancets USE ONE  TO CHECK GLUCOSE ONCE DAILY 100 each 0   • glucose blood (Accu-Chek Wandy Plus) test strip USE 1 STRIP TO CHECK GLUCOSE ONCE DAILY 50 each 11   • lisinopril-hydrochlorothiazide (PRINZIDE,ZESTORETIC) 20-25 MG per tablet Take 1 tablet by mouth once daily 90 tablet 3   • metFORMIN (GLUCOPHAGE) 500 MG tablet TAKE 1 TABLET BY MOUTH TWICE DAILY WITH MEALS 180 tablet 0   • metoprolol succinate XL (TOPROL-XL) 25 MG 24 hr tablet Take 1 tablet by mouth once daily 30 tablet 5   • omeprazole (priLOSEC) 40 MG capsule Take 1 capsule by mouth once daily 90 capsule 3   • Polyethylene Glycol 3350 (MIRALAX PO) Take  by mouth Daily.     • simvastatin (ZOCOR) 40 MG tablet TAKE 1 TABLET  BY MOUTH ONCE DAILY IN THE EVENING 90 tablet 0   • Acetaminophen (TYLENOL ARTHRITIS EXT RELIEF PO) Take  by mouth.     • Calcium Carb-Cholecalciferol (CALCIUM 1000 + D PO) Take  by mouth.     • rOPINIRole (Requip) 0.5 MG tablet Take 1 tablet by mouth Every Night. Take 1 hour before bedtime. 90 tablet 1   • vitamin k 100 MCG tablet Take 100 mcg by mouth Daily.       No facility-administered medications prior to visit.        Chronic Health Conditions Addressed in this Visit:    Ms. Silverio is an AA female w/ HTN, HLD, DM, CKD, and GERD w/ Solis's.     She is tolerating her medication regimen w/o difficulty.     Pt denies CP, SOB, HA, or dizziness. She does not self monitor BP or BG. No episodes of hypoglycemia.       Last EGD was 2016. Reviewed Dr. Hurt's notes. Adenomatous pppolyps on last CLS in 2016 w/ recommended 5yr recall.        HEALTH RISK ASSESSMENT    Recent Hospitalizations:  No hospitalization(s) within the last year.    Current Medical Providers:  Patient Care Team:  Arielle Johnston APRN as PCP - General (Family Medicine)  Raisa Gleason MD as Consulting Physician (Urogynecology)  Trudi Urbina MD as Consulting Physician (Cardiology)  Bronson Fitzgerald MD as Surgeon (Neurosurgery)    Smoking Status:  Social History     Tobacco Use   Smoking Status Never Smoker   Smokeless Tobacco Never Used   Tobacco Comment    no caffiene       Alcohol Consumption:  Social History     Substance and Sexual Activity   Alcohol Use No       Depression Screen:   PHQ-2/PHQ-9 Depression Screening 3/26/2021   Little interest or pleasure in doing things 0   Feeling down, depressed, or hopeless 0   Trouble falling or staying asleep, or sleeping too much -   Feeling tired or having little energy -   Poor appetite or overeating -   Feeling bad about yourself - or that you are a failure or have let yourself or your family down -   Trouble concentrating on things, such as reading the newspaper or watching  television -   Moving or speaking so slowly that other people could have noticed. Or the opposite - being so fidgety or restless that you have been moving around a lot more than usual -   Thoughts that you would be better off dead, or of hurting yourself in some way -   Total Score 0   If you checked off any problems, how difficult have these problems made it for you to do your work, take care of things at home, or get along with other people? -       Fall Risk Screen:  MEREDITH Fall Risk Assessment was completed, and patient is at MODERATE risk for falls. Assessment completed on:3/26/2021    Health Habits and Functional and Cognitive Screening:  Functional & Cognitive Status 3/26/2021   Do you have difficulty preparing food and eating? No   Do you have difficulty bathing yourself, getting dressed or grooming yourself? No   Do you have difficulty using the toilet? No   Do you have difficulty moving around from place to place? No   Do you have trouble with steps or getting out of a bed or a chair? No   Current Diet Well Balanced Diet   Dental Exam Not up to date   Eye Exam Up to date   Exercise (times per week) 0 times per week   Current Exercise Activities Include None   Do you need help using the phone?  No   Are you deaf or do you have serious difficulty hearing?  No   Do you need help with transportation? No   Do you need help shopping? No   Do you need help preparing meals?  No   Do you need help with housework?  No   Do you need help with laundry? No   Do you need help taking your medications? No   Do you need help managing money? No   Do you ever drive or ride in a car without wearing a seat belt? No   Have you felt unusual stress, anger or loneliness in the last month? No   Who do you live with? Alone   If you need help, do you have trouble finding someone available to you? No   Have you been bothered in the last four weeks by sexual problems? No   Do you have difficulty concentrating, remembering or making  decisions? Yes         Does the patient have evidence of cognitive impairment? No    Asprin use counseling:Does not need ASA (and currently is not on it)    Age-appropriate Screening Schedule:  Refer to the list below for future screening recommendations based on patient's age, sex and/or medical conditions. Orders for these recommended tests are listed in the plan section. The patient has been provided with a written plan.    Health Maintenance   Topic Date Due   • ZOSTER VACCINE (1 of 2) Never done   • DIABETIC EYE EXAM  01/09/2021   • TDAP/TD VACCINES (2 - Tdap) 08/10/2021 (Originally 6/16/2009)   • HEMOGLOBIN A1C  08/02/2021   • COLONOSCOPY  10/11/2021   • DXA SCAN  01/02/2022   • LIPID PANEL  02/02/2022   • URINE MICROALBUMIN  02/02/2022   • MAMMOGRAM  01/07/2023   • INFLUENZA VACCINE  Completed          The following portions of the patient's history were reviewed and updated as appropriate: allergies, current medications, past family history, past medical history, past social history, past surgical history, and problem list.      Compared to one year ago, the patient feels her physical health is the same.  Compared to one year ago, the patient feels her mental health is the same.    Reviewed chart for potential of high risk medication in the elderly: yes  Reviewed chart for potential of harmful drug interactions in the elderly:yes      Review of Systems   Constitutional: Negative.    HENT: Negative.    Eyes: Negative.    Respiratory: Negative.    Cardiovascular: Negative.    Gastrointestinal: Negative.    Endocrine: Negative.    Genitourinary: Negative.    Musculoskeletal: Negative.    Skin: Negative.    Allergic/Immunologic: Negative.    Neurological: Negative.    Hematological: Negative.    Psychiatric/Behavioral: Negative.          Objective         Vitals:    03/26/21 1034   BP: 130/80   BP Location: Left arm   Patient Position: Sitting   Cuff Size: Adult   Pulse: 80   Resp: 16   Temp: 97.5 °F (36.4 °C)  "  TempSrc: Temporal   SpO2: 98%   Weight: 87.9 kg (193 lb 12.8 oz)   Height: 172.7 cm (67.99\")       Body mass index is 29.47 kg/m².  Discussed the patient's BMI with her. The BMI is above average; BMI management plan is completed.  Patient's Body mass index is 29.47 kg/m². BMI is above normal parameters. Recommendations include: nutrition counseling.        Physical Exam  Constitutional:       General: She is not in acute distress.     Appearance: She is well-developed.   Neck:      Thyroid: No thyroid mass or thyromegaly.   Cardiovascular:      Rate and Rhythm: Regular rhythm.      Pulses:           Dorsalis pedis pulses are 2+ on the right side and 2+ on the left side.        Posterior tibial pulses are 2+ on the right side and 2+ on the left side.      Heart sounds: S1 normal and S2 normal.   Pulmonary:      Effort: Pulmonary effort is normal.      Breath sounds: Normal breath sounds.   Musculoskeletal:      Cervical back: Neck supple.      Right foot: Normal range of motion. No deformity.      Left foot: Normal range of motion. No deformity.   Feet:      Right foot:      Protective Sensation: 5 sites tested. 5 sites sensed.      Skin integrity: Skin integrity normal.      Toenail Condition: Right toenails are normal.      Left foot:      Protective Sensation: 5 sites tested. 5 sites sensed.      Skin integrity: Skin integrity normal.      Toenail Condition: Left toenails are normal.   Lymphadenopathy:      Cervical: No cervical adenopathy.   Neurological:      Mental Status: She is alert and oriented to person, place, and time.   Psychiatric:         Attention and Perception: She is attentive.         Behavior: Behavior normal.         Thought Content: Thought content normal.         Recent Results (from the past 1344 hour(s))   Comprehensive Metabolic Panel    Collection Time: 02/02/21 11:49 AM    Specimen: Blood   Result Value Ref Range    Glucose 123 (H) 65 - 99 mg/dL    BUN 22 8 - 23 mg/dL    Creatinine " 1.08 (H) 0.57 - 1.00 mg/dL    eGFR Non African Am 49 (L) >60 mL/min/1.73    eGFR African Am 59 (L) >60 mL/min/1.73    BUN/Creatinine Ratio 20.4 7.0 - 25.0    Sodium 139 136 - 145 mmol/L    Potassium 3.9 3.5 - 5.2 mmol/L    Chloride 102 98 - 107 mmol/L    Total CO2 26.4 22.0 - 29.0 mmol/L    Calcium 9.7 8.6 - 10.5 mg/dL    Total Protein 7.3 6.0 - 8.5 g/dL    Albumin 4.70 3.50 - 5.20 g/dL    Globulin 2.6 gm/dL    A/G Ratio 1.8 g/dL    Total Bilirubin 0.5 0.0 - 1.2 mg/dL    Alkaline Phosphatase 52 39 - 117 U/L    AST (SGOT) 17 1 - 32 U/L    ALT (SGPT) 14 1 - 33 U/L   Hemoglobin A1c    Collection Time: 02/02/21 11:49 AM    Specimen: Blood   Result Value Ref Range    Hemoglobin A1C 6.30 (H) 4.80 - 5.60 %   Lipid Panel With / Chol / HDL Ratio    Collection Time: 02/02/21 11:49 AM    Specimen: Blood   Result Value Ref Range    Total Cholesterol 205 (H) 0 - 200 mg/dL    Triglycerides 73 0 - 150 mg/dL    HDL Cholesterol 87 (H) 40 - 60 mg/dL    VLDL Cholesterol Madhav 13 5 - 40 mg/dL    LDL Chol Calc (NIH) 105 (H) 0 - 100 mg/dL    Chol/HDL Ratio 2.36    Hepatitis C Antibody    Collection Time: 02/02/21 11:49 AM    Specimen: Blood   Result Value Ref Range    Hep C Virus Ab <0.1 0.0 - 0.9 s/co ratio   Microalbumin / Creatinine Urine Ratio - Urine, Clean Catch    Collection Time: 02/02/21 11:49 AM    Specimen: Urine, Clean Catch   Result Value Ref Range    Creatinine, Urine 84.7 Not Estab. mg/dL    Microalbumin, Urine 5.9 Not Estab. ug/mL    Microalbumin/Creatinine Ratio 7 0 - 29 mg/g creat   Urinalysis With Culture If Indicated -    Collection Time: 02/02/21 11:49 AM    Specimen: Urine   Result Value Ref Range    Specific Gravity, UA 1.013 1.005 - 1.030    pH, UA 5.5 5.0 - 7.5    Color, UA Yellow Yellow    Appearance, UA Clear Clear    Leukocytes, UA 2+ (A) Negative    Protein Negative Negative/Trace    Glucose, UA Negative Negative    Ketones Negative Negative    Blood, UA Negative Negative    Bilirubin, UA Negative Negative     Urobilinogen, UA 0.2 0.2 - 1.0 mg/dL    Nitrite, UA Negative Negative    Microscopic Examination See below:     Urinalysis Reflex Comment    Microscopic Examination -    Collection Time: 02/02/21 11:49 AM   Result Value Ref Range    WBC, UA 6-10 (A) 0 - 5 /hpf    RBC, UA 0-2 0 - 2 /hpf    Epithelial Cells (non renal) 0-10 0 - 10 /hpf    Casts Present (A) None seen /lpf    Cast Type Hyaline casts N/A    Mucus, UA Present Not Estab. /hpf    Bacteria, UA None seen None seen/Few /hpf   Urine culture, Comprehensive - ,    Collection Time: 02/02/21 11:49 AM   Result Value Ref Range    Urine Culture Final report     Result 1 Comment          Each of these lab results were discussed individually in detail with the patient.      Assessment/Plan     Diagnoses and all orders for this visit:    1. Encounter for Medicare annual wellness exam (Primary)    2. Essential hypertension  Comments:  - controlled    3. Mixed hyperlipidemia  Comments:  - controlled    4. Type 2 diabetes mellitus without complication, without long-term current use of insulin (CMS/[facility])  Comments:  - controlled    5. Encounter for diabetic foot exam (CMS/[facility])    6. Stage 3 chronic kidney disease, unspecified whether stage 3a or 3b CKD (CMS/[facility])  Comments:  - stable    7. Gastroesophageal reflux disease without esophagitis  -     Ambulatory Referral to Gastroenterology    8. 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  -     Ambulatory Referral to Gastroenterology    9. Adenomatous polyp  -     Ambulatory Referral to Gastroenterology      Except as noted above, pt will continue current medications as noted in the medication list. I will continue to authorize refills as needed.    Continue to follow with specialty care as directed by them.    Medicare Risks and Personalized Health Plan    Advanced Care Planning:  ACP discussion was held with the patient during this visit. Patient has an advance directive (not in EMR), copy requested.    CMS Preventative Services  Quick Reference  Advance Directive Discussion  Colon Cancer Screening  Depression/Dysphoria  Diabetic Lab Screening         The above risks/problems have been discussed with the patient.  Pertinent information has been shared with the patient in the After Visit Summary.  Follow up plans and orders are seen below in the Assessment/Plan Section.      Follow Up:  Return in about 6 months (around 9/26/2021).     An After Visit Summary and PPPS were given to the patient.

## 2021-03-26 NOTE — PATIENT INSTRUCTIONS
Low-FODMAP Eating Plan    FODMAPs (fermentable oligosaccharides, disaccharides, monosaccharides, and polyols) are sugars that are hard for some people to digest. A low-FODMAP eating plan may help some people who have bowel (intestinal) diseases to manage their symptoms.  This meal plan can be complicated to follow. Work with a diet and nutrition specialist (dietitian) to make a low-FODMAP eating plan that is right for you. A dietitian can make sure that you get enough nutrition from this diet.  What are tips for following this plan?  Reading food labels  · Check labels for hidden FODMAPs such as:  ? High-fructose syrup.  ? Honey.  ? Agave.  ? Natural fruit flavors.  ? Onion or garlic powder.  · Choose low-FODMAP foods that contain 3-4 grams of fiber per serving.  · Check food labels for serving sizes. Eat only one serving at a time to make sure FODMAP levels stay low.  Meal planning  · Follow a low-FODMAP eating plan for up to 6 weeks, or as told by your health care provider or dietitian.  · To follow the eating plan:  1. Eliminate high-FODMAP foods from your diet completely.  2. Gradually reintroduce high-FODMAP foods into your diet one at a time. Most people should wait a few days after introducing one high-FODMAP food before they introduce the next high-FODMAP food. Your dietitian can recommend how quickly you may reintroduce foods.  3. Keep a daily record of what you eat and drink, and make note of any symptoms that you have after eating.  4. Review your daily record with a dietitian regularly. Your dietitian can help you identify which foods you can eat and which foods you should avoid.  General tips  · Drink enough fluid each day to keep your urine pale yellow.  · Avoid processed foods. These often have added sugar and may be high in FODMAPs.  · Avoid most dairy products, whole grains, and sweeteners.  · Work with a dietitian to make sure you get enough fiber in your diet.  Recommended  "foods  Grains  · Gluten-free grains, such as rice, oats, buckwheat, quinoa, corn, polenta, and millet. Gluten-free pasta, bread, or cereal. Rice noodles. Corn tortillas.  Vegetables  · Eggplant, zucchini, cucumber, peppers, green beans, [place] sprouts, bean sprouts, lettuce, arugula, kale, Swiss chard, spinach, red greens, bok thania, summer squash, potato, and tomato. Limited amounts of corn, carrot, and sweet potato. Green parts of scallions.  Fruits  · Bananas, oranges, krystyna, limes, blueberries, raspberries, strawberries, grapes, cantaloupe, honeydew melon, kiwi, papaya, passion fruit, and pineapple. Limited amounts of dried cranberries, banana chips, and shredded coconut.  Dairy  · Lactose-free milk, yogurt, and kefir. Lactose-free cottage cheese and ice cream. Non-dairy milks, such as almond, coconut, hemp, and rice milk. Yogurts made of non-dairy milks. Limited amounts of goat cheese, brie, mozzarella, parmesan, swiss, and other hard cheeses.  Meats and other protein foods  · Unseasoned beef, pork, poultry, or fish. Eggs. [place]. Tofu (firm) and tempeh. Limited amounts of nuts and seeds, such as almonds, walnuts, brazil nuts, pecans, peanuts, pumpkin seeds, idalmis seeds, and sunflower seeds.  Fats and oils  · Butter-free spreads. Vegetable oils, such as olive, canola, and sunflower oil.  Seasoning and other foods  · Artificial sweeteners with names that do not end in \"ol\" such as aspartame, saccharine, and stevia. Maple syrup, white table sugar, raw sugar, brown sugar, and molasses. Fresh basil, coriander, parsley, rosemary, and thyme.  Beverages  · Water and mineral water. Sugar-sweetened soft drinks. Small amounts of orange juice or cranberry juice. Black and green tea. Most dry nicole. Coffee.  This may not be a complete list of low-FODMAP foods. Talk with your dietitian for more information.  Foods to avoid  Grains  · Wheat, including kamut, durum, and semolina. Barley and bulgur. Couscous. Wheat-based " cereals. Wheat noodles, bread, crackers, and pastries.  Vegetables  · Chicory root, artichoke, asparagus, cabbage, snow peas, sugar snap peas, mushrooms, and cauliflower. Onions, garlic, leeks, and the white part of scallions.  Fruits  · Fresh, dried, and juiced forms of apple, pear, watermelon, peach, plum, cherries, apricots, blackberries, boysenberries, figs, nectarines, and gregoria. Avocado.  Dairy  · Milk, yogurt, ice cream, and soft cheese. Cream and sour cream. Milk-based sauces. Custard.  Meats and other protein foods  · Fried or fatty meat. Sausage. Cashews and pistachios. Soybeans, baked beans, black beans, chickpeas, kidney beans, mike beans, navy beans, lentils, and split peas.  Seasoning and other foods  · Any sugar-free gum or candy. Foods that contain artificial sweeteners such as sorbitol, mannitol, isomalt, or xylitol. Foods that contain honey, high-fructose corn syrup, or agave. Bouillon, vegetable stock, beef stock, and chicken stock. Garlic and onion powder. Condiments made with onion, such as hummus, chutney, pickles, relish, salad dressing, and salsa. Tomato paste.  Beverages  · Chicory-based drinks. Coffee substitutes. Chamomile tea. Fennel tea. Sweet or fortified nicole such as port or marin. Diet soft drinks made with isomalt, mannitol, maltitol, sorbitol, or xylitol. Apple, pear, and gregoria juice. Juices with high-fructose corn syrup.  This may not be a complete list of high-FODMAP foods. Talk with your dietitian to discuss what dietary choices are best for you.   Summary  · A low-FODMAP eating plan is a short-term diet that eliminates FODMAPs from your diet to help ease symptoms of certain bowel diseases.  · The eating plan usually lasts up to 6 weeks. After that, high-FODMAP foods are restarted gradually, one at a time, so you can find out which may be causing symptoms.  · A low-FODMAP eating plan can be complicated. It is best to work with a dietitian who has experience with this type of  plan.  This information is not intended to replace advice given to you by your health care provider. Make sure you discuss any questions you have with your health care provider.  Document Revised: 11/30/2018 Document Reviewed: 08/14/2018  Elsevier Patient Education © 2021 [employer]

## 2021-05-18 ENCOUNTER — OFFICE VISIT (OUTPATIENT)
Dept: CARDIOLOGY | Facility: CLINIC | Age: 81
End: 2021-05-18

## 2021-05-18 VITALS
SYSTOLIC BLOOD PRESSURE: 126 MMHG | DIASTOLIC BLOOD PRESSURE: 86 MMHG | TEMPERATURE: 97.5 F | WEIGHT: 199 LBS | OXYGEN SATURATION: 98 % | HEIGHT: 67 IN | HEART RATE: 81 BPM | BODY MASS INDEX: 31.23 KG/M2

## 2021-05-18 DIAGNOSIS — I49.1 PAC (PREMATURE ATRIAL CONTRACTION): ICD-10-CM

## 2021-05-18 DIAGNOSIS — I49.8 ATRIAL BIGEMINY: Primary | ICD-10-CM

## 2021-05-18 DIAGNOSIS — I49.3 FREQUENT PVCS: ICD-10-CM

## 2021-05-18 PROCEDURE — 99213 OFFICE O/P EST LOW 20 MIN: CPT | Performed by: INTERNAL MEDICINE

## 2021-05-18 PROCEDURE — 93000 ELECTROCARDIOGRAM COMPLETE: CPT | Performed by: INTERNAL MEDICINE

## 2021-05-18 RX ORDER — MULTIVIT WITH MINERALS/LUTEIN
250 TABLET ORAL DAILY
COMMUNITY

## 2021-05-18 RX ORDER — METOPROLOL SUCCINATE 25 MG/1
25 TABLET, EXTENDED RELEASE ORAL DAILY
Qty: 90 TABLET | Refills: 3 | Status: SHIPPED | OUTPATIENT
Start: 2021-05-18 | End: 2021-12-22 | Stop reason: SDUPTHER

## 2021-05-18 NOTE — PROGRESS NOTES
CARDIOLOGY    Trudi Urbina MD    ENCOUNTER DATE:  05/18/2021    Radha Silverio / 80 y.o. / female        CHIEF COMPLAINT / REASON FOR OFFICE VISIT     Palpitations      HISTORY OF PRESENT ILLNESS       HPI    Radha Silverio is a 80 y.o. female     This is a lady I saw for an irregular heartbeat and episodes of breathlessness 6/15/17.  Dr. Quiroga saw her and noted the irregularity and checked an EKG which showed frequent premature ventricular contractions. The patient had an echocardiogram on 06/08/2017 which showed normal LV systolic and diastolic function with mild-to-moderate tricuspid regurgitation with a right ventricular systolic pressure of about 50 mmHg. She wore a Holter monitor which showed frequent premature atrial contractions at about 23% of the tracing. There were frequent bouts of atrial bigeminy. She had some PVCs, but the PACs far outweighed it. She had a little focal stinging pain that she made note of in her diary which did not correspond to any arrhythmia. She did not make note of the breathless episodes in her diary. I suspected that her episodes of breathlessness were related to atrial bigeminy.  I stopped amlodipine and put her on Toprol-XL 25 mg a day.  In February 2019 she had a normal stress echo.  Ejection fraction 62%.    She comes in today for follow-up and has been feeling well.  She did have a fall and has had some problems with her right leg but it is getting better.  She does not have any palpitations or awareness of the premature atrial contractions.    REVIEW OF SYSTEMS     Review of Systems   Constitutional: Negative for chills, fever, weight gain and weight loss.   Cardiovascular: Negative for leg swelling.   Respiratory: Negative for cough, snoring and wheezing.    Hematologic/Lymphatic: Negative for bleeding problem. Does not bruise/bleed easily.   Skin: Negative for color change.   Musculoskeletal: Negative for falls, joint pain and myalgias.   Gastrointestinal:  "Negative for melena.   Genitourinary: Negative for hematuria.   Neurological: Negative for excessive daytime sleepiness.   Psychiatric/Behavioral: Negative for depression. The patient is not nervous/anxious.          VITAL SIGNS     Visit Vitals  /86 (BP Location: Left arm)   Pulse 81   Temp 97.5 °F (36.4 °C)   Ht 170.2 cm (67\")   Wt 90.3 kg (199 lb)   SpO2 98%   BMI 31.17 kg/m²         Wt Readings from Last 3 Encounters:   05/18/21 90.3 kg (199 lb)   03/26/21 87.9 kg (193 lb 12.8 oz)   08/06/20 88.5 kg (195 lb)     Body mass index is 31.17 kg/m².      PHYSICAL EXAMINATION     Constitutional:       General: Not in acute distress.  Neck:      Vascular: No carotid bruit or JVD.   Pulmonary:      Effort: Pulmonary effort is normal.      Breath sounds: Normal breath sounds.   Cardiovascular:      Normal rate. Regular rhythm.      Murmurs: There is no murmur.   Psychiatric:         Mood and Affect: Mood and affect normal.           REVIEWED DATA       ECG 12 Lead    Date/Time: 5/18/2021 12:50 PM  Performed by: Trudi Urbina MD  Authorized by: Trudi Urbina MD   Comparison: compared with previous ECG from 1/8/2019  Rhythm: sinus rhythm  Ectopy: atrial premature contractions  BPM: 63  Conduction: conduction normal  ST Segments: ST segments normal  T Waves: T waves normal    Clinical impression: abnormal EKG              Lipid Panel    Lipid Panel 7/28/20 2/2/21   Total Cholesterol 220 (A) 205 (A)   Triglycerides 71 73   HDL Cholesterol 76 (A) 87 (A)   VLDL Cholesterol 14.2 13   LDL Cholesterol  130 (A) 105 (A)   (A) Abnormal value       Comments are available for some flowsheets but are not being displayed.             Lab Results   Component Value Date    BUN 22 02/02/2021    CREATININE 1.08 (H) 02/02/2021    EGFRIFNONA 49 (L) 02/02/2021    EGFRIFAFRI 59 (L) 02/02/2021    BCR 20.4 02/02/2021    K 3.9 02/02/2021    CO2 26.4 02/02/2021    CALCIUM 9.7 02/02/2021    PROTENTOTREF 7.3 02/02/2021    ALBUMIN 4.70 " 02/02/2021    LABIL2 1.8 02/02/2021    AST 17 02/02/2021    ALT 14 02/02/2021       ASSESSMENT & PLAN      Diagnosis Plan   1. Atrial bigeminy     2. Frequent PVCs     3. PAC (premature atrial contraction)           1.  Atrial bigeminy.  She continues to have some atrial and ventricular extrasystoles.  These are asymptomatic.  Continue current dose of metoprolol.  2.  Hypertension  3.  Hyperlipidemia  4.  Diabetes  5.  Obesity.     I refilled her metoprolol for a year.  She will see Apryl next year.    Orders Placed This Encounter   Procedures   • ECG 12 Lead     This order was created via procedure documentation     Order Specific Question:   Release to patient     Answer:   Immediate           MEDICATIONS         Discharge Medications          Accurate as of May 18, 2021 12:50 PM. If you have any questions, ask your nurse or doctor.            Continue These Medications      Instructions Start Date   Accu-Chek Softclix Lancets lancets   USE ONE  TO CHECK GLUCOSE ONCE DAILY      CALCIUM 1000 + D PO   Oral      glucose blood test strip  Commonly known as: Accu-Chek Wandy Plus   USE 1 STRIP TO CHECK GLUCOSE ONCE DAILY      lisinopril-hydrochlorothiazide 20-25 MG per tablet  Commonly known as: PRINZIDE,ZESTORETIC   Take 1 tablet by mouth once daily      metFORMIN 500 MG tablet  Commonly known as: GLUCOPHAGE   TAKE 1 TABLET BY MOUTH TWICE DAILY WITH MEALS      metoprolol succinate XL 25 MG 24 hr tablet  Commonly known as: TOPROL-XL   25 mg, Oral, Daily      MIRALAX PO   Oral, Daily      omeprazole 40 MG capsule  Commonly known as: priLOSEC   Take 1 capsule by mouth once daily      simvastatin 40 MG tablet  Commonly known as: ZOCOR   TAKE 1 TABLET BY MOUTH ONCE DAILY IN THE EVENING      TYLENOL ARTHRITIS EXT RELIEF PO   2 tablets, Oral, As Needed      vitamin C 250 MG tablet  Commonly known as: ASCORBIC ACID   250 mg, Oral, Daily         Stop These Medications    rOPINIRole 0.5 MG tablet  Commonly known as:  Requip  Stopped by: Trudi Urbina MD     vitamin k 100 MCG tablet  Stopped by: MD Trudi Mann MD  05/18/21  12:50 EDT    **Dragon Disclaimer:   Much of this encounter note is an electronic transcription/translation of spoken language to printed text. The electronic translation of spoken language may permit erroneous, or at times, nonsensical words or phrases to be inadvertently transcribed. Although I have reviewed the note for such errors, some may still exist.

## 2021-07-07 ENCOUNTER — OFFICE VISIT (OUTPATIENT)
Dept: GASTROENTEROLOGY | Facility: CLINIC | Age: 81
End: 2021-07-07

## 2021-07-07 ENCOUNTER — TELEPHONE (OUTPATIENT)
Dept: GASTROENTEROLOGY | Facility: CLINIC | Age: 81
End: 2021-07-07

## 2021-07-07 VITALS — WEIGHT: 195.6 LBS | BODY MASS INDEX: 30.7 KG/M2 | HEIGHT: 67 IN | TEMPERATURE: 98.3 F

## 2021-07-07 DIAGNOSIS — K22.70 BARRETT'S ESOPHAGUS WITHOUT DYSPLASIA: ICD-10-CM

## 2021-07-07 DIAGNOSIS — D12.6 ADENOMATOUS POLYP OF COLON, UNSPECIFIED PART OF COLON: ICD-10-CM

## 2021-07-07 DIAGNOSIS — K59.09 CHRONIC CONSTIPATION: Primary | ICD-10-CM

## 2021-07-07 PROCEDURE — 99203 OFFICE O/P NEW LOW 30 MIN: CPT | Performed by: INTERNAL MEDICINE

## 2021-07-07 NOTE — PROGRESS NOTES
Subjective   Chief Complaint   Patient presents with   • Constipation   • Heartburn       Radha Silverio is a  80 y.o. female here for a follow up visit for constipation and heartburn.  She was last seen in the office in 2016.  She underwent an EGD and colonoscopy thereafter which was notable for H. pylori gastritis and adenomatous polyps.  5-year recommended repeat for colonoscopy.    She takes omeprazole prn - she does not have much heartburn or GERD.  In 2010 she had an EGD performed by Dr. German with focal intestinal metaplasia noted on biopsy.  Irregular Z-line noted in 2016.  No obvious Solis's.    She complains of stomach growling.  She is prone to constipation.  She had a really bad bout of constipation in June.  She takes miralax prn.  She reports blood in her stool when she strains.  She reports bright red blood with straining.      HPI  Past Medical History:   Diagnosis Date   • Anxiety    • Arthritis    • Arthritis of back    • Solis's esophagus    • Cataract    • Chronic constipation    • Colon polyps    • Diabetes type 2, controlled (CMS/HCC)    • Dizziness    • DVT (deep venous thrombosis) (CMS/[facility])    • Essential hypertension 5/24/2016   • 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   • GERD (gastroesophageal reflux disease)    • Hematochezia 12/6/2016   • Hyperlipidemia 5/24/2016   • Hypertension    • Lumbosacral disc disease    • Palpitations    • Pelvic prolapse      Past Surgical History:   Procedure Laterality Date   • BREAST BIOPSY Left     cyst at 11 y/o   • CHOLECYSTECTOMY     • COLONOSCOPY N/A 10/11/2016    Procedure: COLONOSCOPY TO CECUM, TO TERMINAL ILEUM WITH HOT SNARE POLYPECTOMY;  Surgeon: Palmira Calix MD;  Location: [facility] ENDOSCOPY;  Service:    • CYST REMOVAL Right     breast   • ENDOSCOPY N/A 10/11/2016    Procedure: ESOPHAGOGASTRODUODENOSCOPY WITH BIOPSY;  Surgeon: Palmira Calix MD;  Location: [facility] ENDOSCOPY;  Service:    • LAPAROSCOPIC CHOLECYSTECTOMY W/ CHOLANGIOGRAPHY     • MOLE  REMOVAL      on foot    • OOPHORECTOMY Left     B9   • TUBAL ABDOMINAL LIGATION         Current Outpatient Medications:   •  Accu-Chek Softclix Lancets lancets, USE ONE  TO CHECK GLUCOSE ONCE DAILY, Disp: 100 each, Rfl: 0  •  Calcium Carb-Cholecalciferol (CALCIUM 1000 + D PO), Take  by mouth., Disp: , Rfl:   •  glucose blood (Accu-Chek Wandy Plus) test strip, USE 1 STRIP TO CHECK GLUCOSE ONCE DAILY, Disp: 50 each, Rfl: 11  •  lisinopril-hydrochlorothiazide (PRINZIDE,ZESTORETIC) 20-25 MG per tablet, Take 1 tablet by mouth once daily, Disp: 90 tablet, Rfl: 3  •  metFORMIN (GLUCOPHAGE) 500 MG tablet, TAKE 1 TABLET BY MOUTH TWICE DAILY WITH MEALS, Disp: 180 tablet, Rfl: 1  •  metoprolol succinate XL (TOPROL-XL) 25 MG 24 hr tablet, Take 1 tablet by mouth Daily., Disp: 90 tablet, Rfl: 3  •  omeprazole (priLOSEC) 40 MG capsule, Take 1 capsule by mouth once daily, Disp: 90 capsule, Rfl: 3  •  Polyethylene Glycol 3350 (MIRALAX PO), Take  by mouth As Needed., Disp: , Rfl:   •  simvastatin (ZOCOR) 40 MG tablet, TAKE 1 TABLET BY MOUTH ONCE DAILY IN THE EVENING, Disp: 90 tablet, Rfl: 0  •  vitamin C (ASCORBIC ACID) 250 MG tablet, Take 250 mg by mouth Daily., Disp: , Rfl:   •  Acetaminophen (TYLENOL ARTHRITIS EXT RELIEF PO), Take 2 tablets by mouth As Needed., Disp: , Rfl:   PRN Meds:.  No Known Allergies  Social History     Socioeconomic History   • Marital status:      Spouse name: Not on file   • Number of children: Not on file   • Years of education: Not on file   • Highest education level: Not on file   Tobacco Use   • Smoking status: Never Smoker   • Smokeless tobacco: Never Used   • Tobacco comment: no caffiene   Substance and Sexual Activity   • Alcohol use: No   • Drug use: No   • Sexual activity: Not Currently     Partners: Male     Birth control/protection: Post-menopausal, Surgical     Comment: BTL     Family History   Problem Relation Age of Onset   • Diabetes Mother    • Heart disease Father    • Diabetes  Sister    • Heart disease Sister    • Breast cancer Sister    • Cancer Sister    • Stroke Sister    • Diabetes Brother    • Heart disease Brother    • Cancer Brother    • Breast cancer Sister    • Ovarian cancer Neg Hx    • Colon cancer Neg Hx    • Deep vein thrombosis Neg Hx    • Pulmonary embolism Neg Hx      Review of Systems   Constitutional: Negative for appetite change and unexpected weight change.   Gastrointestinal: Positive for blood in stool and constipation. Negative for abdominal pain.     Vitals:    07/07/21 1427   Temp: 98.3 °F (36.8 °C)         07/07/21  1427   Weight: 88.7 kg (195 lb 9.6 oz)       Objective   Physical Exam  Constitutional:       Appearance: Normal appearance.   Abdominal:      General: There is no distension.      Palpations: Abdomen is soft.   Neurological:      Mental Status: She is alert.       No radiology results for the last 7 days    Assessment/Plan   Diagnoses and all orders for this visit:    Chronic constipation    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    Adenomatous polyp of colon, unspecified part of colon      Plan:  · Start taking miralax daily- recommend taking it twice daily for a few days prior to the prep  · Ok to use pepcid or omeprazole as needed for gerd  · Schedule egd/colonoscopy - h/o focal intestinal metaplasia on 2010 egd

## 2021-07-29 RX ORDER — SIMVASTATIN 40 MG
TABLET ORAL
Qty: 90 TABLET | Refills: 3 | Status: SHIPPED | OUTPATIENT
Start: 2021-07-29 | End: 2022-08-16

## 2021-09-07 ENCOUNTER — TELEPHONE (OUTPATIENT)
Dept: INTERNAL MEDICINE | Facility: CLINIC | Age: 81
End: 2021-09-07

## 2021-09-07 NOTE — TELEPHONE ENCOUNTER
LVM FOR PT TO C/B TO R/S 9/30/21 OFFICE APPT WITH [facility].     LORRAINE WILL NOT BE IN THE OFFICE.

## 2021-09-23 RX ORDER — LISINOPRIL AND HYDROCHLOROTHIAZIDE 25; 20 MG/1; MG/1
TABLET ORAL
Qty: 90 TABLET | Refills: 0 | Status: SHIPPED | OUTPATIENT
Start: 2021-09-23 | End: 2022-01-17

## 2021-09-23 NOTE — TELEPHONE ENCOUNTER
Rx Refill Note  Requested Prescriptions     Pending Prescriptions Disp Refills    lisinopril-hydrochlorothiazide (PRINZIDE,ZESTORETIC) 20-25 MG per tablet [Pharmacy Med Name: Lisinopril-hydroCHLOROthiazide 20-25 MG Oral Tablet] 90 tablet 0     Sig: Take 1 tablet by mouth once daily      Last office visit with prescribing clinician: 3/26/2021      Next office visit with prescribing clinician: 10/1/2021            Nora Higginbotham MA  09/23/21, 12:32 EDT

## 2021-09-29 ENCOUNTER — TRANSCRIBE ORDERS (OUTPATIENT)
Dept: SLEEP MEDICINE | Facility: HOSPITAL | Age: 81
End: 2021-09-29

## 2021-09-29 DIAGNOSIS — Z01.818 OTHER SPECIFIED PRE-OPERATIVE EXAMINATION: Primary | ICD-10-CM

## 2021-10-01 ENCOUNTER — OFFICE VISIT (OUTPATIENT)
Dept: INTERNAL MEDICINE | Facility: CLINIC | Age: 81
End: 2021-10-01

## 2021-10-01 VITALS
TEMPERATURE: 97.8 F | BODY MASS INDEX: 29.51 KG/M2 | WEIGHT: 188 LBS | HEIGHT: 67 IN | HEART RATE: 66 BPM | DIASTOLIC BLOOD PRESSURE: 70 MMHG | RESPIRATION RATE: 16 BRPM | OXYGEN SATURATION: 96 % | SYSTOLIC BLOOD PRESSURE: 130 MMHG

## 2021-10-01 DIAGNOSIS — N18.31 STAGE 3A CHRONIC KIDNEY DISEASE (HCC): ICD-10-CM

## 2021-10-01 DIAGNOSIS — E78.2 MIXED HYPERLIPIDEMIA: ICD-10-CM

## 2021-10-01 DIAGNOSIS — D12.6 ADENOMATOUS POLYP OF COLON, UNSPECIFIED PART OF COLON: ICD-10-CM

## 2021-10-01 DIAGNOSIS — Z23 NEED FOR INFLUENZA VACCINATION: ICD-10-CM

## 2021-10-01 DIAGNOSIS — K22.70 BARRETT'S ESOPHAGUS WITHOUT DYSPLASIA: Chronic | ICD-10-CM

## 2021-10-01 DIAGNOSIS — K21.9 GASTROESOPHAGEAL REFLUX DISEASE WITHOUT ESOPHAGITIS: Chronic | ICD-10-CM

## 2021-10-01 DIAGNOSIS — I10 ESSENTIAL HYPERTENSION: Primary | Chronic | ICD-10-CM

## 2021-10-01 DIAGNOSIS — E11.9 TYPE 2 DIABETES MELLITUS WITHOUT COMPLICATION, WITHOUT LONG-TERM CURRENT USE OF INSULIN (HCC): Chronic | ICD-10-CM

## 2021-10-01 PROCEDURE — G0008 ADMIN INFLUENZA VIRUS VAC: HCPCS | Performed by: NURSE PRACTITIONER

## 2021-10-01 PROCEDURE — 99214 OFFICE O/P EST MOD 30 MIN: CPT | Performed by: NURSE PRACTITIONER

## 2021-10-01 PROCEDURE — 90662 IIV NO PRSV INCREASED AG IM: CPT | Performed by: NURSE PRACTITIONER

## 2021-10-01 NOTE — PROGRESS NOTES
Subjective   Radha Silverio is a 80 y.o. female presenting today for follow up of   Chief Complaint   Patient presents with   • Follow-up   • Hypertension   • Diabetes   • GI Problem   • CKD   • Hyperlipidemia       History of Present Illness     Patient Active Problem List   Diagnosis   • Essential hypertension   • Mixed hyperlipidemia   • Chronic constipation   • Acid reflux   • Solis esophagus   • Type 2 diabetes mellitus without complication, without long-term current use of insulin (HCC)   • Hemorrhoids   • Shortness of breath   • Frequent PVCs   • Atrial bigeminy   • PAC (premature atrial contraction)   • Primary osteoarthritis of knee   • Primary osteoarthritis of right knee   • Tinnitus of both ears   • Chronic cough   • Left hip pain   • Primary osteoarthritis of left hip   • Greater trochanteric bursitis of left hip   • Stage 3 chronic kidney disease (HCC)   • Female genital prolapse   • Chronic gastritis without bleeding   • Degenerative disc disease, lumbar   • Weakness of left lower extremity   • Neuritis or radiculitis due to rupture of lumbar intervertebral disc   • 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   • Adenomatous polyp of colon       Current Outpatient Medications on File Prior to Visit   Medication Sig   • Accu-Chek Softclix Lancets lancets USE ONE  TO CHECK GLUCOSE ONCE DAILY   • Acetaminophen (TYLENOL ARTHRITIS EXT RELIEF PO) Take 2 tablets by mouth As Needed.   • Calcium Carb-Cholecalciferol (CALCIUM 1000 + D PO) Take  by mouth.   • glucose blood (Accu-Chek Wandy Plus) test strip USE 1 STRIP TO CHECK GLUCOSE ONCE DAILY   • lisinopril-hydrochlorothiazide (PRINZIDE,ZESTORETIC) 20-25 MG per tablet Take 1 tablet by mouth once daily   • metFORMIN (GLUCOPHAGE) 500 MG tablet TAKE 1 TABLET BY MOUTH TWICE DAILY WITH MEALS   • metoprolol succinate XL (TOPROL-XL) 25 MG 24 hr tablet Take 1 tablet by mouth Daily.   • Polyethylene Glycol 3350 (MIRALAX PO) Take  by mouth As Needed.   • simvastatin  "(ZOCOR) 40 MG tablet TAKE 1 TABLET BY MOUTH ONCE DAILY IN THE EVENING   • vitamin C (ASCORBIC ACID) 250 MG tablet Take 250 mg by mouth Daily.   • omeprazole (priLOSEC) 40 MG capsule Take 1 capsule by mouth once daily     No current facility-administered medications on file prior to visit.      Ms. Silverio is an AA female w/ HTN, HLD, DM, CKD, and GERD w/ Solis's.     She is tolerating her medication regimen w/o difficulty.     Pt denies CP, SOB, HA, or dizziness. She does not self monitor BP or BG. No episodes of hypoglycemia.      Last EGD was 2016. Reviewed Dr. Hurt's notes. Adenomatous polyps on last CLS in 2016 w/ recommended 5yr recall. She is scheduled for EGD and CLS next week.    The following portions of the patient's history were reviewed and updated as appropriate: allergies, current medications, past family history, past medical history, past social history, past surgical history and problem list.    Review of Systems   Respiratory: Negative for shortness of breath.    Cardiovascular: Negative for chest pain.   Neurological: Negative for dizziness and headache.       Objective   Vitals:    10/01/21 0901   BP: 130/70   Pulse: 66   Resp: 16   Temp: 97.8 °F (36.6 °C)   SpO2: 96%   Weight: 85.3 kg (188 lb)   Height: 170.2 cm (67.01\")       BP Readings from Last 3 Encounters:   10/01/21 130/70   05/18/21 126/86   03/26/21 130/80        Wt Readings from Last 3 Encounters:   10/01/21 85.3 kg (188 lb)   07/07/21 88.7 kg (195 lb 9.6 oz)   05/18/21 90.3 kg (199 lb)        Body mass index is 29.44 kg/m².  Nursing notes and vitals reviewed.    Physical Exam  Constitutional:       General: She is not in acute distress.     Appearance: She is well-developed.   Neck:      Thyroid: No thyroid mass or thyromegaly.   Cardiovascular:      Rate and Rhythm: Regular rhythm.      Pulses: Normal pulses.      Heart sounds: S1 normal and S2 normal.   Pulmonary:      Effort: Pulmonary effort is normal.      Breath sounds: " Normal breath sounds.   Musculoskeletal:      Cervical back: Neck supple.      Right lower leg: No edema.      Left lower leg: No edema.   Lymphadenopathy:      Cervical: No cervical adenopathy.   Neurological:      Mental Status: She is alert and oriented to person, place, and time.   Psychiatric:         Attention and Perception: She is attentive.         Behavior: Behavior normal.         Thought Content: Thought content normal.         Recent Results (from the past 672 hour(s))   Comprehensive Metabolic Panel    Collection Time: 09/23/21  9:26 AM    Specimen: Blood   Result Value Ref Range    Glucose 122 (H) 65 - 99 mg/dL    BUN 15 8 - 23 mg/dL    Creatinine 1.04 (H) 0.57 - 1.00 mg/dL    eGFR Non African Am 51 (L) >60 mL/min/1.73    eGFR African Am 62 >60 mL/min/1.73    BUN/Creatinine Ratio 14.4 7.0 - 25.0    Sodium 142 136 - 145 mmol/L    Potassium 4.3 3.5 - 5.2 mmol/L    Chloride 101 98 - 107 mmol/L    Total CO2 30.5 (H) 22.0 - 29.0 mmol/L    Calcium 10.0 8.6 - 10.5 mg/dL    Total Protein 6.4 6.0 - 8.5 g/dL    Albumin 4.40 3.50 - 5.20 g/dL    Globulin 2.0 gm/dL    A/G Ratio 2.2 g/dL    Total Bilirubin 0.4 0.0 - 1.2 mg/dL    Alkaline Phosphatase 43 39 - 117 U/L    AST (SGOT) 17 1 - 32 U/L    ALT (SGPT) 11 1 - 33 U/L   Hemoglobin A1c    Collection Time: 09/23/21  9:26 AM    Specimen: Blood   Result Value Ref Range    Hemoglobin A1C 6.30 (H) 4.80 - 5.60 %   Lipid Panel With / Chol / HDL Ratio    Collection Time: 09/23/21  9:26 AM    Specimen: Blood   Result Value Ref Range    Total Cholesterol 179 0 - 200 mg/dL    Triglycerides 59 0 - 150 mg/dL    HDL Cholesterol 78 (H) 40 - 60 mg/dL    VLDL Cholesterol Madhav 11 5 - 40 mg/dL    LDL Chol Calc (NIH) 90 0 - 100 mg/dL    Chol/HDL Ratio 2.29          Assessment/Plan   Diagnoses and all orders for this visit:    1. Essential hypertension (Primary)  Comments:  - controlled  Orders:  -     CBC (No Diff); Future  -     Comprehensive Metabolic Panel; Future  -     Thyroid  Cascade Profile; Future  -     Urinalysis With Culture If Indicated - Urine, Clean Catch; Future  -     Microalbumin / Creatinine Urine Ratio - Urine, Clean Catch; Future    2. Mixed hyperlipidemia  Comments:  - controlled  Orders:  -     Comprehensive Metabolic Panel; Future  -     Lipid Panel With / Chol / HDL Ratio; Future    3. Type 2 diabetes mellitus without complication, without long-term current use of insulin (HCC)  Comments:  - controlled  Orders:  -     Comprehensive Metabolic Panel; Future  -     Hemoglobin A1c; Future  -     Urinalysis With Culture If Indicated - Urine, Clean Catch; Future  -     Microalbumin / Creatinine Urine Ratio - Urine, Clean Catch; Future    4. Gastroesophageal reflux disease without esophagitis  Comments:  - scheduled for EGD    5. 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  Comments:  - scheduled for EGD    6. Adenomatous polyp of colon, unspecified part of colon  Comments:  - scheduled for CLS    7. Need for influenza vaccination  -     Fluzone High-Dose 65+yrs ([id number])    8. Stage 3a chronic kidney disease (HCC)  Comments:  - stable  Orders:  -     Comprehensive Metabolic Panel; Future  -     Urinalysis With Culture If Indicated - Urine, Clean Catch; Future  -     Microalbumin / Creatinine Urine Ratio - Urine, Clean Catch; Future        Except as noted above, pt will continue current medications as noted in the medication list. I will continue to authorize refills as needed.      Medications, including side effects, were discussed with the patient. Patient verbalized understanding.  The plan of care was discussed. All questions were answered. Patient verbalized understanding.      Return in about 6 months (around 4/1/2022) for fasting labs one week prior to, [employer] Well[employer].

## 2021-10-02 ENCOUNTER — LAB (OUTPATIENT)
Dept: LAB | Facility: HOSPITAL | Age: 81
End: 2021-10-02

## 2021-10-02 DIAGNOSIS — Z01.818 OTHER SPECIFIED PRE-OPERATIVE EXAMINATION: ICD-10-CM

## 2021-10-02 LAB — SARS-COV-2 ORF1AB RESP QL NAA+PROBE: NOT DETECTED

## 2021-10-02 PROCEDURE — U0005 INFEC AGEN DETEC AMPLI PROBE: HCPCS

## 2021-10-02 PROCEDURE — C9803 HOPD COVID-19 SPEC COLLECT: HCPCS

## 2021-10-02 PROCEDURE — U0004 COV-19 TEST NON-CDC HGH THRU: HCPCS

## 2021-10-05 ENCOUNTER — ANESTHESIA EVENT (OUTPATIENT)
Dept: GASTROENTEROLOGY | Facility: HOSPITAL | Age: 81
End: 2021-10-05

## 2021-10-05 ENCOUNTER — ANESTHESIA (OUTPATIENT)
Dept: GASTROENTEROLOGY | Facility: HOSPITAL | Age: 81
End: 2021-10-05

## 2021-10-05 ENCOUNTER — HOSPITAL ENCOUNTER (OUTPATIENT)
Facility: HOSPITAL | Age: 81
Setting detail: HOSPITAL OUTPATIENT SURGERY
Discharge: HOME | End: 2021-10-05
Attending: INTERNAL MEDICINE | Admitting: INTERNAL MEDICINE

## 2021-10-05 VITALS
TEMPERATURE: 98.2 F | HEIGHT: 68 IN | OXYGEN SATURATION: 97 % | BODY MASS INDEX: 28.14 KG/M2 | SYSTOLIC BLOOD PRESSURE: 108 MMHG | WEIGHT: 185.7 LBS | RESPIRATION RATE: 17 BRPM | HEART RATE: 60 BPM | DIASTOLIC BLOOD PRESSURE: 80 MMHG

## 2021-10-05 DIAGNOSIS — K59.09 CHRONIC CONSTIPATION: ICD-10-CM

## 2021-10-05 DIAGNOSIS — K22.70 BARRETT'S ESOPHAGUS WITHOUT DYSPLASIA: ICD-10-CM

## 2021-10-05 DIAGNOSIS — D12.6 ADENOMATOUS POLYP OF COLON, UNSPECIFIED PART OF COLON: ICD-10-CM

## 2021-10-05 LAB — GLUCOSE BLDC GLUCOMTR-MCNC: 137 MG/DL (ref 70–130)

## 2021-10-05 PROCEDURE — 88305 TISSUE EXAM BY PATHOLOGIST: CPT | Performed by: INTERNAL MEDICINE

## 2021-10-05 PROCEDURE — 43239 EGD BIOPSY SINGLE/MULTIPLE: CPT | Performed by: INTERNAL MEDICINE

## 2021-10-05 PROCEDURE — 25010000002 PROPOFOL 10 MG/ML EMULSION: Performed by: NURSE ANESTHETIST, CERTIFIED REGISTERED

## 2021-10-05 PROCEDURE — S0260 H&P FOR SURGERY: HCPCS | Performed by: INTERNAL MEDICINE

## 2021-10-05 PROCEDURE — 82962 GLUCOSE BLOOD TEST: CPT

## 2021-10-05 PROCEDURE — 45380 COLONOSCOPY AND BIOPSY: CPT | Performed by: INTERNAL MEDICINE

## 2021-10-05 RX ORDER — LIDOCAINE HYDROCHLORIDE 20 MG/ML
INJECTION, SOLUTION INFILTRATION; PERINEURAL AS NEEDED
Status: DISCONTINUED | OUTPATIENT
Start: 2021-10-05 | End: 2021-10-05 | Stop reason: SURG

## 2021-10-05 RX ORDER — PROPOFOL 10 MG/ML
VIAL (ML) INTRAVENOUS AS NEEDED
Status: DISCONTINUED | OUTPATIENT
Start: 2021-10-05 | End: 2021-10-05 | Stop reason: SURG

## 2021-10-05 RX ORDER — PROPOFOL 10 MG/ML
VIAL (ML) INTRAVENOUS CONTINUOUS PRN
Status: DISCONTINUED | OUTPATIENT
Start: 2021-10-05 | End: 2021-10-05 | Stop reason: SURG

## 2021-10-05 RX ORDER — SODIUM CHLORIDE 0.9 % (FLUSH) 0.9 %
10 SYRINGE (ML) INJECTION AS NEEDED
Status: DISCONTINUED | OUTPATIENT
Start: 2021-10-05 | End: 2021-10-05 | Stop reason: HOSPADM

## 2021-10-05 RX ORDER — SODIUM CHLORIDE 0.9 % (FLUSH) 0.9 %
3 SYRINGE (ML) INJECTION EVERY 12 HOURS SCHEDULED
Status: DISCONTINUED | OUTPATIENT
Start: 2021-10-05 | End: 2021-10-05 | Stop reason: HOSPADM

## 2021-10-05 RX ORDER — SODIUM CHLORIDE, SODIUM LACTATE, POTASSIUM CHLORIDE, CALCIUM CHLORIDE 600; 310; 30; 20 MG/100ML; MG/100ML; MG/100ML; MG/100ML
30 INJECTION, SOLUTION INTRAVENOUS CONTINUOUS PRN
Status: DISCONTINUED | OUTPATIENT
Start: 2021-10-05 | End: 2021-10-05 | Stop reason: HOSPADM

## 2021-10-05 RX ADMIN — Medication 300 MCG/KG/MIN: at 08:53

## 2021-10-05 RX ADMIN — LIDOCAINE HYDROCHLORIDE 40 MG: 20 INJECTION, SOLUTION INFILTRATION; PERINEURAL at 08:53

## 2021-10-05 RX ADMIN — SODIUM CHLORIDE, POTASSIUM CHLORIDE, SODIUM LACTATE AND CALCIUM CHLORIDE 30 ML/HR: 600; 310; 30; 20 INJECTION, SOLUTION INTRAVENOUS at 08:31

## 2021-10-05 RX ADMIN — PROPOFOL 80 MG: 10 INJECTION, EMULSION INTRAVENOUS at 08:53

## 2021-10-05 NOTE — DISCHARGE INSTRUCTIONS

## 2021-10-05 NOTE — H&P
[facility]  Pre Procedure History & Physical    Chief Complaint:   Gerd, h/o colon polyps    Subjective     HPI:   79 yo here today for egd/colonoscopy.  Last EGD and colonoscopy in 2006 was notable for H. pylori gastritis and adenomatous colon polyps.  Previous EGD in 2010 was notable for focal intestinal metaplasia on biopsy.  No obvious Solis's esophagus was seen.    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   • GERD (gastroesophageal reflux disease)    • Hematochezia 12/6/2016   • Hyperlipidemia 5/24/2016   • Hypertension    • Lumbosacral disc disease    • Palpitations    • Pelvic prolapse        Past Surgical History:  Past Surgical History:   Procedure Laterality Date   • BREAST BIOPSY Left     cyst at 11 y/o   • CHOLECYSTECTOMY     • COLONOSCOPY N/A 10/11/2016    Procedure: COLONOSCOPY TO CECUM, TO TERMINAL ILEUM WITH HOT SNARE POLYPECTOMY;  Surgeon: Palmira Calix MD;  Location: [facility] ENDOSCOPY;  Service:    • CYST REMOVAL Right     breast   • ENDOSCOPY N/A 10/11/2016    Procedure: ESOPHAGOGASTRODUODENOSCOPY WITH BIOPSY;  Surgeon: Palmira Calix MD;  Location: [facility] ENDOSCOPY;  Service:    • LAPAROSCOPIC CHOLECYSTECTOMY W/ CHOLANGIOGRAPHY     • MOLE REMOVAL      on foot    • OOPHORECTOMY Left     B9   • TUBAL ABDOMINAL LIGATION         Family History:  Family History   Problem Relation Age of Onset   • Diabetes Mother    • Heart disease Father    • Diabetes Sister    • Heart disease Sister    • Breast cancer Sister    • Cancer Sister    • Stroke Sister    • Diabetes Brother    • Heart disease Brother    • Cancer Brother    • Breast cancer Sister    • Ovarian cancer  "Neg Hx    • Colon cancer Neg Hx    • Deep vein thrombosis Neg Hx    • Pulmonary embolism Neg Hx        Social History:   reports that she has never smoked. She has never used smokeless tobacco. She reports that she does not drink alcohol and does not use drugs.    Medications:   Medications Prior to Admission   Medication Sig Dispense Refill Last Dose   • lisinopril-hydrochlorothiazide (PRINZIDE,ZESTORETIC) 20-25 MG per tablet Take 1 tablet by mouth once daily 90 tablet 0 10/5/2021 at Unknown time   • metFORMIN (GLUCOPHAGE) 500 MG tablet TAKE 1 TABLET BY MOUTH TWICE DAILY WITH MEALS 180 tablet 1 Past Week at Unknown time   • metoprolol succinate XL (TOPROL-XL) 25 MG 24 hr tablet Take 1 tablet by mouth Daily. 90 tablet 3 10/5/2021 at Unknown time   • simvastatin (ZOCOR) 40 MG tablet TAKE 1 TABLET BY MOUTH ONCE DAILY IN THE EVENING 90 tablet 3 Past Week at Unknown time   • vitamin C (ASCORBIC ACID) 250 MG tablet Take 250 mg by mouth Daily.   Past Week at Unknown time   • Accu-Chek Softclix Lancets lancets USE ONE  TO CHECK GLUCOSE ONCE DAILY 100 each 0    • Acetaminophen (TYLENOL ARTHRITIS EXT RELIEF PO) Take 2 tablets by mouth As Needed.   Unknown at Unknown time   • Calcium Carb-Cholecalciferol (CALCIUM 1000 + D PO) Take  by mouth.      • glucose blood (Accu-Chek Wandy Plus) test strip USE 1 STRIP TO CHECK GLUCOSE ONCE DAILY 50 each 11    • omeprazole (priLOSEC) 40 MG capsule Take 1 capsule by mouth once daily 90 capsule 3 Unknown at Unknown time   • Polyethylene Glycol 3350 (MIRALAX PO) Take  by mouth As Needed.          Allergies:  Patient has no known allergies.    ROS:    Pertinent items are noted in HPI, all other systems reviewed and negative     Objective     Blood pressure 136/60, pulse 60, temperature 98.2 °F (36.8 °C), temperature source Oral, resp. rate 16, height 171.5 cm (67.5\"), weight 84.2 kg (185 lb 11.2 oz), SpO2 96 %, not currently breastfeeding.    Physical Exam   Constitutional: Pt is oriented to " person, place, and time and well-developed, well-nourished, and in no distress.   Mouth/Throat: Oropharynx is clear and moist.   Neck: Normal range of motion.   Cardiovascular: Normal rate, regular rhythm    Pulmonary/Chest: Effort normal    Abdominal: Soft. Nontender  Skin: Skin is warm and dry.   Psychiatric: Mood, memory, affect and judgment normal.     Assessment/Plan     Diagnosis:  Gerd, h/o colon polyps    Anticipated Surgical Procedure:  egd/colonoscopy    The risks, benefits, and alternatives of this procedure have been discussed with the patient or the responsible party- the patient understands and agrees to proceed.

## 2021-10-05 NOTE — ANESTHESIA POSTPROCEDURE EVALUATION
"Patient: Radha Silverio    Procedure Summary     Date: 10/05/21 Room / Location: [facility] ENDOSCOPY 6 / [facility] ENDOSCOPY    Anesthesia Start: 0846 Anesthesia Stop: 0924    Procedures:       COLONOSCOPY TO CECUM WITH COLD POLYPECTOMY (N/A )      ESOPHAGOGASTRODUODENOSCOPY WITH COLD BIOPSIES (N/A Esophagus) Diagnosis:       Chronic constipation      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      Adenomatous polyp of colon, unspecified part of colon      (Chronic constipation [K59.09])      (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 [K22.70])      (Adenomatous polyp of colon, unspecified part of colon [D12.6])    Surgeons: Palmira Calix MD Provider: Christos Franco MD    Anesthesia Type: MAC ASA Status: 3          Anesthesia Type: MAC    Vitals  Vitals Value Taken Time   /80 10/05/21 0942   Temp     Pulse 60 10/05/21 0942   Resp 17 10/05/21 0942   SpO2 97 % 10/05/21 0942           Post Anesthesia Care and Evaluation    Patient location during evaluation: bedside  Patient participation: complete - patient participated  Level of consciousness: awake and alert  Pain management: adequate  Airway patency: patent  Anesthetic complications: No anesthetic complications    Cardiovascular status: acceptable  Respiratory status: acceptable  Hydration status: acceptable    Comments: /80   Pulse 60   Temp 36.8 °C (98.2 °F) (Oral)   Resp 17   Ht 171.5 cm (67.5\")   Wt 84.2 kg (185 lb 11.2 oz)   SpO2 97%   BMI 28.66 kg/m²       "

## 2021-10-05 NOTE — ANESTHESIA PREPROCEDURE EVALUATION
Anesthesia Evaluation     Patient summary reviewed and Nursing notes reviewed   NPO Solid Status: > 8 hours  NPO Liquid Status: > 2 hours           Airway   Mallampati: I  TM distance: >3 FB  Neck ROM: full  no difficulty expected  Dental - normal exam     Pulmonary - normal exam   Cardiovascular - normal exam    (+) hypertension well controlled, dysrhythmias PVC,       Neuro/Psych  (+) psychiatric history Anxiety,     GI/Hepatic/Renal/Endo    (+)   diabetes mellitus type 2 well controlled,     Musculoskeletal     Abdominal  - normal exam    Bowel sounds: normal.   Substance History      OB/GYN          Other                          Anesthesia Plan    ASA 3     MAC     intravenous induction     Anesthetic plan, all risks, benefits, and alternatives have been provided, discussed and informed consent has been obtained with: patient.    Plan discussed with CRNA.

## 2021-10-06 LAB
LAB AP CASE REPORT: NORMAL
PATH REPORT.FINAL DX SPEC: NORMAL
PATH REPORT.GROSS SPEC: NORMAL

## 2021-10-18 NOTE — PROGRESS NOTES
Chronic inflammation was seen on gastric biopsies.  Continue daily omeprazole    Colon biopsy was normal.  Consider repeat colonoscopy in 5 years

## 2021-10-19 ENCOUNTER — TELEPHONE (OUTPATIENT)
Dept: GASTROENTEROLOGY | Facility: CLINIC | Age: 81
End: 2021-10-19

## 2021-10-19 NOTE — TELEPHONE ENCOUNTER
Called pt and left  for pt to call back.     C/s placed in [facility] and  for 10/05/2026.   -Wean Phenobarbital to 8mg PO BID x3 days, then 4mg PO BID x3 days then stop  -F/u genetic panel   - Continue Opthalmology recommendations for f/u in 1month outpatient  -F/u with [facility] Pediatric neurologist outpatient-other reference list given to mother  -F/U with PMD in 1-2 days.

## 2021-10-19 NOTE — TELEPHONE ENCOUNTER
----- Message from Palmira Calix MD sent at 10/18/2021  4:56 PM EDT -----  Chronic inflammation was seen on gastric biopsies.  Continue daily omeprazole    Colon biopsy was normal.  Consider repeat colonoscopy in 5 years

## 2021-10-21 NOTE — TELEPHONE ENCOUNTER
Called home number and call can not be completed.     Called mobile number and left vm for pt to call back.

## 2021-12-07 ENCOUNTER — TRANSCRIBE ORDERS (OUTPATIENT)
Dept: ADMINISTRATIVE | Facility: HOSPITAL | Age: 81
End: 2021-12-07

## 2021-12-07 DIAGNOSIS — Z12.31 SCREENING MAMMOGRAM, ENCOUNTER FOR: Primary | ICD-10-CM

## 2021-12-17 NOTE — TELEPHONE ENCOUNTER
Left a message requesting patient call back to confirm if she wanted this filled at [facility].  We sent in Rx in May 2021 for 90 day supply 3RF to Mail order./ ROSANA

## 2021-12-22 RX ORDER — METOPROLOL SUCCINATE 25 MG/1
25 TABLET, EXTENDED RELEASE ORAL DAILY
Qty: 90 TABLET | Refills: 2 | Status: SHIPPED | OUTPATIENT
Start: 2021-12-22 | End: 2022-05-26 | Stop reason: SDUPTHER

## 2021-12-22 RX ORDER — METOPROLOL SUCCINATE 25 MG/1
TABLET, EXTENDED RELEASE ORAL
Qty: 30 TABLET | Refills: 0 | OUTPATIENT
Start: 2021-12-22

## 2021-12-22 NOTE — TELEPHONE ENCOUNTER
I attempted to call pt on both numbers listed in chart. I was unsuccessful in reaching her and there was not an option to LVM. Since refill has been sent, if pt desires this to be at a different location this can be done when she calls. // HG

## 2021-12-22 NOTE — TELEPHONE ENCOUNTER
Patient called upset that her medication had not yet been filled. States she is on her last dose of her Metoprolol and needs it filled today. I attempted to call patient to verify she wanted it filled at the [facility] in [place], Patient did not answer. I left a voicemail advising her that I would send it to the [facility] in [place] and if she needed it sent mail order or somewhere else to please call and let me know.

## 2022-01-11 ENCOUNTER — HOSPITAL ENCOUNTER (OUTPATIENT)
Dept: MAMMOGRAPHY | Facility: HOSPITAL | Age: 82
Discharge: HOME | End: 2022-01-11
Admitting: NURSE PRACTITIONER

## 2022-01-11 DIAGNOSIS — Z12.31 SCREENING MAMMOGRAM, ENCOUNTER FOR: ICD-10-CM

## 2022-01-11 PROCEDURE — 77067 SCR MAMMO BI INCL CAD: CPT

## 2022-01-11 PROCEDURE — 77063 BREAST TOMOSYNTHESIS BI: CPT

## 2022-01-17 RX ORDER — LISINOPRIL AND HYDROCHLOROTHIAZIDE 25; 20 MG/1; MG/1
TABLET ORAL
Qty: 90 TABLET | Refills: 0 | Status: SHIPPED | OUTPATIENT
Start: 2022-01-17 | End: 2022-05-24

## 2022-02-17 RX ORDER — LANCETS
EACH MISCELLANEOUS
Qty: 50 EACH | Refills: 11 | Status: SHIPPED | OUTPATIENT
Start: 2022-02-17

## 2022-02-24 ENCOUNTER — TELEPHONE (OUTPATIENT)
Dept: INTERNAL MEDICINE | Facility: CLINIC | Age: 82
End: 2022-02-24

## 2022-02-24 NOTE — TELEPHONE ENCOUNTER
Hub staff attempted to follow warm transfer process and was unsuccessful     Caller: Radha Silverio    Relationship to patient: Self    Best call back number: [phone]    Patient is needing: A CALLBACK IF CLINICAL WAS TRYING TO GET A HOLD OF HER REGARDING THIS. STATES SHE GOT A CALL FROM THE OFFICE EARLIER BUT DOES NOT HAVE HER VM SET UP

## 2022-02-24 NOTE — TELEPHONE ENCOUNTER
Caller: Radha Silverio    Relationship: Self    Best call back number: [phone] (M)    Requested Prescriptions:   NEW GLUCOSE METER    TEST STRIPS     Pharmacy where request should be sent: [address] [phone] [facility] [phone]          Additional details provided by patient: PATIENT STATES THAT SHE WAS TOLD BY THE PHARMACY THAT THEY WILL NO LONGER BE GETTING THE glucose blood (Accu-Chek Wandy Plus) test strip,  AND PATIENT WILL NEED A DIFFERENT GLUCOSE METER AND THE TEST STRIPS.       PATIENT IS REQUESTING TO DISCUSS WHY SHE CAN NO LONGER RECEIVE THE TEST STRIPS.    Does the patient have less than a 3 day supply:  [] Yes  [] No    Solomon Barton Rep   02/24/22 13:12 EST

## 2022-02-25 ENCOUNTER — TELEPHONE (OUTPATIENT)
Dept: INTERNAL MEDICINE | Facility: CLINIC | Age: 82
End: 2022-02-25

## 2022-02-25 RX ORDER — BLOOD SUGAR DIAGNOSTIC
STRIP MISCELLANEOUS
Qty: 50 EACH | Refills: 11 | Status: SHIPPED | OUTPATIENT
Start: 2022-02-25

## 2022-02-25 NOTE — TELEPHONE ENCOUNTER
PATIENT RETURNING A CALL TO THE OFFICE.  NOT ABLE TO REACH ANYONE    PLEASE CALL BACK    [phone]

## 2022-02-25 NOTE — TELEPHONE ENCOUNTER
Rx Refill Note  Requested Prescriptions     Pending Prescriptions Disp Refills    Accu-Chek Wandy Plus test strip [Pharmacy Med Name: Accu-Chek Wandy Plus In Vitro Strip] 50 each 0     Sig: USE 1 STRIP TO CHECK GLUCOSE ONCE DAILY      Last office visit with prescribing clinician: 10/1/2021      Next office visit with prescribing clinician: 4/11/2022            Gini Nava MA  02/25/22, 09:34 EST

## 2022-03-16 ENCOUNTER — OFFICE VISIT (OUTPATIENT)
Dept: INTERNAL MEDICINE | Facility: CLINIC | Age: 82
End: 2022-03-16

## 2022-03-16 VITALS
RESPIRATION RATE: 18 BRPM | WEIGHT: 189.2 LBS | HEART RATE: 68 BPM | BODY MASS INDEX: 29.7 KG/M2 | HEIGHT: 67 IN | TEMPERATURE: 97.1 F | DIASTOLIC BLOOD PRESSURE: 76 MMHG | SYSTOLIC BLOOD PRESSURE: 122 MMHG | OXYGEN SATURATION: 97 %

## 2022-03-16 DIAGNOSIS — H53.9 VISION CHANGES: ICD-10-CM

## 2022-03-16 DIAGNOSIS — G44.52 HEADACHE, NEW DAILY PERSISTENT (NDPH): ICD-10-CM

## 2022-03-16 DIAGNOSIS — R42 VERTIGO: Primary | ICD-10-CM

## 2022-03-16 PROCEDURE — 99214 OFFICE O/P EST MOD 30 MIN: CPT | Performed by: INTERNAL MEDICINE

## 2022-03-16 NOTE — PROGRESS NOTES
Radha Silverio is a 81 y.o. female, who presents with a chief complaint of   Chief Complaint   Patient presents with   • Headache     Was putting dishes away and hit her head on the cabinet.    • Eye Pain   • Dizziness     Having issues with walking straight and having a funny feeling.             HPI   Pt here bc of headache and eye pain.  On 2/23 she was putting dishes away and turned around and hit her head on the cabinet door.  No LOC no immediate issues.  Then a couple of days later she got a tooth ache.  She went to the dentist for her tooth and she was taking advil but no antibiotics.  She usually doesn't have headaches but she has had headaches a lot over the past year that are dull.  Her most recent headache feels different from her chronic headache.  Her sx are slowly getting better.  She denies dizziness but says she has a balance problem and tinnitus that is chronic.  MRI brain ordered last year but never done.      The following portions of the patient's history were reviewed and updated as appropriate: allergies, current medications, past family history, past medical history, past social history, past surgical history and problem list.    Allergies: Patient has no known allergies.    Review of Systems   Constitutional: Negative.    Eyes: Negative.    Respiratory: Negative.    Cardiovascular: Negative.    Gastrointestinal: Negative.    Endocrine: Negative.    Genitourinary: Negative.    Musculoskeletal: Negative.    Skin: Negative.    Allergic/Immunologic: Negative.    Neurological: Positive for light-headedness and headaches.   Hematological: Negative.    Psychiatric/Behavioral: Negative.    All other systems reviewed and are negative.            Wt Readings from Last 3 Encounters:   03/16/22 85.8 kg (189 lb 3.2 oz)   10/05/21 84.2 kg (185 lb 11.2 oz)   10/01/21 85.3 kg (188 lb)     Temp Readings from Last 3 Encounters:   03/16/22 97.1 °F (36.2 °C) (Tympanic)   10/05/21 98.2 °F (36.8 °C) (Oral)    10/01/21 97.8 °F (36.6 °C)     BP Readings from Last 3 Encounters:   03/16/22 122/76   10/05/21 108/80   10/01/21 130/70     Pulse Readings from Last 3 Encounters:   03/16/22 68   10/05/21 60   10/01/21 66     Body mass index is 29.63 kg/m².  SpO2 Readings from Last 3 Encounters:   03/16/22 97%   10/05/21 97%   10/01/21 96%          Physical Exam  Vitals and nursing note reviewed.   Constitutional:       General: She is not in acute distress.     Appearance: She is well-developed.   HENT:      Head: Normocephalic and atraumatic.      Right Ear: External ear normal.      Left Ear: External ear normal.      Nose: Nose normal.   Eyes:      Conjunctiva/sclera: Conjunctivae normal.      Pupils: Pupils are equal, round, and reactive to light.   Cardiovascular:      Rate and Rhythm: Normal rate and regular rhythm.      Heart sounds: Normal heart sounds.   Pulmonary:      Effort: Pulmonary effort is normal. No respiratory distress.      Breath sounds: Normal breath sounds. No wheezing.   Musculoskeletal:         General: Normal range of motion.      Cervical back: Normal range of motion and neck supple.      Comments: Normal gait   Skin:     General: Skin is warm and dry.   Neurological:      Mental Status: She is alert and oriented to person, place, and time.      Cranial Nerves: No cranial nerve deficit.      Coordination: Coordination abnormal.      Gait: Gait normal.      Comments: rhomberg normal  Abnormal tandem walk (heel-toe walkig  Finger to nose normal   Psychiatric:         Behavior: Behavior normal.         Thought Content: Thought content normal.         Judgment: Judgment normal.         Results for orders placed or performed during the hospital encounter of 10/05/21   POC Glucose Once    Specimen: Blood   Result Value Ref Range    Glucose 137 (H) 70 - 130 mg/dL   Tissue Pathology Exam    Specimen: A: Stomach; Tissue    B: Stomach; Tissue    C: Large Intestine, Sigmoid Colon; Tissue   Result Value Ref Range     Case Report       Surgical Pathology Report                         Case: [id number]                                  Authorizing Provider:  Palmira Calix MD      Collected:           10/05/2021 08:56 AM          Ordering Location:     [facility]  Received:            10/05/2021 11:17 AM                                 ENDO SUITES                                                                  Pathologist:           Stuart Carroll MD                                                         Specimens:   1) - Stomach, RANDOM GASTRIC BIOPSIES                                                               2) - Stomach, GASTRIC BODY BIOPSIES                                                                 3) - Large Intestine, Sigmoid Colon, SIGMOID COLON POLYP                                   Final Diagnosis       1. Stomach, Random, Biopsy: Benign gastric mucosa with    A. Intestinal metaplasia with no dysplasia.   B. Chronic inflammation of the gastric mucosa.    C. Features suggestive of chemical gastritis.   D. No Helicobacter pylori.     2. Stomach, Body, Biopsy: Benign gastric mucosa with    A. Intestinal metaplasia with no dysplasia.   B. Mild chronic inflammation.    C. No Helicobacter pylori.     3. Colon, Sigmoid, Biopsy: Benign polypoid colonic mucosa with   A. No hyperplastic or tubulovillous change.   B. No significant inflammation.   C. No crypt distortion or basement membrane thickening.   D. No viral inclusions or other organisms on routinely stained sections.    rosina/pkm       Gross Description       1. The specimen is received in formalin labeled with the patient's name and further designated 'random gastric biopsy' are multiple small fragments of gray-tan tissue. The specimen is submitted for embedding as received.    2. The specimen is received in formalin labeled with the patient's name and further designated 'gastric body biopsy' are multiple small fragments of gray-tan  tissue. The specimen is submitted for embedding as received.    3. The specimen is received in formalin labeled with the patient's name and further designated 'sigmoid colon polyp biopsy' is a small fragment of gray-tan tissue. The specimen is submitted for embedding as received.       Result Review :     Common labs    Common Labsle 9/23/21 9/23/21 9/23/21    0926 0926 0926   Glucose 122 (A)     BUN 15     Creatinine 1.04 (A)     eGFR Non  Am 51 (A)     eGFR African Am 62     Sodium 142     Potassium 4.3     Chloride 101     Calcium 10.0     Total Protein 6.4     Albumin 4.40     Total Bilirubin 0.4     Alkaline Phosphatase 43     AST (SGOT) 17     ALT (SGPT) 11     Total Cholesterol   179   Triglycerides   59   HDL Cholesterol   78 (A)   LDL Cholesterol    90   Hemoglobin A1C  6.30 (A)    (A) Abnormal value       Comments are available for some flowsheets but are not being displayed.           Data reviewed: past pcp notes,  and radiology orders.           Assessment and Plan    Diagnoses and all orders for this visit:    1. Vertigo (Primary)  -     MRI Brain With & Without Contrast; Future    2. Vision changes  -     MRI Brain With & Without Contrast; Future    3. Headache, new daily persistent (NDPH)  -     MRI Brain With & Without Contrast; Future     Pt with multiple issues that are new to me.  She has chronic headache and vertigo issues with acute exacerbation of sx after hitting her head. Possible mild concussion sx but this doesn't explain chronic sx.  MRI rec last year but pt didn't have this done.  Will check MRI now and then proceed based on results.  Reviewed red flag sx and when to go to ED.       I spent 30 minutes caring for Radha on this date of service. This time includes time spent by me in the following activities:preparing for the visit, reviewing tests, performing a medically appropriate examination and/or evaluation , counseling and educating the patient/family/caregiver, ordering  medications, tests, or procedures and documenting information in the medical record      Outpatient Medications Prior to Visit   Medication Sig Dispense Refill   • Acetaminophen (TYLENOL ARTHRITIS EXT RELIEF PO) Take 2 tablets by mouth As Needed. prn     • Calcium Carb-Cholecalciferol (CALCIUM 1000 + D PO) Take  by mouth.     • lisinopril-hydrochlorothiazide (PRINZIDE,ZESTORETIC) 20-25 MG per tablet Take 1 tablet by mouth once daily 90 tablet 0   • metFORMIN (GLUCOPHAGE) 500 MG tablet TAKE 1 TABLET BY MOUTH TWICE DAILY WITH MEALS 180 tablet 0   • metoprolol succinate XL (TOPROL-XL) 25 MG 24 hr tablet Take 1 tablet by mouth Daily. 90 tablet 2   • omeprazole (priLOSEC) 40 MG capsule Take 1 capsule by mouth once daily (Patient taking differently: 40 mg. prn) 90 capsule 3   • Polyethylene Glycol 3350 (MIRALAX PO) Take  by mouth As Needed.     • simvastatin (ZOCOR) 40 MG tablet TAKE 1 TABLET BY MOUTH ONCE DAILY IN THE EVENING 90 tablet 3   • vitamin C (ASCORBIC ACID) 250 MG tablet Take 250 mg by mouth Daily.     • Accu-Chek Softclix Lancets lancets USE 1 STRIP TO CHECK GLUCOSE ONCE DAILY 50 each 11   • glucose blood (Accu-Chek Wandy Plus) test strip USE 1 STRIP TO CHECK GLUCOSE ONCE DAILY 50 each 11     No facility-administered medications prior to visit.     No orders of the defined types were placed in this encounter.    [unfilled]  There are no discontinued medications.      Return for Recheck after MRI.    Patient was given instructions and counseling regarding her condition or for health maintenance advice. Please see specific information pulled into the AVS if appropriate.

## 2022-03-23 ENCOUNTER — TELEPHONE (OUTPATIENT)
Dept: INTERNAL MEDICINE | Facility: CLINIC | Age: 82
End: 2022-03-23

## 2022-03-23 NOTE — TELEPHONE ENCOUNTER
Called patient back and relayed the message to her again that I had left on the voicemail. She stated her understanding.

## 2022-03-23 NOTE — TELEPHONE ENCOUNTER
Caller: Radha Silverio    Relationship: Self    Best call back number: [phone]     What is the best time to reach you: ANYTIME    Who are you requesting to speak with (clinical staff, provider,  specific staff member): CLINICAL    What was the call regarding: PATIENT CALLING STATING THAT AN MRI WAS SUPPOSED TO BE SCHEDULED SHE NEVER RECEIVED A CALL TO MAKE AN APPOINTMENT     Do you require a callback: YES

## 2022-03-23 NOTE — TELEPHONE ENCOUNTER
Tried to call patient to inform her that I was unable to schedule the MRI. I did inform her that someone should be reaching out form our scheduling department to get that scheduled

## 2022-03-23 NOTE — TELEPHONE ENCOUNTER
PATIENT RETURNED CALL. HUB ATTEMPTED TO WARM TRANSFER WAS UNSUCCESSFUL. HUB UNABLE TO RELAY MESSAGE. PLEASE CALL PATIENT BACK.

## 2022-03-28 ENCOUNTER — TELEPHONE (OUTPATIENT)
Dept: INTERNAL MEDICINE | Facility: CLINIC | Age: 82
End: 2022-03-28

## 2022-03-28 NOTE — TELEPHONE ENCOUNTER
Caller: Radha Silverio    Relationship: Self    Best call back number: [phone]        Any additional details: PATIENT CALLED IN REGARDS TO HER MRI REFERRAL.     PATIENT NEEDS A UPDATE IN REGARDS TO THE MRI ON 3/30/2022 .     PATIENT STATED SHE WAS NOT CONTACTED IN REGARDS TO THE MRI APPOINTMENT ON THE 3/30/2022.

## 2022-03-30 ENCOUNTER — HOSPITAL ENCOUNTER (OUTPATIENT)
Dept: MRI IMAGING | Facility: HOSPITAL | Age: 82
Discharge: HOME | End: 2022-03-30
Admitting: INTERNAL MEDICINE

## 2022-03-30 DIAGNOSIS — G44.52 HEADACHE, NEW DAILY PERSISTENT (NDPH): ICD-10-CM

## 2022-03-30 DIAGNOSIS — R42 VERTIGO: ICD-10-CM

## 2022-03-30 DIAGNOSIS — H53.9 VISION CHANGES: ICD-10-CM

## 2022-03-30 LAB — CREAT BLDA-MCNC: 1 MG/DL (ref 0.6–1.3)

## 2022-03-30 PROCEDURE — 70553 MRI BRAIN STEM W/O & W/DYE: CPT

## 2022-03-30 PROCEDURE — A9577 INJ MULTIHANCE: HCPCS | Performed by: INTERNAL MEDICINE

## 2022-03-30 PROCEDURE — 0 GADOBENATE DIMEGLUMINE 529 MG/ML SOLUTION: Performed by: INTERNAL MEDICINE

## 2022-03-30 PROCEDURE — 82565 ASSAY OF CREATININE: CPT

## 2022-03-30 RX ADMIN — GADOBENATE DIMEGLUMINE 18 ML: 529 INJECTION, SOLUTION INTRAVENOUS at 12:04

## 2022-03-31 ENCOUNTER — TELEPHONE (OUTPATIENT)
Dept: INTERNAL MEDICINE | Facility: CLINIC | Age: 82
End: 2022-03-31

## 2022-03-31 NOTE — TELEPHONE ENCOUNTER
----- Message from Christina Azar MD sent at 3/31/2022  8:42 AM EDT -----  Call pt about MRI - shows age appropriate changes.  No acute issues.  No tumor

## 2022-04-11 ENCOUNTER — OFFICE VISIT (OUTPATIENT)
Dept: INTERNAL MEDICINE | Facility: CLINIC | Age: 82
End: 2022-04-11

## 2022-04-11 VITALS
SYSTOLIC BLOOD PRESSURE: 128 MMHG | HEART RATE: 77 BPM | WEIGHT: 193.6 LBS | OXYGEN SATURATION: 99 % | BODY MASS INDEX: 30.39 KG/M2 | DIASTOLIC BLOOD PRESSURE: 74 MMHG | HEIGHT: 67 IN

## 2022-04-11 DIAGNOSIS — Z00.00 ENCOUNTER FOR MEDICARE ANNUAL WELLNESS EXAM: Primary | ICD-10-CM

## 2022-04-11 DIAGNOSIS — Z78.0 POST-MENOPAUSE: ICD-10-CM

## 2022-04-11 DIAGNOSIS — N18.31 STAGE 3A CHRONIC KIDNEY DISEASE: Chronic | ICD-10-CM

## 2022-04-11 DIAGNOSIS — E11.9 TYPE 2 DIABETES MELLITUS WITHOUT COMPLICATION, WITHOUT LONG-TERM CURRENT USE OF INSULIN: Chronic | ICD-10-CM

## 2022-04-11 DIAGNOSIS — I10 ESSENTIAL HYPERTENSION: Chronic | ICD-10-CM

## 2022-04-11 DIAGNOSIS — E78.2 MIXED HYPERLIPIDEMIA: ICD-10-CM

## 2022-04-11 PROCEDURE — 1170F FXNL STATUS ASSESSED: CPT | Performed by: NURSE PRACTITIONER

## 2022-04-11 PROCEDURE — G0439 PPPS, SUBSEQ VISIT: HCPCS | Performed by: NURSE PRACTITIONER

## 2022-04-11 PROCEDURE — 1160F RVW MEDS BY RX/DR IN RCRD: CPT | Performed by: NURSE PRACTITIONER

## 2022-04-11 PROCEDURE — 1126F AMNT PAIN NOTED NONE PRSNT: CPT | Performed by: NURSE PRACTITIONER

## 2022-04-11 PROCEDURE — 99214 OFFICE O/P EST MOD 30 MIN: CPT | Performed by: NURSE PRACTITIONER

## 2022-04-11 NOTE — PROGRESS NOTES
The ABCs of the Annual Wellness Visit  Subsequent Medicare Wellness Visit    Chief Complaint   Patient presents with   • Annual Exam     AMW   • Follow-up   • Hypertension   • Hyperlipidemia   • Diabetes   • Chronic Kidney Disease       Subjective   History of Present Illness:  Radha Silverio is a 81 y.o. female who presents for a Subsequent Medicare Wellness Visit as well as follow up of her chronic health conditions.        Patient Active Problem List   Diagnosis   • Essential hypertension   • Mixed hyperlipidemia   • Chronic constipation   • Acid reflux   • Solis esophagus   • Type 2 diabetes mellitus without complication, without long-term current use of insulin (HCC)   • Hemorrhoids   • Shortness of breath   • Frequent PVCs   • Atrial bigeminy   • PAC (premature atrial contraction)   • Primary osteoarthritis of knee   • Primary osteoarthritis of right knee   • Tinnitus of both ears   • Chronic cough   • Left hip pain   • Primary osteoarthritis of left hip   • Greater trochanteric bursitis of left hip   • Stage 3 chronic kidney disease (HCC)   • Female genital prolapse   • Chronic gastritis without bleeding   • Degenerative disc disease, lumbar   • Weakness of left lower extremity   • Neuritis or radiculitis due to rupture of lumbar intervertebral disc   • 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   • Adenomatous polyp of colon       Outpatient Medications Marked as Taking for the 4/11/22 encounter (Office Visit) with Arielle Johnston APRN   Medication Sig Dispense Refill   • Accu-Chek Softclix Lancets lancets USE 1 STRIP TO CHECK GLUCOSE ONCE DAILY 50 each 11   • Acetaminophen (TYLENOL ARTHRITIS EXT RELIEF PO) Take 2 tablets by mouth As Needed. prn     • Calcium Carb-Cholecalciferol (CALCIUM 1000 + D PO) Take  by mouth.     • glucose blood (Accu-Chek Wandy Plus) test strip USE 1 STRIP TO CHECK GLUCOSE ONCE DAILY 50 each 11   • lisinopril-hydrochlorothiazide (PRINZIDE,ZESTORETIC) 20-25 MG per tablet Take 1  tablet by mouth once daily 90 tablet 0   • metFORMIN (GLUCOPHAGE) 500 MG tablet TAKE 1 TABLET BY MOUTH TWICE DAILY WITH MEALS 180 tablet 0   • metoprolol succinate XL (TOPROL-XL) 25 MG 24 hr tablet Take 1 tablet by mouth Daily. 90 tablet 2   • omeprazole (priLOSEC) 40 MG capsule Take 1 capsule by mouth once daily (Patient taking differently: 40 mg. prn) 90 capsule 3   • Polyethylene Glycol 3350 (MIRALAX PO) Take  by mouth As Needed.     • simvastatin (ZOCOR) 40 MG tablet TAKE 1 TABLET BY MOUTH ONCE DAILY IN THE EVENING 90 tablet 3   • vitamin C (ASCORBIC ACID) 250 MG tablet Take 250 mg by mouth Daily.         Chronic Health Conditions Addressed in this Visit:    Ms. Silverio is an AA female w/ HTN, HLD, DM, CKD.     She is tolerating her medication regimen w/o difficulty.     Pt denies CP, SOB, HA, or dizziness. She does not self monitor BP. She spor monitors BG. Fasting = 130s. She rarely checks PP. No episodes of hypoglycemia.    She is concerned about her memory.    ATTENTION  What is the year: correct  What is the month of the year: correct  What is the day of the week?: correct  What is the date?: incorrect  MEMORY  Repeat address three times, only score third attempt: Matt Jack [address] [place] [address]: 7  HOW MANY ANIMALS DID THE PATIENT NAME  Verbal Fluency -- Animal Names (0-25): 17-21  CLOCK DRAWING  Clock Drawing: All Correct  MEMORY RECALL  Tell me what you remember about that name and address we were repeating at the beginnin  ACE TOTAL SCORE  Total ACE Score - <25/30 strongly suggests cognitive impairment; <21/30 almost certainly shows dementia: 28      HEALTH RISK ASSESSMENT    Recent Hospitalizations:  No hospitalization(s) within the last year.    Current Medical Providers:  Patient Care Team:  Arielle Johnston APRN as PCP - General (Family Medicine)  Raisa Gleason MD as Consulting Physician (Urogynecology)  Trudi Urbina MD as Consulting Physician  (Cardiology)  Bronson Fitzgerald MD as Surgeon (Neurosurgery)    Smoking Status:  Social History     Tobacco Use   Smoking Status Never Smoker   Smokeless Tobacco Never Used   Tobacco Comment    no caffiene       Alcohol Consumption:  Social History     Substance and Sexual Activity   Alcohol Use No       Depression Screen:   PHQ-2/PHQ-9 Depression Screening 4/11/2022   Retired Total Score -   Little Interest or Pleasure in Doing Things 1-->several days   Feeling Down, Depressed or Hopeless 0-->not at all   PHQ-9: Brief Depression Severity Measure Score 1       Fall Risk Screen:  STEARELI Fall Risk Assessment was completed, and patient is at MODERATE risk for falls. Assessment completed on:4/11/2022    Health Habits and Functional and Cognitive Screening:  Functional & Cognitive Status 4/11/2022   Do you have difficulty preparing food and eating? No   Do you have difficulty bathing yourself, getting dressed or grooming yourself? No   Do you have difficulty using the toilet? No   Do you have difficulty moving around from place to place? No   Do you have trouble with steps or getting out of a bed or a chair? No   Current Diet Unhealthy Diet   Dental Exam Up to date   Eye Exam Up to date   Exercise (times per week) -   Current Exercises Include No Regular Exercise   Current Exercise Activities Include -   Do you need help using the phone?  No   Are you deaf or do you have serious difficulty hearing?  No   Do you need help with transportation? No   Do you need help shopping? No   Do you need help preparing meals?  No   Do you need help with housework?  No   Do you need help with laundry? No   Do you need help taking your medications? No   Do you need help managing money? No   Do you ever drive or ride in a car without wearing a seat belt? No   Have you felt unusual stress, anger or loneliness in the last month? No   Who do you live with? Alone   If you need help, do you have trouble finding someone available to you? Yes  "  Have you been bothered in the last four weeks by sexual problems? No   Do you have difficulty concentrating, remembering or making decisions? Yes         Does the patient have evidence of cognitive impairment? No    Asprin use counseling:Start ASA 81 mg daily     Age-appropriate Screening Schedule:  Refer to the list below for future screening recommendations based on patient's age, sex and/or medical conditions. Orders for these recommended tests are listed in the plan section. The patient has been provided with a written plan.    Health Maintenance   Topic Date Due   • ZOSTER VACCINE (1 of 2) Never done   • TDAP/TD VACCINES (2 - Tdap) 06/16/2009   • DXA SCAN  01/02/2022   • DIABETIC EYE EXAM  04/15/2022   • INFLUENZA VACCINE  08/01/2022   • HEMOGLOBIN A1C  10/01/2022   • LIPID PANEL  04/01/2023   • URINE MICROALBUMIN  04/01/2023   • MAMMOGRAM  01/11/2024          The following portions of the patient's history were reviewed and updated as appropriate: allergies, current medications, past family history, past medical history, past social history, past surgical history, and problem list.      Compared to one year ago, the patient feels her physical health is the same.  Compared to one year ago, the patient feels her mental health is the same.    Reviewed chart for potential of high risk medication in the elderly: Yes  Reviewed chart for potential of harmful drug interactions in the elderly:Yes          Objective         Vitals:    04/11/22 0959   BP: 128/74   Pulse: 77   SpO2: 99%   Weight: 87.8 kg (193 lb 9.6 oz)   Height: 170.2 cm (67\")       Body mass index is 30.32 kg/m².  Discussed the patient's BMI with her. The BMI is above average; BMI management plan is completed.  Patient's Body mass index is 30.32 kg/m². indicating that she is obese (BMI >30). Obesity-related health conditions include the following: hypertension, diabetes mellitus, dyslipidemias and GERD. Obesity is worsening. BMI is is above average; " BMI management plan is completed. We discussed low calorie, low carb based diet program, portion control and increasing exercise..        Physical Exam  Constitutional:       General: She is not in acute distress.     Appearance: Normal appearance. She is well-developed.   HENT:      Head: Normocephalic.      Right Ear: Hearing, tympanic membrane, ear canal and external ear normal.      Left Ear: Hearing, tympanic membrane, ear canal and external ear normal.      Nose: Nose normal. No mucosal edema or rhinorrhea.      Mouth/Throat:      Mouth: Mucous membranes are moist.      Pharynx: Oropharynx is clear. Uvula midline.   Eyes:      General: Lids are normal.      Extraocular Movements: Extraocular movements intact.      Conjunctiva/sclera: Conjunctivae normal.      Pupils: Pupils are equal, round, and reactive to light.   Neck:      Thyroid: No thyroid mass or thyromegaly.   Cardiovascular:      Rate and Rhythm: Regular rhythm.      Pulses: Normal pulses.      Heart sounds: S1 normal and S2 normal. No murmur heard.    No friction rub. No gallop.   Pulmonary:      Effort: Pulmonary effort is normal.      Breath sounds: Normal breath sounds. No wheezing, rhonchi or rales.   Abdominal:      General: Bowel sounds are normal.      Palpations: Abdomen is soft.      Tenderness: There is no abdominal tenderness. There is no guarding.      Hernia: No hernia is present.   Musculoskeletal:         General: No deformity. Normal range of motion.      Cervical back: Normal range of motion and neck supple.   Lymphadenopathy:      Cervical: No cervical adenopathy.   Skin:     General: Skin is warm and dry.      Findings: No lesion or rash.   Neurological:      General: No focal deficit present.      Mental Status: She is alert and oriented to person, place, and time.      Cranial Nerves: No cranial nerve deficit.      Sensory: No sensory deficit.      Motor: Motor function is intact.      Coordination: Coordination is intact.       Gait: Gait normal.      Deep Tendon Reflexes: Reflexes are normal and symmetric.   Psychiatric:         Attention and Perception: She is attentive.         Mood and Affect: Mood and affect normal.         Speech: Speech normal.         Behavior: Behavior normal.         Thought Content: Thought content normal.         Recent Results (from the past 336 hour(s))   POC Creatinine    Collection Time: 03/30/22 11:40 AM    Specimen: Blood   Result Value Ref Range    Creatinine 1.00 0.60 - 1.30 mg/dL   CBC (No Diff)    Collection Time: 04/01/22 10:03 AM    Specimen: Blood   Result Value Ref Range    WBC 3.9 3.4 - 10.8 x10E3/uL    RBC 5.09 3.77 - 5.28 x10E6/uL    Hemoglobin 14.4 11.1 - 15.9 g/dL    Hematocrit 43.6 34.0 - 46.6 %    MCV 86 79 - 97 fL    MCH 28.3 26.6 - 33.0 pg    MCHC 33.0 31.5 - 35.7 g/dL    RDW 12.8 11.7 - 15.4 %    Platelets 186 150 - 450 x10E3/uL   Comprehensive Metabolic Panel    Collection Time: 04/01/22 10:03 AM    Specimen: Blood   Result Value Ref Range    Glucose 134 (H) 65 - 99 mg/dL    BUN 13 8 - 27 mg/dL    Creatinine 1.04 (H) 0.57 - 1.00 mg/dL    EGFR Result 55 (L) >59 mL/min/1.73    BUN/Creatinine Ratio 13 12 - 28    Sodium 139 134 - 144 mmol/L    Potassium 4.5 3.5 - 5.2 mmol/L    Chloride 99 96 - 106 mmol/L    Total CO2 22 20 - 29 mmol/L    Calcium 10.0 8.7 - 10.3 mg/dL    Total Protein 7.3 6.0 - 8.5 g/dL    Albumin 4.5 3.7 - 4.7 g/dL    Globulin 2.8 1.5 - 4.5 g/dL    A/G Ratio 1.6 1.2 - 2.2    Total Bilirubin 0.4 0.0 - 1.2 mg/dL    Alkaline Phosphatase 62 44 - 121 IU/L    AST (SGOT) 21 0 - 40 IU/L    ALT (SGPT) 14 0 - 32 IU/L   Hemoglobin A1c    Collection Time: 04/01/22 10:03 AM    Specimen: Blood   Result Value Ref Range    Hemoglobin A1C 6.8 (H) 4.8 - 5.6 %   Lipid Panel With / Chol / HDL Ratio    Collection Time: 04/01/22 10:03 AM    Specimen: Blood   Result Value Ref Range    Total Cholesterol 192 100 - 199 mg/dL    Triglycerides 98 0 - 149 mg/dL    HDL Cholesterol 81 >39 mg/dL    VLDL  Cholesterol Madhav 17 5 - 40 mg/dL    LDL Chol Calc (NIH) 94 0 - 99 mg/dL    Chol/HDL Ratio 2.4 0.0 - 4.4 ratio   Thyroid Cascade Profile    Collection Time: 04/01/22 10:03 AM    Specimen: Blood   Result Value Ref Range    TSH 1.250 0.450 - 4.500 uIU/mL   Urinalysis With Culture If Indicated - Urine, Clean Catch    Collection Time: 04/01/22 10:03 AM    Specimen: Urine, Clean Catch   Result Value Ref Range    Specific Gravity, UA 1.015 1.005 - 1.030    pH, UA 7.0 5.0 - 7.5    Color, UA Yellow Yellow    Appearance, UA Clear Clear    Leukocytes, UA 2+ (A) Negative    Protein Negative Negative/Trace    Glucose, UA Negative Negative    Ketones Negative Negative    Blood, UA Negative Negative    Bilirubin, UA Negative Negative    Urobilinogen, UA 0.2 0.2 - 1.0 mg/dL    Nitrite, UA Negative Negative    Microscopic Examination See below:     Urinalysis Reflex Comment    Microalbumin / Creatinine Urine Ratio - Urine, Clean Catch    Collection Time: 04/01/22 10:03 AM    Specimen: Urine, Clean Catch   Result Value Ref Range    Creatinine, Urine 140.7 Not Estab. mg/dL    Microalbumin, Urine 8.4 Not Estab. ug/mL    Microalbumin/Creatinine Ratio 6 0 - 29 mg/g creat   Microscopic Examination -    Collection Time: 04/01/22 10:03 AM   Result Value Ref Range    WBC, UA 0-5 0 - 5 /hpf    RBC, UA 0-2 0 - 2 /hpf    Epithelial Cells (non renal) 0-10 0 - 10 /hpf    Casts None seen None seen /lpf    Bacteria, UA None seen None seen/Few   Urine culture, Comprehensive - ,    Collection Time: 04/01/22 10:03 AM   Result Value Ref Range    Urine Culture Final report     Result 1 Comment      MRI Brain With & Without Contrast    Result Date: 3/31/2022  Impression: 1.  No acute intracranial findings. 2.  No evidence of abnormal enhancement. 3.  Age-appropriate aging changes of the brain consisting of mild cerebral atrophy and white matter microvascular disease. Signer Name: Bhaskar Leary MD  Signed: 3/31/2022 8:33 AM  Workstation Name: [id number]   [facility]    Mammo Screening Digital Tomosynthesis Bilateral With CAD    Result Date: 1/11/2022  Benign screening mammogram.  BI-RADS CATEGORY 2: Benign Findings.   Women over the age of 40 undergoing screening mammography are entered into a reminder system with target due date for the next mammogram.  This report was finalized on 1/11/2022 3:24 PM by Dr. Romain Ordoñez MD.        Each of these lab results were discussed individually in detail with the patient.      Assessment/Plan     Diagnoses and all orders for this visit:    1. Encounter for Medicare annual wellness exam (Primary)    2. Post-menopause  -     DEXA Bone Density Axial    3. Essential hypertension  Comments:  - controlled    4. Mixed hyperlipidemia  Comments:  - controlled    5. Type 2 diabetes mellitus without complication, without long-term current use of insulin (HCC)  Comments:  - controlled, A1c 6.8    6. Stage 3a chronic kidney disease (HCC)  Comments:  - stable      Except as noted above, pt will continue current medications as noted in the medication list. I will continue to authorize refills as needed.    Medicare Risks and Personalized Health Plan    Advanced Care Planning:  ACP discussion was held with the patient during this visit. Patient has an advance directive in EMR which is still valid.     CMS Preventative Services Quick Reference  Advance Directive Discussion  Breast Cancer/Mammogram Screening  Dementia/Memory   Diabetic Lab Screening   Immunizations Discussed/Encouraged (specific immunizations; Tdap and Shingrix )  Osteoporosis Risk        The above risks/problems have been discussed with the patient.  Pertinent information has been shared with the patient in the After Visit Summary.  Follow up plans and orders are seen below in the Assessment/Plan Section.      Follow Up:  Return in about 6 months (around 10/11/2022) for fasting labs one week prior to., sooner if needed.    An After Visit Summary  and PPPS were given to the patient.

## 2022-04-11 NOTE — PATIENT INSTRUCTIONS
Check your blood sugar twice each day. Check first thing each day while you are still fasting. Check 2 hours after your largest meal.    Ask your pharmacist about receiving Shingles vaccine and Tdap vaccine.

## 2022-04-27 ENCOUNTER — APPOINTMENT (OUTPATIENT)
Dept: BONE DENSITY | Facility: HOSPITAL | Age: 82
End: 2022-04-27

## 2022-04-27 PROCEDURE — 77080 DXA BONE DENSITY AXIAL: CPT

## 2022-04-28 ENCOUNTER — TELEPHONE (OUTPATIENT)
Dept: INTERNAL MEDICINE | Facility: CLINIC | Age: 82
End: 2022-04-28

## 2022-04-28 NOTE — TELEPHONE ENCOUNTER
Ok for HUB to read    Tried to call pt. No answer and was unable to leave results on VM bc pt does not have a Verbal Release Form. Told pt to call back for results.    PIP - nml BMD

## 2022-05-04 ENCOUNTER — TELEPHONE (OUTPATIENT)
Dept: INTERNAL MEDICINE | Facility: CLINIC | Age: 82
End: 2022-05-04

## 2022-05-04 NOTE — TELEPHONE ENCOUNTER
Caller: Radha Silverio    Relationship to patient: Self    Best call back number: [phone]     Patient is needing: PATIENT IS CALLING BACK ABOUT BONE DENSITY TEST, WAS UNABLE TO WARM TRANSFER PLEASE CALL PATIENT BACK AS SOON AS POSSIBLE.

## 2022-05-04 NOTE — TELEPHONE ENCOUNTER
I HAVE READ THIS HUB TO READ MESSAGE TO THE PATIENT.  SHE IS REQUESTING A COPY OF THE RESULTS BE MAILED TO HER HOME    PATIENT CALL BACK  [phone]

## 2022-05-04 NOTE — TELEPHONE ENCOUNTER
Tried to call patient back about results but was unable to reach and left a voicemail for a call back.

## 2022-05-24 RX ORDER — OMEPRAZOLE 40 MG/1
CAPSULE, DELAYED RELEASE ORAL
Qty: 90 CAPSULE | Refills: 1 | Status: SHIPPED | OUTPATIENT
Start: 2022-05-24

## 2022-05-24 RX ORDER — LISINOPRIL AND HYDROCHLOROTHIAZIDE 25; 20 MG/1; MG/1
TABLET ORAL
Qty: 90 TABLET | Refills: 1 | Status: SHIPPED | OUTPATIENT
Start: 2022-05-24 | End: 2022-12-05

## 2022-05-24 NOTE — TELEPHONE ENCOUNTER
Rx Refill Note  Requested Prescriptions     Pending Prescriptions Disp Refills    lisinopril-hydrochlorothiazide (PRINZIDE,ZESTORETIC) 20-25 MG per tablet [Pharmacy Med Name: Lisinopril-hydroCHLOROthiazide 20-25 MG Oral Tablet] 90 tablet 0     Sig: Take 1 tablet by mouth once daily    omeprazole (priLOSEC) 40 MG capsule [Pharmacy Med Name: Omeprazole 40 MG Oral Capsule Delayed Release] 90 capsule 0     Sig: Take 1 capsule by mouth once daily      Last office visit with prescribing clinician: 4/11/2022      Next office visit with prescribing clinician: 10/18/2022            Gini Nava MA  05/24/22, 08:59 EDT

## 2022-05-26 ENCOUNTER — OFFICE VISIT (OUTPATIENT)
Dept: CARDIOLOGY | Facility: CLINIC | Age: 82
End: 2022-05-26

## 2022-05-26 VITALS
BODY MASS INDEX: 30.45 KG/M2 | WEIGHT: 194 LBS | RESPIRATION RATE: 16 BRPM | HEIGHT: 67 IN | DIASTOLIC BLOOD PRESSURE: 76 MMHG | SYSTOLIC BLOOD PRESSURE: 118 MMHG | HEART RATE: 68 BPM | OXYGEN SATURATION: 97 %

## 2022-05-26 DIAGNOSIS — I10 ESSENTIAL HYPERTENSION: Chronic | ICD-10-CM

## 2022-05-26 DIAGNOSIS — I49.8 ATRIAL BIGEMINY: Primary | ICD-10-CM

## 2022-05-26 DIAGNOSIS — I49.3 FREQUENT PVCS: ICD-10-CM

## 2022-05-26 PROCEDURE — 93000 ELECTROCARDIOGRAM COMPLETE: CPT | Performed by: INTERNAL MEDICINE

## 2022-05-26 PROCEDURE — 99214 OFFICE O/P EST MOD 30 MIN: CPT | Performed by: INTERNAL MEDICINE

## 2022-05-26 RX ORDER — METOPROLOL SUCCINATE 25 MG/1
25 TABLET, EXTENDED RELEASE ORAL DAILY
Qty: 90 TABLET | Refills: 3 | Status: SHIPPED | OUTPATIENT
Start: 2022-05-26 | End: 2022-11-14

## 2022-05-26 RX ORDER — CARVEDILOL 3.12 MG/1
3.12 TABLET ORAL 2 TIMES DAILY
Qty: 180 TABLET | Refills: 3 | Status: SHIPPED | OUTPATIENT
Start: 2022-05-26 | End: 2022-05-26

## 2022-05-26 RX ORDER — METOPROLOL SUCCINATE 25 MG/1
25 TABLET, EXTENDED RELEASE ORAL DAILY
Qty: 90 TABLET | Refills: 3 | Status: SHIPPED | OUTPATIENT
Start: 2022-05-26 | End: 2022-05-26

## 2022-05-26 RX ORDER — ASPIRIN 81 MG/1
81 TABLET ORAL DAILY
COMMUNITY

## 2022-05-26 NOTE — PROGRESS NOTES
CARDIOLOGY    Trudi Urbina MD    ENCOUNTER DATE:  05/26/2022    Radha Silverio / 81 y.o. / female        CHIEF COMPLAINT / REASON FOR OFFICE VISIT     Heart Problem (Yearly follow up PAC and Atrial Bigeminy )      HISTORY OF PRESENT ILLNESS       HPI    Radha Silverio is a 81 y.o. female     This is a lady I saw for an irregular heartbeat and episodes of breathlessness 6/15/17.  Dr. Quiroga saw her and noted the irregularity and checked an EKG which showed frequent premature ventricular contractions. The patient had an echocardiogram on 06/08/2017 which showed normal LV systolic and diastolic function with mild-to-moderate tricuspid regurgitation with a right ventricular systolic pressure of about 50 mmHg. She wore a Holter monitor which showed frequent premature atrial contractions at about 23% of the tracing. There were frequent bouts of atrial bigeminy. She had some PVCs, but the PACs far outweighed it. She had a little focal stinging pain that she made note of in her diary which did not correspond to any arrhythmia. She did not make note of the breathless episodes in her diary. I suspected that her episodes of breathlessness were related to atrial bigeminy.  I stopped amlodipine and put her on Toprol-XL 25 mg a day.  In February 2019 she had a normal stress echo.  Ejection fraction 62%.    She is here for routine follow-up.  She is unaware of any palpitations.  She has not been exercising.  She does get short of breath with exertion but no anginal type chest pains.  Her biggest complaint is itching over her whole body for a year.    REVIEW OF SYSTEMS     Review of Systems   Constitutional: Positive for weight gain. Negative for chills, fever and weight loss.   Cardiovascular: Positive for leg swelling.   Respiratory: Positive for cough. Negative for snoring and wheezing.    Hematologic/Lymphatic: Negative for bleeding problem. Bruises/bleeds easily.   Skin: Negative for color change.  "  Musculoskeletal: Negative for falls, joint pain and myalgias.   Gastrointestinal: Negative for melena.   Genitourinary: Negative for hematuria.   Neurological: Negative for excessive daytime sleepiness.   Psychiatric/Behavioral: Negative for depression. The patient is not nervous/anxious.          VITAL SIGNS     Visit Vitals  /76 (BP Location: Left arm, Patient Position: Sitting, Cuff Size: Adult)   Pulse 68   Resp 16   Ht 170.2 cm (67\")   Wt 88 kg (194 lb)   LMP  (LMP Unknown)   SpO2 97%   BMI 30.38 kg/m²         Wt Readings from Last 3 Encounters:   05/26/22 88 kg (194 lb)   04/11/22 87.8 kg (193 lb 9.6 oz)   03/30/22 90.7 kg (200 lb)     Body mass index is 30.38 kg/m².      PHYSICAL EXAMINATION     Constitutional:       General: Not in acute distress.  Neck:      Vascular: No carotid bruit or JVD.   Pulmonary:      Effort: Pulmonary effort is normal.      Breath sounds: Normal breath sounds.   Cardiovascular:      Normal rate. Regular rhythm.      Murmurs: There is no murmur.   Psychiatric:         Mood and Affect: Mood and affect normal.           REVIEWED DATA       ECG 12 Lead    Date/Time: 5/26/2022 10:55 AM  Performed by: Trudi Urbina MD  Authorized by: Trudi Urbina MD   Comparison: compared with previous ECG from 5/18/2021  Comparison to previous ECG: Ectopic rhythm is new.  No ectopy  BPM: 68  Conduction: conduction normal  ST Segments: ST segments normal  T Waves: T waves normal    Clinical impression: abnormal EKG              Lipid Panel    Lipid Panel 9/23/21 4/1/22   Total Cholesterol 179 192   Triglycerides 59 98   HDL Cholesterol 78 (A) 81   VLDL Cholesterol 11 17   LDL Cholesterol  90 94   (A) Abnormal value       Comments are available for some flowsheets but are not being displayed.             Lab Results   Component Value Date    GLUCOSE 134 (H) 04/01/2022    BUN 13 04/01/2022    CREATININE 1.04 (H) 04/01/2022    EGFRIFNONA 51 (L) 09/23/2021    EGFRIFAFRI 62 09/23/2021    BCR " 13 04/01/2022    K 4.5 04/01/2022    CO2 22 04/01/2022    CALCIUM 10.0 04/01/2022    PROTENTOTREF 7.3 04/01/2022    ALBUMIN 4.5 04/01/2022    LABIL2 1.6 04/01/2022    AST 21 04/01/2022    ALT 14 04/01/2022       ASSESSMENT & PLAN      Diagnosis Plan   1. Atrial bigeminy     2. Essential hypertension     3. Frequent PVCs           1.  Atrial bigeminy.  No active symptoms.  She wonders if the metoprolol is causing her itching.  I offered to switch her to bisoprolol but the price was little bit high on that or carvedilol but she was not excited about a twice a day medication so for now she has decided to stay on metoprolol succinate 25 mg a day and follow-up with her primary care provider.  2.  Hypertension  3.  Hyperlipidemia  4.  Diabetes  5.  Obesity.    Follow-up in 1 year.      Orders Placed This Encounter   Procedures   • ECG 12 Lead     This order was created via procedure documentation     Order Specific Question:   Release to patient     Answer:   Immediate           MEDICATIONS         Discharge Medications          Accurate as of May 26, 2022 10:56 AM. If you have any questions, ask your nurse or doctor.            Continue These Medications      Instructions Start Date   Accu-Chek Wandy Plus test strip  Generic drug: glucose blood   USE 1 STRIP TO CHECK GLUCOSE ONCE DAILY      Accu-Chek Softclix Lancets lancets   USE 1 STRIP TO CHECK GLUCOSE ONCE DAILY      aspirin 81 MG EC tablet   81 mg, Oral, Daily      CALCIUM 1000 + D PO   Oral      lisinopril-hydrochlorothiazide 20-25 MG per tablet  Commonly known as: PRINZIDE,ZESTORETIC   Take 1 tablet by mouth once daily      metFORMIN 500 MG tablet  Commonly known as: GLUCOPHAGE   TAKE 1 TABLET BY MOUTH TWICE DAILY WITH MEALS      metoprolol succinate XL 25 MG 24 hr tablet  Commonly known as: TOPROL-XL   25 mg, Oral, Daily      MIRALAX PO   Oral, As Needed      omeprazole 40 MG capsule  Commonly known as: priLOSEC   Take 1 capsule by mouth once daily       simvastatin 40 MG tablet  Commonly known as: ZOCOR   TAKE 1 TABLET BY MOUTH ONCE DAILY IN THE EVENING      TYLENOL ARTHRITIS EXT RELIEF PO   2 tablets, Oral, As Needed, Prn seldomly      vitamin C 250 MG tablet  Commonly known as: ASCORBIC ACID   250 mg, Oral, Daily               Trudi Urbina MD  05/26/22  10:56 EDT    **Gibran Disclaimer:   Much of this encounter note is an electronic transcription/translation of spoken language to printed text. The electronic translation of spoken language may permit erroneous, or at times, nonsensical words or phrases to be inadvertently transcribed. Although I have reviewed the note for such errors, some may still exist.

## 2022-07-22 NOTE — TELEPHONE ENCOUNTER
Rx Refill Note  Requested Prescriptions     Pending Prescriptions Disp Refills   • metFORMIN (GLUCOPHAGE) 500 MG tablet [Pharmacy Med Name: metFORMIN HCl 500 MG Oral Tablet] 180 tablet 0     Sig: TAKE 1 TABLET BY MOUTH TWICE DAILY WITH MEALS      Last office visit with prescribing clinician: 4/11/2022      Next office visit with prescribing clinician: 10/18/2022            Carola Mullen MA  07/22/22, 11:49 EDT

## 2022-07-25 ENCOUNTER — TELEPHONE (OUTPATIENT)
Dept: INTERNAL MEDICINE | Facility: CLINIC | Age: 82
End: 2022-07-25

## 2022-07-25 NOTE — TELEPHONE ENCOUNTER
I called and spoke with the patient and informed her of what arielle had said and got her schedule to see Arielle

## 2022-07-25 NOTE — TELEPHONE ENCOUNTER
I am happy to see her anyday this week for a same day appt. She should elevate her feet as much as possible, wear compression stockings, avoid salt. If she has SOB or CP she should go to the ER.

## 2022-07-25 NOTE — TELEPHONE ENCOUNTER
Caller: Radha Silverio     Relationship: PATIENT     Best call back number: [phone]*    What is your medical concern? BILATERAL FEET SWELLING    How long has this issue been going on? PATIENT STATES ALMOST 3 WEEKS    Is your provider already aware of this issue? NO    Have you been treated for this issue? NO    PATIENT REQUEST A CALL BACK TO ADVISE WHAT SHE CAN DO. THE PATIENT HAS BEEN SCHEDULED FOR AN APPOINTMENT ON 8/1/22.

## 2022-07-26 ENCOUNTER — OFFICE VISIT (OUTPATIENT)
Dept: INTERNAL MEDICINE | Facility: CLINIC | Age: 82
End: 2022-07-26

## 2022-07-26 VITALS
DIASTOLIC BLOOD PRESSURE: 90 MMHG | BODY MASS INDEX: 30.35 KG/M2 | SYSTOLIC BLOOD PRESSURE: 138 MMHG | OXYGEN SATURATION: 97 % | TEMPERATURE: 96.9 F | HEIGHT: 67 IN | HEART RATE: 57 BPM | WEIGHT: 193.4 LBS

## 2022-07-26 DIAGNOSIS — R60.9 DEPENDENT EDEMA: Primary | ICD-10-CM

## 2022-07-26 PROCEDURE — 99213 OFFICE O/P EST LOW 20 MIN: CPT | Performed by: NURSE PRACTITIONER

## 2022-07-26 NOTE — PROGRESS NOTES
"Radha Silverio is a 81 y.o. female presenting today for   Chief Complaint   Patient presents with   • Foot Swelling     Swelling in both feet. Some numbness in feet sometimes.       Subjective    History of Present Illness     Pt presents for an acute visit c/o bilat LE edema x1mo. This is better in the morning and worsens throughout the day. She denies pain. She denies CP, SOB, HA, dizziness, or syncope. She does not wear compression stockings although she sts she does have a pair. She sts she does not drink enough water. She is eating foods high in salt.    The following portions of the patient's history were reviewed and updated as appropriate: allergies, current medications, problem list, past medical history, past surgical history, family history, and social history.          Objective    Vitals:    22 1103   BP: 138/90   BP Location: Left arm   Patient Position: Sitting   Pulse: 57   Temp: 96.9 °F (36.1 °C)   SpO2: 97%   Weight: 87.7 kg (193 lb 6.4 oz)   Height: 170.2 cm (67\")     Body mass index is 30.29 kg/m².  Nursing notes and vitals reviewed.    Physical Exam  Constitutional:       General: She is not in acute distress.     Appearance: She is well-developed.   Neck:      Thyroid: No thyroid mass or thyromegaly.   Cardiovascular:      Rate and Rhythm: Regular rhythm.      Pulses: Normal pulses.      Heart sounds: S1 normal and S2 normal.   Pulmonary:      Effort: Pulmonary effort is normal.      Breath sounds: Normal breath sounds.   Musculoskeletal:      Cervical back: Neck supple.      Right lower le+ Edema present.      Left lower le+ Edema present.   Lymphadenopathy:      Cervical: No cervical adenopathy.   Neurological:      Mental Status: She is alert and oriented to person, place, and time.   Psychiatric:         Attention and Perception: She is attentive.         Behavior: Behavior normal.         Thought Content: Thought content normal.           Assessment and Plan    Diagnoses " and all orders for this visit:    1. Dependent edema (Primary)  -     Comprehensive Metabolic Panel        Wear compression stockings.  Increase water intake to a min of 64 oz  Reduce salt intake  Elevate extremities when resting    Patient counseled regarding therapeutic lifestyle changes including improved nutrition, increased exercise, and weight loss.      Medications, including side effects, were discussed with the patient. Patient verbalized understanding.  The plan of care was discussed. All questions were answered. Patient verbalized understanding.        Return if symptoms worsen or fail to improve.

## 2022-07-27 LAB
ALBUMIN SERPL-MCNC: 4.4 G/DL (ref 3.7–4.7)
ALBUMIN/GLOB SERPL: 1.8 {RATIO} (ref 1.2–2.2)
ALP SERPL-CCNC: 53 IU/L (ref 44–121)
ALT SERPL-CCNC: 14 IU/L (ref 0–32)
AST SERPL-CCNC: 19 IU/L (ref 0–40)
BILIRUB SERPL-MCNC: 0.6 MG/DL (ref 0–1.2)
BUN SERPL-MCNC: 17 MG/DL (ref 8–27)
BUN/CREAT SERPL: 16 (ref 12–28)
CALCIUM SERPL-MCNC: 9.6 MG/DL (ref 8.7–10.3)
CHLORIDE SERPL-SCNC: 105 MMOL/L (ref 96–106)
CO2 SERPL-SCNC: 26 MMOL/L (ref 20–29)
CREAT SERPL-MCNC: 1.08 MG/DL (ref 0.57–1)
EGFRCR SERPLBLD CKD-EPI 2021: 52 ML/MIN/1.73
GLOBULIN SER CALC-MCNC: 2.5 G/DL (ref 1.5–4.5)
GLUCOSE SERPL-MCNC: 127 MG/DL (ref 65–99)
POTASSIUM SERPL-SCNC: 4 MMOL/L (ref 3.5–5.2)
PROT SERPL-MCNC: 6.9 G/DL (ref 6–8.5)
SODIUM SERPL-SCNC: 144 MMOL/L (ref 134–144)

## 2022-08-16 RX ORDER — SIMVASTATIN 40 MG
TABLET ORAL
Qty: 90 TABLET | Refills: 0 | Status: SHIPPED | OUTPATIENT
Start: 2022-08-16 | End: 2022-12-05

## 2022-08-24 RX ORDER — SIMVASTATIN 40 MG
TABLET ORAL
Qty: 90 TABLET | Refills: 0 | OUTPATIENT
Start: 2022-08-24

## 2022-10-11 ENCOUNTER — TELEPHONE (OUTPATIENT)
Dept: INTERNAL MEDICINE | Facility: CLINIC | Age: 82
End: 2022-10-11

## 2022-10-11 DIAGNOSIS — I10 ESSENTIAL HYPERTENSION: Primary | Chronic | ICD-10-CM

## 2022-10-11 DIAGNOSIS — E11.9 TYPE 2 DIABETES MELLITUS WITHOUT COMPLICATION, WITHOUT LONG-TERM CURRENT USE OF INSULIN: Chronic | ICD-10-CM

## 2022-10-11 DIAGNOSIS — E78.2 MIXED HYPERLIPIDEMIA: ICD-10-CM

## 2022-10-12 LAB
ALBUMIN SERPL-MCNC: 4.3 G/DL (ref 3.5–5.2)
ALBUMIN/GLOB SERPL: 2.3 G/DL
ALP SERPL-CCNC: 49 U/L (ref 39–117)
ALT SERPL-CCNC: 15 U/L (ref 1–33)
AST SERPL-CCNC: 20 U/L (ref 1–32)
BILIRUB SERPL-MCNC: 0.6 MG/DL (ref 0–1.2)
BUN SERPL-MCNC: 14 MG/DL (ref 8–23)
BUN/CREAT SERPL: 13.5 (ref 7–25)
CALCIUM SERPL-MCNC: 10.3 MG/DL (ref 8.6–10.5)
CHLORIDE SERPL-SCNC: 103 MMOL/L (ref 98–107)
CHOLEST SERPL-MCNC: 168 MG/DL (ref 0–200)
CHOLEST/HDLC SERPL: 2.18 {RATIO}
CO2 SERPL-SCNC: 28.7 MMOL/L (ref 22–29)
CREAT SERPL-MCNC: 1.04 MG/DL (ref 0.57–1)
EGFRCR SERPLBLD CKD-EPI 2021: 54.8 ML/MIN/1.73
GLOBULIN SER CALC-MCNC: 1.9 GM/DL
GLUCOSE SERPL-MCNC: 119 MG/DL (ref 65–99)
HBA1C MFR BLD: 6.5 % (ref 4.8–5.6)
HDLC SERPL-MCNC: 77 MG/DL (ref 40–60)
LDLC SERPL CALC-MCNC: 79 MG/DL (ref 0–100)
POTASSIUM SERPL-SCNC: 4.3 MMOL/L (ref 3.5–5.2)
PROT SERPL-MCNC: 6.2 G/DL (ref 6–8.5)
SODIUM SERPL-SCNC: 143 MMOL/L (ref 136–145)
TRIGL SERPL-MCNC: 58 MG/DL (ref 0–150)
VLDLC SERPL CALC-MCNC: 12 MG/DL (ref 5–40)

## 2022-10-18 ENCOUNTER — OFFICE VISIT (OUTPATIENT)
Dept: INTERNAL MEDICINE | Facility: CLINIC | Age: 82
End: 2022-10-18

## 2022-10-18 VITALS
HEART RATE: 68 BPM | OXYGEN SATURATION: 98 % | TEMPERATURE: 97.7 F | SYSTOLIC BLOOD PRESSURE: 110 MMHG | BODY MASS INDEX: 30.42 KG/M2 | HEIGHT: 67 IN | RESPIRATION RATE: 20 BRPM | WEIGHT: 193.8 LBS | DIASTOLIC BLOOD PRESSURE: 82 MMHG

## 2022-10-18 DIAGNOSIS — R35.0 URINARY FREQUENCY: ICD-10-CM

## 2022-10-18 DIAGNOSIS — N18.31 STAGE 3A CHRONIC KIDNEY DISEASE: ICD-10-CM

## 2022-10-18 DIAGNOSIS — I10 ESSENTIAL HYPERTENSION: Primary | ICD-10-CM

## 2022-10-18 DIAGNOSIS — M51.36 DEGENERATIVE DISC DISEASE, LUMBAR: ICD-10-CM

## 2022-10-18 DIAGNOSIS — S29.011A MUSCLE STRAIN OF CHEST WALL, INITIAL ENCOUNTER: ICD-10-CM

## 2022-10-18 DIAGNOSIS — E78.2 MIXED HYPERLIPIDEMIA: ICD-10-CM

## 2022-10-18 DIAGNOSIS — E11.9 TYPE 2 DIABETES MELLITUS WITHOUT COMPLICATION, WITHOUT LONG-TERM CURRENT USE OF INSULIN: ICD-10-CM

## 2022-10-18 DIAGNOSIS — R32 URINARY INCONTINENCE, UNSPECIFIED TYPE: ICD-10-CM

## 2022-10-18 DIAGNOSIS — Z23 NEED FOR INFLUENZA VACCINATION: ICD-10-CM

## 2022-10-18 LAB
BILIRUB BLD-MCNC: NEGATIVE MG/DL
CLARITY, POC: ABNORMAL
COLOR UR: YELLOW
EXPIRATION DATE: ABNORMAL
GLUCOSE UR STRIP-MCNC: NEGATIVE MG/DL
KETONES UR QL: NEGATIVE
LEUKOCYTE EST, POC: ABNORMAL
Lab: ABNORMAL
NITRITE UR-MCNC: NEGATIVE MG/ML
PH UR: 6 [PH] (ref 5–8)
PROT UR STRIP-MCNC: NEGATIVE MG/DL
RBC # UR STRIP: NEGATIVE /UL
SP GR UR: 1.01 (ref 1–1.03)
UROBILINOGEN UR QL: NORMAL

## 2022-10-18 PROCEDURE — 81003 URINALYSIS AUTO W/O SCOPE: CPT | Performed by: NURSE PRACTITIONER

## 2022-10-18 PROCEDURE — G0008 ADMIN INFLUENZA VIRUS VAC: HCPCS | Performed by: NURSE PRACTITIONER

## 2022-10-18 PROCEDURE — 99214 OFFICE O/P EST MOD 30 MIN: CPT | Performed by: NURSE PRACTITIONER

## 2022-10-18 PROCEDURE — 90662 IIV NO PRSV INCREASED AG IM: CPT | Performed by: NURSE PRACTITIONER

## 2022-10-18 NOTE — PROGRESS NOTES
"Subjective   Radha Silverio is a 81 y.o. female presenting today for follow up of   Chief Complaint   Patient presents with   • Hyperlipidemia     3 month follow up visit        History of Present Illness     Outpatient Medications Marked as Taking for the 10/18/22 encounter (Office Visit) with Arielle Johnston APRN   Medication Sig Dispense Refill   • Accu-Chek Softclix Lancets lancets USE 1 STRIP TO CHECK GLUCOSE ONCE DAILY 50 each 11   • aspirin 81 MG EC tablet Take 81 mg by mouth Daily.     • Calcium Carb-Cholecalciferol (CALCIUM 1000 + D PO) Take  by mouth.     • glucose blood (Accu-Chek Wandy Plus) test strip USE 1 STRIP TO CHECK GLUCOSE ONCE DAILY 50 each 11   • lisinopril-hydrochlorothiazide (PRINZIDE,ZESTORETIC) 20-25 MG per tablet Take 1 tablet by mouth once daily 90 tablet 1   • metFORMIN (GLUCOPHAGE) 500 MG tablet TAKE 1 TABLET BY MOUTH TWICE DAILY WITH MEALS 180 tablet 0   • metoprolol succinate XL (TOPROL-XL) 25 MG 24 hr tablet Take 1 tablet by mouth Daily. 90 tablet 3   • omeprazole (priLOSEC) 40 MG capsule Take 1 capsule by mouth once daily 90 capsule 1   • Polyethylene Glycol 3350 (MIRALAX PO) Take  by mouth As Needed.     • simvastatin (ZOCOR) 40 MG tablet TAKE 1 TABLET BY MOUTH ONCE DAILY IN THE EVENING 90 tablet 0   • vitamin C (ASCORBIC ACID) 250 MG tablet Take 250 mg by mouth Daily.       Ms. Silverio is an AA female w/ HTN, HLD, DM, CKD.     She is tolerating her medication regimen w/o difficulty. She is struggling w/ nutrition d/t lack of dentition and inability to afford dentures.    She notes a \"stitch\" in her lower back intermittently w/ walking over the past several weeks. This will resolve quickly if she stops walking. Denies numbness/tingling/weakness in LEs or groin.    She notes a pain in Her R side. This initiated when rising from bed and is now very sore.    She notes urinary frequency and incontinence.      The following portions of the patient's history were reviewed and " "updated as appropriate: allergies, current medications, past family history, past medical history, past social history, past surgical history and problem list.    Review of Systems   Constitutional: Negative.    HENT: Negative.    Eyes: Negative.    Respiratory: Negative.    Cardiovascular: Negative.    Gastrointestinal: Negative.    Endocrine: Negative.    Genitourinary: Negative.    Musculoskeletal: Positive for back pain and myalgias.   Skin: Negative.    Neurological: Negative.    Hematological: Negative.    Psychiatric/Behavioral: Negative.        Objective   Vitals:    10/18/22 0930   BP: 110/82   BP Location: Left arm   Patient Position: Sitting   Cuff Size: Adult   Pulse: 68   Resp: 20   Temp: 97.7 °F (36.5 °C)   TempSrc: Infrared   SpO2: 98%   Weight: 87.9 kg (193 lb 12.8 oz)   Height: 170.2 cm (67\")       BP Readings from Last 3 Encounters:   10/18/22 110/82   07/26/22 138/90   05/26/22 118/76        Wt Readings from Last 3 Encounters:   10/18/22 87.9 kg (193 lb 12.8 oz)   07/26/22 87.7 kg (193 lb 6.4 oz)   05/26/22 88 kg (194 lb)        Body mass index is 30.35 kg/m².  Nursing notes and vitals reviewed.    Physical Exam  Constitutional:       General: She is not in acute distress.     Appearance: Normal appearance. She is well-developed.   HENT:      Head: Normocephalic.      Right Ear: Hearing, tympanic membrane, ear canal and external ear normal.      Left Ear: Hearing, tympanic membrane, ear canal and external ear normal.      Nose: Nose normal. No mucosal edema or rhinorrhea.      Mouth/Throat:      Mouth: Mucous membranes are moist.      Pharynx: Oropharynx is clear. Uvula midline.   Eyes:      General: Lids are normal.      Extraocular Movements: Extraocular movements intact.      Conjunctiva/sclera: Conjunctivae normal.      Pupils: Pupils are equal, round, and reactive to light.   Neck:      Thyroid: No thyroid mass or thyromegaly.   Cardiovascular:      Rate and Rhythm: Regular rhythm.      " Pulses: Normal pulses.      Heart sounds: S1 normal and S2 normal. No murmur heard.    No friction rub. No gallop.   Pulmonary:      Effort: Pulmonary effort is normal.      Breath sounds: Normal breath sounds. No wheezing, rhonchi or rales.   Abdominal:      General: Bowel sounds are normal.      Palpations: Abdomen is soft.      Tenderness: There is no abdominal tenderness. There is no guarding.      Hernia: No hernia is present.   Musculoskeletal:         General: No deformity. Normal range of motion.      Cervical back: Normal range of motion and neck supple.   Lymphadenopathy:      Cervical: No cervical adenopathy.   Skin:     General: Skin is warm and dry.      Findings: No lesion or rash.   Neurological:      General: No focal deficit present.      Mental Status: She is alert and oriented to person, place, and time.      Cranial Nerves: No cranial nerve deficit.      Sensory: No sensory deficit.      Motor: Motor function is intact.      Coordination: Coordination is intact.      Gait: Gait normal.      Deep Tendon Reflexes: Reflexes are normal and symmetric.   Psychiatric:         Attention and Perception: She is attentive.         Mood and Affect: Mood and affect normal.         Speech: Speech normal.         Behavior: Behavior normal.         Thought Content: Thought content normal.         Recent Results (from the past 672 hour(s))   Comprehensive Metabolic Panel    Collection Time: 10/11/22  9:49 AM    Specimen: Blood   Result Value Ref Range    Glucose 119 (H) 65 - 99 mg/dL    BUN 14 8 - 23 mg/dL    Creatinine 1.04 (H) 0.57 - 1.00 mg/dL    EGFR Result 54.8 (L) >60.0 mL/min/1.73    BUN/Creatinine Ratio 13.5 7.0 - 25.0    Sodium 143 136 - 145 mmol/L    Potassium 4.3 3.5 - 5.2 mmol/L    Chloride 103 98 - 107 mmol/L    Total CO2 28.7 22.0 - 29.0 mmol/L    Calcium 10.3 8.6 - 10.5 mg/dL    Total Protein 6.2 6.0 - 8.5 g/dL    Albumin 4.30 3.50 - 5.20 g/dL    Globulin 1.9 gm/dL    A/G Ratio 2.3 g/dL    Total  Bilirubin 0.6 0.0 - 1.2 mg/dL    Alkaline Phosphatase 49 39 - 117 U/L    AST (SGOT) 20 1 - 32 U/L    ALT (SGPT) 15 1 - 33 U/L   Hemoglobin A1c    Collection Time: 10/11/22  9:49 AM    Specimen: Blood   Result Value Ref Range    Hemoglobin A1C 6.50 (H) 4.80 - 5.60 %   Lipid Panel With / Chol / HDL Ratio    Collection Time: 10/11/22  9:49 AM    Specimen: Blood   Result Value Ref Range    Total Cholesterol 168 0 - 200 mg/dL    Triglycerides 58 0 - 150 mg/dL    HDL Cholesterol 77 (H) 40 - 60 mg/dL    VLDL Cholesterol Madhav 12 5 - 40 mg/dL    LDL Chol Calc (NIH) 79 0 - 100 mg/dL    Chol/HDL Ratio 2.18          Assessment & Plan   Diagnoses and all orders for this visit:    1. Essential hypertension (Primary)   - controlled    2. Mixed hyperlipidemia   - controlled    3. Type 2 diabetes mellitus without complication, without long-term current use of insulin (HCC)   - controlled    4. Stage 3a chronic kidney disease (HCC)   - stable    5. Degenerative disc disease, lumbar  -     Diclofenac Sodium (VOLTAREN) 1 % gel gel; Apply 4 g topically to the appropriate area as directed 4 (Four) Times a Day for 10 days.  Dispense: 100 g; Refill: 0    6. Muscle strain of chest wall, initial encounter  -     Diclofenac Sodium (VOLTAREN) 1 % gel gel; Apply 4 g topically to the appropriate area as directed 4 (Four) Times a Day for 10 days.  Dispense: 100 g; Refill: 0    7. Urinary incontinence, unspecified type  -     POC Urinalysis Dipstick, Automated  -     Urine Culture - , Urine, Clean Catch    8. Urinary frequency  -     POC Urinalysis Dipstick, Automated  -     Urine Culture - , Urine, Clean Catch    9. Need for influenza vaccination  -     Fluzone High-Dose 65+yrs ([id number])        Except as noted above, pt will continue current medications as noted in the medication list. I will continue to authorize refills as needed.        Medications, including side effects, were discussed with the patient. Patient verbalized  understanding.  The plan of care was discussed. All questions were answered. Patient verbalized understanding.      Return in about 6 months (around 4/18/2023) for fasting labs one week prior to, Medicare Wellness.

## 2022-10-20 ENCOUNTER — TELEPHONE (OUTPATIENT)
Dept: INTERNAL MEDICINE | Facility: CLINIC | Age: 82
End: 2022-10-20

## 2022-10-20 LAB
BACTERIA UR CULT: NORMAL
BACTERIA UR CULT: NORMAL

## 2022-10-20 NOTE — TELEPHONE ENCOUNTER
Caller: Radha Silverio    Relationship: Self    Best call back number: [phone]    What is the best time to reach you: ANYTIME    Who are you requesting to speak with (clinical staff, provider,  specific staff member):CLINICAL     What was the call regarding: PATIENT WAS CALLING TO SEE WHAT COVID BOOSTER SHE SHOULD GET, PFIZER OR MODERNA     WOULD LIKE TO GET AS SOON AS POSSIBLE     Do you require a callback: YES

## 2022-10-20 NOTE — TELEPHONE ENCOUNTER
It is really just important to receive the vaccine regardless of the brand. If her pharmacy has Pfizer available, that is what she received in the past.
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
## 2022-11-07 ENCOUNTER — OFFICE VISIT (OUTPATIENT)
Dept: INTERNAL MEDICINE | Facility: CLINIC | Age: 82
End: 2022-11-07

## 2022-11-07 VITALS
TEMPERATURE: 97.5 F | HEIGHT: 67 IN | BODY MASS INDEX: 30.29 KG/M2 | OXYGEN SATURATION: 98 % | WEIGHT: 193 LBS | SYSTOLIC BLOOD PRESSURE: 124 MMHG | HEART RATE: 66 BPM | DIASTOLIC BLOOD PRESSURE: 68 MMHG

## 2022-11-07 DIAGNOSIS — M51.36 DEGENERATIVE DISC DISEASE, LUMBAR: Primary | ICD-10-CM

## 2022-11-07 PROCEDURE — 99213 OFFICE O/P EST LOW 20 MIN: CPT | Performed by: NURSE PRACTITIONER

## 2022-11-07 RX ORDER — SENNOSIDES 8.6 MG
650 CAPSULE ORAL EVERY 8 HOURS PRN
Start: 2022-11-07

## 2022-11-07 NOTE — PROGRESS NOTES
"Chief Complaint   Patient presents with   • swelling     Swollen area under right breast, states that it feels hard to the touch. She is sore around to her back. Pt thinks it is related to using diclofenac gel.       Subjective     Radha Silverio is a 81 y.o. female being seen for an acute visit for \"swollen area under her breast.\" She has been using Diclofenac gel for some lumbar pain, and she reports that the first day she used it she developed skin swelling and tenderness. She stopped using it, but reports that she is still having \"sorenss.\" She denies rash. Pain described as a soreness,rated 8 of 10. No radiation of pain. She is reporting right lower back pain, radiating around to right hip. Worse with laying on her left side. She is not taking Tylenol arthritis right now. She tried a warm compress. She has spinal stenosis and had an MRI in 2019.  MRI Lumbar Spine Without Contrast (12/06/2019 12:19)      History of Present Illness     No Known Allergies      Current Outpatient Medications:   •  Accu-Chek Softclix Lancets lancets, USE 1 STRIP TO CHECK GLUCOSE ONCE DAILY, Disp: 50 each, Rfl: 11  •  aspirin 81 MG EC tablet, Take 81 mg by mouth Daily., Disp: , Rfl:   •  Calcium Carb-Cholecalciferol (CALCIUM 1000 + D PO), Take  by mouth., Disp: , Rfl:   •  glucose blood (Accu-Chek Wandy Plus) test strip, USE 1 STRIP TO CHECK GLUCOSE ONCE DAILY, Disp: 50 each, Rfl: 11  •  lisinopril-hydrochlorothiazide (PRINZIDE,ZESTORETIC) 20-25 MG per tablet, Take 1 tablet by mouth once daily, Disp: 90 tablet, Rfl: 1  •  metFORMIN (GLUCOPHAGE) 500 MG tablet, TAKE 1 TABLET BY MOUTH TWICE DAILY WITH MEALS, Disp: 180 tablet, Rfl: 0  •  metoprolol succinate XL (TOPROL-XL) 25 MG 24 hr tablet, Take 1 tablet by mouth Daily., Disp: 90 tablet, Rfl: 3  •  omeprazole (priLOSEC) 40 MG capsule, Take 1 capsule by mouth once daily, Disp: 90 capsule, Rfl: 1  •  Polyethylene Glycol 3350 (MIRALAX PO), Take  by mouth As Needed., Disp: , Rfl:   •  " simvastatin (ZOCOR) 40 MG tablet, TAKE 1 TABLET BY MOUTH ONCE DAILY IN THE EVENING, Disp: 90 tablet, Rfl: 0  •  vitamin C (ASCORBIC ACID) 250 MG tablet, Take 250 mg by mouth Daily., Disp: , Rfl:   •  Acetaminophen (TYLENOL ARTHRITIS EXT RELIEF PO), Take 2 tablets by mouth As Needed. Prn seldomly, Disp: , Rfl:     The following portions of the patient's history were reviewed and updated as appropriate: allergies, current medications, past family history, past medical history, past social history, past surgical history and problem list.    Review of Systems   Constitutional: Negative.    Musculoskeletal: Positive for arthralgias and back pain.       Assessment     Physical Exam  Vitals reviewed.   Constitutional:       Appearance: Normal appearance.   Cardiovascular:      Rate and Rhythm: Normal rate.      Pulses: Normal pulses.      Heart sounds: Normal heart sounds. No murmur heard.  Pulmonary:      Effort: Pulmonary effort is normal. No respiratory distress.      Breath sounds: Normal breath sounds. No stridor.   Musculoskeletal:      Lumbar back: No swelling. Decreased range of motion. Negative right straight leg raise test and negative left straight leg raise test.      Comments: Pain right lumbar area, radiating to right hip. No shingles rash   Neurological:      Mental Status: She is alert and oriented to person, place, and time.   Psychiatric:         Mood and Affect: Mood normal.         Behavior: Behavior normal.         Thought Content: Thought content normal.         Plan     Reviewed lumbar MRI from 2019 with her.     Diagnoses and all orders for this visit:    1. Degenerative disc disease, lumbar (Primary)  -     Ambulatory Referral to Physical Therapy Evaluate and treat  -     acetaminophen (Tylenol 8 Hour Arthritis Pain) 650 MG 8 hr tablet; Take 1 tablet by mouth Every 8 (Eight) Hours As Needed for Mild Pain.        Use a pillow between knees at night. Cannot take oral NSAIDs due to kidney functions.      Refer to PT

## 2022-11-14 RX ORDER — METOPROLOL SUCCINATE 25 MG/1
TABLET, EXTENDED RELEASE ORAL
Qty: 90 TABLET | Refills: 0 | Status: SHIPPED | OUTPATIENT
Start: 2022-11-14

## 2022-12-05 RX ORDER — LISINOPRIL AND HYDROCHLOROTHIAZIDE 25; 20 MG/1; MG/1
TABLET ORAL
Qty: 90 TABLET | Refills: 1 | Status: SHIPPED | OUTPATIENT
Start: 2022-12-05

## 2022-12-05 RX ORDER — SIMVASTATIN 40 MG
TABLET ORAL
Qty: 90 TABLET | Refills: 1 | Status: SHIPPED | OUTPATIENT
Start: 2022-12-05

## 2022-12-05 NOTE — TELEPHONE ENCOUNTER
Rx Refill Note  Requested Prescriptions     Pending Prescriptions Disp Refills    lisinopril-hydrochlorothiazide (PRINZIDE,ZESTORETIC) 20-25 MG per tablet [Pharmacy Med Name: Lisinopril-hydroCHLOROthiazide 20-25 MG Oral Tablet] 90 tablet 0     Sig: Take 1 tablet by mouth once daily    simvastatin (ZOCOR) 40 MG tablet [Pharmacy Med Name: Simvastatin 40 MG Oral Tablet] 90 tablet 0     Sig: TAKE 1 TABLET BY MOUTH ONCE DAILY IN THE EVENING      Last office visit with prescribing clinician: 10/18/2022   Last telemedicine visit with prescribing clinician: 12/6/2022   Next office visit with prescribing clinician: 12/6/2022                         Would you like a call back once the refill request has been completed: [] Yes [] No    If the office needs to give you a call back, can they leave a voicemail: [] Yes [] No    Gini Nava MA  12/05/22, 15:23 EST

## 2022-12-06 ENCOUNTER — OFFICE VISIT (OUTPATIENT)
Dept: INTERNAL MEDICINE | Facility: CLINIC | Age: 82
End: 2022-12-06

## 2022-12-06 VITALS
HEART RATE: 73 BPM | SYSTOLIC BLOOD PRESSURE: 122 MMHG | BODY MASS INDEX: 30.26 KG/M2 | TEMPERATURE: 97.5 F | DIASTOLIC BLOOD PRESSURE: 78 MMHG | OXYGEN SATURATION: 97 % | WEIGHT: 192.8 LBS | HEIGHT: 67 IN

## 2022-12-06 DIAGNOSIS — E66.09 CLASS 1 OBESITY DUE TO EXCESS CALORIES WITH SERIOUS COMORBIDITY AND BODY MASS INDEX (BMI) OF 30.0 TO 30.9 IN ADULT: ICD-10-CM

## 2022-12-06 DIAGNOSIS — M48.061 SPINAL STENOSIS OF LUMBAR REGION WITHOUT NEUROGENIC CLAUDICATION: ICD-10-CM

## 2022-12-06 DIAGNOSIS — E11.9 TYPE 2 DIABETES MELLITUS WITHOUT COMPLICATION, WITHOUT LONG-TERM CURRENT USE OF INSULIN: ICD-10-CM

## 2022-12-06 DIAGNOSIS — N18.31 STAGE 3A CHRONIC KIDNEY DISEASE: ICD-10-CM

## 2022-12-06 DIAGNOSIS — M51.36 DEGENERATIVE DISC DISEASE, LUMBAR: Primary | ICD-10-CM

## 2022-12-06 PROCEDURE — 99215 OFFICE O/P EST HI 40 MIN: CPT | Performed by: NURSE PRACTITIONER

## 2022-12-06 NOTE — PROGRESS NOTES
"Radha Silverio is a 82 y.o. female presenting today for   Chief Complaint   Patient presents with   • Back Pain       Subjective    History of Present Illness     Ms. Silverio presents for 4wk f/u r/t R sided pain, RUQ, RLQ pain. She first reported this to me 10/18/2022. I recommended topical Diclofenac. She sts that after one day of use she experienced swelling in her RUQ. She saw NP Omar. She was referred for PT but declined this citing no time in her busy schedule.  Office Visit with Niya Nelson APRN (11/07/2022)  She notes no relief w/ Tylenol. She continues to c/o swelling in her RUQ despite no use of topical Diclofenac in 4wks.  Her pain is worse w/ walking, sitting. Worse with laying on the L side. Dull, ache. Intermittent. 5-7/10 VPS.  MRI Lumbar Spine Without Contrast (12/06/2019 12:19)    She has questions regarding CKD and DM. She would like to again review her most recent labs from October which we reviewed together at her OV in October.     She has concerns about her wgt as she sts she feels that she is gaining wgt.        The following portions of the patient's history were reviewed and updated as appropriate: allergies, current medications, problem list, past medical history, past surgical history, family history, and social history.          Objective    Vitals:    12/06/22 0941   BP: 122/78   BP Location: Left arm   Pulse: 73   Temp: 97.5 °F (36.4 °C)   TempSrc: Infrared   SpO2: 97%   Weight: 87.5 kg (192 lb 12.8 oz)   Height: 170.2 cm (67\")     Body mass index is 30.2 kg/m².  Nursing notes and vitals reviewed.    Physical Exam  Constitutional:       General: She is not in acute distress.     Appearance: She is well-developed. She is obese.   Pulmonary:      Effort: Pulmonary effort is normal.   Abdominal:      General: There is no distension.      Palpations: Abdomen is soft. There is no hepatomegaly, splenomegaly or mass.      Tenderness: There is no abdominal tenderness.   Neurological:     "  Mental Status: She is alert.   Psychiatric:         Attention and Perception: She is attentive.         Speech: Speech normal.         Comprehensive Metabolic Panel (10/11/2022 09:49)  Hemoglobin A1c (10/11/2022 09:49)      Assessment and Plan    Diagnoses and all orders for this visit:    1. Degenerative disc disease, lumbar (Primary)  -     Ambulatory Referral to Physical Therapy Evaluate and treat  - Pt may continue Tylenol, other NSAIDs are contraindicated in the setting of CKD  - I find no evidence w/ PE of abd swelling. There is redundant adipose tissue. There is no rash or erythema. No masses. This was discussed w/ the patient.    2. Spinal stenosis of lumbar region without neurogenic claudication  -     Ambulatory Referral to Physical Therapy Evaluate and treat  - We discussed her prior MRI results at length. I again recommended PT. Ms. Silverio is now agreeable to try this.    3. Stage 3a chronic kidney disease (HCC)   - We discussed her labs results. We discussed at length the pathphysiology surrounding CKD.   - We discussed risk factors and the importance of good HTN, HLD, DM control; avoidance of nephrotoxic substances including NSAIDs and contrast dyes.    4. Type 2 diabetes mellitus without complication, without long-term current use of insulin (HCC)   - controlled   - Patient counseled regarding therapeutic lifestyle changes including improved nutrition, increased exercise, and weight loss.    5. Class 1 obesity due to excess calories with serious comorbidity and body mass index (BMI) of 30.0 to 30.9 in adult   - Patient counseled regarding therapeutic lifestyle changes including improved nutrition, increased exercise, and weight loss.   - while pt's BMI does fall into a category of obese, her wgt has remained stable over the last 5 months. There is no documented wgt gain.          Medications, including side effects, were discussed with the patient. Patient verbalized understanding.  The plan of  care was discussed. All questions were answered. Patient verbalized understanding.      I spent 50 minutes caring for Radha on this date of service. This time includes time spent by me in the following activities:preparing for the visit, reviewing tests, obtaining and/or reviewing a separately obtained history, performing a medically appropriate examination and/or evaluation , counseling and educating the patient/family/caregiver, referring and communicating with other health care professionals , documenting information in the medical record, independently interpreting results and communicating that information with the patient/family/caregiver and care coordination

## 2022-12-07 ENCOUNTER — TRANSCRIBE ORDERS (OUTPATIENT)
Dept: ADMINISTRATIVE | Facility: HOSPITAL | Age: 82
End: 2022-12-07

## 2022-12-07 DIAGNOSIS — Z92.89 HISTORY OF MAMMOGRAPHY, SCREENING: Primary | ICD-10-CM

## 2023-01-17 ENCOUNTER — HOSPITAL ENCOUNTER (OUTPATIENT)
Dept: MAMMOGRAPHY | Facility: HOSPITAL | Age: 83
Discharge: HOME | End: 2023-01-17
Admitting: NURSE PRACTITIONER
Payer: MEDICARE

## 2023-01-17 DIAGNOSIS — Z92.89 HISTORY OF MAMMOGRAPHY, SCREENING: ICD-10-CM

## 2023-01-17 PROCEDURE — 77063 BREAST TOMOSYNTHESIS BI: CPT

## 2023-01-17 PROCEDURE — 77067 SCR MAMMO BI INCL CAD: CPT

## 2023-01-17 NOTE — LETTER
January 17, 2023        Radha Silverio  [address] [place], [place] [address]      Dear Ms. Silverio,    We are pleased to inform you that the results of your recent breast imaging examination on 1/17/23 show no suspicious findings for breast cancer.    A report of your breast imaging result was sent to Arielle Johnston.      Current American College of Radiology (ACR) and Society of Breast Imaging (SBI) guidelines recommend screening mammograms every year beginning at the age of 40.    Please keep in mind that good breast care involves a combination of three important steps: yearly mammograms, an annual examination by a health care professional, and self-awareness of any changes in your breast.    Remember that negative breast imaging does not exclude the possibility of breast disease. You should never ignore a breast lump or any other change in your breasts, even if the breast imaging is normal. If you find a lump or other change, talk to your health care provider about it as soon as possible.     Your breast imaging study will be stored at our facility and made available upon your written request.    Thank you for allowing us to care for you.      Sincerely,    Tremaine Mensah MD    MRN: [id number]    Breast Density Notification Statement    Your x-ray mammogram shows that your breast tissue is dense. Dense breast tissue is common among women and is not abnormal.  However, women with dense breast tissue may have a slightly increased risk for developing breast cancer.  Dense breast tissue may also make it more difficult to detect an early breast cancer on your mammogram.  At this time, there are no specific recommendations for additional screening or other measures related to having dense breast tissue.  The information about the result of your x-ray mammogram report is given to you to raise your awareness.  However, you may want to talk to your doctor about other ways that you might be able to reduce  your risk of breast cancer.  Use this report when you talk to your doctor about your own risks for breast cancer, which includes your family history.

## 2023-04-10 DIAGNOSIS — I10 ESSENTIAL HYPERTENSION: ICD-10-CM

## 2023-04-10 DIAGNOSIS — N18.31 STAGE 3A CHRONIC KIDNEY DISEASE: ICD-10-CM

## 2023-04-10 DIAGNOSIS — E78.2 MIXED HYPERLIPIDEMIA: ICD-10-CM

## 2023-04-10 DIAGNOSIS — E11.9 TYPE 2 DIABETES MELLITUS WITHOUT COMPLICATION, WITHOUT LONG-TERM CURRENT USE OF INSULIN: ICD-10-CM

## 2023-04-12 LAB
ALBUMIN SERPL-MCNC: 4.5 G/DL (ref 3.5–5.2)
ALBUMIN/CREAT UR: 13 MG/G CREAT (ref 0–29)
ALBUMIN/GLOB SERPL: 1.9 G/DL
ALP SERPL-CCNC: 49 U/L (ref 39–117)
ALT SERPL-CCNC: 18 U/L (ref 1–33)
AST SERPL-CCNC: 19 U/L (ref 1–32)
BILIRUB SERPL-MCNC: 0.4 MG/DL (ref 0–1.2)
BUN SERPL-MCNC: 15 MG/DL (ref 8–23)
BUN/CREAT SERPL: 13.5 (ref 7–25)
CALCIUM SERPL-MCNC: 10.4 MG/DL (ref 8.6–10.5)
CHLORIDE SERPL-SCNC: 102 MMOL/L (ref 98–107)
CHOLEST SERPL-MCNC: 230 MG/DL (ref 0–200)
CHOLEST/HDLC SERPL: 2.53 {RATIO}
CO2 SERPL-SCNC: 29.4 MMOL/L (ref 22–29)
CREAT SERPL-MCNC: 1.11 MG/DL (ref 0.57–1)
CREAT UR-MCNC: 148.3 MG/DL
EGFRCR SERPLBLD CKD-EPI 2021: 50.4 ML/MIN/1.73
ERYTHROCYTE [DISTWIDTH] IN BLOOD BY AUTOMATED COUNT: 13.1 % (ref 12.3–15.4)
GLOBULIN SER CALC-MCNC: 2.4 GM/DL
GLUCOSE SERPL-MCNC: 151 MG/DL (ref 65–99)
HBA1C MFR BLD: 6.7 % (ref 4.8–5.6)
HCT VFR BLD AUTO: 40 % (ref 34–46.6)
HDLC SERPL-MCNC: 91 MG/DL (ref 40–60)
HGB BLD-MCNC: 13.3 G/DL (ref 12–15.9)
LDLC SERPL CALC-MCNC: 127 MG/DL (ref 0–100)
MCH RBC QN AUTO: 28.9 PG (ref 26.6–33)
MCHC RBC AUTO-ENTMCNC: 33.3 G/DL (ref 31.5–35.7)
MCV RBC AUTO: 86.8 FL (ref 79–97)
MICROALBUMIN UR-MCNC: 19.5 UG/ML
PLATELET # BLD AUTO: 189 10*3/MM3 (ref 140–450)
POTASSIUM SERPL-SCNC: 4.2 MMOL/L (ref 3.5–5.2)
PROT SERPL-MCNC: 6.9 G/DL (ref 6–8.5)
RBC # BLD AUTO: 4.61 10*6/MM3 (ref 3.77–5.28)
SODIUM SERPL-SCNC: 140 MMOL/L (ref 136–145)
TRIGL SERPL-MCNC: 71 MG/DL (ref 0–150)
TSH SERPL DL<=0.005 MIU/L-ACNC: 1.39 UIU/ML (ref 0.27–4.2)
VLDLC SERPL CALC-MCNC: 12 MG/DL (ref 5–40)
WBC # BLD AUTO: 3.15 10*3/MM3 (ref 3.4–10.8)

## 2023-04-18 ENCOUNTER — TELEPHONE (OUTPATIENT)
Dept: INTERNAL MEDICINE | Facility: CLINIC | Age: 83
End: 2023-04-18

## 2023-04-18 ENCOUNTER — OFFICE VISIT (OUTPATIENT)
Dept: INTERNAL MEDICINE | Facility: CLINIC | Age: 83
End: 2023-04-18
Payer: MEDICARE

## 2023-04-18 VITALS
WEIGHT: 195.4 LBS | SYSTOLIC BLOOD PRESSURE: 134 MMHG | TEMPERATURE: 97.7 F | HEIGHT: 67 IN | HEART RATE: 50 BPM | DIASTOLIC BLOOD PRESSURE: 82 MMHG | BODY MASS INDEX: 30.67 KG/M2 | OXYGEN SATURATION: 99 %

## 2023-04-18 DIAGNOSIS — E11.9 TYPE 2 DIABETES MELLITUS WITHOUT COMPLICATION, WITHOUT LONG-TERM CURRENT USE OF INSULIN: Chronic | ICD-10-CM

## 2023-04-18 DIAGNOSIS — N18.31 STAGE 3A CHRONIC KIDNEY DISEASE: Chronic | ICD-10-CM

## 2023-04-18 DIAGNOSIS — I10 ESSENTIAL HYPERTENSION: Chronic | ICD-10-CM

## 2023-04-18 DIAGNOSIS — E78.2 MIXED HYPERLIPIDEMIA: ICD-10-CM

## 2023-04-18 DIAGNOSIS — Z00.00 ENCOUNTER FOR MEDICARE ANNUAL WELLNESS EXAM: Primary | ICD-10-CM

## 2023-04-18 PROBLEM — R05.3 CHRONIC COUGH: Status: RESOLVED | Noted: 2018-06-20 | Resolved: 2023-04-18

## 2023-04-18 PROBLEM — R06.02 SHORTNESS OF BREATH: Status: RESOLVED | Noted: 2017-06-08 | Resolved: 2023-04-18

## 2023-04-18 NOTE — TELEPHONE ENCOUNTER
PATIENT IS CALLING IN ASKING IF HER MOST RECENT LAB REPORT BE MAILED TO THE ADDRESS ON FILE     THE CALCIUM AND VITAMIN D THAT SHE WAS TAKING COMPARED TO THE PRESCRIPTION THAT SHE PICKED UP TODAY SHE WANTS TO KNOW THE INCREASE      [id number]

## 2023-04-18 NOTE — PROGRESS NOTES
The ABCs of the Annual Wellness Visit  Subsequent Medicare Wellness Visit    Chief Complaint   Patient presents with   • Medicare Wellness-subsequent       Subjective   History of Present Illness:  Radha Silverio is a 82 y.o. female who presents for a Subsequent Medicare Wellness Visit as well as follow up of her chronic health conditions.        Patient Active Problem List   Diagnosis   • Essential hypertension   • Mixed hyperlipidemia   • Chronic constipation   • Acid reflux   • Solis esophagus   • Type 2 diabetes mellitus without complication, without long-term current use of insulin   • Hemorrhoids   • Shortness of breath   • Frequent PVCs   • Atrial bigeminy   • PAC (premature atrial contraction)   • Primary osteoarthritis of knee   • Primary osteoarthritis of right knee   • Tinnitus of both ears   • Chronic cough   • Left hip pain   • Primary osteoarthritis of left hip   • Greater trochanteric bursitis of left hip   • Stage 3 chronic kidney disease   • Female genital prolapse   • Chronic gastritis without bleeding   • Degenerative disc disease, lumbar   • Weakness of left lower extremity   • Neuritis or radiculitis due to rupture of lumbar intervertebral disc   • 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   • Adenomatous polyp of colon       Outpatient Medications Marked as Taking for the 4/18/23 encounter (Office Visit) with Arielle Johnston APRN   Medication Sig Dispense Refill   • aspirin 81 MG EC tablet Take 1 tablet by mouth Daily.     • Calcium Carb-Cholecalciferol (CALCIUM 1000 + D PO) Take  by mouth.     • lisinopril-hydrochlorothiazide (PRINZIDE,ZESTORETIC) 20-25 MG per tablet Take 1 tablet by mouth once daily 90 tablet 1   • metFORMIN (GLUCOPHAGE) 500 MG tablet TAKE 1 TABLET BY MOUTH TWICE DAILY WITH MEALS 180 tablet 0   • metoprolol succinate XL (TOPROL-XL) 25 MG 24 hr tablet Take 1 tablet by mouth once daily 90 tablet 0   • omeprazole (priLOSEC) 40 MG capsule Take 1 capsule by mouth once daily  90 capsule 1   • Polyethylene Glycol 3350 (MIRALAX PO) Take  by mouth As Needed.     • simvastatin (ZOCOR) 40 MG tablet TAKE 1 TABLET BY MOUTH ONCE DAILY IN THE EVENING 90 tablet 1   • vitamin C (ASCORBIC ACID) 250 MG tablet Take 1 tablet by mouth Daily.         Ms. Silverio is an AA female w/ HTN, HLD, DM, CKD. She is tolerating her medication regimen w/o difficulty.     She has been neglecting her cholesterol medicine. Her diet is unhealthy. She is sedentary.    HEALTH RISK ASSESSMENT    Recent Hospitalizations:  No hospitalization(s) within the last year.    Current Medical Providers:  Patient Care Team:  Arielle Johnston APRN as PCP - General (Family Medicine)  Raisa Gleason MD as Consulting Physician (Urogynecology)  Trudi Urbina MD as Consulting Physician (Cardiology)  Bronson Fitzgerald MD as Surgeon (Neurosurgery)    Smoking Status:  Social History     Tobacco Use   Smoking Status Never   Smokeless Tobacco Never   Tobacco Comments    no caffiene       Alcohol Consumption:  Social History     Substance and Sexual Activity   Alcohol Use No       Depression Screen:       2023     9:53 AM   PHQ-2/PHQ-9 Depression Screening   Little Interest or Pleasure in Doing Things 0-->not at all   Feeling Down, Depressed or Hopeless 0-->not at all   PHQ-9: Brief Depression Severity Measure Score 0       Fall Risk Screen:  MEREDITH Fall Risk Assessment was completed, and patient is at LOW risk for falls.Assessment completed on:2023    Health Habits and Functional and Cognitive Screenin/18/2023     9:51 AM   Functional & Cognitive Status   Do you have difficulty preparing food and eating? No   Do you have difficulty bathing yourself, getting dressed or grooming yourself? No   Do you have difficulty using the toilet? No   Do you have difficulty moving around from place to place? No   Do you have trouble with steps or getting out of a bed or a chair? No   Current Diet Well Balanced Diet   Dental Exam  Up to date   Eye Exam Up to date   Exercise (times per week) 0 times per week   Current Exercises Include No Regular Exercise   Do you need help using the phone?  No   Are you deaf or do you have serious difficulty hearing?  No   Do you need help with transportation? No   Do you need help shopping? No   Do you need help preparing meals?  No   Do you need help with housework?  No   Do you need help with laundry? No   Do you need help taking your medications? No   Do you need help managing money? No   Do you ever drive or ride in a car without wearing a seat belt? No   Have you felt unusual stress, anger or loneliness in the last month? No   Who do you live with? Alone   If you need help, do you have trouble finding someone available to you? No   Have you been bothered in the last four weeks by sexual problems? No   Do you have difficulty concentrating, remembering or making decisions? Yes         Does the patient have evidence of cognitive impairment? No    Asprin use counseling:Taking ASA appropriately as indicated    Age-appropriate Screening Schedule:  Refer to the list below for future screening recommendations based on patient's age, sex and/or medical conditions. Orders for these recommended tests are listed in the plan section. The patient has been provided with a written plan.    Health Maintenance   Topic Date Due   • ZOSTER VACCINE (1 of 2) Never done   • TDAP/TD VACCINES (2 - Tdap) 06/16/2009   • DIABETIC EYE EXAM  04/21/2023   • INFLUENZA VACCINE  08/01/2023   • HEMOGLOBIN A1C  10/11/2023   • LIPID PANEL  04/11/2024   • URINE MICROALBUMIN  04/11/2024   • ANNUAL WELLNESS VISIT  04/18/2024   • DXA SCAN  04/27/2024   • MAMMOGRAM  01/17/2025   • COLORECTAL CANCER SCREENING  10/05/2026   • COVID-19 Vaccine  Completed   • Pneumococcal Vaccine 65+  Completed          The following portions of the patient's history were reviewed and updated as appropriate: allergies, current medications, past family history, past  "medical history, past social history, past surgical history, and problem list.      Compared to one year ago, the patient feels her physical health is the same.  Compared to one year ago, the patient feels her mental health is the same.    Reviewed chart for potential of high risk medication in the elderly: Yes  Reviewed chart for potential of harmful drug interactions in the elderly:Yes      Review of Systems   Respiratory: Negative for cough, shortness of breath and wheezing.    Cardiovascular: Negative for chest pain and palpitations.   Neurological: Negative for dizziness and headaches.         Objective         Vitals:    04/18/23 0950   BP: 134/82   BP Location: Left arm   Patient Position: Sitting   Cuff Size: Adult   Pulse: 50   Temp: 97.7 °F (36.5 °C)   TempSrc: Infrared   SpO2: 99%   Weight: 88.6 kg (195 lb 6.4 oz)   Height: 170.2 cm (67\")       Body mass index is 30.6 kg/m².  Discussed the patient's BMI with her. The BMI is above average; BMI management plan is completed.  BMI is >= 30 and <35. (Class 1 Obesity). The following options were offered after discussion;: exercise counseling/recommendations and nutrition counseling/recommendations        Physical Exam  Constitutional:       General: She is not in acute distress.     Appearance: Normal appearance. She is well-developed.   HENT:      Head: Normocephalic.      Right Ear: Hearing, tympanic membrane, ear canal and external ear normal.      Left Ear: Hearing, tympanic membrane, ear canal and external ear normal.      Nose: Nose normal. No mucosal edema or rhinorrhea.      Mouth/Throat:      Mouth: Mucous membranes are moist.      Pharynx: Oropharynx is clear. Uvula midline.   Eyes:      General: Lids are normal.      Extraocular Movements: Extraocular movements intact.      Conjunctiva/sclera: Conjunctivae normal.      Pupils: Pupils are equal, round, and reactive to light.   Neck:      Thyroid: No thyroid mass or thyromegaly.   Cardiovascular:      " Rate and Rhythm: Rhythm regularly irregular.      Pulses: Normal pulses.      Heart sounds: S1 normal and S2 normal. No murmur heard.    No friction rub. No gallop.   Pulmonary:      Effort: Pulmonary effort is normal.      Breath sounds: Normal breath sounds. No wheezing, rhonchi or rales.   Abdominal:      General: Bowel sounds are normal.      Palpations: Abdomen is soft.      Tenderness: There is no abdominal tenderness. There is no guarding.      Hernia: No hernia is present.   Musculoskeletal:         General: No deformity. Normal range of motion.      Cervical back: Normal range of motion and neck supple.   Lymphadenopathy:      Cervical: No cervical adenopathy.   Skin:     General: Skin is warm and dry.      Findings: No lesion or rash.   Neurological:      General: No focal deficit present.      Mental Status: She is alert and oriented to person, place, and time.      Cranial Nerves: No cranial nerve deficit.      Sensory: No sensory deficit.      Motor: Motor function is intact.      Coordination: Coordination is intact.      Gait: Gait normal.      Deep Tendon Reflexes: Reflexes are normal and symmetric.   Psychiatric:         Attention and Perception: She is attentive.         Mood and Affect: Mood and affect normal.         Speech: Speech normal.         Behavior: Behavior normal.         Thought Content: Thought content normal.         Vision Screening    Right eye Left eye Both eyes   Without correction 20/40 20/25 20/15   With correction          Recent Results (from the past 336 hour(s))   Microalbumin / Creatinine Urine Ratio - Urine, Clean Catch    Collection Time: 04/11/23 11:05 AM    Specimen: Urine, Clean Catch   Result Value Ref Range    Creatinine, Urine 148.3 Not Estab. mg/dL    Microalbumin, Urine 19.5 Not Estab. ug/mL    Microalbumin/Creatinine Ratio 13 0 - 29 mg/g creat   TSH    Collection Time: 04/11/23 11:05 AM    Specimen: Blood   Result Value Ref Range    TSH 1.390 0.270 - 4.200  uIU/mL   Lipid Panel With / Chol / HDL Ratio    Collection Time: 04/11/23 11:05 AM    Specimen: Blood   Result Value Ref Range    Total Cholesterol 230 (H) 0 - 200 mg/dL    Triglycerides 71 0 - 150 mg/dL    HDL Cholesterol 91 (H) 40 - 60 mg/dL    VLDL Cholesterol Madhav 12 5 - 40 mg/dL    LDL Chol Calc (NIH) 127 (H) 0 - 100 mg/dL    Chol/HDL Ratio 2.53    Hemoglobin A1c    Collection Time: 04/11/23 11:05 AM    Specimen: Blood   Result Value Ref Range    Hemoglobin A1C 6.70 (H) 4.80 - 5.60 %   Comprehensive Metabolic Panel    Collection Time: 04/11/23 11:05 AM    Specimen: Blood   Result Value Ref Range    Glucose 151 (H) 65 - 99 mg/dL    BUN 15 8 - 23 mg/dL    Creatinine 1.11 (H) 0.57 - 1.00 mg/dL    EGFR Result 50.4 (L) >60.0 mL/min/1.73    BUN/Creatinine Ratio 13.5 7.0 - 25.0    Sodium 140 136 - 145 mmol/L    Potassium 4.2 3.5 - 5.2 mmol/L    Chloride 102 98 - 107 mmol/L    Total CO2 29.4 (H) 22.0 - 29.0 mmol/L    Calcium 10.4 8.6 - 10.5 mg/dL    Total Protein 6.9 6.0 - 8.5 g/dL    Albumin 4.5 3.5 - 5.2 g/dL    Globulin 2.4 gm/dL    A/G Ratio 1.9 g/dL    Total Bilirubin 0.4 0.0 - 1.2 mg/dL    Alkaline Phosphatase 49 39 - 117 U/L    AST (SGOT) 19 1 - 32 U/L    ALT (SGPT) 18 1 - 33 U/L   CBC (No Diff)    Collection Time: 04/11/23 11:05 AM    Specimen: Blood   Result Value Ref Range    WBC 3.15 (L) 3.40 - 10.80 10*3/mm3    RBC 4.61 3.77 - 5.28 10*6/mm3    Hemoglobin 13.3 12.0 - 15.9 g/dL    Hematocrit 40.0 34.0 - 46.6 %    MCV 86.8 79.0 - 97.0 fL    MCH 28.9 26.6 - 33.0 pg    MCHC 33.3 31.5 - 35.7 g/dL    RDW 13.1 12.3 - 15.4 %    Platelets 189 140 - 450 10*3/mm3       Each of these lab results were discussed individually in detail with the patient.      Assessment & Plan     Diagnoses and all orders for this visit:    1. Encounter for Medicare annual wellness exam (Primary)    2. Essential hypertension  -     Comprehensive Metabolic Panel; Future  - Controlled, no change    3. Mixed hyperlipidemia  -     Comprehensive  Metabolic Panel; Future  -     Lipid Panel With / Chol / HDL Ratio; Future  - Uncontrolled d/t non-compliance  - Stressed c/w statin    4. Type 2 diabetes mellitus without complication, without long-term current use of insulin  -     Comprehensive Metabolic Panel; Future  -     Hemoglobin A1c; Future  - Controlled, no med change  - Patient counseled regarding therapeutic lifestyle changes including improved nutrition, increased exercise, and weight loss.      5. Stage 3a chronic kidney disease  -     Comprehensive Metabolic Panel; Future  - stable        Medicare Risks and Personalized Health Plan    Advanced Care Planning:  ACP discussion was held with the patient during this visit. Patient has an advance directive in EMR which is still valid.     CMS Preventative Services Quick Reference  Advance Directive Discussion  Breast Cancer/Mammogram Screening  Cardiovascular risk  Diabetic Lab Screening   Immunizations Discussed/Encouraged (specific immunizations; Shingrix )  Inactivity/Sedentary  Obesity/Overweight         The above risks/problems have been discussed with the patient.  Pertinent information has been shared with the patient in the After Visit Summary.  Follow up plans and orders are seen below in the Assessment/Plan Section.      Follow Up:  Return in about 6 months (around 10/18/2023) for fasting labs one week prior to., sooner if needed.    An After Visit Summary and PPPS were given to the patient.

## 2023-06-01 ENCOUNTER — OFFICE VISIT (OUTPATIENT)
Dept: CARDIOLOGY | Facility: CLINIC | Age: 83
End: 2023-06-01

## 2023-06-01 VITALS
BODY MASS INDEX: 30.76 KG/M2 | OXYGEN SATURATION: 96 % | SYSTOLIC BLOOD PRESSURE: 140 MMHG | HEIGHT: 67 IN | DIASTOLIC BLOOD PRESSURE: 98 MMHG | HEART RATE: 80 BPM | WEIGHT: 196 LBS

## 2023-06-01 DIAGNOSIS — I49.3 FREQUENT PVCS: ICD-10-CM

## 2023-06-01 DIAGNOSIS — I49.1 PAC (PREMATURE ATRIAL CONTRACTION): ICD-10-CM

## 2023-06-01 DIAGNOSIS — E78.2 MIXED HYPERLIPIDEMIA: ICD-10-CM

## 2023-06-01 DIAGNOSIS — I49.8 ATRIAL BIGEMINY: Primary | ICD-10-CM

## 2023-06-01 DIAGNOSIS — I10 ESSENTIAL HYPERTENSION: Chronic | ICD-10-CM

## 2023-06-01 RX ORDER — METOPROLOL SUCCINATE 25 MG/1
25 TABLET, EXTENDED RELEASE ORAL DAILY
Qty: 90 TABLET | Refills: 3 | Status: SHIPPED | OUTPATIENT
Start: 2023-06-01

## 2023-06-01 NOTE — PROGRESS NOTES
CARDIOLOGY    Trudi Urbina MD    ENCOUNTER DATE:  06/01/2023    Radha Silverio / 82 y.o. / female        CHIEF COMPLAINT / REASON FOR OFFICE VISIT     Hyperlipidemia and Hypertension (1 year f/u)      HISTORY OF PRESENT ILLNESS       HPI    Radha Silverio is a 82 y.o. female     This is a lady I saw for an irregular heartbeat and episodes of breathlessness 6/15/17.  Dr. Quiroga saw her and noted the irregularity and checked an EKG which showed frequent premature ventricular contractions. The patient had an echocardiogram on 06/08/2017 which showed normal LV systolic and diastolic function with mild-to-moderate tricuspid regurgitation with a right ventricular systolic pressure of about 50 mmHg. She wore a Holter monitor which showed frequent premature atrial contractions at about 23% of the tracing. There were frequent bouts of atrial bigeminy. She had some PVCs, but the PACs far outweighed it. She had a little focal stinging pain that she made note of in her diary which did not correspond to any arrhythmia. She did not make note of the breathless episodes in her diary. I suspected that her episodes of breathlessness were related to atrial bigeminy.  I stopped amlodipine and put her on Toprol-XL 25 mg a day.  In February 2019 she had a normal stress echo.  Ejection fraction 62%.    She has been having some worsening shortness of breath.  No chest pain.  No palpitations.    REVIEW OF SYSTEMS     Review of Systems   Constitutional: Positive for weight gain. Negative for chills, fever and weight loss.   Cardiovascular: Negative for leg swelling.   Respiratory: Negative for cough, snoring and wheezing.    Hematologic/Lymphatic: Negative for bleeding problem. Bruises/bleeds easily.   Skin: Negative for color change.   Musculoskeletal: Negative for falls, joint pain and myalgias.   Gastrointestinal: Negative for melena.   Genitourinary: Negative for hematuria.   Neurological: Negative for excessive daytime  "sleepiness.   Psychiatric/Behavioral: Negative for depression. The patient is not nervous/anxious.          VITAL SIGNS     Visit Vitals  /98 (BP Location: Left arm, Patient Position: Sitting)   Pulse 80   Ht 170.2 cm (67\")   Wt 88.9 kg (196 lb)   LMP  (LMP Unknown)   SpO2 96%   BMI 30.70 kg/m²         Wt Readings from Last 3 Encounters:   06/01/23 88.9 kg (196 lb)   04/18/23 88.6 kg (195 lb 6.4 oz)   12/06/22 87.5 kg (192 lb 12.8 oz)     Body mass index is 30.7 kg/m².      PHYSICAL EXAMINATION     Physical Exam      REVIEWED DATA       ECG 12 Lead    Date/Time: 6/1/2023 10:22 AM  Performed by: Trudi Urbina MD  Authorized by: Trudi Urbina MD   Comparison: compared with previous ECG from 5/26/2022  Similar to previous ECG  Rhythm comments: eptopic atrial rhythm  BPM: 80  Conduction: conduction normal  ST Segments: ST segments normal  T Waves: T waves normal    Clinical impression: normal ECG              Lipid Panel        10/11/2022    09:49 4/11/2023    11:05   Lipid Panel   Total Cholesterol 168   230     Triglycerides 58   71     HDL Cholesterol 77   91     VLDL Cholesterol 12   12     LDL Cholesterol  79   127         Lab Results   Component Value Date    GLUCOSE 151 (H) 04/11/2023    BUN 15 04/11/2023    CREATININE 1.11 (H) 04/11/2023    EGFRRESULT 50.4 (L) 04/11/2023    BCR 13.5 04/11/2023    K 4.2 04/11/2023    CO2 29.4 (H) 04/11/2023    CALCIUM 10.4 04/11/2023    PROTENTOTREF 6.9 04/11/2023    ALBUMIN 4.5 04/11/2023    BILITOT 0.4 04/11/2023    AST 19 04/11/2023    ALT 18 04/11/2023       ASSESSMENT & PLAN      Diagnosis Plan   1. Atrial bigeminy        2. Essential hypertension        3. Frequent PVCs        4. PAC (premature atrial contraction)        5. Mixed hyperlipidemia            1.  Atrial bigeminy.  Not currently having any PACs.  And in the topic atrial rhythm.  No palpitations.  Continue metoprolol.  She previously wondered if metoprolol was causing itching but was not interested " in twice a day carvedilol or spending more money such as Bystolic.  2.  Hypertension.  Controlled for her age  3.  Hyperlipidemia  4.  Diabetes  5.  Obesity.     Follow-up in 1 year.        Orders Placed This Encounter   Procedures   • ECG 12 Lead     This order was created via procedure documentation     Order Specific Question:   Release to patient     Answer:   Routine Release           MEDICATIONS         Discharge Medications          Accurate as of June 1, 2023 10:30 AM. If you have any questions, ask your nurse or doctor.            Continue These Medications      Instructions Start Date   Accu-Chek Wandy Plus test strip  Generic drug: glucose blood   USE 1 STRIP TO CHECK GLUCOSE ONCE DAILY      Accu-Chek Softclix Lancets lancets   USE 1 STRIP TO CHECK GLUCOSE ONCE DAILY      acetaminophen 650 MG 8 hr tablet  Commonly known as: Tylenol 8 Hour Arthritis Pain   650 mg, Oral, Every 8 Hours PRN      aspirin 81 MG EC tablet   81 mg, Oral, Daily      CALCIUM 1000 + D PO   Oral      lisinopril-hydrochlorothiazide 20-25 MG per tablet  Commonly known as: PRINZIDE,ZESTORETIC   Take 1 tablet by mouth once daily      metFORMIN 500 MG tablet  Commonly known as: GLUCOPHAGE   TAKE 1 TABLET BY MOUTH TWICE DAILY WITH MEALS      metoprolol succinate XL 25 MG 24 hr tablet  Commonly known as: TOPROL-XL   25 mg, Oral, Daily      MIRALAX PO   Oral, As Needed      omeprazole 40 MG capsule  Commonly known as: priLOSEC   Take 1 capsule by mouth once daily      simvastatin 40 MG tablet  Commonly known as: ZOCOR   TAKE 1 TABLET BY MOUTH ONCE DAILY IN THE EVENING      vitamin C 250 MG tablet  Commonly known as: ASCORBIC ACID   250 mg, Oral, Daily               Trudi Urbina MD  06/01/23  10:30 EDT    Part of this note may be an electronic transcription/translation of spoken language to printed text using the Dragon dictation system.

## 2023-08-17 RX ORDER — SIMVASTATIN 40 MG
TABLET ORAL
Qty: 90 TABLET | Refills: 0 | Status: SHIPPED | OUTPATIENT
Start: 2023-08-17

## 2023-09-22 RX ORDER — LISINOPRIL AND HYDROCHLOROTHIAZIDE 25; 20 MG/1; MG/1
TABLET ORAL
Qty: 90 TABLET | Refills: 0 | Status: SHIPPED | OUTPATIENT
Start: 2023-09-22

## 2023-09-26 RX ORDER — LISINOPRIL AND HYDROCHLOROTHIAZIDE 25; 20 MG/1; MG/1
TABLET ORAL
Qty: 90 TABLET | Refills: 0 | OUTPATIENT
Start: 2023-09-26

## 2023-10-04 DIAGNOSIS — N18.31 STAGE 3A CHRONIC KIDNEY DISEASE: Chronic | ICD-10-CM

## 2023-10-04 DIAGNOSIS — E78.2 MIXED HYPERLIPIDEMIA: ICD-10-CM

## 2023-10-04 DIAGNOSIS — E11.9 TYPE 2 DIABETES MELLITUS WITHOUT COMPLICATION, WITHOUT LONG-TERM CURRENT USE OF INSULIN: Chronic | ICD-10-CM

## 2023-10-04 DIAGNOSIS — I10 ESSENTIAL HYPERTENSION: Chronic | ICD-10-CM

## 2023-10-10 ENCOUNTER — FLU SHOT (OUTPATIENT)
Dept: INTERNAL MEDICINE | Facility: CLINIC | Age: 83
End: 2023-10-10
Payer: MEDICARE

## 2023-10-10 DIAGNOSIS — Z23 NEED FOR INFLUENZA VACCINATION: Primary | ICD-10-CM

## 2023-10-10 PROCEDURE — G0008 ADMIN INFLUENZA VIRUS VAC: HCPCS | Performed by: NURSE PRACTITIONER

## 2023-10-10 PROCEDURE — 90662 IIV NO PRSV INCREASED AG IM: CPT | Performed by: NURSE PRACTITIONER

## 2023-10-10 NOTE — PROGRESS NOTES
Injection  Injection performed by JAIR Shannon. Patient tolerated the procedure well without complications.  10/10/23   JAIR Shannon

## 2023-10-11 LAB
ALBUMIN SERPL-MCNC: 4.6 G/DL (ref 3.8–4.8)
ALBUMIN/GLOB SERPL: 1.9 {RATIO} (ref 1.2–2.2)
ALP SERPL-CCNC: 51 IU/L (ref 44–121)
ALT SERPL-CCNC: 19 IU/L (ref 0–32)
AST SERPL-CCNC: 25 IU/L (ref 0–40)
BILIRUB SERPL-MCNC: 0.7 MG/DL (ref 0–1.2)
BUN SERPL-MCNC: 17 MG/DL (ref 8–27)
BUN/CREAT SERPL: 15 (ref 12–28)
CALCIUM SERPL-MCNC: 10.2 MG/DL (ref 8.7–10.3)
CHLORIDE SERPL-SCNC: 100 MMOL/L (ref 96–106)
CHOLEST SERPL-MCNC: 162 MG/DL (ref 100–199)
CHOLEST/HDLC SERPL: 2.1 RATIO (ref 0–4.4)
CO2 SERPL-SCNC: 26 MMOL/L (ref 20–29)
CREAT SERPL-MCNC: 1.15 MG/DL (ref 0.57–1)
EGFRCR SERPLBLD CKD-EPI 2021: 48 ML/MIN/1.73
GLOBULIN SER CALC-MCNC: 2.4 G/DL (ref 1.5–4.5)
GLUCOSE SERPL-MCNC: 137 MG/DL (ref 70–99)
HBA1C MFR BLD: 6.5 % (ref 4.8–5.6)
HDLC SERPL-MCNC: 76 MG/DL
LDLC SERPL CALC-MCNC: 74 MG/DL (ref 0–99)
POTASSIUM SERPL-SCNC: 4.2 MMOL/L (ref 3.5–5.2)
PROT SERPL-MCNC: 7 G/DL (ref 6–8.5)
SODIUM SERPL-SCNC: 142 MMOL/L (ref 134–144)
TRIGL SERPL-MCNC: 62 MG/DL (ref 0–149)
VLDLC SERPL CALC-MCNC: 12 MG/DL (ref 5–40)

## 2023-10-17 ENCOUNTER — OFFICE VISIT (OUTPATIENT)
Dept: INTERNAL MEDICINE | Facility: CLINIC | Age: 83
End: 2023-10-17
Payer: MEDICARE

## 2023-10-17 VITALS
OXYGEN SATURATION: 99 % | WEIGHT: 191.2 LBS | TEMPERATURE: 97.3 F | HEIGHT: 67 IN | SYSTOLIC BLOOD PRESSURE: 132 MMHG | DIASTOLIC BLOOD PRESSURE: 82 MMHG | BODY MASS INDEX: 30.01 KG/M2 | HEART RATE: 82 BPM

## 2023-10-17 DIAGNOSIS — K59.09 CHRONIC CONSTIPATION: ICD-10-CM

## 2023-10-17 DIAGNOSIS — K22.70 BARRETT'S ESOPHAGUS WITHOUT DYSPLASIA: Chronic | ICD-10-CM

## 2023-10-17 DIAGNOSIS — R42 DIZZINESS: ICD-10-CM

## 2023-10-17 DIAGNOSIS — N18.31 STAGE 3A CHRONIC KIDNEY DISEASE: Chronic | ICD-10-CM

## 2023-10-17 DIAGNOSIS — E78.2 MIXED HYPERLIPIDEMIA: ICD-10-CM

## 2023-10-17 DIAGNOSIS — R26.89 BALANCE PROBLEMS: ICD-10-CM

## 2023-10-17 DIAGNOSIS — E11.9 TYPE 2 DIABETES MELLITUS WITHOUT COMPLICATION, WITHOUT LONG-TERM CURRENT USE OF INSULIN: Chronic | ICD-10-CM

## 2023-10-17 DIAGNOSIS — I10 ESSENTIAL HYPERTENSION: Primary | Chronic | ICD-10-CM

## 2023-10-17 NOTE — PROGRESS NOTES
"Subjective   Radha Silverio is a 82 y.o. female presenting today for follow up of   Chief Complaint   Patient presents with    Hypertension    Hyperlipidemia    Diabetes       History of Present Illness     Outpatient Medications Marked as Taking for the 10/17/23 encounter (Office Visit) with Arielle Johnston APRN   Medication Sig Dispense Refill    aspirin 81 MG EC tablet Take 1 tablet by mouth Daily.      Calcium Carb-Cholecalciferol (CALCIUM 1000 + D PO) Take  by mouth.      lisinopril-hydrochlorothiazide (PRINZIDE,ZESTORETIC) 20-25 MG per tablet Take 1 tablet by mouth once daily 90 tablet 0    metFORMIN (GLUCOPHAGE) 500 MG tablet TAKE 1 TABLET BY MOUTH TWICE DAILY WITH MEALS 180 tablet 0    metoprolol succinate XL (TOPROL-XL) 25 MG 24 hr tablet Take 1 tablet by mouth Daily. 90 tablet 3    simvastatin (ZOCOR) 40 MG tablet TAKE 1 TABLET BY MOUTH ONCE DAILY IN THE EVENING 90 tablet 0    vitamin C (ASCORBIC ACID) 250 MG tablet Take 1 tablet by mouth Daily.         Ms. Silverio is an AA female w/ HTN, HLD, DM, CKD.     Today she c/o chronic constipation. She is taking clear lax.    She also c/o brief episodes of intermittent dizziness with position change. She sts she feels like her balance is poor. Her lifestyle is mostly sedentary and she feels out of shape.    She questions why she is taking omeprazole and sts she does not take this regularly.      The following portions of the patient's history were reviewed and updated as appropriate: allergies, current medications, past family history, past medical history, past social history, past surgical history and problem list.        Objective   Vitals:    10/17/23 0948   BP: 132/82   BP Location: Left arm   Patient Position: Sitting   Cuff Size: Adult   Pulse: 82   Temp: 97.3 °F (36.3 °C)   TempSrc: Infrared   SpO2: 99%   Weight: 86.7 kg (191 lb 3.2 oz)   Height: 170.2 cm (67.01\")       BP Readings from Last 3 Encounters:   10/17/23 132/82   06/01/23 140/98 "   04/18/23 134/82        Wt Readings from Last 3 Encounters:   10/17/23 86.7 kg (191 lb 3.2 oz)   06/01/23 88.9 kg (196 lb)   04/18/23 88.6 kg (195 lb 6.4 oz)        Body mass index is 29.94 kg/m².  Nursing notes and vitals reviewed.    Physical Exam  Constitutional:       General: She is not in acute distress.     Appearance: She is well-developed.   Pulmonary:      Effort: Pulmonary effort is normal.   Neurological:      Mental Status: She is alert.   Psychiatric:         Attention and Perception: She is attentive.         Speech: Speech normal.         Recent Results (from the past 672 hour(s))   Lipid Panel With / Chol / HDL Ratio    Collection Time: 10/10/23  9:52 AM    Specimen: Blood   Result Value Ref Range    Total Cholesterol 162 100 - 199 mg/dL    Triglycerides 62 0 - 149 mg/dL    HDL Cholesterol 76 >39 mg/dL    VLDL Cholesterol Madhav 12 5 - 40 mg/dL    LDL Chol Calc (NIH) 74 0 - 99 mg/dL    Chol/HDL Ratio 2.1 0.0 - 4.4 ratio   Hemoglobin A1c    Collection Time: 10/10/23  9:52 AM    Specimen: Blood   Result Value Ref Range    Hemoglobin A1C 6.5 (H) 4.8 - 5.6 %   Comprehensive Metabolic Panel    Collection Time: 10/10/23  9:52 AM    Specimen: Blood   Result Value Ref Range    Glucose 137 (H) 70 - 99 mg/dL    BUN 17 8 - 27 mg/dL    Creatinine 1.15 (H) 0.57 - 1.00 mg/dL    EGFR Result 48 (L) >59 mL/min/1.73    BUN/Creatinine Ratio 15 12 - 28    Sodium 142 134 - 144 mmol/L    Potassium 4.2 3.5 - 5.2 mmol/L    Chloride 100 96 - 106 mmol/L    Total CO2 26 20 - 29 mmol/L    Calcium 10.2 8.7 - 10.3 mg/dL    Total Protein 7.0 6.0 - 8.5 g/dL    Albumin 4.6 3.8 - 4.8 g/dL    Globulin 2.4 1.5 - 4.5 g/dL    A/G Ratio 1.9 1.2 - 2.2    Total Bilirubin 0.7 0.0 - 1.2 mg/dL    Alkaline Phosphatase 51 44 - 121 IU/L    AST (SGOT) 25 0 - 40 IU/L    ALT (SGPT) 19 0 - 32 IU/L         Assessment & Plan   Diagnoses and all orders for this visit:    1. Essential hypertension (Primary)  -     CBC (No Diff); Future  -      Comprehensive Metabolic Panel; Future    2. Mixed hyperlipidemia  -     Comprehensive Metabolic Panel; Future  -     Lipid Panel With / Chol / HDL Ratio; Future    3. Type 2 diabetes mellitus without complication, without long-term current use of insulin  -     Comprehensive Metabolic Panel; Future  -     Hemoglobin A1c; Future  -     TSH; Future  -     Urinalysis without microscopic (no culture) - Urine, Clean Catch; Future  -     Microalbumin / Creatinine Urine Ratio - Urine, Clean Catch; Future    4. Stage 3a chronic kidney disease  -     Comprehensive Metabolic Panel; Future  -     Urinalysis without microscopic (no culture) - Urine, Clean Catch; Future  -     Microalbumin / Creatinine Urine Ratio - Urine, Clean Catch; Future    5. Chronic constipation    6. Dizziness  -     Ambulatory Referral to Physical Therapy Vestibular    7. Balance problems  -     Ambulatory Referral to Physical Therapy Vestibular    8. 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        #1. Controlled, no change    2. Controlled, no change    3. Controlled, no change    4. Stable. Encouraged 64-80oz of water consumption each day.    5. Increase fluids and fiber. Continue Clear Lax. Avoid stimulant laxatives.    6, 7. Refer to PT.    8. Discussed dx and associated risks. Advised compliance w/ QD PPI.      Medications, including side effects, were discussed with the patient. Patient verbalized understanding.  The plan of care was discussed. All questions were answered. Patient verbalized understanding.      Return in about 6 months (around 4/17/2024) for Medicare Wellness, fasting labs one week prior to.

## 2023-11-27 ENCOUNTER — TREATMENT (OUTPATIENT)
Dept: PHYSICAL THERAPY | Facility: CLINIC | Age: 83
End: 2023-11-27
Payer: MEDICARE

## 2023-11-27 DIAGNOSIS — R42 VERTIGO: ICD-10-CM

## 2023-11-27 DIAGNOSIS — H81.12 BPPV (BENIGN PAROXYSMAL POSITIONAL VERTIGO), LEFT: Primary | ICD-10-CM

## 2023-11-27 NOTE — PROGRESS NOTES
Physical Therapy Initial Evaluation and Plan of Care  [facility] [place]   [address], [address]  [place] [address]  P: [phone]  F: [phone]    Patient: Radha Silverio   : 1940  Diagnosis/ICD-10 Code:  BPPV (benign paroxysmal positional vertigo), left [H81.12]  Referring practitioner: CÉSAR Sandoval  Date of Initial Visit: 2023  Today's Date: 2023  Patient seen for 1 session         Visit Diagnoses:    ICD-10-CM ICD-9-CM   1. BPPV (benign paroxysmal positional vertigo), left  H81.12 386.11   2. Vertigo  R42 780.4       PMHx Reviewed : 2023      Subjective Evaluation    History of Present Illness  Mechanism of injury: Pt presents to PT with a hx of vertigo with laying down, sitting up and rolling over.  Has been present +5-6 years.      I also notice the leaning to the right with walking.  Notice symptoms for the last 1-2 years.      Had a BPPV in .  Was treated but stopped early.  The symptoms got better but never went away.      Reports a hx of (L) ear ringing.  Listens to the TV loud to drown out the ringing.      Occupation:  Retired - Homemaker  Activities:  House Work, yard work, Sedentary lifestyle  PLOF: Independent  Medical Hx Reviewed.        Quality of life: excellent    Social Support  Lives in: multiple-level home  Lives with: alone             Objective       Assessment & Plan       Assessment  Impairments: activity intolerance, impaired balance and safety issue   Functional limitations: moving in bed, standing and reaching overhead   Assessment details:   Pt presents to PT with symptoms consistent with (L) BPPV.  Pt would benefit from skilled PT intervention to address the deficits noted.      S/P Rx provided pt was escorted to a chair with CGA to a full seated position.  Pt sat for 10 mins with head stationary.       Pt not to lay down or do activities that change head level beyond 45 degrees from upright until  laying down for bed for the day.       Educated the pt to BPPV symptoms, anatomy, pathology and plan to treat.  Reviewed and answered questions at the end of the session.        Prognosis: good    Goals  Plan Goals: SHORT TERM GOALS: Time for Goal: 3 weeks    1.  Pt reports that understand the information about BPPV and the treatment.  2.  Pt to understand, not to lay down the rest of the day secondary to BPPV Tx.    LONG TERM GOALS: Time for Goal: 6 weeks  1.  Pt to report absence of vertigo symptoms with all ADL's, IADL's, household, recreational and community activities, including all motions and positions.       Plan  Therapy options: will be seen for skilled therapy services  Planned therapy interventions: neuromuscular re-education and therapeutic activities  Other planned therapy interventions: Canalith repositioning, Vestibular Strengthening  Frequency: 2x week  Duration in weeks: 6  Treatment plan discussed with: patient  Plan details: If no improvement is seen with BPPV is seen, then an appropriate vestibular exercise program will be started.            Manual Therapy:          mins  20640;  Therapeutic Exercise:          mins  86367;     Neuromuscular Rose:           mins  58423;    Therapeutic Activity:      10     mins  54785;     Gait Training:            mins  22355;     Ultrasound:           mins  53141;    Electrical Stimulation:          mins  45519 ( );  Dry Needling           mins self-pay  Traction           mins 76169  Canalith Repositioning    10     mins 41887  Positional Testing x4      10    mins      Timed Treatment:   10   mins   Total Treatment:     60   mins      PT: Yury Sepulveda PT     License Number: [id number]  Electronically signed by Yury Sepulveda PT, 11/27/23, 9:52 AM EST    Certification Period: 11/27/2023 thru 2/24/2024  I certify that the therapy services are furnished while this patient is under my care.  The services outlined above are required by this patient, and will  be reviewed every 90 days.         Physician Signature:__________________________________________________    PHYSICIAN: Arielle Johnston APRN  NPI: [id number]                                      DATE:      Please sign in Epic or return via fax to [email or web address] . Thank you, [facility].

## 2023-11-29 RX ORDER — SIMVASTATIN 40 MG
TABLET ORAL
Qty: 90 TABLET | Refills: 0 | Status: SHIPPED | OUTPATIENT
Start: 2023-11-29

## 2023-11-29 NOTE — TELEPHONE ENCOUNTER
Rx Refill Note  Requested Prescriptions     Pending Prescriptions Disp Refills    simvastatin (ZOCOR) 40 MG tablet [Pharmacy Med Name: Simvastatin 40 MG Oral Tablet] 90 tablet 0     Sig: TAKE 1 TABLET BY MOUTH ONCE DAILY IN THE EVENING      Last office visit with prescribing clinician: 10/17/2023   Last telemedicine visit with prescribing clinician: Visit date not found   Next office visit with prescribing clinician: 4/25/2024                         Would you like a call back once the refill request has been completed: [] Yes [] No    If the office needs to give you a call back, can they leave a voicemail: [] Yes [] No    Lele Goncalves, PCT  11/29/23, 15:27 EST

## 2023-12-01 ENCOUNTER — TELEPHONE (OUTPATIENT)
Dept: CARDIOLOGY | Facility: CLINIC | Age: 83
End: 2023-12-01
Payer: MEDICARE

## 2023-12-01 NOTE — TELEPHONE ENCOUNTER
Radha Jean Claude returned call.    Patient reports she has been dealing with vertigo for years now, but that her symptoms have worsened recently.  Patient stated she just started seeing PT for vertigo, but wanted to be seen by cardiology to make sure her symptoms are not related to her heart.  Patient stated she was able to schedule an appointment with Apryl on 12/11.    Patient reports vertigo/dizziness occurs with position changes and even sometimes when turning over in bed.  Patient stated her symptoms have worsened over the past few weeks.  Patient denies feeling any abnormal/irregular heart beats or having any swelling in her lower extremities.  Patient does have shortness of breath with minimal exertion, stating she gets short of breath walking to her mailbox.      /94, HR 67 (checked during phone call)    Cardiac Meds  Lisinopril-HCTZ 20-25 mg, daily  Metoprolol succinate XL 25 mg, daily    Patient expressed concern that her symptoms are related to her heart which is why she requested the appointment.  Patient has already been scheduled by staff member to see Apryl on 12/11.  Patient would like to know if provider will do an EKG at the appointment as she thinks she needs one.    Please let me know if there is anything else you would like me to do for this patient.    Thank you,  Floresita LARIOS RN  Triage Nurse [facility]   16:17 EST

## 2023-12-01 NOTE — TELEPHONE ENCOUNTER
Patient called 11/30/23 at 4:03pm.  She asked to talk with someone regarding a procedure and the vertigo she is experiencing. I tried to call patient back to obtain more information but her voicemail was not set up, therefore I could not leave a message asking her to call back. / ROSANA     Patient's phone number is [phone]

## 2023-12-01 NOTE — TELEPHONE ENCOUNTER
Yes she will get an EKG.  I think keeping that appointment with Apryl is reasonable.  Go to ER if symptoms get worse.

## 2023-12-04 NOTE — TELEPHONE ENCOUNTER
Reviewed recommendations with Radha Silverio and the patient verbalized understanding of the recommendations.    Thank you,  Floresita LARIOS RN  Triage Nurse [facility]   09:31 EST

## 2023-12-08 ENCOUNTER — TREATMENT (OUTPATIENT)
Dept: PHYSICAL THERAPY | Facility: CLINIC | Age: 83
End: 2023-12-08
Payer: MEDICARE

## 2023-12-08 DIAGNOSIS — R42 VERTIGO: ICD-10-CM

## 2023-12-08 DIAGNOSIS — H81.12 BPPV (BENIGN PAROXYSMAL POSITIONAL VERTIGO), LEFT: Primary | ICD-10-CM

## 2023-12-08 NOTE — PROGRESS NOTES
Physical Therapy Daily Treatment Note  [facility] [place]   [address], [address]  [place] [address]  P: [phone]  F: [phone]    Patient: Radha Silverio   : 1940  Referring practitioner: CAMMY Sandoval*  Date of Initial Visit: Type: THERAPY  Noted: 2023  Today's Date: 2023  Patient seen for 2 sessions       Visit Diagnoses:    ICD-10-CM ICD-9-CM   1. BPPV (benign paroxysmal positional vertigo), left  H81.12 386.11   2. Vertigo  R42 780.4         Subjective:  Radha Silverio reports: I have been still having a lot of dizziness.  I have been doing the HEP.  I do get dizzy when I do it.  I feel like I have seen no difference and actually been more dizzy in the last week.        Objective   See Exercise, and vestibular logs.       See Vestibular Section of Logs for testing and Rx.    S/P Rx provided pt was escorted to a chair with CGA to a full seated position.  Pt sat for 10 mins with head stationary.      Pt not to lay down or do activities that change head level beyond 45 degrees from upright until laying down for bed for the day.        Assessment:  Continued with the (L) Epley.  Reviewed the performance in the clinic and the pt needed correction of some of the positions and having the head slanted down.  Will continue with the tx of the BPPV before we progress to balance training & vestibular strengthening/.        Plan:   Re-assess upon next visit for continued BPPV symptoms and treat appropriately.           Timed:         Manual Therapy:         mins  51481;     Therapeutic Exercise:        mins  05752;     Neuromuscular Rose:        mins  26626;    Therapeutic Activity:     10     mins  47372;     Gait Training:           mins  52141;     Ultrasound:          mins  97391;    Ionto                                  mins  91157  Self Care                            mins  08587  Traction          mins 27823      Un-Timed:  Monique Hanson     10     mins 99596  Electrical Stimulation:         mins  61863 ( );  Dry Needling          mins self-pay  Traction          mins 78906  Positional Testing x4   10   mins      Timed Treatment:   10   mins   Total Treatment:     40   mins    Yury Sepulveda, PT  [place] License #: [id number]    Physical Therapist

## 2023-12-11 ENCOUNTER — OFFICE VISIT (OUTPATIENT)
Dept: CARDIOLOGY | Facility: CLINIC | Age: 83
End: 2023-12-11
Payer: MEDICARE

## 2023-12-11 VITALS
SYSTOLIC BLOOD PRESSURE: 130 MMHG | HEIGHT: 67 IN | HEART RATE: 88 BPM | OXYGEN SATURATION: 98 % | WEIGHT: 193 LBS | BODY MASS INDEX: 30.29 KG/M2 | DIASTOLIC BLOOD PRESSURE: 70 MMHG

## 2023-12-11 DIAGNOSIS — I10 ESSENTIAL HYPERTENSION: ICD-10-CM

## 2023-12-11 DIAGNOSIS — I49.3 FREQUENT PVCS: Primary | ICD-10-CM

## 2023-12-11 DIAGNOSIS — R42 DIZZINESS: ICD-10-CM

## 2023-12-11 PROCEDURE — 3078F DIAST BP <80 MM HG: CPT | Performed by: NURSE PRACTITIONER

## 2023-12-11 PROCEDURE — 3075F SYST BP GE 130 - 139MM HG: CPT | Performed by: NURSE PRACTITIONER

## 2023-12-11 PROCEDURE — 1159F MED LIST DOCD IN RCRD: CPT | Performed by: NURSE PRACTITIONER

## 2023-12-11 PROCEDURE — 99214 OFFICE O/P EST MOD 30 MIN: CPT | Performed by: NURSE PRACTITIONER

## 2023-12-11 PROCEDURE — 93000 ELECTROCARDIOGRAM COMPLETE: CPT | Performed by: NURSE PRACTITIONER

## 2023-12-11 PROCEDURE — 1160F RVW MEDS BY RX/DR IN RCRD: CPT | Performed by: NURSE PRACTITIONER

## 2023-12-11 RX ORDER — LISINOPRIL AND HYDROCHLOROTHIAZIDE 20; 12.5 MG/1; MG/1
1 TABLET ORAL DAILY
Qty: 30 TABLET | Refills: 5 | Status: SHIPPED | OUTPATIENT
Start: 2023-12-11

## 2023-12-11 NOTE — PROGRESS NOTES
"Date of Office Visit: 23  Encounter Provider: SLAVA Quinonez  Place of Service: [facility] CARDIOLOGY  Patient Name: Radha Silverio  :1940    Chief Complaint   Patient presents with    Shortness of Breath    Dizziness    Fatigue    Follow-up   :     HPI: Radha Silverio is a 83 y.o. female  with frequent premature atrial contractions, premature ventricular contraction, hypertension, hyperlipidemia, vertigo, diabetes mellitus and GERD.  She is followed by Dr. Trudi Urbina.  I will visit with her for the first time today and have reviewed her medical record.  She was found to have 23% atrial ectopics in 2017.  She had PVCs accounting for 1.3% of Holter monitor.  Echocardiogram showed normal left ventricular systolic function, mild to moderate tricuspid valve regurgitation with RVSP moderately elevated at 49.5 mmHg.  She was treated with metoprolol.  Follow-up stress echo 2019 showed no evidence of myocardial ischemia.  She had frequent PVCs with stress.      She presents today for reassessment.  She has had recurrent vertigo issues over this year with associated balance issues.  She is now on physical therapy and has had only 2 sessions.  She denies palpitations, chest pain, edema, near-syncope or syncope.  She reports fatigue which is not worse.  She reports dizziness getting up or with position changes.  She is short of breath with exertion or climbing steps but that is unchanged since her last visit.  She is accompanied by her daughter today.              No Known Allergies        Family and social history reviewed.     ROS  All other systems were reviewed and are negative          Objective:     Vitals:    23 1514   BP: 130/70   BP Location: Left arm   Patient Position: Sitting   Cuff Size: Adult   Pulse: 88   SpO2: 98%   Weight: 87.5 kg (193 lb)   Height: 170.2 cm (67.01\")     Body mass index is 30.22 kg/m².    PHYSICAL EXAM:  Pulmonary:      " Effort: Pulmonary effort is normal.      Breath sounds: Normal breath sounds.   Cardiovascular:      Normal rate. Regular rhythm.      Comments: No pitting edema          ECG 12 Lead    Date/Time: 12/11/2023 4:36 PM  Performed by: Apryl Og APRN    Authorized by: Apryl Og APRN  Comparison: compared with previous ECG   Similar to previous ECG  Rhythm comments: ectopic sinus  Rate: normal  QRS axis: normal  Comments: No change            Current Outpatient Medications   Medication Sig Dispense Refill    Accu-Chek Softclix Lancets lancets USE 1 STRIP TO CHECK GLUCOSE ONCE DAILY 50 each 11    aspirin 81 MG EC tablet Take 1 tablet by mouth Daily.      Calcium Carb-Cholecalciferol (CALCIUM 1000 + D PO) Take  by mouth.      glucose blood (Accu-Chek Wandy Plus) test strip USE 1 STRIP TO CHECK GLUCOSE ONCE DAILY 50 each 11    metFORMIN (GLUCOPHAGE) 500 MG tablet TAKE 1 TABLET BY MOUTH TWICE DAILY WITH MEALS 180 tablet 0    metoprolol succinate XL (TOPROL-XL) 25 MG 24 hr tablet Take 1 tablet by mouth Daily. 90 tablet 3    omeprazole (priLOSEC) 40 MG capsule Take 1 capsule by mouth once daily 90 capsule 1    Polyethylene Glycol 3350 (MIRALAX PO) Take  by mouth As Needed.      simvastatin (ZOCOR) 40 MG tablet TAKE 1 TABLET BY MOUTH ONCE DAILY IN THE EVENING 90 tablet 0    vitamin C (ASCORBIC ACID) 250 MG tablet Take 1 tablet by mouth Daily.      lisinopril-hydrochlorothiazide (PRINZIDE,ZESTORETIC) 20-12.5 MG per tablet Take 1 tablet by mouth Daily. 30 tablet 5     No current facility-administered medications for this visit.     Assessment:       Diagnosis Plan   1. Frequent PVCs  Adult Transthoracic Echo Complete W/ Cont if Necessary Per Protocol      2. Dizziness  Adult Transthoracic Echo Complete W/ Cont if Necessary Per Protocol      3. Essential hypertension             Orders Placed This Encounter   Procedures    ECG 12 Lead     This order was created via procedure documentation     Order Specific Question:    Release to patient     Answer:   Routine Release [[id number]]    Adult Transthoracic Echo Complete W/ Cont if Necessary Per Protocol     Standing Status:   Future     Standing Expiration Date:   12/10/2024     Scheduling Instructions:      Prefer [employer]     Order Specific Question:   Reason for exam?     Answer:   Dyspnea     Order Specific Question:   Reason for exam?     Answer:   Palpitations     Order Specific Question:   Release to patient     Answer:   Routine Release [[id number]]         Plan:       1.  83-year-old female with dizziness and balance issues.  She is currently in physical therapy for BPPV.  She has several sessions left.  I advised that she continue PT.  Will arrange for echocardiogram to evaluate internal cardiac structure and function.  I will also decrease hydrochlorothiazide component of her Prinzide to see if this helps  2.  History of frequent PACs-continue current dose metoprolol succinate 25 mg daily  3.  Hyperlipidemia continue simvastatin  4.  GERD on therapy  5.  Hypertension-blood pressure appears adequately controlled however as listed above given her dizziness and balance issues I will decrease lisinopril hydrochlorothiazide from 20-25 mg daily to 20-12.5 mg daily.  I will see her back in 6 weeks to reassess  6.  History of moderate TR with elevated RVSP in 2017.-As above check echo  7.  Diabetes mellitus-well-controlled  Call with questions or concerns          It has been a pleasure to participate in this patient's care.      Thank you,  SLAVA Quinonez      **I used Dragon to dictate this note:**

## 2023-12-12 ENCOUNTER — TRANSCRIBE ORDERS (OUTPATIENT)
Dept: ADMINISTRATIVE | Facility: HOSPITAL | Age: 83
End: 2023-12-12
Payer: MEDICARE

## 2023-12-12 DIAGNOSIS — Z12.31 SCREENING MAMMOGRAM FOR BREAST CANCER: Primary | ICD-10-CM

## 2023-12-12 RX ORDER — OMEPRAZOLE 40 MG/1
40 CAPSULE, DELAYED RELEASE ORAL DAILY
Qty: 90 CAPSULE | Refills: 0 | Status: SHIPPED | OUTPATIENT
Start: 2023-12-12

## 2023-12-12 NOTE — TELEPHONE ENCOUNTER
Rx Refill Note  Requested Prescriptions     Pending Prescriptions Disp Refills    metFORMIN (GLUCOPHAGE) 500 MG tablet [Pharmacy Med Name: metFORMIN HCl 500 MG Oral Tablet] 180 tablet 0     Sig: TAKE 1 TABLET BY MOUTH TWICE DAILY WITH MEALS    omeprazole (priLOSEC) 40 MG capsule [Pharmacy Med Name: Omeprazole 40 MG Oral Capsule Delayed Release] 90 capsule 0     Sig: Take 1 capsule by mouth once daily      Last office visit with prescribing clinician: 10/17/2023   Last telemedicine visit with prescribing clinician: Visit date not found   Next office visit with prescribing clinician: 4/25/2024                         Would you like a call back once the refill request has been completed: [] Yes [] No    If the office needs to give you a call back, can they leave a voicemail: [] Yes [] No    Lele Goncalves, PCT  12/12/23, 12:14 EST

## 2023-12-13 ENCOUNTER — TREATMENT (OUTPATIENT)
Dept: PHYSICAL THERAPY | Facility: CLINIC | Age: 83
End: 2023-12-13
Payer: MEDICARE

## 2023-12-13 DIAGNOSIS — H81.12 BPPV (BENIGN PAROXYSMAL POSITIONAL VERTIGO), LEFT: Primary | ICD-10-CM

## 2023-12-13 DIAGNOSIS — R42 VERTIGO: ICD-10-CM

## 2023-12-13 NOTE — PROGRESS NOTES
Physical Therapy Daily Treatment - Vestibular Note  [facility] [place]   [address], [address]  [place] [address]  P: [phone]  F: [phone]    Patient: Radha Silverio   : 1940  Referring practitioner: CÉSAR Sandoval  Date of Initial Visit: Type: THERAPY  Noted: 2023  Today's Date: 2023  Patient seen for 3 sessions       Visit Diagnoses:    ICD-10-CM ICD-9-CM   1. BPPV (benign paroxysmal positional vertigo), left  H81.12 386.11   2. Vertigo  R42 780.4         Subjective:  Radha Silverio reports: I have been doing better with less frequency of exercise.  I did have some symptoms this morning preparing breakfast in the kitchen.        Objective     Positional Testing:     Positional Testing  Positional Testing: With infrared goggles  [place]-Hallpike Right: Upbeat, right rotatory nystagmus  Kary[place]-Hallpike Right Onset Time : 20 (symptomatic)  Karyna-Hallpike Right Duration Time : > 1 min  Karyna-Hallpike Left: Left-beating Nystagmus  Karyna-Hallpike Left Onset Time : 15 sec  [place]-Hallpike Left Duration Time : > 1 min  Horizontal Roll Test Right: Ageotrophic nystagmus  Horizontal Roll Test Right Onset Time : 5 sec  Horizontal Roll Test Right Duration Time : > 1 min  Horizontal Roll Test Left: Geotrophic nystagmus  Horizontal Roll Test Left Onset Time : 5 sec  Horizontal Roll Test Left Duration Time : > 1 min                See Exercise and Vestibular Logs for complete testing and treatment.     S/P Rx provided pt was escorted to a chair with CGA to a full seated position.  Pt sat for 10 mins with head stationary.      Pt instructed not to lay down or do activities that change head level beyond 45 degrees from upright until laying down for bed for at least 2 to 3 hours.  Verbally discussed the treatment, reviewed the HEP (if given) and answered questions.        Assessment:  The pt had symptoms and nystagmus of the (R) PC with testing.  Switching to the (R)  Epley at home.  Not addressing the lateral canals at this time.        Plan:   Re-assess upon next visit for continued BPPV symptoms and treat appropriately after testing.           Timed:         Neuromuscular Rose:         mins  31999;    Therapeutic Activity:      10     mins  95747;           Un-Timed:  Canalith Repositioninig        10    mins 67401  Testing w/ Goggles 4 positions     10    mins 93186      Timed Treatment:   10   mins   Total Treatment:     40   mins      Yury Sepulveda, PT  [place] License #: [id number]    Physical Therapist

## 2023-12-18 ENCOUNTER — TELEPHONE (OUTPATIENT)
Dept: PHYSICAL THERAPY | Facility: CLINIC | Age: 83
End: 2023-12-18

## 2023-12-18 NOTE — TELEPHONE ENCOUNTER
Caller: Radha Silverio    Relationship: Self         What was the call regarding: SCARED TO GET CAUGHT OUT IN WEATHER

## 2023-12-29 ENCOUNTER — TREATMENT (OUTPATIENT)
Dept: PHYSICAL THERAPY | Facility: CLINIC | Age: 83
End: 2023-12-29
Payer: MEDICARE

## 2023-12-29 DIAGNOSIS — R42 VERTIGO: ICD-10-CM

## 2023-12-29 DIAGNOSIS — H81.12 BPPV (BENIGN PAROXYSMAL POSITIONAL VERTIGO), LEFT: Primary | ICD-10-CM

## 2023-12-29 NOTE — PROGRESS NOTES
Physical Therapy Daily Treatment Note  [facility] [place]   [address], [address]  [place] [address]  P: [phone]  F: [phone]    Patient: Radha Silverio   : 1940  Referring practitioner: CAMMY Sandoval*  Date of Initial Visit: Type: THERAPY  Noted: 2023  Today's Date: 2023  Patient seen for 4 sessions       Visit Diagnoses:    ICD-10-CM ICD-9-CM   1. BPPV (benign paroxysmal positional vertigo), left  H81.12 386.11   2. Vertigo  R42 780.4         Subjective:  Radha Silverio reports: I am having my head hang down further when I do the exercises.  I am overall am better, because there has been less frequency.        Objective   See Exercise, Manual, and Modality Logs for complete treatment.     See Vestibular Section of Logs for testing and Rx.    S/P Rx provided pt was escorted to a chair with CGA to a full seated position.  Pt sat for 10 mins with head stationary.      Pt not to lay down or do activities that change head level beyond 45 degrees from upright until laying down for bed for the day.        Assessment:  The pt continues to have (L) PC involvement.  Tx w/ more beaice L Chi in the clinic and at home.  Pt thuy Tx well in the clinic.        Plan:   Re-assess upon next visit for continued BPPV symptoms and treat appropriately.           Timed:         Manual Therapy:         mins  39309;     Therapeutic Exercise:         mins  83498;     Neuromuscular Rose:        mins  58777;    Therapeutic Activity:     10     mins  38288;     Gait Training:          mins  37255;     Ultrasound:          mins  42539;    Ionto                                   mins  04823  Self Care                            mins  64588  Traction          mins 48441      Un-Timed:  Canalith Repos    10     mins 91652  Electrical Stimulation:         mins  73440 ( );  Dry Needling          mins self-pay  Traction          mins 83031  Positional  Testing x4    10   mins      Timed Treatment:   10   mins   Total Treatment:     40   mins    Yury Sepuvleda, PT  [place] License #: [id number]    Physical Therapist

## 2024-01-03 ENCOUNTER — TREATMENT (OUTPATIENT)
Dept: PHYSICAL THERAPY | Facility: CLINIC | Age: 84
End: 2024-01-03
Payer: MEDICARE

## 2024-01-03 DIAGNOSIS — H81.12 BPPV (BENIGN PAROXYSMAL POSITIONAL VERTIGO), LEFT: Primary | ICD-10-CM

## 2024-01-03 DIAGNOSIS — R42 VERTIGO: ICD-10-CM

## 2024-01-03 NOTE — PROGRESS NOTES
Physical Therapy Daily Treatment - Vestibular Note  [facility] [place]   [address], [address]  [place] [address]  P: [phone]  F: [phone]    Patient: Radha Silverio   : 1940  Referring practitioner: CÉSAR Sandoval  Date of Initial Visit: Type: THERAPY  Noted: 2023  Today's Date: 1/3/2024  Patient seen for 5 sessions       Visit Diagnoses:    ICD-10-CM ICD-9-CM   1. BPPV (benign paroxysmal positional vertigo), left  H81.12 386.11   2. Vertigo  R42 780.4         Subjective:  Radha Silverio reports: Overall I am still doing better but I am still having instances of vertigo where I sway to the side.  The laying down seems better with laying down and rolling over, that is better.        I notice that when I am turning in the kitchen I am getting off balance, I think to the right mainly.        Objective     Positional Testing:     Positional Testing  Positional Testing: With infrared goggles  Karyna-Hallpike Right: Upbeat, right rotatory nystagmus  [place]-Hallpike Right Onset Time : 20 sec (sligth symptoms initially)  Karyna-Hallpike Right Duration Time : > 1 min  Horizontal Roll Test Right: No nystagmus  Horizontal Roll Test Left: No nystagmus     Occulomotor Exam Fixation Present  Smooth Pursuit: Bilateral:, Normal (reported fatigue)  Saccades: Bilateral:, Intact  Convergence: Bilateral:, Normal          See Exercise and Vestibular Logs for complete testing and treatment.     S/P Rx provided pt was escorted to a chair with CGA to a full seated position.  Pt sat for 10 mins with head stationary.      Pt instructed not to lay down or do activities that change head level beyond 45 degrees from upright until laying down for bed for at least 2 to 3 hours.  Verbally discussed the treatment, reviewed the HEP (if given) and answered questions.        Assessment:  The pt continues to have (R) PC involvement.  Continuing with the (R) Epley @ HEP.       Plan:    Re-assess upon next visit for continued BPPV symptoms and treat appropriately after testing.           Timed:         Neuromuscular Rose:         mins  38341;    Therapeutic Activity:      10     mins  79334;           Un-Timed:  Canalith Repositioninig        10    mins 68656  Testing w/ Goggles 4 positions     10    mins 99102      Timed Treatment:   10   mins   Total Treatment:     40   mins      Yury Sepulveda, PT  [place] License #: [id number]    Physical Therapist

## 2024-01-08 ENCOUNTER — TREATMENT (OUTPATIENT)
Dept: PHYSICAL THERAPY | Facility: CLINIC | Age: 84
End: 2024-01-08
Payer: MEDICARE

## 2024-01-08 DIAGNOSIS — R42 VERTIGO: ICD-10-CM

## 2024-01-08 DIAGNOSIS — H81.12 BPPV (BENIGN PAROXYSMAL POSITIONAL VERTIGO), LEFT: Primary | ICD-10-CM

## 2024-01-08 PROCEDURE — 95992 CANALITH REPOSITIONING PROC: CPT | Performed by: PHYSICAL THERAPIST

## 2024-01-08 PROCEDURE — 97530 THERAPEUTIC ACTIVITIES: CPT | Performed by: PHYSICAL THERAPIST

## 2024-01-08 NOTE — PROGRESS NOTES
Physical Therapy Daily Treatment - Vestibular Note  [facility] [place]   [address], [address]  [place] [address]  P: [phone]  F: [phone]    Patient: Radha Silverio   : 1940  Referring practitioner: CÉSAR Sandoval  Date of Initial Visit: Type: THERAPY  Noted: 2023  Today's Date: 2024  Patient seen for 6 sessions       Visit Diagnoses:    ICD-10-CM ICD-9-CM   1. BPPV (benign paroxysmal positional vertigo), left  H81.12 386.11   2. Vertigo  R42 780.4         Subjective:  Radha Silverio reports: I haven't been doing the specific HEP that you gave me last time, because I was unsure of the which one it was.  I have been doing some of the other exercises though.      Overall my symptoms are about the same as last week.  No difference.        Objective     Positional Testing:     Positional Testing  Positional Testing: With infrared goggles  [place]-Hallpike Right: Upbeat, right rotatory nystagmus  Karyna-Hallpike Right Onset Time : immediate  Karyna-Hallpike Right Duration Time : 20 sec than started again 30 sec to >1 min  Karyna-Hallpike Left: No nystagmus  Karyna-Hallpike Left Onset Time : possible presence  [place]-Hallpike Left Duration Time : unclear  Horizontal Roll Test Right: No nystagmus  Horizontal Roll Test Left: No nystagmus                See Exercise and Vestibular Logs for complete testing and treatment.     S/P Rx provided pt was escorted to a chair with CGA to a full seated position.  Pt sat for 10 mins with head stationary.      Pt instructed not to lay down or do activities that change head level beyond 45 degrees from upright until laying down for bed for at least 2 to 3 hours.  Verbally discussed the treatment, reviewed the HEP (if given) and answered questions.        Assessment:  The pt conts to have (R) PC involvement that needs treatment.  Reviewed the symptoms and Tx w/ pt.       Plan:   Re-assess upon next visit for continued BPPV  symptoms and treat appropriately after testing.           Timed:         Neuromuscular Rose:         mins  11390;    Therapeutic Activity:      15     mins  51957;           Un-Timed:  Canalith Repositioninig        15    mins 63581  Testing w/ Goggles 4 positions         mins 76920      Timed Treatment:   15   mins   Total Treatment:     40   mins      Yury Sepulveda, PT  [place] License #: [id number]    Physical Therapist

## 2024-01-12 ENCOUNTER — TELEPHONE (OUTPATIENT)
Dept: CARDIOLOGY | Facility: CLINIC | Age: 84
End: 2024-01-12
Payer: MEDICARE

## 2024-01-12 ENCOUNTER — HOSPITAL ENCOUNTER (OUTPATIENT)
Dept: CARDIOLOGY | Facility: HOSPITAL | Age: 84
Discharge: HOME | End: 2024-01-12
Payer: MEDICARE

## 2024-01-12 VITALS
WEIGHT: 193 LBS | SYSTOLIC BLOOD PRESSURE: 136 MMHG | HEART RATE: 104 BPM | OXYGEN SATURATION: 95 % | HEIGHT: 67 IN | DIASTOLIC BLOOD PRESSURE: 82 MMHG | BODY MASS INDEX: 30.29 KG/M2

## 2024-01-12 DIAGNOSIS — I49.3 FREQUENT PVCS: ICD-10-CM

## 2024-01-12 DIAGNOSIS — R42 DIZZINESS: ICD-10-CM

## 2024-01-12 LAB
AORTIC ARCH: 1.5 CM
ASCENDING AORTA: 3 CM
BH CV ECHO MEAS - ACS: 2.08 CM
BH CV ECHO MEAS - AO ROOT DIAM: 3.2 CM
BH CV ECHO MEAS - EDV(CUBED): 79.5 ML
BH CV ECHO MEAS - EDV(MOD-SP2): 51 ML
BH CV ECHO MEAS - EDV(MOD-SP4): 74 ML
BH CV ECHO MEAS - EF(MOD-BP): 72 %
BH CV ECHO MEAS - EF(MOD-SP2): 66.7 %
BH CV ECHO MEAS - EF(MOD-SP4): 74.3 %
BH CV ECHO MEAS - ESV(CUBED): 15.1 ML
BH CV ECHO MEAS - ESV(MOD-SP2): 17 ML
BH CV ECHO MEAS - ESV(MOD-SP4): 19 ML
BH CV ECHO MEAS - FS: 42.5 %
BH CV ECHO MEAS - IVS/LVPW: 1.11 CM
BH CV ECHO MEAS - IVSD: 1 CM
BH CV ECHO MEAS - LAT PEAK E' VEL: 7.2 CM/SEC
BH CV ECHO MEAS - LV DIASTOLIC VOL/BSA (35-75): 37.1 CM2
BH CV ECHO MEAS - LV MASS(C)D: 132.7 GRAMS
BH CV ECHO MEAS - LV SYSTOLIC VOL/BSA (12-30): 9.5 CM2
BH CV ECHO MEAS - LVIDD: 4.3 CM
BH CV ECHO MEAS - LVIDS: 2.47 CM
BH CV ECHO MEAS - LVOT AREA: 3.2 CM2
BH CV ECHO MEAS - LVOT DIAM: 2.02 CM
BH CV ECHO MEAS - LVPWD: 0.9 CM
BH CV ECHO MEAS - MED PEAK E' VEL: 4.8 CM/SEC
BH CV ECHO MEAS - MV A DUR: 0.11 SEC
BH CV ECHO MEAS - MV A MAX VEL: 80.1 CM/SEC
BH CV ECHO MEAS - MV DEC SLOPE: 347.1 CM/SEC2
BH CV ECHO MEAS - MV DEC TIME: 0.16 SEC
BH CV ECHO MEAS - MV E MAX VEL: 49.7 CM/SEC
BH CV ECHO MEAS - MV E/A: 0.62
BH CV ECHO MEAS - MV MAX PG: 3 MMHG
BH CV ECHO MEAS - MV MEAN PG: 1.43 MMHG
BH CV ECHO MEAS - MV P1/2T: 60.5 MSEC
BH CV ECHO MEAS - MV V2 VTI: 13.5 CM
BH CV ECHO MEAS - MVA(P1/2T): 3.6 CM2
BH CV ECHO MEAS - PA ACC TIME: 0.11 SEC
BH CV ECHO MEAS - PA V2 MAX: 97 CM/SEC
BH CV ECHO MEAS - PULM A REVS DUR: 0.1 SEC
BH CV ECHO MEAS - PULM A REVS VEL: 31.3 CM/SEC
BH CV ECHO MEAS - PULM DIAS VEL: 43.7 CM/SEC
BH CV ECHO MEAS - PULM S/D: 1.78
BH CV ECHO MEAS - PULM SYS VEL: 78 CM/SEC
BH CV ECHO MEAS - RAP SYSTOLE: 3 MMHG
BH CV ECHO MEAS - RV MAX PG: 1.32 MMHG
BH CV ECHO MEAS - RV V1 MAX: 57.4 CM/SEC
BH CV ECHO MEAS - RV V1 VTI: 10.4 CM
BH CV ECHO MEAS - RVOT DIAM: 2.17 CM
BH CV ECHO MEAS - RVSP: 33 MMHG
BH CV ECHO MEAS - SI(MOD-SP2): 17.1 ML/M2
BH CV ECHO MEAS - SI(MOD-SP4): 27.6 ML/M2
BH CV ECHO MEAS - SUP REN AO DIAM: 1.8 CM
BH CV ECHO MEAS - SV(MOD-SP2): 34 ML
BH CV ECHO MEAS - SV(MOD-SP4): 55 ML
BH CV ECHO MEAS - SV(RVOT): 38.6 ML
BH CV ECHO MEAS - TAPSE (>1.6): 2.34 CM
BH CV ECHO MEAS - TR MAX PG: 29.6 MMHG
BH CV ECHO MEAS - TR MAX VEL: 271.9 CM/SEC
BH CV ECHO MEASUREMENTS AVERAGE E/E' RATIO: 8.28
BH CV XLRA - RV BASE: 3.8 CM
BH CV XLRA - RV LENGTH: 6.1 CM
BH CV XLRA - RV MID: 3.1 CM
BH CV XLRA - TDI S': 21.1 CM/SEC
LEFT ATRIUM VOLUME INDEX: 21.3 ML/M2
SINUS: 3.2 CM
STJ: 2.3 CM

## 2024-01-12 PROCEDURE — 93306 TTE W/DOPPLER COMPLETE: CPT

## 2024-01-12 NOTE — TELEPHONE ENCOUNTER
I attempted to call patient x2  but voicemail was not set up so I was unable to leave a message.    Echocardiogram shows preserved LV systolic function and no significant valve disease.  She has trivial pericardial effusion.    I would like to know if her dizziness is better since I lowered the hydrochlorothiazide component of her medication however I will plan to discuss at her upcoming appointment on 1/26/2024.

## 2024-01-22 ENCOUNTER — TREATMENT (OUTPATIENT)
Dept: PHYSICAL THERAPY | Facility: CLINIC | Age: 84
End: 2024-01-22
Payer: MEDICARE

## 2024-01-22 DIAGNOSIS — R42 VERTIGO: ICD-10-CM

## 2024-01-22 DIAGNOSIS — H81.12 BPPV (BENIGN PAROXYSMAL POSITIONAL VERTIGO), LEFT: Primary | ICD-10-CM

## 2024-01-22 PROCEDURE — 95992 CANALITH REPOSITIONING PROC: CPT | Performed by: PHYSICAL THERAPIST

## 2024-01-22 PROCEDURE — 97530 THERAPEUTIC ACTIVITIES: CPT | Performed by: PHYSICAL THERAPIST

## 2024-01-22 NOTE — PROGRESS NOTES
Physical Therapy Daily Treatment - Vestibular Note  [facility] [place]   [address], [address]  [place] [address]  P: [phone]  F: [phone]    Patient: Radha Silverio   : 1940  Referring practitioner: CÉSAR Sandoval  Date of Initial Visit: Type: THERAPY  Noted: 2023  Today's Date: 2024  Patient seen for 7 sessions       Visit Diagnoses:    ICD-10-CM ICD-9-CM   1. BPPV (benign paroxysmal positional vertigo), left  H81.12 386.11   2. Vertigo  R42 780.4         Subjective:  Radha Silverio reports: I have been doing better with no vertigo, except this last Saturday.  I was bending a lot to pick shoes so I could clean.  Being bent over is when I notice the symptoms of dizziness.  The symptoms lasted for a few seconds.      I have been doing my HEP for the (R) Epley at home.        Objective     Positional Testing:     Positional Testing  Positional Testing: With infrared goggles  Karyna-Hallpike Right: No nystagmus  Karyna-Hallpike Left: Upbeat Nystagmus  Karyna-Hallpike Left Onset Time : 5 sec  Karyna-Hallpike Left Duration Time : 30+ sec  Horizontal Roll Test Right: No nystagmus  Horizontal Roll Test Left: No nystagmus                See Exercise and Vestibular Logs for complete testing and treatment.     S/P Rx provided pt was escorted to a chair with CGA to a full seated position.  Pt sat for 10 mins with head stationary.      Pt instructed not to lay down or do activities that change head level beyond 45 degrees from upright until laying down for bed for at least 2 to 3 hours.  Verbally discussed the treatment, reviewed the HEP (if given) and answered questions.        Assessment:  The pt has improved overall with her symptom of vertigo.  Her symptoms seem more (L) PC involved today.        Plan:   Re-assess upon next visit for continued BPPV symptoms and treat appropriately after testing.           Timed:         Neuromuscular Rose:         mins   54874;    Therapeutic Activity:      15     mins  11640;           Un-Timed:  Canalith Repositioninig        15    mins 41021  Testing w/ Goggles 4 positions         mins 69808      Timed Treatment:   15   mins   Total Treatment:     40   mins      Yury Sepulveda, PT  [place] License #: [id number]    Physical Therapist

## 2024-01-26 ENCOUNTER — OFFICE VISIT (OUTPATIENT)
Age: 84
End: 2024-01-26
Payer: MEDICARE

## 2024-01-26 VITALS
HEIGHT: 67 IN | BODY MASS INDEX: 29.85 KG/M2 | HEART RATE: 82 BPM | WEIGHT: 190.2 LBS | DIASTOLIC BLOOD PRESSURE: 80 MMHG | SYSTOLIC BLOOD PRESSURE: 128 MMHG

## 2024-01-26 DIAGNOSIS — R42 DIZZINESS: Primary | ICD-10-CM

## 2024-01-26 DIAGNOSIS — R26.89 UNSTABLE BALANCE: ICD-10-CM

## 2024-01-26 PROCEDURE — 99214 OFFICE O/P EST MOD 30 MIN: CPT | Performed by: NURSE PRACTITIONER

## 2024-01-26 PROCEDURE — 3074F SYST BP LT 130 MM HG: CPT | Performed by: NURSE PRACTITIONER

## 2024-01-26 PROCEDURE — 3079F DIAST BP 80-89 MM HG: CPT | Performed by: NURSE PRACTITIONER

## 2024-01-26 NOTE — PROGRESS NOTES
"Date of Office Visit: 24  Encounter Provider: SLAVA Quinonez  Place of Service: [facility] CARDIOLOGY  Patient Name: Radha Silverio  :1940    Chief Complaint   Patient presents with    Hypertension   :     HPI: Radha Silverio is a 83 y.o. female  with  frequent premature atrial contractions, premature ventricular contraction, hypertension, hyperlipidemia, vertigo, diabetes mellitus and GERD.  She is followed by Dr. Trudi Urbina.  I will visit with her for the first time today and have reviewed her medical record.  She was found to have 23% atrial ectopics in 2017.  She had PVCs accounting for 1.3% of Holter monitor.  Echocardiogram showed normal left ventricular systolic function, mild to moderate tricuspid valve regurgitation with RVSP moderately elevated at 49.5 mmHg.  She was treated with metoprolol.  Follow-up stress echo 2019 showed no evidence of myocardial ischemia.  She had frequent PVCs with stress.     She had issues with recurrent vertigo and was in vestibular physical therapy.  She also reported dizziness with getting up with position changes so I decreased the hydrochlorothiazide component from 25 mg to 12.5 mg.  Echocardiogram 2024 showed preserved LV systolic function and no significant valve disease.  She presents today for reassessment.  Dizziness is a little improved.  She still have balance issues.  She remains in school therapy for vertigo.                No Known Allergies        Family and social history reviewed.     ROS  All other systems were reviewed and are negative          Objective:     Vitals:    24 1400   BP: 128/80   Pulse: 82   Weight: 86.3 kg (190 lb 3.2 oz)   Height: 170.2 cm (67\")     Body mass index is 29.79 kg/m².    PHYSICAL EXAM:  Pulmonary:      Effort: Pulmonary effort is normal.      Breath sounds: Normal breath sounds.   Cardiovascular:      Normal rate. Regular rhythm. "         Procedures      Current Outpatient Medications   Medication Sig Dispense Refill    Accu-Chek Softclix Lancets lancets USE 1 STRIP TO CHECK GLUCOSE ONCE DAILY 50 each 11    aspirin 81 MG EC tablet Take 1 tablet by mouth Daily.      Calcium Carb-Cholecalciferol (CALCIUM 1000 + D PO) Take  by mouth.      glucose blood (Accu-Chek Wandy Plus) test strip USE 1 STRIP TO CHECK GLUCOSE ONCE DAILY 50 each 11    lisinopril-hydrochlorothiazide (PRINZIDE,ZESTORETIC) 20-12.5 MG per tablet Take 1 tablet by mouth Daily. 30 tablet 5    metFORMIN (GLUCOPHAGE) 500 MG tablet TAKE 1 TABLET BY MOUTH TWICE DAILY WITH MEALS 180 tablet 0    metoprolol succinate XL (TOPROL-XL) 25 MG 24 hr tablet Take 1 tablet by mouth Daily. 90 tablet 3    omeprazole (priLOSEC) 40 MG capsule Take 1 capsule by mouth once daily 90 capsule 0    Polyethylene Glycol 3350 (MIRALAX PO) Take  by mouth As Needed.      simvastatin (ZOCOR) 40 MG tablet TAKE 1 TABLET BY MOUTH ONCE DAILY IN THE EVENING 90 tablet 0    vitamin C (ASCORBIC ACID) 250 MG tablet Take 1 tablet by mouth Daily.       No current facility-administered medications for this visit.     Assessment:       Diagnosis Plan   1. Dizziness  Cane  Quad or Three Prong Cane with Tips      2. Unstable balance  Cane  Quad or Three Prong Cane with Tips           Orders Placed This Encounter   Procedures    Cane  Quad or Three Prong Cane with Tips     3 or 4 prongs. Measured/sized appropriately.     Order Specific Question:   Equipment     Answer:    Quad or Three Prong Cane with Tips     Order Specific Question:   Mobility Limitation     Answer:   Prevents Completion of MRADLs Within Reasonable Time Frame     Order Specific Question:   Safety     Answer:   Able to Safely Use Equipment     Order Specific Question:   Mobility Deficit     Answer:   Mobility Deficit Can Be Sufficiently Resolved By Use of Equipment     Order Specific Question:   Length of Need     Answer:   99 Months =  Lifetime         Plan:           1.  83-year-old female with dizziness and balance issues.  Slightly improved with decrease hydrochlorothiazide.  Her echo was unremarkable earlier this month.  She remains in physical therapy for vertigo.  We discussed using assistive ice to prevent falls and patient is interested in looking at some canes.  I gave her a prescription for a cane and requested she go to [employer] to be sized appropriately.  2.  History of frequent PACs-continue current dose metoprolol succinate 25 mg daily  3.  Hyperlipidemia continue simvastatin  4.  GERD on therapy  5.  Hypertension-blood pressure is doing well on current dose lisinopril-hydrochlorothiazide 20-12 0.5.  Decreasing the hydrochlorothiazide component has seemed to improve her dizziness some  6.  Trace MR on echocardiogram January 2024.  With history of moderate TR with elevated RVSP in 2017.  7.  Diabetes mellitus-well-controlled  Follow-up in 6 months.She states she prefers the [place] location.          It has been a pleasure to participate in this patient's care.      Thank you,  SLAVA Quinonez      **I used Dragon to dictate this note:**

## 2024-01-31 ENCOUNTER — HOSPITAL ENCOUNTER (OUTPATIENT)
Dept: MAMMOGRAPHY | Facility: HOSPITAL | Age: 84
Discharge: HOME | End: 2024-01-31
Admitting: NURSE PRACTITIONER
Payer: MEDICARE

## 2024-01-31 DIAGNOSIS — Z12.31 SCREENING MAMMOGRAM FOR BREAST CANCER: ICD-10-CM

## 2024-01-31 PROCEDURE — 77063 BREAST TOMOSYNTHESIS BI: CPT

## 2024-01-31 PROCEDURE — 77067 SCR MAMMO BI INCL CAD: CPT

## 2024-02-02 ENCOUNTER — TREATMENT (OUTPATIENT)
Dept: PHYSICAL THERAPY | Facility: CLINIC | Age: 84
End: 2024-02-02
Payer: MEDICARE

## 2024-02-02 DIAGNOSIS — R42 VERTIGO: ICD-10-CM

## 2024-02-02 DIAGNOSIS — H81.12 BPPV (BENIGN PAROXYSMAL POSITIONAL VERTIGO), LEFT: Primary | ICD-10-CM

## 2024-02-02 PROCEDURE — 97530 THERAPEUTIC ACTIVITIES: CPT | Performed by: PHYSICAL THERAPIST

## 2024-02-02 PROCEDURE — 97112 NEUROMUSCULAR REEDUCATION: CPT | Performed by: PHYSICAL THERAPIST

## 2024-02-02 NOTE — PROGRESS NOTES
Physical Therapy Daily Treatment - Vestibular Note  [facility] [place]   [address], [address]  [place] [address]  P: [phone]  F: [phone]    Patient: Radha Silverio   : 1940  Referring practitioner: CAMMY Sandoval*  Date of Initial Visit: Type: THERAPY  Noted: 2023  Today's Date: 2024  Patient seen for 8 sessions       Visit Diagnoses:    ICD-10-CM ICD-9-CM   1. BPPV (benign paroxysmal positional vertigo), left  H81.12 386.11   2. Vertigo  R42 780.4         Subjective:  Radha Silverio reports: Overall I have been doing better, but this morning I got out of bed quick and felt very off balance.        Objective        See Exercise and Vestibular Logs for complete testing and treatment.       Assessment:  Started some vestibular strengthening today with some symptom provoking.  Reviewed extensively the pathology and tx.  Reviewed for pt to keep within her limits.        Plan:   Re-assess upon next visit for continued BPPV symptoms and treat appropriately after testing.           Timed:         Neuromuscular Rose:     15    mins  21926;    Therapeutic Activity:      15     mins  84102;           Un-Time  Canalith Repositioninig            mins 35033  Testing w/ Goggles 4 positions         mins 44733      Timed Treatment:   30   mins   Total Treatment:     30   mins      Yury Sepulveda PT  [place] License #: [id number]    Physical Therapist

## 2024-03-20 RX ORDER — OMEPRAZOLE 40 MG/1
40 CAPSULE, DELAYED RELEASE ORAL DAILY
Qty: 90 CAPSULE | Refills: 0 | Status: SHIPPED | OUTPATIENT
Start: 2024-03-20

## 2024-03-20 RX ORDER — SIMVASTATIN 40 MG
TABLET ORAL
Qty: 90 TABLET | Refills: 0 | Status: SHIPPED | OUTPATIENT
Start: 2024-03-20

## 2024-03-20 RX ORDER — SIMVASTATIN 40 MG
TABLET ORAL
Qty: 90 TABLET | Refills: 0 | OUTPATIENT
Start: 2024-03-20

## 2024-03-20 NOTE — TELEPHONE ENCOUNTER
Rx Refill Note  Requested Prescriptions     Pending Prescriptions Disp Refills    omeprazole (priLOSEC) 40 MG capsule [Pharmacy Med Name: Omeprazole 40 MG Oral Capsule Delayed Release] 90 capsule 0     Sig: Take 1 capsule by mouth once daily    simvastatin (ZOCOR) 40 MG tablet [Pharmacy Med Name: Simvastatin 40 MG Oral Tablet] 90 tablet 0     Sig: TAKE 1 TABLET BY MOUTH ONCE DAILY IN THE EVENING      Last office visit with prescribing clinician: 10/17/2023   Last telemedicine visit with prescribing clinician: Visit date not found   Next office visit with prescribing clinician: 3/20/2024                         Would you like a call back once the refill request has been completed: [] Yes [] No    If the office needs to give you a call back, can they leave a voicemail: [] Yes [] No    Pennie Charles CMA  03/20/24, 13:56 EDT

## 2024-04-10 DIAGNOSIS — I10 ESSENTIAL HYPERTENSION: Chronic | ICD-10-CM

## 2024-04-10 DIAGNOSIS — E11.9 TYPE 2 DIABETES MELLITUS WITHOUT COMPLICATION, WITHOUT LONG-TERM CURRENT USE OF INSULIN: Chronic | ICD-10-CM

## 2024-04-10 DIAGNOSIS — N18.31 STAGE 3A CHRONIC KIDNEY DISEASE: Chronic | ICD-10-CM

## 2024-04-10 DIAGNOSIS — E78.2 MIXED HYPERLIPIDEMIA: ICD-10-CM

## 2024-04-19 LAB
ALBUMIN SERPL-MCNC: 4.5 G/DL (ref 3.5–5.2)
ALBUMIN/CREAT UR: 6 MG/G CREAT (ref 0–29)
ALBUMIN/GLOB SERPL: 1.8 G/DL
ALP SERPL-CCNC: 52 U/L (ref 39–117)
ALT SERPL-CCNC: 15 U/L (ref 1–33)
APPEARANCE UR: CLEAR
AST SERPL-CCNC: 19 U/L (ref 1–32)
BILIRUB SERPL-MCNC: 0.4 MG/DL (ref 0–1.2)
BILIRUB UR QL STRIP: NEGATIVE
BUN SERPL-MCNC: 25 MG/DL (ref 8–23)
BUN/CREAT SERPL: 20.7 (ref 7–25)
CALCIUM SERPL-MCNC: 10.1 MG/DL (ref 8.6–10.5)
CHLORIDE SERPL-SCNC: 105 MMOL/L (ref 98–107)
CHOLEST SERPL-MCNC: 165 MG/DL (ref 0–200)
CHOLEST/HDLC SERPL: 2.14 {RATIO}
CO2 SERPL-SCNC: 26.1 MMOL/L (ref 22–29)
COLOR UR: YELLOW
CREAT SERPL-MCNC: 1.21 MG/DL (ref 0.57–1)
CREAT UR-MCNC: 102.7 MG/DL
EGFRCR SERPLBLD CKD-EPI 2021: 45.1 ML/MIN/1.73
ERYTHROCYTE [DISTWIDTH] IN BLOOD BY AUTOMATED COUNT: 13 % (ref 12.3–15.4)
GLOBULIN SER CALC-MCNC: 2.5 GM/DL
GLUCOSE SERPL-MCNC: 147 MG/DL (ref 65–99)
GLUCOSE UR QL STRIP: NEGATIVE
HBA1C MFR BLD: 7 % (ref 4.8–5.6)
HCT VFR BLD AUTO: 39.7 % (ref 34–46.6)
HDLC SERPL-MCNC: 77 MG/DL (ref 40–60)
HGB BLD-MCNC: 13 G/DL (ref 12–15.9)
HGB UR QL STRIP: NEGATIVE
KETONES UR QL STRIP: NEGATIVE
LDLC SERPL CALC-MCNC: 76 MG/DL (ref 0–100)
LEUKOCYTE ESTERASE UR QL STRIP: ABNORMAL
MCH RBC QN AUTO: 27.4 PG (ref 26.6–33)
MCHC RBC AUTO-ENTMCNC: 32.7 G/DL (ref 31.5–35.7)
MCV RBC AUTO: 83.8 FL (ref 79–97)
MICROALBUMIN UR-MCNC: 5.7 UG/ML
NITRITE UR QL STRIP: NEGATIVE
PH UR STRIP: 6 [PH] (ref 5–8)
PLATELET # BLD AUTO: 191 10*3/MM3 (ref 140–450)
POTASSIUM SERPL-SCNC: 4.5 MMOL/L (ref 3.5–5.2)
PROT SERPL-MCNC: 7 G/DL (ref 6–8.5)
PROT UR QL STRIP: NEGATIVE
RBC # BLD AUTO: 4.74 10*6/MM3 (ref 3.77–5.28)
SODIUM SERPL-SCNC: 143 MMOL/L (ref 136–145)
SP GR UR STRIP: 1.02 (ref 1–1.03)
TRIGL SERPL-MCNC: 61 MG/DL (ref 0–150)
TSH SERPL DL<=0.005 MIU/L-ACNC: 1.26 UIU/ML (ref 0.27–4.2)
UROBILINOGEN UR STRIP-MCNC: ABNORMAL MG/DL
VLDLC SERPL CALC-MCNC: 12 MG/DL (ref 5–40)
WBC # BLD AUTO: 4.49 10*3/MM3 (ref 3.4–10.8)

## 2024-04-25 ENCOUNTER — OFFICE VISIT (OUTPATIENT)
Dept: INTERNAL MEDICINE | Facility: CLINIC | Age: 84
End: 2024-04-25
Payer: MEDICARE

## 2024-04-25 VITALS
BODY MASS INDEX: 30.67 KG/M2 | WEIGHT: 195.4 LBS | SYSTOLIC BLOOD PRESSURE: 122 MMHG | HEIGHT: 67 IN | TEMPERATURE: 99.1 F | HEART RATE: 71 BPM | DIASTOLIC BLOOD PRESSURE: 78 MMHG | OXYGEN SATURATION: 98 %

## 2024-04-25 DIAGNOSIS — E11.65 TYPE 2 DIABETES MELLITUS WITH HYPERGLYCEMIA, WITHOUT LONG-TERM CURRENT USE OF INSULIN: ICD-10-CM

## 2024-04-25 DIAGNOSIS — I10 ESSENTIAL HYPERTENSION: Chronic | ICD-10-CM

## 2024-04-25 DIAGNOSIS — Z78.0 POSTMENOPAUSE: ICD-10-CM

## 2024-04-25 DIAGNOSIS — E78.2 MIXED HYPERLIPIDEMIA: ICD-10-CM

## 2024-04-25 DIAGNOSIS — Z00.00 ENCOUNTER FOR MEDICARE ANNUAL WELLNESS EXAM: Primary | ICD-10-CM

## 2024-04-25 DIAGNOSIS — N18.31 STAGE 3A CHRONIC KIDNEY DISEASE: Chronic | ICD-10-CM

## 2024-04-25 NOTE — PROGRESS NOTES
The ABCs of the Annual Wellness Visit  Subsequent Medicare Wellness Visit    Chief Complaint   Patient presents with    Medicare Wellness-subsequent       Subjective   History of Present Illness:  Radha Silverio is a 83 y.o. female who presents for a Subsequent Medicare Wellness Visit as well as follow up of her chronic health conditions.        Patient Active Problem List   Diagnosis    Essential hypertension    Mixed hyperlipidemia    Chronic constipation    Acid reflux    Solis esophagus    Type 2 diabetes mellitus without complication, without long-term current use of insulin    Hemorrhoids    Frequent PVCs    Atrial bigeminy    PAC (premature atrial contraction)    Primary osteoarthritis of knee    Primary osteoarthritis of right knee    Tinnitus of both ears    Left hip pain    Primary osteoarthritis of left hip    Greater trochanteric bursitis of left hip    Stage 3 chronic kidney disease    Female genital prolapse    Chronic gastritis without bleeding    Degenerative disc disease, lumbar    Weakness of left lower extremity    Neuritis or radiculitis due to rupture of lumbar intervertebral disc    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      Calcium Carb-Cholecalciferol (CALCIUM 1000 + D PO) Take  by mouth.      lisinopril-hydrochlorothiazide (PRINZIDE,ZESTORETIC) 20-12.5 MG per tablet Take 1 tablet by mouth Daily. 30 tablet 5    metFORMIN (GLUCOPHAGE) 500 MG tablet TAKE 1 TABLET BY MOUTH TWICE DAILY WITH MEALS 180 tablet 0    metoprolol succinate XL (TOPROL-XL) 25 MG 24 hr tablet Take 1 tablet by mouth Daily. 90 tablet 3    omeprazole (priLOSEC) 40 MG capsule Take 1 capsule by mouth once daily 90 capsule 0    Polyethylene Glycol 3350 (MIRALAX PO) Take  by mouth As  Needed.      simvastatin (ZOCOR) 40 MG tablet TAKE 1 TABLET BY MOUTH ONCE DAILY IN THE EVENING 90 tablet 0    vitamin C (ASCORBIC ACID) 250 MG tablet Take 1 tablet by mouth Daily.         Appt for eye exam 2024.    Her diet in the last 6mo has not been as healthy as previously. She has not been drinking as much water as previously.    No BG<70 nor >200.      HEALTH RISK ASSESSMENT    Recent Hospitalizations:  No hospitalization(s) within the last year.    Current Medical Providers:  Patient Care Team:  Arielle Johnston APRN as PCP - General (Family Medicine)  Trudi Urbina MD as Consulting Physician (Cardiology)  Nam Johnson OD (Optometry)    Smoking Status:  Social History     Tobacco Use   Smoking Status Never   Smokeless Tobacco Never   Tobacco Comments    no caffiene       Alcohol Consumption:  Social History     Substance and Sexual Activity   Alcohol Use No       Depression Screen:       2024    11:34 AM   PHQ-2/PHQ-9 Depression Screening   Little Interest or Pleasure in Doing Things 0-->not at all   Feeling Down, Depressed or Hopeless 0-->not at all   PHQ-9: Brief Depression Severity Measure Score 0       Fall Risk Screen:  STEADI Fall Risk Assessment was completed, and patient is at LOW risk for falls.Assessment completed on:2024    Health Habits and Functional and Cognitive Screenin/25/2024    11:35 AM   Functional & Cognitive Status   Do you have difficulty preparing food and eating? No   Do you have difficulty bathing yourself, getting dressed or grooming yourself? No   Do you have difficulty using the toilet? No   Do you have difficulty moving around from place to place? No   Do you have trouble with steps or getting out of a bed or a chair? No   Current Diet Well Balanced Diet   Dental Exam Not up to date   Eye Exam Up to date   Current Exercises Include No Regular Exercise   Do you need help using the phone?  No   Are you deaf or do you have serious difficulty  hearing?  No   Do you need help to go to places out of walking distance? No   Do you need help shopping? No   Do you need help preparing meals?  No   Do you need help with housework?  No   Do you need help with laundry? No   Do you need help taking your medications? No   Do you need help managing money? No   Do you ever drive or ride in a car without wearing a seat belt? No   Have you felt unusual stress, anger or loneliness in the last month? No   Who do you live with? Alone   If you need help, do you have trouble finding someone available to you? No   Have you been bothered in the last four weeks by sexual problems? No   Do you have difficulty concentrating, remembering or making decisions? No         Does the patient have evidence of cognitive impairment? No    Asprin use counseling:Taking ASA appropriately as indicated    Age-appropriate Screening Schedule:  Refer to the list below for future screening recommendations based on patient's age, sex and/or medical conditions. Orders for these recommended tests are listed in the plan section. The patient has been provided with a written plan.    Health Maintenance   Topic Date Due    TDAP/TD VACCINES (2 - Tdap) 06/16/2009    ZOSTER VACCINE (2 of 2) 01/27/2024    DIABETIC EYE EXAM  04/27/2024    DXA SCAN  04/27/2024    INFLUENZA VACCINE  08/01/2024    BMI FOLLOWUP  10/17/2024    HEMOGLOBIN A1C  10/18/2024    LIPID PANEL  04/18/2025    URINE MICROALBUMIN  04/18/2025    ANNUAL WELLNESS VISIT  04/25/2025    COLORECTAL CANCER SCREENING  10/05/2026    RSV Vaccine - Adults  Completed    Pneumococcal Vaccine 65+  Completed    COVID-19 Vaccine  Discontinued          The following portions of the patient's history were reviewed and updated as appropriate: allergies, current medications, past family history, past medical history, past social history, past surgical history, and problem list.      Compared to one year ago, the patient feels her physical health is the  "same.  Compared to one year ago, the patient feels her mental health is the same.    Reviewed chart for potential of high risk medication in the elderly: Yes  Reviewed chart for potential of harmful drug interactions in the elderly:Yes          Objective         Vitals:    04/25/24 1130   BP: 122/78   BP Location: Left arm   Patient Position: Sitting   Cuff Size: Adult   Pulse: 71   Temp: 99.1 °F (37.3 °C)   TempSrc: Infrared   SpO2: 98%   Weight: 88.6 kg (195 lb 6.4 oz)   Height: 170.2 cm (67\")       No results found.    Body mass index is 30.6 kg/m².        Physical Exam  Constitutional:       General: She is not in acute distress.     Appearance: Normal appearance. She is well-developed.   HENT:      Head: Normocephalic.      Right Ear: Hearing, tympanic membrane, ear canal and external ear normal.      Left Ear: Hearing, tympanic membrane, ear canal and external ear normal.      Nose: Nose normal. No mucosal edema or rhinorrhea.      Mouth/Throat:      Mouth: Mucous membranes are moist.      Dentition: Abnormal dentition.      Pharynx: Oropharynx is clear. Uvula midline.   Eyes:      General: Lids are normal.      Extraocular Movements: Extraocular movements intact.      Conjunctiva/sclera: Conjunctivae normal.      Pupils: Pupils are equal, round, and reactive to light.   Neck:      Thyroid: No thyroid mass or thyromegaly.   Cardiovascular:      Rate and Rhythm: Regular rhythm.      Pulses: Normal pulses.      Heart sounds: S1 normal and S2 normal. No murmur heard.     No friction rub. No gallop.   Pulmonary:      Effort: Pulmonary effort is normal.      Breath sounds: Normal breath sounds. No wheezing, rhonchi or rales.   Abdominal:      General: Bowel sounds are normal.      Palpations: Abdomen is soft.      Tenderness: There is no abdominal tenderness. There is no guarding.      Hernia: No hernia is present.   Musculoskeletal:         General: No deformity. Normal range of motion.      Cervical back: Normal " range of motion and neck supple.   Lymphadenopathy:      Cervical: No cervical adenopathy.   Skin:     General: Skin is warm and dry.      Findings: No lesion or rash.   Neurological:      General: No focal deficit present.      Mental Status: She is alert and oriented to person, place, and time.      Cranial Nerves: No cranial nerve deficit.      Sensory: No sensory deficit.      Motor: Motor function is intact.      Coordination: Coordination is intact.      Gait: Gait normal.      Deep Tendon Reflexes: Reflexes are normal and symmetric.   Psychiatric:         Attention and Perception: She is attentive.         Mood and Affect: Mood and affect normal.         Speech: Speech normal.         Behavior: Behavior normal.         Thought Content: Thought content normal.         Recent Results (from the past 336 hour(s))   Microalbumin / Creatinine Urine Ratio - Urine, Clean Catch    Collection Time: 04/18/24 10:05 AM    Specimen: Urine, Clean Catch   Result Value Ref Range    Creatinine, Urine 102.7 Not Estab. mg/dL    Microalbumin, Urine 5.7 Not Estab. ug/mL    Microalbumin/Creatinine Ratio 6 0 - 29 mg/g creat   Urinalysis without microscopic (no culture) - Urine, Clean Catch    Collection Time: 04/18/24 10:05 AM    Specimen: Urine, Clean Catch   Result Value Ref Range    Specific Gravity, UA 1.016 1.005 - 1.030    pH, UA 6.0 5.0 - 8.0    Color, UA Yellow     Appearance, UA Clear Clear    Leukocytes, UA See below: (A) Negative    Protein Negative Negative    Glucose, UA Negative Negative    Ketones Negative Negative    Blood, UA Negative Negative    Bilirubin, UA Negative Negative    Urobilinogen, UA Comment     Nitrite, UA Negative Negative   TSH    Collection Time: 04/18/24 10:05 AM    Specimen: Blood   Result Value Ref Range    TSH 1.260 0.270 - 4.200 uIU/mL   Lipid Panel With / Chol / HDL Ratio    Collection Time: 04/18/24 10:05 AM    Specimen: Blood   Result Value Ref Range    Total Cholesterol 165 0 - 200 mg/dL     Triglycerides 61 0 - 150 mg/dL    HDL Cholesterol 77 (H) 40 - 60 mg/dL    VLDL Cholesterol Madhav 12 5 - 40 mg/dL    LDL Chol Calc (NIH) 76 0 - 100 mg/dL    Chol/HDL Ratio 2.14    Hemoglobin A1c    Collection Time: 04/18/24 10:05 AM    Specimen: Blood   Result Value Ref Range    Hemoglobin A1C 7.00 (H) 4.80 - 5.60 %   Comprehensive Metabolic Panel    Collection Time: 04/18/24 10:05 AM    Specimen: Blood   Result Value Ref Range    Glucose 147 (H) 65 - 99 mg/dL    BUN 25 (H) 8 - 23 mg/dL    Creatinine 1.21 (H) 0.57 - 1.00 mg/dL    EGFR Result 45.1 (L) >60.0 mL/min/1.73    BUN/Creatinine Ratio 20.7 7.0 - 25.0    Sodium 143 136 - 145 mmol/L    Potassium 4.5 3.5 - 5.2 mmol/L    Chloride 105 98 - 107 mmol/L    Total CO2 26.1 22.0 - 29.0 mmol/L    Calcium 10.1 8.6 - 10.5 mg/dL    Total Protein 7.0 6.0 - 8.5 g/dL    Albumin 4.5 3.5 - 5.2 g/dL    Globulin 2.5 gm/dL    A/G Ratio 1.8 g/dL    Total Bilirubin 0.4 0.0 - 1.2 mg/dL    Alkaline Phosphatase 52 39 - 117 U/L    AST (SGOT) 19 1 - 32 U/L    ALT (SGPT) 15 1 - 33 U/L   CBC (No Diff)    Collection Time: 04/18/24 10:05 AM    Specimen: Blood   Result Value Ref Range    WBC 4.49 3.40 - 10.80 10*3/mm3    RBC 4.74 3.77 - 5.28 10*6/mm3    Hemoglobin 13.0 12.0 - 15.9 g/dL    Hematocrit 39.7 34.0 - 46.6 %    MCV 83.8 79.0 - 97.0 fL    MCH 27.4 26.6 - 33.0 pg    MCHC 32.7 31.5 - 35.7 g/dL    RDW 13.0 12.3 - 15.4 %    Platelets 191 140 - 450 10*3/mm3       Each of these lab results were discussed individually in detail with the patient.      Assessment & Plan     Diagnoses and all orders for this visit:    1. Encounter for Medicare annual wellness exam (Primary)    2. Postmenopause  -     DEXA Bone Density Axial; Future    3. Essential hypertension    4. Mixed hyperlipidemia    5. Type 2 diabetes mellitus with hyperglycemia, without long-term current use of insulin    6. Stage 3a chronic kidney disease        Medicare Risks and Personalized Health Plan    Advanced Care  Planning:  ACP discussion was held with the patient during this visit. Patient has an advance directive in EMR which is still valid.     CMS Preventative Services Quick Reference  Advance Directive Discussion  Breast Cancer/Mammogram Screening  Diabetic Lab Screening   Immunizations Discussed/Encouraged (specific immunizations; Tdap and Shingrix )  Obesity/Overweight   Osteoporosis Risk      Discussed the patient's BMI with her. The BMI is above average; BMI management plan is completed.  BMI is >= 30 and <35. (Class 1 Obesity). The following options were offered after discussion;: nutrition counseling/recommendations      Except as noted above, pt will continue current medications as noted in the medication list. I will continue to authorize refills as needed.      The above risks/problems have been discussed with the patient.  Pertinent information has been shared with the patient in the After Visit Summary.  Follow up plans and orders are seen below in the Assessment/Plan Section.      Follow Up:  Return in about 6 months (around 10/25/2024) for fasting labs one week prior to.    An After Visit Summary and PPPS were given to the patient.

## 2024-05-14 ENCOUNTER — TELEPHONE (OUTPATIENT)
Dept: INTERNAL MEDICINE | Facility: CLINIC | Age: 84
End: 2024-05-14

## 2024-05-14 NOTE — TELEPHONE ENCOUNTER
Caller: Radha Silverio    Relationship: Self    Best call back number: [phone]     Who are you requesting to speak with (clinical staff, provider,  specific staff member): CLINICAL STAFF    What was the call regarding: PATIENT REQUESTS A CALL BACK TO DISCUSS QUESTIONS ABOUT UPCOMING BONE DENSITY SCAN

## 2024-05-16 NOTE — TELEPHONE ENCOUNTER
Rx Refill Note  Requested Prescriptions     Pending Prescriptions Disp Refills    metFORMIN (GLUCOPHAGE) 500 MG tablet [Pharmacy Med Name: metFORMIN HCl 500 MG Oral Tablet] 180 tablet 0     Sig: TAKE 1 TABLET BY MOUTH TWICE DAILY WITH MEALS      Last office visit with prescribing clinician: 4/25/2024   Last telemedicine visit with prescribing clinician: Visit date not found   Next office visit with prescribing clinician: 10/29/2024                         Would you like a call back once the refill request has been completed: [] Yes [] No    If the office needs to give you a call back, can they leave a voicemail: [] Yes [] No    Lele aJffe MA  05/16/24, 15:15 EDT

## 2024-05-21 ENCOUNTER — APPOINTMENT (OUTPATIENT)
Dept: BONE DENSITY | Facility: HOSPITAL | Age: 84
End: 2024-05-21
Payer: MEDICARE

## 2024-05-21 DIAGNOSIS — Z78.0 POSTMENOPAUSE: ICD-10-CM

## 2024-05-21 PROCEDURE — 77080 DXA BONE DENSITY AXIAL: CPT

## 2024-05-21 RX ORDER — LISINOPRIL AND HYDROCHLOROTHIAZIDE 20; 12.5 MG/1; MG/1
1 TABLET ORAL DAILY
Qty: 90 TABLET | Refills: 2 | Status: SHIPPED | OUTPATIENT
Start: 2024-05-21

## 2024-06-06 ENCOUNTER — OFFICE VISIT (OUTPATIENT)
Dept: CARDIOLOGY | Facility: CLINIC | Age: 84
End: 2024-06-06
Payer: MEDICARE

## 2024-06-06 VITALS
SYSTOLIC BLOOD PRESSURE: 130 MMHG | DIASTOLIC BLOOD PRESSURE: 78 MMHG | HEIGHT: 67 IN | WEIGHT: 197 LBS | RESPIRATION RATE: 18 BRPM | BODY MASS INDEX: 30.92 KG/M2 | HEART RATE: 64 BPM | OXYGEN SATURATION: 98 %

## 2024-06-06 DIAGNOSIS — I10 ESSENTIAL HYPERTENSION: Primary | Chronic | ICD-10-CM

## 2024-06-06 DIAGNOSIS — I49.1 ECTOPIC ATRIAL RHYTHM: ICD-10-CM

## 2024-06-06 DIAGNOSIS — E78.2 MIXED HYPERLIPIDEMIA: ICD-10-CM

## 2024-06-06 DIAGNOSIS — I49.3 FREQUENT PVCS: ICD-10-CM

## 2024-06-06 PROCEDURE — 1160F RVW MEDS BY RX/DR IN RCRD: CPT | Performed by: INTERNAL MEDICINE

## 2024-06-06 PROCEDURE — 3078F DIAST BP <80 MM HG: CPT | Performed by: INTERNAL MEDICINE

## 2024-06-06 PROCEDURE — 99214 OFFICE O/P EST MOD 30 MIN: CPT | Performed by: INTERNAL MEDICINE

## 2024-06-06 PROCEDURE — 3075F SYST BP GE 130 - 139MM HG: CPT | Performed by: INTERNAL MEDICINE

## 2024-06-06 PROCEDURE — 1159F MED LIST DOCD IN RCRD: CPT | Performed by: INTERNAL MEDICINE

## 2024-06-06 PROCEDURE — 93000 ELECTROCARDIOGRAM COMPLETE: CPT | Performed by: INTERNAL MEDICINE

## 2024-06-06 RX ORDER — LISINOPRIL AND HYDROCHLOROTHIAZIDE 20; 12.5 MG/1; MG/1
1 TABLET ORAL DAILY
Qty: 90 TABLET | Refills: 3 | Status: SHIPPED | OUTPATIENT
Start: 2024-06-06

## 2024-06-06 RX ORDER — METOPROLOL SUCCINATE 25 MG/1
25 TABLET, EXTENDED RELEASE ORAL DAILY
Qty: 90 TABLET | Refills: 3 | Status: SHIPPED | OUTPATIENT
Start: 2024-06-06

## 2024-06-06 NOTE — PROGRESS NOTES
CARDIOLOGY    Trudi Urbina MD    ENCOUNTER DATE:  06/06/2024    Radha Silverio / 83 y.o. / female        CHIEF COMPLAINT / REASON FOR OFFICE VISIT     Follow-up (Yearly follow up/Atrial Bigeminy /Dizziness /Frequent PVC )      HISTORY OF PRESENT ILLNESS       HPI    Radha Silverio is a 83 y.o. female     This is a lady I saw for an irregular heartbeat and episodes of breathlessness 6/15/17.  Dr. Quiroga saw her and noted the irregularity and checked an EKG which showed frequent premature ventricular contractions. The patient had an echocardiogram on 06/08/2017 which showed normal LV systolic and diastolic function with mild-to-moderate tricuspid regurgitation with a right ventricular systolic pressure of about 50 mmHg. She wore a Holter monitor which showed frequent premature atrial contractions at about 23% of the tracing. There were frequent bouts of atrial bigeminy. She had some PVCs, but the PACs far outweighed it. She had a little focal stinging pain that she made note of in her diary which did not correspond to any arrhythmia. She did not make note of the breathless episodes in her diary. I suspected that her episodes of breathlessness were related to atrial bigeminy.  I stopped amlodipine and put her on Toprol-XL 25 mg a day.  In February 2019 she had a normal stress echo.  Ejection fraction 62%.     She had a other echo in January 2024 which showed hyperdynamic LV function, grade 1 diastolic dysfunction, trace tricuspid regurgitation with a normal right ventricular systolic pressure.  Around that time she saw Apryl twice and was complaining of dizziness and some adjustments were made to her medication.    She is here today for follow-up.  She denies any palpitations or edema.  She gets some atypical right-sided discomfort in her chest not associated with exercise.  She notices it mostly when she is laying down.    REVIEW OF SYSTEMS     ROS      VITAL SIGNS     Visit Vitals  /78 (BP  "Location: Left arm, Patient Position: Sitting, Cuff Size: Adult)   Pulse 64   Resp 18   Ht 170.2 cm (67\")   Wt 89.4 kg (197 lb)   LMP  (LMP Unknown)   SpO2 98%   BMI 30.85 kg/m²         Wt Readings from Last 3 Encounters:   06/06/24 89.4 kg (197 lb)   04/25/24 88.6 kg (195 lb 6.4 oz)   01/26/24 86.3 kg (190 lb 3.2 oz)     Body mass index is 30.85 kg/m².      PHYSICAL EXAMINATION     Constitutional:       General: Not in acute distress.  Neck:      Vascular: No carotid bruit or JVD.   Pulmonary:      Effort: Pulmonary effort is normal.      Breath sounds: Normal breath sounds.   Cardiovascular:      Normal rate. Regular rhythm.      Murmurs: There is no murmur.   Psychiatric:         Mood and Affect: Mood and affect normal.           REVIEWED DATA       ECG 12 Lead    Date/Time: 6/6/2024 11:47 AM  Performed by: Trudi Urbina MD    Authorized by: Trudi Urbina MD  Comparison: compared with previous ECG from 12/11/2023  Similar to previous ECG  Rhythm comments: Ectopic atrial rhythm  BPM: 64  Conduction: conduction normal  ST Segments: ST segments normal  T Waves: T waves normal    Clinical impression: normal ECG            Lipid Panel          10/10/2023    09:52 4/18/2024    10:05   Lipid Panel   Total Cholesterol 162  165    Triglycerides 62  61    HDL Cholesterol 76  77    VLDL Cholesterol 12  12    LDL Cholesterol  74  76        Lab Results   Component Value Date    GLUCOSE 147 (H) 04/18/2024    BUN 25 (H) 04/18/2024    CREATININE 1.21 (H) 04/18/2024    EGFRRESULT 45.1 (L) 04/18/2024    BCR 20.7 04/18/2024    K 4.5 04/18/2024    CO2 26.1 04/18/2024    CALCIUM 10.1 04/18/2024    PROTENTOTREF 7.0 04/18/2024    ALBUMIN 4.5 04/18/2024    BILITOT 0.4 04/18/2024    AST 19 04/18/2024    ALT 15 04/18/2024       ASSESSMENT & PLAN      Diagnosis Plan   1. Essential hypertension        2. Frequent PVCs        3. Mixed hyperlipidemia        4. Ectopic atrial rhythm            1.  Atrial bigeminy.  Now in an ectopic " atrial rhythm and has been for the last few years.  Asymptomatic.    2.  Hypertension.  Her blood pressure looks good.  I am not going to change anything.  3.  Hyperlipidemia.  I reviewed her lipid panel as above.  Continue with simvastatin.  4.  Diabetes  5.  Obesity.    Follow-up in 1 year unless her symptoms change sooner.    Orders Placed This Encounter   Procedures    ECG 12 Lead     This order was created via procedure documentation     Order Specific Question:   Release to patient     Answer:   Routine Release [[id number]]           MEDICATIONS         Discharge Medications            Accurate as of June 6, 2024 11:48 AM. If you have any questions, ask your nurse or doctor.                Continue These Medications        Instructions Start Date   Accu-Chek Wandy Plus test strip  Generic drug: glucose blood   USE 1 STRIP TO CHECK GLUCOSE ONCE DAILY      Accu-Chek Softclix Lancets lancets   USE 1 STRIP TO CHECK GLUCOSE ONCE DAILY      aspirin 81 MG EC tablet   81 mg, Oral, Daily      CALCIUM 1000 + D PO   Oral      lisinopril-hydrochlorothiazide 20-12.5 MG per tablet  Commonly known as: PRINZIDE,ZESTORETIC   1 tablet, Oral, Daily      metFORMIN 500 MG tablet  Commonly known as: GLUCOPHAGE   TAKE 1 TABLET BY MOUTH TWICE DAILY WITH MEALS      metoprolol succinate XL 25 MG 24 hr tablet  Commonly known as: TOPROL-XL   25 mg, Oral, Daily      MIRALAX PO   Oral, As Needed, Has not been taking but is going to be starting again soon      omeprazole 40 MG capsule  Commonly known as: priLOSEC   40 mg, Oral, Daily      simvastatin 40 MG tablet  Commonly known as: ZOCOR   TAKE 1 TABLET BY MOUTH ONCE DAILY IN THE EVENING      vitamin C 250 MG tablet  Commonly known as: ASCORBIC ACID   250 mg, Oral, Daily                 Trudi Urbina MD  06/06/24  11:48 EDT    Part of this note may be an electronic transcription/translation of spoken language to printed text using the Dragon dictation system.

## 2024-06-18 ENCOUNTER — TELEPHONE (OUTPATIENT)
Dept: INTERNAL MEDICINE | Facility: CLINIC | Age: 84
End: 2024-06-18
Payer: MEDICARE

## 2024-06-18 NOTE — TELEPHONE ENCOUNTER
OK FOR HUB TO READ.  Attempted to call patient to schedule appt for back pain. Patient had called over the weekend and had talked Niya PEDERSEN. Niya had asked we schedule her an OV with any of the providers that has an opening as her PCP is on vacation for the next 2 weeks.

## 2024-07-06 RX ORDER — OMEPRAZOLE 40 MG/1
40 CAPSULE, DELAYED RELEASE ORAL DAILY
Qty: 90 CAPSULE | Refills: 0 | Status: SHIPPED | OUTPATIENT
Start: 2024-07-06

## 2024-07-06 RX ORDER — SIMVASTATIN 40 MG
TABLET ORAL
Qty: 90 TABLET | Refills: 0 | Status: SHIPPED | OUTPATIENT
Start: 2024-07-06

## 2024-07-06 NOTE — TELEPHONE ENCOUNTER
Rx Refill Note  Requested Prescriptions     Pending Prescriptions Disp Refills    omeprazole (priLOSEC) 40 MG capsule [Pharmacy Med Name: Omeprazole 40 MG Oral Capsule Delayed Release] 90 capsule 0     Sig: Take 1 capsule by mouth once daily    simvastatin (ZOCOR) 40 MG tablet [Pharmacy Med Name: Simvastatin 40 MG Oral Tablet] 90 tablet 0     Sig: TAKE 1 TABLET BY MOUTH ONCE DAILY IN THE EVENING      Last office visit with prescribing clinician: 4/25/2024   Last telemedicine visit with prescribing clinician: Visit date not found   Next office visit with prescribing clinician: 7/8/2024                         Would you like a call back once the refill request has been completed: [] Yes [] No    If the office needs to give you a call back, can they leave a voicemail: [] Yes [] No    Gini Nava MA  07/06/24, 11:39 EDT

## 2024-07-08 ENCOUNTER — OFFICE VISIT (OUTPATIENT)
Dept: INTERNAL MEDICINE | Facility: CLINIC | Age: 84
End: 2024-07-08
Payer: MEDICARE

## 2024-07-08 VITALS
OXYGEN SATURATION: 98 % | HEIGHT: 67 IN | BODY MASS INDEX: 30.86 KG/M2 | HEART RATE: 61 BPM | WEIGHT: 196.6 LBS | TEMPERATURE: 97.8 F | SYSTOLIC BLOOD PRESSURE: 124 MMHG | DIASTOLIC BLOOD PRESSURE: 82 MMHG

## 2024-07-08 DIAGNOSIS — M54.50 CHRONIC BILATERAL LOW BACK PAIN WITHOUT SCIATICA: ICD-10-CM

## 2024-07-08 DIAGNOSIS — M54.6 CHRONIC BILATERAL THORACIC BACK PAIN: ICD-10-CM

## 2024-07-08 DIAGNOSIS — G89.29 CHRONIC BILATERAL LOW BACK PAIN WITHOUT SCIATICA: ICD-10-CM

## 2024-07-08 DIAGNOSIS — G89.29 CHRONIC BILATERAL THORACIC BACK PAIN: ICD-10-CM

## 2024-07-08 PROCEDURE — 3079F DIAST BP 80-89 MM HG: CPT | Performed by: NURSE PRACTITIONER

## 2024-07-08 PROCEDURE — 1126F AMNT PAIN NOTED NONE PRSNT: CPT | Performed by: NURSE PRACTITIONER

## 2024-07-08 PROCEDURE — 99214 OFFICE O/P EST MOD 30 MIN: CPT | Performed by: NURSE PRACTITIONER

## 2024-07-08 PROCEDURE — 3074F SYST BP LT 130 MM HG: CPT | Performed by: NURSE PRACTITIONER

## 2024-07-08 PROCEDURE — 1160F RVW MEDS BY RX/DR IN RCRD: CPT | Performed by: NURSE PRACTITIONER

## 2024-07-08 PROCEDURE — 1159F MED LIST DOCD IN RCRD: CPT | Performed by: NURSE PRACTITIONER

## 2024-07-08 RX ORDER — CYCLOBENZAPRINE HCL 5 MG
5 TABLET ORAL NIGHTLY PRN
Qty: 14 TABLET | Refills: 0 | Status: SHIPPED | OUTPATIENT
Start: 2024-07-08 | End: 2024-07-22

## 2024-07-08 RX ORDER — CALCIUM CARBONATE 160(400)MG
1 TABLET,CHEWABLE ORAL DAILY
Qty: 1 EACH | Refills: 0 | Status: SHIPPED | OUTPATIENT
Start: 2024-07-08

## 2024-07-08 NOTE — PROGRESS NOTES
"Radha Silverio is a 83 y.o. female presenting today for   Chief Complaint   Patient presents with    Back Pain     3 weeks        Subjective    Back Pain  This is a chronic problem. The current episode started 1 to 4 weeks ago. The problem occurs intermittently. The pain is present in the lumbar spine and thoracic spine. The quality of the pain is described as cramping, burning and aching. The pain does not radiate. The symptoms are aggravated by standing. Pertinent negatives include no bladder incontinence, bowel incontinence or perianal numbness. She has tried nothing for the symptoms.        The following portions of the patient's history were reviewed and updated as appropriate: allergies, current medications, problem list, past medical history, past surgical history, family history, and social history.    Review of Systems   Gastrointestinal:  Negative for bowel incontinence.   Genitourinary:  Negative for urinary incontinence.   Musculoskeletal:  Positive for back pain.         Objective    Vitals:    07/08/24 0858   BP: 124/82   BP Location: Left arm   Patient Position: Sitting   Cuff Size: Adult   Pulse: 61   Temp: 97.8 °F (36.6 °C)   TempSrc: Infrared   SpO2: 98%   Weight: 89.2 kg (196 lb 9.6 oz)   Height: 170.2 cm (67.01\")     Body mass index is 30.78 kg/m².  Nursing notes and vitals reviewed.    Physical Exam  Constitutional:       General: She is not in acute distress.     Appearance: She is well-developed.   Pulmonary:      Effort: Pulmonary effort is normal.   Musculoskeletal:      Thoracic back: Normal.      Lumbar back: No swelling, deformity, spasms, tenderness or bony tenderness. Decreased range of motion.   Neurological:      Mental Status: She is alert.      Sensory: Sensation is intact.      Motor: Motor function is intact.      Coordination: Coordination is intact.      Gait: Gait is intact.      Deep Tendon Reflexes: Reflexes are normal and symmetric.   Psychiatric:         Attention and " Perception: She is attentive.         Speech: Speech normal.         MRI Lumbar Spine Without Contrast (12/06/2019 12:19)   IMPRESSION:  1. At L4-5, there is a left paracentral and posterolateral disc herniation as described above, with milder broad-based disc bulging. Combination with ligamentum flavum hypertrophy and facet arthropathy this results in relatively severe left lateral  recess and foraminal stenosis with impingement of the left L4 and left L5 nerve root. Moderate to marked spinal stenosis. Mild right lateral recess and foraminal stenosis.  2. Edema in the L4-5 articulating facets and adjacent pedicles, greater on the left than on the right. This most likely reflects reactive edema related to facet arthropathy or areas of stress response.  3. At L3-4, disc, facet and ligamentum flavum hypertrophy with moderate spinal stenosis and moderate lateral recess stenosis. Mild bilateral foraminal stenosis.  4. At L2-3, moderate spinal stenosis and mild lateral recess and foraminal stenosis bilaterally.  5. At L5-S1, moderate to marked bilateral foraminal stenosis.  6. Diffuse heterogeneity of the vertebral body marrow, with subtle rounded areas of low signal in multiple vertebral bodies. This may simply reflect a pattern of osteopenia, however a more aggressive or marrow placing process might also cause this  appearance.       Assessment and Plan    Diagnoses and all orders for this visit:    1. Chronic bilateral thoracic back pain  -     XR spine thoracolumbar 2 vw  -     diclofenac (VOLTAREN) 50 MG EC tablet; Take 1 tablet by mouth 2 (Two) Times a Day for 14 days.  Dispense: 28 tablet; Refill: 0  -     cyclobenzaprine (FLEXERIL) 5 MG tablet; Take 1 tablet by mouth At Night As Needed for Muscle Spasms for up to 14 days.  Dispense: 14 tablet; Refill: 0  -     Ambulatory Referral to Physical Therapy    2. Chronic bilateral low back pain without sciatica  -     XR Spine Lumbar 4+ View  -     diclofenac (VOLTAREN)  50 MG EC tablet; Take 1 tablet by mouth 2 (Two) Times a Day for 14 days.  Dispense: 28 tablet; Refill: 0  -     cyclobenzaprine (FLEXERIL) 5 MG tablet; Take 1 tablet by mouth At Night As Needed for Muscle Spasms for up to 14 days.  Dispense: 14 tablet; Refill: 0  -     Ambulatory Referral to Physical Therapy            Medications, including side effects, were discussed with the patient. Patient verbalized understanding.  The plan of care was discussed. All questions were answered. Patient verbalized understanding.        Return if symptoms worsen or fail to improve.

## 2024-07-18 ENCOUNTER — HOSPITAL ENCOUNTER (OUTPATIENT)
Dept: GENERAL RADIOLOGY | Facility: HOSPITAL | Age: 84
Discharge: HOME | End: 2024-07-18
Payer: MEDICARE

## 2024-07-18 ENCOUNTER — HOSPITAL ENCOUNTER (OUTPATIENT)
Dept: GENERAL RADIOLOGY | Facility: HOSPITAL | Age: 84
End: 2024-07-18
Payer: MEDICARE

## 2024-07-18 DIAGNOSIS — G89.29 CHRONIC BILATERAL THORACIC BACK PAIN: Primary | ICD-10-CM

## 2024-07-18 DIAGNOSIS — M54.6 CHRONIC BILATERAL THORACIC BACK PAIN: Primary | ICD-10-CM

## 2024-07-18 PROCEDURE — 72110 X-RAY EXAM L-2 SPINE 4/>VWS: CPT

## 2024-07-18 PROCEDURE — 72072 X-RAY EXAM THORAC SPINE 3VWS: CPT

## 2024-07-24 ENCOUNTER — TELEPHONE (OUTPATIENT)
Dept: INTERNAL MEDICINE | Facility: CLINIC | Age: 84
End: 2024-07-24
Payer: MEDICARE

## 2024-07-24 NOTE — TELEPHONE ENCOUNTER
SHE WANTS TO KNOW IF IT IS POSSIBLE TO WEAR A BACK BRACE INSTEAD OF GOING THRU THERAPY.  SHE WOULD LIKE TO TRY A BRACE FIRST.  SHE IS STILL HAVING SOME BACK PAIN. SHE DOES FEEL LIKE SHE IS GETTING BETTER SINCE HE LAST EPISODE.  SHE WOULD LIKE A CALL PLEASE ABOUT HER XRAY AND THE BACK BRACE.

## 2024-07-29 NOTE — TELEPHONE ENCOUNTER
Hub staff attempted to follow warm transfer process and was unsuccessful     Caller: Radha Silverio    Relationship to patient: Self    Best call back number: [phone]     Patient is needing: AN UPDATE IN REGARDS TO HER BACK     PATIENT STATES OVER THE WEEKEND SHE WAS WALKING IN THE [facility] AND BY THE TIME SHE CHECKED OUT SHE HAD LOWER BACK PAIN AND IT WENT DOWN TO HER LEG     PATIENT STATES SHE HAS NOT RECEIVED A CALLBACK

## 2024-08-01 ENCOUNTER — HOSPITAL ENCOUNTER (OUTPATIENT)
Dept: PHYSICAL THERAPY | Facility: HOSPITAL | Age: 84
Setting detail: THERAPIES SERIES
Discharge: HOME | End: 2024-08-01
Payer: MEDICARE

## 2024-08-01 DIAGNOSIS — G89.29 CHRONIC BILATERAL LOW BACK PAIN WITHOUT SCIATICA: ICD-10-CM

## 2024-08-01 DIAGNOSIS — M54.6 CHRONIC BILATERAL THORACIC BACK PAIN: Primary | ICD-10-CM

## 2024-08-01 DIAGNOSIS — G89.29 CHRONIC BILATERAL THORACIC BACK PAIN: Primary | ICD-10-CM

## 2024-08-01 DIAGNOSIS — M54.50 CHRONIC BILATERAL LOW BACK PAIN WITHOUT SCIATICA: ICD-10-CM

## 2024-08-01 PROCEDURE — 97161 PT EVAL LOW COMPLEX 20 MIN: CPT | Performed by: PHYSICAL THERAPIST

## 2024-08-01 NOTE — THERAPY EVALUATION
Outpatient Physical Therapy Ortho Initial Evaluation   [place]     Patient Name: Radha Silverio  : 1940  MRN: [id number]  Today's Date: 2024      Visit Date: 2024    Patient Active Problem List   Diagnosis    Essential hypertension    Mixed hyperlipidemia    Chronic constipation    Acid reflux    Oslis esophagus    Type 2 diabetes mellitus without complication, without long-term current use of insulin    Hemorrhoids    Frequent PVCs    Atrial bigeminy    Ectopic atrial rhythm    Primary osteoarthritis of knee    Primary osteoarthritis of right knee    Tinnitus of both ears    Left hip pain    Primary osteoarthritis of left hip    Greater trochanteric bursitis of left hip    Stage 3 chronic kidney disease    Female genital prolapse    Chronic gastritis without bleeding    Degenerative disc disease, lumbar    Weakness of left lower extremity    Neuritis or radiculitis due to rupture of lumbar intervertebral disc    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    Adenomatous polyp of colon        Past Medical History:   Diagnosis Date    Anxiety     Arthritis     Arthritis of back     Solis's esophagus     Cataract     Chronic constipation     Chronic cough 2018    Colon polyps     Diabetes type 2, controlled     Dizziness     DVT (deep venous thrombosis)     Essential hypertension 2016    Frequent PVCs 2017    GERD (gastroesophageal reflux disease)     Hematochezia 2016    Hyperlipidemia 2016    Hypertension     Lumbosacral disc disease     Palpitations     Pelvic prolapse         Past Surgical History:   Procedure Laterality Date    BREAST BIOPSY Left     cyst at 11 y/o    CHOLECYSTECTOMY      COLONOSCOPY N/A 10/11/2016    Procedure: COLONOSCOPY TO CECUM, TO TERMINAL ILEUM WITH HOT SNARE POLYPECTOMY;  Surgeon: Palmira Calix MD;  Location: [facility] ENDOSCOPY;  Service:     COLONOSCOPY N/A 10/5/2021    Procedure: COLONOSCOPY TO CECUM WITH COLD POLYPECTOMY;  Surgeon:  Palmira Calix MD;  Location: [facility] ENDOSCOPY;  Service: Gastroenterology;  Laterality: N/A;  HISTORY OF COLON POLYPS  COLON POLYP, HEMORRHOIDS, DIVERTICULOSIS    CYST REMOVAL Right     breast    ENDOSCOPY N/A 10/11/2016    Procedure: ESOPHAGOGASTRODUODENOSCOPY WITH BIOPSY;  Surgeon: Palmira Calix MD;  Location: [facility] ENDOSCOPY;  Service:     ENDOSCOPY N/A 10/5/2021    Procedure: ESOPHAGOGASTRODUODENOSCOPY WITH COLD BIOPSIES;  Surgeon: Palmira Calix MD;  Location: [facility] ENDOSCOPY;  Service: Gastroenterology;  Laterality: N/A;  GERD  GASTRITIS    LAPAROSCOPIC CHOLECYSTECTOMY W/ CHOLANGIOGRAPHY      MOLE REMOVAL      on foot     OOPHORECTOMY Left     B9    TUBAL ABDOMINAL LIGATION         Visit Dx:     ICD-10-CM ICD-9-CM   1. Chronic bilateral thoracic back pain  M54.6 724.1    G89.29 338.29   2. Chronic bilateral low back pain without sciatica  M54.50 724.2    G89.29 338.29          Patient History       Row Name 08/01/24 1000             History    Chief Complaint Pain;Difficulty with daily activities  -SP      Type of Pain Back pain  -SP      Date Current Problem(s) Began 06/01/24  -SP      Brief Description of Current Complaint Patient reports that she began having insidious onset of mid to low back pain that began after getting up one morning in early June 2024.  Patient reports that she has had chronic and intermittent back pain over the years, but not like this.  Patient has not had any prior PT or other treatment for back pain.  She describes left side low back pain with radiating pain into right LE that goes to her foot  -SP      Patient/Caregiver Goals Relieve pain;Know what to do to help the symptoms  -SP      Patient's Rating of General Health Good  -SP      Occupation/sports/leisure activities retired: drives, goes to grocery  -SP         Pain     Pain Location Back  -SP      Pain at Present 5  -SP      Pain at Worst 5  -SP      Pain Frequency Constant/continuous  -SP      Pain Description  Aching  -SP      What Performance Factors Make the Current Problem(s) WORSE? prolonged standing and walking, prolonged sitting.  -SP      What Performance Factors Make the Current Problem(s) BETTER? nothing seems to help  -SP      Tolerance Time- Standing limited  -SP      Tolerance Time- Sitting limited  -SP      Tolerance Time- Walking limited  -SP      Is your sleep disturbed? Yes  -SP      What position do you sleep in? Right sidelying;Left sidelying  -SP      Difficulties with ADL's? Patient with increased time required for all transfers, gait; limited tolerance for weight bearing activity  -SP         Fall Risk Assessment    Any falls in the past year: No  -SP         Daily Activities    Primary Language English  -SP      Are you able to read Yes  -SP      Are you able to write Yes  -SP      Teaching needs identified Home Exercise Program;Management of Condition  -SP      Patient is concerned about/has problems with Performing home management (household chores, shopping, care of dependents);Walking;Standing  -SP      Does patient have problems with the following? None  -SP      Barriers to learning None  -SP      Pt Participated in POC and Goals Yes  -SP         Safety    Are you being hurt, hit, or frightened by anyone at home or in your life? No  -SP      Are you being neglected by a caregiver No  -SP                User Key  (r) = Recorded By, (t) = Taken By, (c) = Cosigned By      Initials Name Provider Type    Miranda Blake, PT Physical Therapist                     PT Ortho       Row Name 08/01/24 1100       Posture/Observations    Rounded Shoulders Bilateral:;Moderate  -SP    Lumbar lordosis Decreased  -SP    Iliac crests Bilateral:;Normal  -SP    Genu varus Bilateral:;Mild  -SP    Posture/Observations Comments Patient maintains slight forward flexion at waist  -SP       Neural Tension Signs- Lower Quarter Clearing    SLR Bilateral:;Negative  -SP       Sensory Screen for Light Touch- Lower  Quarter Clearing    L1 (inguinal area) Bilateral:;Intact  -SP    L2 (anterior mid thigh) Bilateral:;Intact  -SP    L3 (distal anterior thigh) Bilateral:;Intact  -SP    L4 (medial lower leg/foot) Bilateral:;Intact  -SP    L5 (lateral lower leg/great toe) Bilateral:;Intact  -SP    S1 (bottom of foot) Bilateral:;Intact  -SP       Myotomal Screen- Lower Quarter Clearing    Hip flexion (L2) Bilateral:;4- (Good -)  -SP    Knee extension (L3) Bilateral:;4 (Good)  -SP    Ankle DF (L4) Bilateral:;4 (Good)  -SP    Ankle PF (S1) Bilateral:;4- (Good -)  -SP    Knee flexion (S2) Right:;4- (Good -);Left:;4 (Good)  -SP       Lumbar/SI Special Tests    SLR (Neural Tension) Bilateral:;Negative  -SP       Lumbosacral Palpation    SI Bilateral:;Tender  -SP    Erector Spinae (Paraspinals) Bilateral:;Tender;Guarded/taut  -SP       General ROM    GENERAL ROM COMMENTS bilateral LE AROM is WFL  -SP       Head/Neck/Trunk    Trunk Extension AROM extends to nuetral spine; flexes knee in attempt to extend  -SP    Trunk Flexion AROM decreased by 50% from full range  -SP    Trunk Lt Lateral Flexion AROM decreased by 50% from full range  -SP    Trunk Rt Lateral Flexion AROM decreased by 50% from full range  -SP    Trunk Lt Rotation AROM decreased by 50%  -SP    Trunk Rt Rotation AROM decreased by 50%  -SP       MMT Neck/Trunk    Trunk Flexion MMT, Gross Movement (3/5) fair  -SP    Left Pelvic Elevation MMT, Gross Movement (2/5) poor  -SP    Right Pelvic Elevation MMT, Gross Movement: (2/5) poor  -SP       Sensation    Sensation WNL? WFL  -SP       Lower Extremity Flexibility    Hamstrings Bilateral:;Mildly limited  -SP    Hip External Rotators Bilateral:;Moderately limited  -SP    Hip Internal Rotators Bilateral:;Moderately limited  -SP       Transfers    Bed-Chair Ashby (Transfers) independent  -SP    Chair-Bed [place] (Transfers) independent  -SP    Sit-Stand [place]by (Transfers) independent  -SP    Stand-Sit [place]by  (Transfers) independent  -SP    Comment, (Transfers) Patient with increased time required for all transfers  -SP       Gait/Stairs (Locomotion)    Piatt Level (Gait) independent  -SP    Assistive Device (Gait) cane, straight  -SP    Pattern (Gait) swing-through  -SP    Deviations/Abnormal Patterns (Gait) antalgic;tanner decreased;stride length decreased;gait speed decreased  -SP    Bilateral Gait Deviations forward flexed posture  -SP              User Key  (r) = Recorded By, (t) = Taken By, (c) = Cosigned By      Initials Name Provider Type    Miranda Blake, PT Physical Therapist                                Therapy Education  Given: HEP  Program: New  How Provided: Verbal, Written  Provided to: Patient  Level of Understanding: Verbalized, Demonstrated      PT OP Goals       Row Name 08/01/24 1200          PT Short Term Goals    STG Date to Achieve 08/16/24  -SP     STG 1 Patient tolerates standing and ambulating in her home with pain level <2/10 at worst  -SP     STG 2 Patient demonstrates appropriate technique with transfers supine/sit without increased pain  -SP     STG 3 Patient reports that she is able to sleep through the night without waking due to back pain  -SP        Long Term Goals    LTG Date to Achieve 08/31/24  -SP     LTG 1 Patient demonstrates trunk and LE strength increased by one muscle grade  -SP     LTG 2 Patient tolerates community distance ambulation with 0-1/10 pain level  -SP     LTG 3 Patient independent with HEP  -SP        Time Calculation    PT Goal Re-Cert Due Date 08/31/24  -SP               User Key  (r) = Recorded By, (t) = Taken By, (c) = Cosigned By      Initials Name Provider Type    Miranda Blake, PT Physical Therapist                     PT Assessment/Plan       Row Name 08/01/24 1242          PT Assessment    Functional Limitations Impaired gait;Limitation in home management;Limitations in community activities;Limitations in functional  capacity and performance;Performance in self-care ADL;Performance in leisure activities  -SP     Assessment Comments Patient with chronic back pain that significantly worsened in June 2024.  Imaging shows degenerative changes in thoracic and lumbar spine  Patient presents with pain, decreased trunk mobility, decreased LE and trunk stength, antalgic transfers and difficulty tolerating home and community mobility and ADLs  -SP     Please refer to paper survey for additional self-reported information Yes  -SP     Rehab Potential Good  -SP     Patient/caregiver participated in establishment of treatment plan and goals Yes  -SP     Patient would benefit from skilled therapy intervention Yes  -SP        PT Plan    PT Frequency 1x/week;2x/week  -SP     Predicted Duration of Therapy Intervention (PT) 4 weeks  -SP     Planned CPT's? PT EVAL LOW COMPLEXITY: 19593;PT THER PROC EA 15 MIN: 03974;PT MANUAL THERAPY EA 15 MIN: 03094;PT HOT OR COLD PACK TREAT MCARE;PT ELECTRICAL STIM UNATTEND:   -SP               User Key  (r) = Recorded By, (t) = Taken By, (c) = Cosigned By      Initials Name Provider Type    Miranda Blake, PT Physical Therapist                     Modalities       Row Name 08/01/24 1100             Moist Heat    MH Applied Yes  -SP      Location L/S area; patient reclined with HOB; wedge under knees  -SP      PT Moist Heat Minutes 12  -SP      MH Prior to Rx Yes  -SP                User Key  (r) = Recorded By, (t) = Taken By, (c) = Cosigned By      Initials Name Provider Type    Miranda Blake, PT Physical Therapist                   OP Exercises       Row Name 08/01/24 1200             Exercise 1    Exercise Name 1 SKTC  -SP      Cueing 1 Verbal  -SP      Reps 1 3  -SP      Time 1 20 sec  -SP         Exercise 2    Exercise Name 2 piriformis stretch  -SP      Cueing 2 Verbal;Tactile  -SP      Reps 2 3  -SP      Time 2 20 sec  -SP         Exercise 3    Exercise Name 3 hamstring  stretch  -SP      Cueing 3 Verbal;Demo  -SP      Reps 3 3  -SP      Time 3 20 sec  -SP      Additional Comments patient complains of left hip flexor/quad cramping with stretch; patient instructed in seated HS  -SP         Exercise 4    Exercise Name 4 PPT  -SP      Cueing 4 Verbal  -SP      Reps 4 10  -SP      Time 4 5 sec  -SP                User Key  (r) = Recorded By, (t) = Taken By, (c) = Cosigned By      Initials Name Provider Type    Miranda Blake, PT Physical Therapist                                  Outcome Measure Options: Other Outcome Measure  Other Outcome Measure Tool Used  Other Outcome Measure Tool Comments: back index 24      Time Calculation:     Start Time: 1000  Stop Time: 1115  Time Calculation (min): 75 min  Untimed Charges  PT Eval/Re-eval Minutes: 60  PT Moist Heat Minutes: 12  Total Minutes  Untimed Charges Total Minutes: 60   Total Minutes: 60     Therapy Charges for Today       Code Description Service Date Service Provider Modifiers Qty    40748845877 HC PT EVAL LOW COMPLEXITY 4 8/1/2024 Miranda Bergman, PT GP 1            PT G-Codes  Outcome Measure Options: Other Outcome Measure         Miranda Bergman, LILLIAN  8/1/2024

## 2024-08-06 ENCOUNTER — HOSPITAL ENCOUNTER (OUTPATIENT)
Dept: PHYSICAL THERAPY | Facility: HOSPITAL | Age: 84
Setting detail: THERAPIES SERIES
Discharge: HOME | End: 2024-08-06
Payer: MEDICARE

## 2024-08-06 DIAGNOSIS — M54.50 CHRONIC BILATERAL LOW BACK PAIN WITHOUT SCIATICA: ICD-10-CM

## 2024-08-06 DIAGNOSIS — M54.6 CHRONIC BILATERAL THORACIC BACK PAIN: Primary | ICD-10-CM

## 2024-08-06 DIAGNOSIS — G89.29 CHRONIC BILATERAL THORACIC BACK PAIN: Primary | ICD-10-CM

## 2024-08-06 DIAGNOSIS — G89.29 CHRONIC BILATERAL LOW BACK PAIN WITHOUT SCIATICA: ICD-10-CM

## 2024-08-06 PROCEDURE — 97110 THERAPEUTIC EXERCISES: CPT

## 2024-08-06 NOTE — THERAPY TREATMENT NOTE
Outpatient Physical Therapy Ortho Treatment Note  XIOMARA Mccoy     Patient Name: Radha Silverio  : 1940  MRN: [id number]  Today's Date: 2024      Visit Date: 2024    Visit Dx:    ICD-10-CM ICD-9-CM   1. Chronic bilateral thoracic back pain  M54.6 724.1    G89.29 338.29   2. Chronic bilateral low back pain without sciatica  M54.50 724.2    G89.29 338.29       Patient Active Problem List   Diagnosis    Essential hypertension    Mixed hyperlipidemia    Chronic constipation    Acid reflux    Solis esophagus    Type 2 diabetes mellitus without complication, without long-term current use of insulin    Hemorrhoids    Frequent PVCs    Atrial bigeminy    Ectopic atrial rhythm    Primary osteoarthritis of knee    Primary osteoarthritis of right knee    Tinnitus of both ears    Left hip pain    Primary osteoarthritis of left hip    Greater trochanteric bursitis of left hip    Stage 3 chronic kidney disease    Female genital prolapse    Chronic gastritis without bleeding    Degenerative disc disease, lumbar    Weakness of left lower extremity    Neuritis or radiculitis due to rupture of lumbar intervertebral disc    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    Adenomatous polyp of colon        Past Medical History:   Diagnosis Date    Anxiety     Arthritis     Arthritis of back     Solis's esophagus     Cataract     Chronic constipation     Chronic cough 2018    Colon polyps     Diabetes type 2, controlled     Dizziness     DVT (deep venous thrombosis)     Essential hypertension 2016    Frequent PVCs 2017    GERD (gastroesophageal reflux disease)     Hematochezia 2016    Hyperlipidemia 2016    Hypertension     Lumbosacral disc disease     Palpitations     Pelvic prolapse         Past Surgical History:   Procedure Laterality Date    BREAST BIOPSY Left     cyst at 9 y/o    CHOLECYSTECTOMY      COLONOSCOPY N/A 10/11/2016    Procedure: COLONOSCOPY TO CECUM, TO TERMINAL ILEUM WITH  HOT SNARE POLYPECTOMY;  Surgeon: Palmira Calix MD;  Location: [facility] ENDOSCOPY;  Service:     COLONOSCOPY N/A 10/5/2021    Procedure: COLONOSCOPY TO CECUM WITH COLD POLYPECTOMY;  Surgeon: Palmira Calix MD;  Location: [facility] ENDOSCOPY;  Service: Gastroenterology;  Laterality: N/A;  HISTORY OF COLON POLYPS  COLON POLYP, HEMORRHOIDS, DIVERTICULOSIS    CYST REMOVAL Right     breast    ENDOSCOPY N/A 10/11/2016    Procedure: ESOPHAGOGASTRODUODENOSCOPY WITH BIOPSY;  Surgeon: Palmira Calix MD;  Location: [facility] ENDOSCOPY;  Service:     ENDOSCOPY N/A 10/5/2021    Procedure: ESOPHAGOGASTRODUODENOSCOPY WITH COLD BIOPSIES;  Surgeon: Palmira Calix MD;  Location: [facility] ENDOSCOPY;  Service: Gastroenterology;  Laterality: N/A;  GERD  GASTRITIS    LAPAROSCOPIC CHOLECYSTECTOMY W/ CHOLANGIOGRAPHY      MOLE REMOVAL      on foot     OOPHORECTOMY Left     B9    TUBAL ABDOMINAL LIGATION          PT Ortho       Row Name 08/06/24 0900       Subjective    Subjective Comments Pt reports initial increase in pain following initial eval but improved some; has been able to tolerate HEP but is continuing with LBP into (R) hip and LE this morning  -              User Key  (r) = Recorded By, (t) = Taken By, (c) = Cosigned By      Initials Name Provider Type    Man Bui PTA Physical Therapist Assistant                                 PT Assessment/Plan       Row Name 08/06/24 1522          PT Assessment    Assessment Comments pt continues with LBP and pain to hip and LE but tolerating current ex's well  -MH        PT Plan    PT Plan Comments Cont per POC, check response to new ex's  -MH               User Key  (r) = Recorded By, (t) = Taken By, (c) = Cosigned By      Initials Name Provider Type    Man Bui PTA Physical Therapist Assistant                     Modalities       Row Name 08/06/24 0900             Moist Heat    Location L/S area; patient reclined with HOB; bolster under knees  -      PT Moist  Heat Minutes 12  -MH      MH Prior to Rx Yes  -                User Key  (r) = Recorded By, (t) = Taken By, (c) = Cosigned By      Initials Name Provider Type     Man Flynn PTA Physical Therapist Assistant                   OP Exercises       Row Name 08/06/24 1523 08/06/24 0900          Subjective    Subjective Comments -- Pt reports initial increase in pain following initial eval but improved some; has been able to tolerate HEP but is continuing with LBP into (R) hip and LE this morning  -        Total Minutes    41022 - PT Therapeutic Exercise Minutes 25  -MH --        Exercise 1    Exercise Name 1 -- SKTC  -     Cueing 1 -- Verbal  -     Reps 1 -- 3  -MH     Time 1 -- 20 sec  -MH        Exercise 2    Exercise Name 2 -- piriformis stretch  -     Cueing 2 -- Verbal;Tactile  -     Reps 2 -- 3  -MH     Time 2 -- 20 sec  -MH        Exercise 3    Exercise Name 3 -- hamstring stretch  -     Cueing 3 -- Verbal;Demo  -     Reps 3 -- 3  -MH     Time 3 -- 20 sec  -     Additional Comments -- performed from reclined position with bolster under knees  -        Exercise 4    Exercise Name 4 -- PPT  -     Cueing 4 -- Verbal  -     Reps 4 -- 10  -MH     Time 4 -- 5 sec  -        Exercise 5    Exercise Name 5 -- PPT ball squeeze  -     Cueing 5 -- Verbal;Tactile;Demo  -     Reps 5 -- 10  -MH     Time 5 -- 5 sec  -        Exercise 6    Exercise Name 6 -- PPT clam  -     Cueing 6 -- Verbal;Tactile;Demo  -     Reps 6 -- 10  -MH     Time 6 -- 5 sec  -     Additional Comments -- black  -               User Key  (r) = Recorded By, (t) = Taken By, (c) = Cosigned By      Initials Name Provider Type     Man Flynn PTA Physical Therapist Assistant                                     Therapy Education  Education Details: written instruction of new ex's issued and reviewed; black theraband issued for home  Given: HEP, Symptoms/condition management  Program: New, Reinforced  How  Provided: Verbal, Demonstration, Written  Provided to: Patient  Level of Understanding: Teach back education performed, Verbalized, Demonstrated              Time Calculation:   Start Time: 0903  Stop Time: 1005  Time Calculation (min): 62 min  Timed Charges  53101 - PT Therapeutic Exercise Minutes: 25  Untimed Charges  PT Moist Heat Minutes: 12  Total Minutes  Timed Charges Total Minutes: 25  Untimed Charges Total Minutes: 12   Total Minutes: 25  Therapy Charges for Today       Code Description Service Date Service Provider Modifiers Qty    26286170175 HC PT THER PROC EA 15 MIN 8/6/2024 Man Flynn, LES GP 2                      Man Flynn PTA  8/6/2024

## 2024-08-08 ENCOUNTER — APPOINTMENT (OUTPATIENT)
Dept: PHYSICAL THERAPY | Facility: HOSPITAL | Age: 84
End: 2024-08-08
Payer: MEDICARE

## 2024-08-15 ENCOUNTER — HOSPITAL ENCOUNTER (OUTPATIENT)
Dept: PHYSICAL THERAPY | Facility: HOSPITAL | Age: 84
Setting detail: THERAPIES SERIES
Discharge: HOME | End: 2024-08-15
Payer: MEDICARE

## 2024-08-15 DIAGNOSIS — G89.29 CHRONIC BILATERAL THORACIC BACK PAIN: Primary | ICD-10-CM

## 2024-08-15 DIAGNOSIS — M54.50 CHRONIC BILATERAL LOW BACK PAIN WITHOUT SCIATICA: ICD-10-CM

## 2024-08-15 DIAGNOSIS — M54.6 CHRONIC BILATERAL THORACIC BACK PAIN: Primary | ICD-10-CM

## 2024-08-15 DIAGNOSIS — G89.29 CHRONIC BILATERAL LOW BACK PAIN WITHOUT SCIATICA: ICD-10-CM

## 2024-08-15 PROCEDURE — G0283 ELEC STIM OTHER THAN WOUND: HCPCS | Performed by: PHYSICAL THERAPIST

## 2024-08-15 PROCEDURE — 97110 THERAPEUTIC EXERCISES: CPT | Performed by: PHYSICAL THERAPIST

## 2024-08-15 NOTE — THERAPY TREATMENT NOTE
Outpatient Physical Therapy Ortho Treatment Note  XIOMARA Mccoy     Patient Name: Radha Silverio  : 1940  MRN: [id number]  Today's Date: 8/15/2024      Visit Date: 08/15/2024    Visit Dx:    ICD-10-CM ICD-9-CM   1. Chronic bilateral thoracic back pain  M54.6 724.1    G89.29 338.29   2. Chronic bilateral low back pain without sciatica  M54.50 724.2    G89.29 338.29       Patient Active Problem List   Diagnosis    Essential hypertension    Mixed hyperlipidemia    Chronic constipation    Acid reflux    Solis esophagus    Type 2 diabetes mellitus without complication, without long-term current use of insulin    Hemorrhoids    Frequent PVCs    Atrial bigeminy    Ectopic atrial rhythm    Primary osteoarthritis of knee    Primary osteoarthritis of right knee    Tinnitus of both ears    Left hip pain    Primary osteoarthritis of left hip    Greater trochanteric bursitis of left hip    Stage 3 chronic kidney disease    Female genital prolapse    Chronic gastritis without bleeding    Degenerative disc disease, lumbar    Weakness of left lower extremity    Neuritis or radiculitis due to rupture of lumbar intervertebral disc    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    Adenomatous polyp of colon        Past Medical History:   Diagnosis Date    Anxiety     Arthritis     Arthritis of back     Solis's esophagus     Cataract     Chronic constipation     Chronic cough 2018    Colon polyps     Diabetes type 2, controlled     Dizziness     DVT (deep venous thrombosis)     Essential hypertension 2016    Frequent PVCs 2017    GERD (gastroesophageal reflux disease)     Hematochezia 2016    Hyperlipidemia 2016    Hypertension     Lumbosacral disc disease     Palpitations     Pelvic prolapse         Past Surgical History:   Procedure Laterality Date    BREAST BIOPSY Left     cyst at 9 y/o    CHOLECYSTECTOMY      COLONOSCOPY N/A 10/11/2016    Procedure: COLONOSCOPY TO CECUM, TO TERMINAL ILEUM WITH  HOT SNARE POLYPECTOMY;  Surgeon: Palmira Calix MD;  Location: [facility] ENDOSCOPY;  Service:     COLONOSCOPY N/A 10/5/2021    Procedure: COLONOSCOPY TO CECUM WITH COLD POLYPECTOMY;  Surgeon: Palmira Calix MD;  Location: [facility] ENDOSCOPY;  Service: Gastroenterology;  Laterality: N/A;  HISTORY OF COLON POLYPS  COLON POLYP, HEMORRHOIDS, DIVERTICULOSIS    CYST REMOVAL Right     breast    ENDOSCOPY N/A 10/11/2016    Procedure: ESOPHAGOGASTRODUODENOSCOPY WITH BIOPSY;  Surgeon: Palmira Calix MD;  Location: [facility] ENDOSCOPY;  Service:     ENDOSCOPY N/A 10/5/2021    Procedure: ESOPHAGOGASTRODUODENOSCOPY WITH COLD BIOPSIES;  Surgeon: Palmira Calix MD;  Location: [facility] ENDOSCOPY;  Service: Gastroenterology;  Laterality: N/A;  GERD  GASTRITIS    LAPAROSCOPIC CHOLECYSTECTOMY W/ CHOLANGIOGRAPHY      MOLE REMOVAL      on foot     OOPHORECTOMY Left     B9    TUBAL ABDOMINAL LIGATION          PT Ortho       Row Name 08/15/24 1100       Subjective    Subjective Comments Patient reports that she continues to notice low back area pain with some radiation to right groin area.  -SP              User Key  (r) = Recorded By, (t) = Taken By, (c) = Cosigned By      Initials Name Provider Type    SP Miranda Bergman, PT Physical Therapist                                 PT Assessment/Plan       Row Name 08/15/24 1119          PT Assessment    Assessment Comments Patient with continued complaints of pain.  She is able to tolerate progression of trunk stabilization well  -SP        PT Plan    PT Plan Comments Continue to progress as tolerable  -SP               User Key  (r) = Recorded By, (t) = Taken By, (c) = Cosigned By      Initials Name Provider Type    Miranda Blake, PT Physical Therapist                     Modalities       Row Name 08/15/24 1100             Moist Heat    MH Applied Yes  -SP      Location L/S area; patient reclined with HOB; bolster under knees  -SP      PT Moist Heat Minutes 12  -SP       MH Prior to Rx Yes  -SP         ELECTRICAL STIMULATION    Attended/Unattended Unattended  -SP      Stimulation Type IFC  -SP      Location/Electrode Placement/Other bilateral L/S area  -SP                User Key  (r) = Recorded By, (t) = Taken By, (c) = Cosigned By      Initials Name Provider Type    Miranda Blake, PT Physical Therapist                   OP Exercises       Row Name 08/15/24 1124 08/15/24 1100 08/15/24 1000       Subjective    Subjective Comments -- Patient reports that she continues to notice low back area pain with some radiation to right groin area.  -SP --       Total Minutes    83700 - PT Therapeutic Exercise Minutes 15  -SP -- --       Exercise 1    Exercise Name 1 -- -- SKTC  -SP    Cueing 1 -- -- Verbal  -SP    Reps 1 -- -- 3  -SP    Time 1 -- -- 20 sec  -SP       Exercise 2    Exercise Name 2 -- -- piriformis stretch  -SP    Cueing 2 -- -- Verbal;Tactile  -SP    Reps 2 -- -- 3  -SP    Time 2 -- -- 20 sec  -SP       Exercise 3    Exercise Name 3 -- -- hamstring stretch  -SP    Cueing 3 -- -- Verbal;Demo  -SP    Reps 3 -- -- 3  -SP    Time 3 -- -- 20 sec  -SP       Exercise 4    Exercise Name 4 -- -- PPT  -SP    Cueing 4 -- -- Verbal  -SP    Reps 4 -- -- 15  -SP    Time 4 -- -- 5 sec  -SP       Exercise 5    Exercise Name 5 -- -- PPT ball squeeze  -SP    Cueing 5 -- -- Verbal;Tactile;Demo  -SP    Reps 5 -- -- 15  -SP    Time 5 -- -- 5 sec  -SP       Exercise 6    Exercise Name 6 -- -- PPT clam  -SP    Cueing 6 -- -- Verbal;Tactile;Demo  -SP    Reps 6 -- -- 15  -SP    Time 6 -- -- 5 sec  -SP    Additional Comments -- -- black  -SP       Exercise 7    Exercise Name 7 -- -- LTR  -SP    Cueing 7 -- -- Verbal;Tactile  -SP    Reps 7 -- -- 15  -SP       Exercise 8    Exercise Name 8 -- -- bridge  -SP    Cueing 8 -- -- Verbal  -SP    Reps 8 -- -- 15  -SP              User Key  (r) = Recorded By, (t) = Taken By, (c) = Cosigned By      Initials Name Provider Type    SP Bergman,  Miranda Maldonado, PT Physical Therapist                                     Therapy Education  Given: HEP  Program: Reinforced, Progressed  How Provided: Verbal, Written  Provided to: Patient  Level of Understanding: Verbalized, Demonstrated              Time Calculation:   Start Time: 1000  Stop Time: 1100  Time Calculation (min): 60 min  Timed Charges  82635 - PT Therapeutic Exercise Minutes: 15  Untimed Charges  PT E-Stim Unattended Minutes: 15  PT Moist Heat Minutes: 12  Total Minutes  Timed Charges Total Minutes: 15  Untimed Charges Total Minutes: 15   Total Minutes: 30  Therapy Charges for Today       Code Description Service Date Service Provider Modifiers Qty    06333445934 HC PT THER PROC EA 15 MIN 8/15/2024 Miranda Bergman, PT GP 1    39253910228 HC PT ELECTRICAL STIM UNATTENDED 8/15/2024 Miranda Bergman, PT  1                      Miranda Bergman, PT  8/15/2024

## 2024-08-22 ENCOUNTER — HOSPITAL ENCOUNTER (OUTPATIENT)
Dept: PHYSICAL THERAPY | Facility: HOSPITAL | Age: 84
Setting detail: THERAPIES SERIES
Discharge: HOME | End: 2024-08-22
Payer: MEDICARE

## 2024-08-22 DIAGNOSIS — G89.29 CHRONIC BILATERAL THORACIC BACK PAIN: Primary | ICD-10-CM

## 2024-08-22 DIAGNOSIS — G89.29 CHRONIC BILATERAL LOW BACK PAIN WITHOUT SCIATICA: ICD-10-CM

## 2024-08-22 DIAGNOSIS — M54.6 CHRONIC BILATERAL THORACIC BACK PAIN: Primary | ICD-10-CM

## 2024-08-22 DIAGNOSIS — M54.50 CHRONIC BILATERAL LOW BACK PAIN WITHOUT SCIATICA: ICD-10-CM

## 2024-08-22 PROCEDURE — 97110 THERAPEUTIC EXERCISES: CPT | Performed by: PHYSICAL THERAPIST

## 2024-08-22 PROCEDURE — G0283 ELEC STIM OTHER THAN WOUND: HCPCS | Performed by: PHYSICAL THERAPIST

## 2024-08-22 NOTE — THERAPY TREATMENT NOTE
Outpatient Physical Therapy Ortho Treatment Note  XIOMARA Mccoy     Patient Name: Radha Silverio  : 1940  MRN: [id number]  Today's Date: 2024      Visit Date: 2024    Visit Dx:    ICD-10-CM ICD-9-CM   1. Chronic bilateral thoracic back pain  M54.6 724.1    G89.29 338.29   2. Chronic bilateral low back pain without sciatica  M54.50 724.2    G89.29 338.29       Patient Active Problem List   Diagnosis    Essential hypertension    Mixed hyperlipidemia    Chronic constipation    Acid reflux    Solis esophagus    Type 2 diabetes mellitus without complication, without long-term current use of insulin    Hemorrhoids    Frequent PVCs    Atrial bigeminy    Ectopic atrial rhythm    Primary osteoarthritis of knee    Primary osteoarthritis of right knee    Tinnitus of both ears    Left hip pain    Primary osteoarthritis of left hip    Greater trochanteric bursitis of left hip    Stage 3 chronic kidney disease    Female genital prolapse    Chronic gastritis without bleeding    Degenerative disc disease, lumbar    Weakness of left lower extremity    Neuritis or radiculitis due to rupture of lumbar intervertebral disc    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    Adenomatous polyp of colon        Past Medical History:   Diagnosis Date    Anxiety     Arthritis     Arthritis of back     Solis's esophagus     Cataract     Chronic constipation     Chronic cough 2018    Colon polyps     Diabetes type 2, controlled     Dizziness     DVT (deep venous thrombosis)     Essential hypertension 2016    Frequent PVCs 2017    GERD (gastroesophageal reflux disease)     Hematochezia 2016    Hyperlipidemia 2016    Hypertension     Lumbosacral disc disease     Palpitations     Pelvic prolapse         Past Surgical History:   Procedure Laterality Date    BREAST BIOPSY Left     cyst at 9 y/o    CHOLECYSTECTOMY      COLONOSCOPY N/A 10/11/2016    Procedure: COLONOSCOPY TO CECUM, TO TERMINAL ILEUM WITH  HOT SNARE POLYPECTOMY;  Surgeon: Palmira Calix MD;  Location: [facility] ENDOSCOPY;  Service:     COLONOSCOPY N/A 10/5/2021    Procedure: COLONOSCOPY TO CECUM WITH COLD POLYPECTOMY;  Surgeon: Palmira Calix MD;  Location: [facility] ENDOSCOPY;  Service: Gastroenterology;  Laterality: N/A;  HISTORY OF COLON POLYPS  COLON POLYP, HEMORRHOIDS, DIVERTICULOSIS    CYST REMOVAL Right     breast    ENDOSCOPY N/A 10/11/2016    Procedure: ESOPHAGOGASTRODUODENOSCOPY WITH BIOPSY;  Surgeon: Palmira Calix MD;  Location: [facility] ENDOSCOPY;  Service:     ENDOSCOPY N/A 10/5/2021    Procedure: ESOPHAGOGASTRODUODENOSCOPY WITH COLD BIOPSIES;  Surgeon: Palmira Calix MD;  Location: [facility] ENDOSCOPY;  Service: Gastroenterology;  Laterality: N/A;  GERD  GASTRITIS    LAPAROSCOPIC CHOLECYSTECTOMY W/ CHOLANGIOGRAPHY      MOLE REMOVAL      on foot     OOPHORECTOMY Left     B9    TUBAL ABDOMINAL LIGATION          PT Ortho       Row Name 08/22/24 1100       Subjective    Subjective Comments Patient reports that she does notice decreased episodes of her legs giving out.  Otherwise she continues to have low back area to right LE area pain.  -SP              User Key  (r) = Recorded By, (t) = Taken By, (c) = Cosigned By      Initials Name Provider Type    Miranda Blake, PT Physical Therapist                                 PT Assessment/Plan       Row Name 08/22/24 1535          PT Assessment    Assessment Comments Patient with difficulty tolerating lying supine or reclined position.  Patient tolerates ther-ex well with modification into sitting.  -SP        PT Plan    PT Plan Comments Continue to progress as tolerable  -SP               User Key  (r) = Recorded By, (t) = Taken By, (c) = Cosigned By      Initials Name Provider Type    Miranda Blake, PT Physical Therapist                     Modalities       Row Name 08/22/24 1100             Moist Heat    MH Applied Yes  -SP      Location Patient seated with MHP  to L/S area  -SP      PT Moist Heat Minutes 12  -SP      MH Prior to Rx Yes  -SP         ELECTRICAL STIMULATION    Attended/Unattended Unattended  -SP      Stimulation Type IFC  -SP      Location/Electrode Placement/Other bilateral L/S area  -SP                User Key  (r) = Recorded By, (t) = Taken By, (c) = Cosigned By      Initials Name Provider Type    Miranda Blake, PT Physical Therapist                   OP Exercises       Row Name 08/22/24 1538 08/22/24 1100          Subjective    Subjective Comments -- Patient reports that she does notice decreased episodes of her legs giving out.  Otherwise she continues to have low back area to right LE area pain.  -SP        Total Minutes    73641 - PT Therapeutic Exercise Minutes 15  -SP --        Exercise 1    Exercise Name 1 -- seated forward flexion stretch with therapy ball  -SP     Cueing 1 -- Verbal  -SP     Reps 1 -- 3  -SP     Time 1 -- 20 sec  -SP        Exercise 2    Exercise Name 2 -- piriformis stretch seated: push and pull  -SP     Cueing 2 -- Verbal;Tactile  -SP     Reps 2 -- 3  -SP     Time 2 -- 20 sec  -SP        Exercise 3    Exercise Name 3 -- hamstring stretch seated  -SP     Cueing 3 -- Verbal;Demo  -SP     Reps 3 -- 3  -SP     Time 3 -- 20 sec  -SP        Exercise 4    Exercise Name 4 -- seated on shaheed disc pelvic tilt  -SP     Cueing 4 -- Verbal  -SP     Reps 4 -- 15  -SP     Time 4 -- 5 sec  -SP        Exercise 5    Exercise Name 5 -- seated ball squeeze  -SP     Cueing 5 -- Verbal;Tactile;Demo  -SP     Reps 5 -- 20  -SP     Time 5 -- 3 sec  -SP        Exercise 6    Exercise Name 6 -- seated hip abduction  -SP     Cueing 6 -- Verbal;Tactile;Demo  -SP     Reps 6 -- 20  -SP     Time 6 -- black tband  -SP        Exercise 7    Exercise Name 7 -- seated trunk rotation stretch  -SP     Cueing 7 -- Verbal;Tactile  -SP     Reps 7 -- 3  -SP     Time 7 -- 20 sec  -SP        Exercise 8    Exercise Name 8 -- seated marches vs tband  -SP      Cueing 8 -- Verbal  -SP     Reps 8 -- 20  -SP     Additional Comments -- black tband  -SP               User Key  (r) = Recorded By, (t) = Taken By, (c) = Cosigned By      Initials Name Provider Type    Miranda Blake, PT Physical Therapist                                     Therapy Education  Given: HEP  Program: Reinforced, Modified  How Provided: Verbal, Written  Provided to: Patient  Level of Understanding: Verbalized, Demonstrated              Time Calculation:   Start Time: 1130  Stop Time: 1230  Time Calculation (min): 60 min  Timed Charges  02456 - PT Therapeutic Exercise Minutes: 15  Untimed Charges  PT E-Stim Unattended Minutes: 15  PT Moist Heat Minutes: 12  Total Minutes  Timed Charges Total Minutes: 15  Untimed Charges Total Minutes: 15   Total Minutes: 30  Therapy Charges for Today       Code Description Service Date Service Provider Modifiers Qty    10096668180 HC PT THER PROC EA 15 MIN 8/22/2024 Miranda Bergman, PT GP 1    51408585872 HC PT ELECTRICAL STIM UNATTENDED 8/22/2024 Miranda Bergman, PT  1                      Miranda Bergman, PT  8/22/2024

## 2024-08-29 ENCOUNTER — HOSPITAL ENCOUNTER (OUTPATIENT)
Dept: PHYSICAL THERAPY | Facility: HOSPITAL | Age: 84
Setting detail: THERAPIES SERIES
Discharge: HOME | End: 2024-08-29
Payer: MEDICARE

## 2024-08-29 DIAGNOSIS — G89.29 CHRONIC BILATERAL THORACIC BACK PAIN: Primary | ICD-10-CM

## 2024-08-29 DIAGNOSIS — M54.50 CHRONIC BILATERAL LOW BACK PAIN WITHOUT SCIATICA: ICD-10-CM

## 2024-08-29 DIAGNOSIS — M54.6 CHRONIC BILATERAL THORACIC BACK PAIN: Primary | ICD-10-CM

## 2024-08-29 DIAGNOSIS — G89.29 CHRONIC BILATERAL LOW BACK PAIN WITHOUT SCIATICA: ICD-10-CM

## 2024-08-29 PROCEDURE — 97110 THERAPEUTIC EXERCISES: CPT | Performed by: PHYSICAL THERAPIST

## 2024-08-29 PROCEDURE — G0283 ELEC STIM OTHER THAN WOUND: HCPCS | Performed by: PHYSICAL THERAPIST

## 2024-08-29 NOTE — THERAPY TREATMENT NOTE
Outpatient Physical Therapy Ortho Treatment Note  XIOMARA Mccoy     Patient Name: Radha Silverio  : 1940  MRN: [id number]  Today's Date: 2024      Visit Date: 2024    Visit Dx:    ICD-10-CM ICD-9-CM   1. Chronic bilateral thoracic back pain  M54.6 724.1    G89.29 338.29   2. Chronic bilateral low back pain without sciatica  M54.50 724.2    G89.29 338.29       Patient Active Problem List   Diagnosis    Essential hypertension    Mixed hyperlipidemia    Chronic constipation    Acid reflux    Solis esophagus    Type 2 diabetes mellitus without complication, without long-term current use of insulin    Hemorrhoids    Frequent PVCs    Atrial bigeminy    Ectopic atrial rhythm    Primary osteoarthritis of knee    Primary osteoarthritis of right knee    Tinnitus of both ears    Left hip pain    Primary osteoarthritis of left hip    Greater trochanteric bursitis of left hip    Stage 3 chronic kidney disease    Female genital prolapse    Chronic gastritis without bleeding    Degenerative disc disease, lumbar    Weakness of left lower extremity    Neuritis or radiculitis due to rupture of lumbar intervertebral disc    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    Adenomatous polyp of colon        Past Medical History:   Diagnosis Date    Anxiety     Arthritis     Arthritis of back     Solis's esophagus     Cataract     Chronic constipation     Chronic cough 2018    Colon polyps     Diabetes type 2, controlled     Dizziness     DVT (deep venous thrombosis)     Essential hypertension 2016    Frequent PVCs 2017    GERD (gastroesophageal reflux disease)     Hematochezia 2016    Hyperlipidemia 2016    Hypertension     Lumbosacral disc disease     Palpitations     Pelvic prolapse         Past Surgical History:   Procedure Laterality Date    BREAST BIOPSY Left     cyst at 11 y/o    CHOLECYSTECTOMY      COLONOSCOPY N/A 10/11/2016    Procedure: COLONOSCOPY TO CECUM, TO TERMINAL ILEUM WITH  HOT SNARE POLYPECTOMY;  Surgeon: Palmira Calix MD;  Location: [facility];  Service:     COLONOSCOPY N/A 10/5/2021    Procedure: COLONOSCOPY TO CECUM WITH COLD POLYPECTOMY;  Surgeon: Palmira Calix MD;  Location: [facility];  Service: Gastroenterology;  Laterality: N/A;  HISTORY OF COLON POLYPS  COLON POLYP, HEMORRHOIDS, DIVERTICULOSIS    CYST REMOVAL Right     breast    ENDOSCOPY N/A 10/11/2016    Procedure: ESOPHAGOGASTRODUODENOSCOPY WITH BIOPSY;  Surgeon: Palmira Calix MD;  Location: [facility];  Service:     ENDOSCOPY N/A 10/5/2021    Procedure: ESOPHAGOGASTRODUODENOSCOPY WITH COLD BIOPSIES;  Surgeon: Palmira Calix MD;  Location: [facility];  Service: Gastroenterology;  Laterality: N/A;  GERD  GASTRITIS    LAPAROSCOPIC CHOLECYSTECTOMY W/ CHOLANGIOGRAPHY      MOLE REMOVAL      on foot     OOPHORECTOMY Left     B9    TUBAL ABDOMINAL LIGATION          PT Ortho       Row Name 08/29/24 1500       Subjective    Subjective Comments Patient reports that she continues to have right side low back pain with radiation of symptoms into right hip and thigh.  She notices minimal change in symptoms with treatment at this point  -SP              User Key  (r) = Recorded By, (t) = Taken By, (c) = Cosigned By      Initials Name Provider Type    Miranda Blake, PT Physical Therapist                                 PT Assessment/Plan       Row Name 08/29/24 1509          PT Assessment    Assessment Comments Patient demonstrates good technique with exercise and tolerates well.  She continues to have elevated back pain that has changed minimally with treatment at this point.  -SP        PT Plan    PT Plan Comments Continue 2-3 visits, if symptoms persist, patient may need referral or further assessment  -SP               User Key  (r) = Recorded By, (t) = Taken By, (c) = Cosigned By      Initials Name Provider Type    Miranda Blake, PT Physical Therapist                      Modalities       Row Name 08/29/24 1500             Moist Heat    MH Applied Yes  -SP      Location Patient seated with MHP to L/S area  -SP      PT Moist Heat Minutes 12  -SP      MH Prior to Rx Yes  -SP         ELECTRICAL STIMULATION    Attended/Unattended Unattended  -SP      Stimulation Type IFC  -SP      Location/Electrode Placement/Other bilateral L/S area  -SP                User Key  (r) = Recorded By, (t) = Taken By, (c) = Cosigned By      Initials Name Provider Type    Miranda Blake, PT Physical Therapist                   OP Exercises       Row Name 08/29/24 1512 08/29/24 1500          Subjective    Subjective Comments -- Patient reports that she continues to have right side low back pain with radiation of symptoms into right hip and thigh.  She notices minimal change in symptoms with treatment at this point  -SP        Total Minutes    85785 - PT Therapeutic Exercise Minutes 15  -SP --        Exercise 1    Exercise Name 1 -- seated forward flexion stretch with therapy ball  -SP     Cueing 1 -- Verbal  -SP     Reps 1 -- 3  -SP     Time 1 -- 20 sec  -SP        Exercise 2    Exercise Name 2 -- piriformis stretch seated: push and pull  -SP     Cueing 2 -- Verbal;Tactile  -SP     Reps 2 -- 3  -SP     Time 2 -- 20 sec  -SP        Exercise 3    Exercise Name 3 -- hamstring stretch seated  -SP     Cueing 3 -- Verbal;Demo  -SP     Reps 3 -- 3  -SP     Time 3 -- 20 sec  -SP        Exercise 4    Exercise Name 4 -- seated on shaheed disc pelvic tilt  -SP     Cueing 4 -- Verbal  -SP     Reps 4 -- 15  -SP     Time 4 -- 5 sec  -SP        Exercise 5    Exercise Name 5 -- seated ball squeeze  -SP     Cueing 5 -- Verbal;Tactile;Demo  -SP     Reps 5 -- 20  -SP     Time 5 -- 3 sec  -SP        Exercise 6    Exercise Name 6 -- seated hip abduction  -SP     Cueing 6 -- Verbal;Tactile;Demo  -SP     Reps 6 -- 20  -SP     Time 6 -- black tband  -SP        Exercise 7    Exercise Name 7 -- seated trunk rotation  stretch  -SP     Cueing 7 -- Verbal;Tactile  -SP     Reps 7 -- 3  -SP     Time 7 -- 20 sec  -SP        Exercise 8    Exercise Name 8 -- seated marches vs tband  -SP     Cueing 8 -- Verbal  -SP     Reps 8 -- 20  -SP     Additional Comments -- black tband  -SP        Exercise 9    Exercise Name 9 -- PPT vs wall  -SP     Cueing 9 -- Verbal;Demo  -SP     Reps 9 -- 15  -SP     Time 9 -- 5sec  -SP               User Key  (r) = Recorded By, (t) = Taken By, (c) = Cosigned By      Initials Name Provider Type    Miranda Blake, PT Physical Therapist                                     Therapy Education  Given: HEP  Program: Progressed  How Provided: Verbal, Written  Provided to: Patient  Level of Understanding: Verbalized, Demonstrated              Time Calculation:   Start Time: 1035  Stop Time: 1135  Time Calculation (min): 60 min  Timed Charges  95406 - PT Therapeutic Exercise Minutes: 15  Untimed Charges  PT E-Stim Unattended Minutes: 15  PT Moist Heat Minutes: 12  Total Minutes  Timed Charges Total Minutes: 15  Untimed Charges Total Minutes: 15   Total Minutes: 30  Therapy Charges for Today       Code Description Service Date Service Provider Modifiers Qty    41898497801 HC PT THER PROC EA 15 MIN 8/29/2024 Miranda Bergman, PT GP 1    27739834889 HC PT ELECTRICAL STIM UNATTENDED 8/29/2024 Miranda Bergman, PT  1                      Miranda Bergman, PT  8/29/2024

## 2024-10-08 ENCOUNTER — OFFICE VISIT (OUTPATIENT)
Dept: INTERNAL MEDICINE | Facility: CLINIC | Age: 84
End: 2024-10-08
Payer: MEDICARE

## 2024-10-08 VITALS
DIASTOLIC BLOOD PRESSURE: 80 MMHG | HEART RATE: 66 BPM | SYSTOLIC BLOOD PRESSURE: 122 MMHG | TEMPERATURE: 98.7 F | OXYGEN SATURATION: 96 % | BODY MASS INDEX: 30.53 KG/M2 | WEIGHT: 195 LBS

## 2024-10-08 DIAGNOSIS — K64.4 EXTERNAL HEMORRHOIDS: ICD-10-CM

## 2024-10-08 DIAGNOSIS — R20.0 SADDLE ANESTHESIA: ICD-10-CM

## 2024-10-08 DIAGNOSIS — M51.362 DEGENERATION OF INTERVERTEBRAL DISC OF LUMBAR REGION WITH DISCOGENIC BACK PAIN AND LOWER EXTREMITY PAIN: ICD-10-CM

## 2024-10-08 DIAGNOSIS — K59.09 CHRONIC CONSTIPATION: ICD-10-CM

## 2024-10-08 DIAGNOSIS — I10 ESSENTIAL HYPERTENSION: Primary | Chronic | ICD-10-CM

## 2024-10-08 PROCEDURE — 1126F AMNT PAIN NOTED NONE PRSNT: CPT | Performed by: NURSE PRACTITIONER

## 2024-10-08 PROCEDURE — 3079F DIAST BP 80-89 MM HG: CPT | Performed by: NURSE PRACTITIONER

## 2024-10-08 PROCEDURE — 1160F RVW MEDS BY RX/DR IN RCRD: CPT | Performed by: NURSE PRACTITIONER

## 2024-10-08 PROCEDURE — 1159F MED LIST DOCD IN RCRD: CPT | Performed by: NURSE PRACTITIONER

## 2024-10-08 PROCEDURE — 3074F SYST BP LT 130 MM HG: CPT | Performed by: NURSE PRACTITIONER

## 2024-10-08 PROCEDURE — 99215 OFFICE O/P EST HI 40 MIN: CPT | Performed by: NURSE PRACTITIONER

## 2024-10-08 RX ORDER — HYDROCORTISONE 10 MG/G
CREAM TOPICAL DAILY
Qty: 26 G | Refills: 1 | Status: SHIPPED | OUTPATIENT
Start: 2024-10-08 | End: 2024-10-22

## 2024-10-08 NOTE — PATIENT INSTRUCTIONS
Increase Miralax or Clearlax to one capful twice daily.  Take a supplement of Magnesium Oxide each night before bed.

## 2024-10-08 NOTE — PROGRESS NOTES
Chief Complaint   Patient presents with    Constipation     Miralax is not working     Peripheral Neuropathy     Concerns about feet being numb.     Urinary Frequency     Has gotten worse       SUBJECTIVE  Radha Silverio is a 83 y.o. female presenting today for follow up of her chronic health conditions.    HTN    Constipation - she is using Miralax everyday. She will supplement w/ other OTC laxatives PRN. She c/o hemorrhoids. She reports BM q3days. She is sedentary. Her water intake is poor. Her dietary intake is poor. This is complicated by lack of dentition.    She continues to note lower back pain. Since July she has had constant numbness of her buttocks and perineum extending to the upper thigh. She notes increased urinary incontinence.    Outpatient Medications Marked as Taking for the 10/8/24 encounter (Office Visit) with Arielle Johnston APRN   Medication Sig Dispense Refill    Accu-Chek Softclix Lancets lancets USE 1 STRIP TO CHECK GLUCOSE ONCE DAILY 50 each 11    aspirin 81 MG EC tablet Take 1 tablet by mouth Daily.      Calcium Carb-Cholecalciferol (CALCIUM 1000 + D PO) Take  by mouth.      glucose blood (Accu-Chek Wandy Plus) test strip USE 1 STRIP TO CHECK GLUCOSE ONCE DAILY 50 each 11    lisinopril-hydrochlorothiazide (PRINZIDE,ZESTORETIC) 20-12.5 MG per tablet Take 1 tablet by mouth Daily. 90 tablet 3    metFORMIN (GLUCOPHAGE) 500 MG tablet TAKE 1 TABLET BY MOUTH TWICE DAILY WITH MEALS 180 tablet 3    metoprolol succinate XL (TOPROL-XL) 25 MG 24 hr tablet Take 1 tablet by mouth Daily. 90 tablet 3    Misc. Devices (Rollator Ultra-Light) misc Use 1 Units Daily. 1 each 0    omeprazole (priLOSEC) 40 MG capsule Take 1 capsule by mouth once daily 90 capsule 0    Polyethylene Glycol 3350 (MIRALAX PO) Take  by mouth As Needed. Has not been taking but is going to be starting again soon      simvastatin (ZOCOR) 40 MG tablet TAKE 1 TABLET BY MOUTH ONCE DAILY IN THE EVENING 90 tablet 0    vitamin C  (ASCORBIC ACID) 250 MG tablet Take 1 tablet by mouth Daily.           The following portions of the patient's history were reviewed and updated as appropriate: allergies, current medications, past family history, past medical history, past social history, past surgical history and problem list.        Objective   Vitals:    10/08/24 1247   BP: 122/80   BP Location: Right arm   Patient Position: Sitting   Cuff Size: Adult   Pulse: 66   Temp: 98.7 °F (37.1 °C)   TempSrc: Infrared   SpO2: 96%   Weight: 88.5 kg (195 lb)       BP Readings from Last 3 Encounters:   10/08/24 122/80   07/08/24 124/82   06/06/24 130/78        Wt Readings from Last 3 Encounters:   10/08/24 88.5 kg (195 lb)   07/08/24 89.2 kg (196 lb 9.6 oz)   06/06/24 89.4 kg (197 lb)        Body mass index is 30.53 kg/m².  Nursing notes and vitals reviewed.    Physical Exam  Exam conducted with a chaperone present.   Constitutional:       General: She is not in acute distress.     Appearance: She is well-developed.   Pulmonary:      Effort: Pulmonary effort is normal.   Genitourinary:     Rectum: External hemorrhoid present.   Neurological:      Mental Status: She is alert.   Psychiatric:         Attention and Perception: She is attentive.         Speech: Speech normal.         No results found for this or any previous visit (from the past 672 hour(s)).      Assessment & Plan   Diagnoses and all orders for this visit:    1. Essential hypertension (Primary)    2. Chronic constipation    3. External hemorrhoids  -     Hydrocortisone, Perianal, (PROCTOCORT) 1 % cream rectal cream; Insert  into the rectum Daily for 14 days.  Dispense: 26 g; Refill: 1    4. Saddle anesthesia  -     MRI Lumbar Spine Without Contrast; Future    5. Degeneration of intervertebral disc of lumbar region with discogenic back pain and lower extremity pain  -     MRI Lumbar Spine Without Contrast; Future      Patient Instructions   Increase Miralax or Clearlax to one capful twice  daily.  Take a supplement of Magnesium Oxide each night before bed.        Medications, including side effects, were discussed with the patient. Patient verbalized understanding.  The plan of care was discussed. All questions were answered. Patient verbalized understanding.        I spent 41 minutes caring for Radha on this date of service. This time includes time spent by me in the following activities:preparing for the visit, reviewing tests, performing a medically appropriate examination and/or evaluation , counseling and educating the patient/family/caregiver, ordering medications, tests, or procedures, documenting information in the medical record, and care coordination

## 2024-10-15 ENCOUNTER — HOSPITAL ENCOUNTER (OUTPATIENT)
Dept: MRI IMAGING | Facility: HOSPITAL | Age: 84
Discharge: HOME | End: 2024-10-15
Admitting: NURSE PRACTITIONER
Payer: MEDICARE

## 2024-10-15 DIAGNOSIS — G95.89 LUMBAR EPIDURAL MASS: Primary | ICD-10-CM

## 2024-10-15 DIAGNOSIS — M51.362 DEGENERATION OF INTERVERTEBRAL DISC OF LUMBAR REGION WITH DISCOGENIC BACK PAIN AND LOWER EXTREMITY PAIN: ICD-10-CM

## 2024-10-15 DIAGNOSIS — R20.0 SADDLE ANESTHESIA: ICD-10-CM

## 2024-10-15 PROCEDURE — 72148 MRI LUMBAR SPINE W/O DYE: CPT

## 2024-10-16 ENCOUNTER — HOSPITAL ENCOUNTER (OUTPATIENT)
Dept: MRI IMAGING | Facility: HOSPITAL | Age: 84
Discharge: HOME | End: 2024-10-16
Admitting: NURSE PRACTITIONER
Payer: MEDICARE

## 2024-10-16 DIAGNOSIS — E78.2 MIXED HYPERLIPIDEMIA: ICD-10-CM

## 2024-10-16 DIAGNOSIS — G95.89 LUMBAR EPIDURAL MASS: ICD-10-CM

## 2024-10-16 DIAGNOSIS — I10 ESSENTIAL HYPERTENSION: Chronic | ICD-10-CM

## 2024-10-16 DIAGNOSIS — E11.65 TYPE 2 DIABETES MELLITUS WITH HYPERGLYCEMIA, WITHOUT LONG-TERM CURRENT USE OF INSULIN: ICD-10-CM

## 2024-10-16 DIAGNOSIS — N18.31 STAGE 3A CHRONIC KIDNEY DISEASE: Chronic | ICD-10-CM

## 2024-10-16 LAB — CREAT BLDA-MCNC: 1.1 MG/DL (ref 0.6–1.3)

## 2024-10-16 PROCEDURE — 82565 ASSAY OF CREATININE: CPT

## 2024-10-16 PROCEDURE — 72149 MRI LUMBAR SPINE W/DYE: CPT

## 2024-10-16 PROCEDURE — A9577 INJ MULTIHANCE: HCPCS | Performed by: NURSE PRACTITIONER

## 2024-10-16 PROCEDURE — 0 GADOBENATE DIMEGLUMINE 529 MG/ML SOLUTION: Performed by: NURSE PRACTITIONER

## 2024-10-16 RX ADMIN — GADOBENATE DIMEGLUMINE 18 ML: 529 INJECTION, SOLUTION INTRAVENOUS at 10:48

## 2024-10-17 DIAGNOSIS — M51.26 LUMBAR DISC HERNIATION: Primary | ICD-10-CM

## 2024-10-21 ENCOUNTER — OFFICE VISIT (OUTPATIENT)
Dept: NEUROSURGERY | Facility: CLINIC | Age: 84
End: 2024-10-21
Payer: MEDICARE

## 2024-10-21 DIAGNOSIS — M51.06 INTERVERTEBRAL LUMBAR DISC DISORDER WITH MYELOPATHY, LUMBAR REGION: Primary | ICD-10-CM

## 2024-10-21 PROCEDURE — 1159F MED LIST DOCD IN RCRD: CPT | Performed by: NEUROLOGICAL SURGERY

## 2024-10-21 PROCEDURE — 1160F RVW MEDS BY RX/DR IN RCRD: CPT | Performed by: NEUROLOGICAL SURGERY

## 2024-10-21 PROCEDURE — 99204 OFFICE O/P NEW MOD 45 MIN: CPT | Performed by: NEUROLOGICAL SURGERY

## 2024-10-21 NOTE — PROGRESS NOTES
Subjective   Patient ID: Radha Silverio is a 83 y.o. female is being seen for consultation today at the request of CÉSAR Sandoval for  low back pain. Patient had a MRI L-spine done on 10/15/2024, and 10/16/2024    Treatment: PT     Today patient states that she has mild low back pain, along with bladder incontinence, along with tingling in the R foot    History of Present Illness    This patient had been having pain in her back on the left side for several months.  A few months ago the pain got better but then she also has trouble with bowel retention and she developed numbness on both sides of her lower back into her buttocks and into her groin.  She also has trouble with bowel retention and bladder incontinence.  Is been going on since July.    The following portions of the patient's history were reviewed and updated as appropriate: allergies, current medications, past family history, past medical history, past social history, past surgical history, and problem list.    Review of Systems   Genitourinary:  Positive for urgency. Negative for difficulty urinating and dysuria.   Musculoskeletal:  Positive for back pain and gait problem.   Neurological:  Positive for numbness. Negative for weakness.       I reviewed the review of systems listed by the patient and discussed by my MA    Objective     There were no vitals filed for this visit.  There is no height or weight on file to calculate BMI.    Tobacco Use: Low Risk  (10/21/2024)    Patient History     Smoking Tobacco Use: Never     Smokeless Tobacco Use: Never     Passive Exposure: Never          Physical Exam  Constitutional:       Appearance: She is well-developed.   HENT:      Head: Normocephalic and atraumatic.   Eyes:      General: Lids are normal.      Extraocular Movements: Extraocular movements intact.      Conjunctiva/sclera: Conjunctivae normal.      Pupils: Pupils are equal, round, and reactive to light.   Neck:      Vascular: No carotid  bruit.   Neurological:      Motor: Motor strength is normal.     Coordination: Coordination is intact.      Deep Tendon Reflexes:      Reflex Scores:       Tricep reflexes are 2+ on the right side and 2+ on the left side.       Bicep reflexes are 2+ on the right side and 2+ on the left side.       Brachioradialis reflexes are 2+ on the right side and 2+ on the left side.       Patellar reflexes are 2+ on the right side and 2+ on the left side.       Achilles reflexes are 2+ on the right side and 2+ on the left side.      Neurological Exam  Mental Status  Awake, alert and oriented to person, place and time. Oriented to person, place and time.    Cranial Nerves  CN II: Visual acuity is normal. Visual fields full to confrontation.  CN III, IV, VI: Extraocular movements intact bilaterally. Normal lids and orbits bilaterally. Pupils equal round and reactive to light bilaterally.  CN V: Facial sensation is normal.  CN VII: Full and symmetric facial movement.  CN IX, X: Palate elevates symmetrically. Normal gag reflex.  CN XI: Shoulder shrug strength is normal.  CN XII: Tongue midline without atrophy or fasciculations.    Motor   Strength is 5/5 throughout all four extremities.    Sensory  Sensation is intact to light touch, pinprick, vibration and proprioception in all four extremities.    Reflexes                                            Right                      Left  Brachioradialis                    2+                         2+  Biceps                                 2+                         2+  Triceps                                2+                         2+  Finger flex                           2+                         2+  Hamstring                            2+                         2+  Patellar                                2+                         2+  Achilles                                2+                         2+    Coordination    Finger-to-nose, rapid alternating movements and  heel-to-shin normal bilaterally without dysmetria.    Gait  Normal casual, toe, heel and tandem gait.          Assessment & Plan   Independent Review of Radiographic Studies:      I personally reviewed the images from the following studies.    I reviewed an MRI of the lumbar spine done on 15 October of this year.  This shows a widely patent canal and neuroforamina at T12-L1.  At L1-2 there is a large abnormality centrally into the left.  A contrasted MRI was done the next day.  This does show some enhancement in that area but there is a central area of not enhancement much more consistent with a disc herniation than a tumor.  There is stenosis at L2-3, L3-4 and L4-5.  L5-S1 mostly looks okay.    Medical Decision Making:      I had a very long discussion of almost 30 minutes with the patient and her daughter.  I explained the problem with severe stenosis and early cauda equina syndrome.  I explained that the risk of paraparesis as well as loss of bowel or bladder control is substantial.  I told her that while normally we would try physical therapy and epidural blocks in this particular situation it may be safer to just go ahead with surgery.  I told the patient about the risks, complications and expected outcome of the lumbar surgery.  I explained that there was an 80% chance of getting rid of the pain in the leg.  I explained that there would still be back pain after the surgery.  Initially this will be quite severe but will improve over time.  There is a 2 or 3% chance of infection, bleeding, CSF leak, damage to the nerve as a result of surgery, paralysis, as well as anesthetic risk.  There is a 5% chance of late instability which could require a fusion later on and a 10% chance of recurrent disc herniation.  We discussed the postoperative hospital and home course.   they seemed understand the risks both ways.  They will discuss and decide what they would like to do.    If they call to schedule surgery she will  need to be scheduled for a: Left lumbar 1 to lumbar 2 laminectomy and discectomy with metrx    Diagnoses and all orders for this visit:    1. Intervertebral lumbar disc disorder with myelopathy, lumbar region (Primary)      Return for 2-3 week post op.

## 2024-10-22 ENCOUNTER — TELEPHONE (OUTPATIENT)
Dept: INTERNAL MEDICINE | Facility: CLINIC | Age: 84
End: 2024-10-22
Payer: MEDICARE

## 2024-10-22 NOTE — TELEPHONE ENCOUNTER
"Relay     \"Arielle placed referral to Dr Fitzgerald/[facility] due to MRI indicates a possible disc herniation, Arielle wants a consult. \"                ----- Message from Niya Nelson sent at 10/21/2024  8:17 AM EDT -----  Regarding: Referral to Dr Fitzgerald  She called this weekend on the emergency line asking for the reason Arielle referred her to Dr. Fitzgerald. Please call her to advise.  "

## 2024-10-23 LAB
ALBUMIN SERPL-MCNC: 4.2 G/DL (ref 3.5–5.2)
ALBUMIN/GLOB SERPL: 1.6 G/DL
ALP SERPL-CCNC: 53 U/L (ref 39–117)
ALT SERPL-CCNC: 15 U/L (ref 1–33)
AST SERPL-CCNC: 20 U/L (ref 1–32)
BILIRUB SERPL-MCNC: 0.6 MG/DL (ref 0–1.2)
BUN SERPL-MCNC: 18 MG/DL (ref 8–23)
BUN/CREAT SERPL: 16.5 (ref 7–25)
CALCIUM SERPL-MCNC: 10.1 MG/DL (ref 8.6–10.5)
CHLORIDE SERPL-SCNC: 102 MMOL/L (ref 98–107)
CHOLEST SERPL-MCNC: 176 MG/DL (ref 0–200)
CHOLEST/HDLC SERPL: 2.38 {RATIO}
CO2 SERPL-SCNC: 27.4 MMOL/L (ref 22–29)
CREAT SERPL-MCNC: 1.09 MG/DL (ref 0.57–1)
EGFRCR SERPLBLD CKD-EPI 2021: 51.1 ML/MIN/1.73
GLOBULIN SER CALC-MCNC: 2.6 GM/DL
GLUCOSE SERPL-MCNC: 157 MG/DL (ref 65–99)
HBA1C MFR BLD: 6.4 % (ref 4.8–5.6)
HDLC SERPL-MCNC: 74 MG/DL (ref 40–60)
LDLC SERPL CALC-MCNC: 88 MG/DL (ref 0–100)
POTASSIUM SERPL-SCNC: 4.1 MMOL/L (ref 3.5–5.2)
PROT SERPL-MCNC: 6.8 G/DL (ref 6–8.5)
SODIUM SERPL-SCNC: 139 MMOL/L (ref 136–145)
TRIGL SERPL-MCNC: 77 MG/DL (ref 0–150)
VLDLC SERPL CALC-MCNC: 14 MG/DL (ref 5–40)

## 2024-10-29 ENCOUNTER — OFFICE VISIT (OUTPATIENT)
Dept: INTERNAL MEDICINE | Facility: CLINIC | Age: 84
End: 2024-10-29
Payer: MEDICARE

## 2024-10-29 VITALS
BODY MASS INDEX: 30.79 KG/M2 | OXYGEN SATURATION: 98 % | TEMPERATURE: 98 F | WEIGHT: 196.2 LBS | HEART RATE: 62 BPM | SYSTOLIC BLOOD PRESSURE: 112 MMHG | DIASTOLIC BLOOD PRESSURE: 72 MMHG | HEIGHT: 67 IN

## 2024-10-29 DIAGNOSIS — N18.31 STAGE 3A CHRONIC KIDNEY DISEASE: Chronic | ICD-10-CM

## 2024-10-29 DIAGNOSIS — K22.70 BARRETT'S ESOPHAGUS WITHOUT DYSPLASIA: Chronic | ICD-10-CM

## 2024-10-29 DIAGNOSIS — K21.9 GASTROESOPHAGEAL REFLUX DISEASE WITHOUT ESOPHAGITIS: Chronic | ICD-10-CM

## 2024-10-29 DIAGNOSIS — E78.2 MIXED HYPERLIPIDEMIA: ICD-10-CM

## 2024-10-29 DIAGNOSIS — M51.362 DEGENERATION OF INTERVERTEBRAL DISC OF LUMBAR REGION WITH DISCOGENIC BACK PAIN AND LOWER EXTREMITY PAIN: ICD-10-CM

## 2024-10-29 DIAGNOSIS — E11.9 TYPE 2 DIABETES MELLITUS WITHOUT COMPLICATION, WITHOUT LONG-TERM CURRENT USE OF INSULIN: Chronic | ICD-10-CM

## 2024-10-29 DIAGNOSIS — K59.09 CHRONIC CONSTIPATION: ICD-10-CM

## 2024-10-29 DIAGNOSIS — I10 ESSENTIAL HYPERTENSION: Primary | Chronic | ICD-10-CM

## 2024-10-29 PROCEDURE — G2211 COMPLEX E/M VISIT ADD ON: HCPCS | Performed by: NURSE PRACTITIONER

## 2024-10-29 PROCEDURE — 1160F RVW MEDS BY RX/DR IN RCRD: CPT | Performed by: NURSE PRACTITIONER

## 2024-10-29 PROCEDURE — 99214 OFFICE O/P EST MOD 30 MIN: CPT | Performed by: NURSE PRACTITIONER

## 2024-10-29 PROCEDURE — 1159F MED LIST DOCD IN RCRD: CPT | Performed by: NURSE PRACTITIONER

## 2024-10-29 PROCEDURE — 3074F SYST BP LT 130 MM HG: CPT | Performed by: NURSE PRACTITIONER

## 2024-10-29 PROCEDURE — 3078F DIAST BP <80 MM HG: CPT | Performed by: NURSE PRACTITIONER

## 2024-10-29 PROCEDURE — 1126F AMNT PAIN NOTED NONE PRSNT: CPT | Performed by: NURSE PRACTITIONER

## 2024-10-29 RX ORDER — DOCUSATE SODIUM 100 MG/1
100 CAPSULE, LIQUID FILLED ORAL 2 TIMES DAILY
Qty: 180 CAPSULE | Refills: 3 | Status: SHIPPED | OUTPATIENT
Start: 2024-10-29

## 2024-10-29 RX ORDER — PHENOL 1.4 %
600 AEROSOL, SPRAY (ML) MUCOUS MEMBRANE 2 TIMES DAILY
COMMUNITY

## 2024-10-29 RX ORDER — BACLOFEN 20 MG
500 TABLET ORAL NIGHTLY
COMMUNITY

## 2024-10-29 RX ORDER — OMEGA-3S/DHA/EPA/FISH OIL/D3 300MG-1000
400 CAPSULE ORAL 2 TIMES DAILY
COMMUNITY

## 2024-10-29 NOTE — PROGRESS NOTES
"Chief Complaint   Patient presents with    Hypertension     Follow up       SUBJECTIVE  Radha Silverio is a 83 y.o. female presenting today for follow up of her chronic health conditions.    These include HTN, HLD, DM2, GERD w/ Solis's, CKD, chronic constipation, DDD.    She continues to have stools every 2-3 days.      Outpatient Medications Marked as Taking for the 10/29/24 encounter (Office Visit) with Arielle Johnston APRN   Medication Sig Dispense Refill    aspirin 81 MG EC tablet Take 1 tablet by mouth Daily.      calcium carbonate (OS-MARIS) 600 MG tablet Take 1 tablet by mouth 2 (Two) Times a Day.      lisinopril-hydrochlorothiazide (PRINZIDE,ZESTORETIC) 20-12.5 MG per tablet Take 1 tablet by mouth Daily. 90 tablet 3    Magnesium Oxide -Mg Supplement 500 MG tablet Take 1 tablet by mouth Every Night.      metFORMIN (GLUCOPHAGE) 500 MG tablet TAKE 1 TABLET BY MOUTH TWICE DAILY WITH MEALS 180 tablet 3    metoprolol succinate XL (TOPROL-XL) 25 MG 24 hr tablet Take 1 tablet by mouth Daily. 90 tablet 3    omeprazole (priLOSEC) 40 MG capsule Take 1 capsule by mouth once daily 90 capsule 0    Polyethylene Glycol 3350 (MIRALAX PO) Take  by mouth As Needed. Has not been taking but is going to be starting again soon      simvastatin (ZOCOR) 40 MG tablet TAKE 1 TABLET BY MOUTH ONCE DAILY IN THE EVENING 90 tablet 0             The following portions of the patient's history were reviewed and updated as appropriate: allergies, current medications, past family history, past medical history, past social history, past surgical history and problem list.        Objective   Vitals:    10/29/24 0942   BP: 112/72   BP Location: Left arm   Patient Position: Sitting   Cuff Size: Adult   Pulse: 62   Temp: 98 °F (36.7 °C)   TempSrc: Infrared   SpO2: 98%   Weight: 89 kg (196 lb 3.2 oz)   Height: 170.2 cm (67.01\")       BP Readings from Last 3 Encounters:   10/29/24 112/72   10/08/24 122/80   07/08/24 124/82        Wt Readings " from Last 3 Encounters:   10/29/24 89 kg (196 lb 3.2 oz)   10/16/24 88.5 kg (195 lb)   10/15/24 88.5 kg (195 lb)        Body mass index is 30.72 kg/m².  Nursing notes and vitals reviewed.    Physical Exam  Constitutional:       General: She is not in acute distress.     Appearance: She is well-developed.   Cardiovascular:      Rate and Rhythm: Regular rhythm.      Heart sounds: S1 normal and S2 normal.   Pulmonary:      Effort: Pulmonary effort is normal.      Breath sounds: Normal breath sounds.   Neurological:      Mental Status: She is alert and oriented to person, place, and time.   Psychiatric:         Attention and Perception: She is attentive.         Behavior: Behavior normal.         Thought Content: Thought content normal.         Data Reviewed:  Recent Results (from the past 4 weeks)   POC Creatinine    Collection Time: 10/16/24 10:42 AM    Specimen: Blood   Result Value Ref Range    Creatinine 1.10 0.60 - 1.30 mg/dL   Lipid Panel With / Chol / HDL Ratio    Collection Time: 10/22/24  9:22 AM    Specimen: Blood   Result Value Ref Range    Total Cholesterol 176 0 - 200 mg/dL    Triglycerides 77 0 - 150 mg/dL    HDL Cholesterol 74 (H) 40 - 60 mg/dL    VLDL Cholesterol Madhav 14 5 - 40 mg/dL    LDL Chol Calc (NIH) 88 0 - 100 mg/dL    Chol/HDL Ratio 2.38    Hemoglobin A1c    Collection Time: 10/22/24  9:22 AM    Specimen: Blood   Result Value Ref Range    Hemoglobin A1C 6.40 (H) 4.80 - 5.60 %   Comprehensive Metabolic Panel    Collection Time: 10/22/24  9:22 AM    Specimen: Blood   Result Value Ref Range    Glucose 157 (H) 65 - 99 mg/dL    BUN 18 8 - 23 mg/dL    Creatinine 1.09 (H) 0.57 - 1.00 mg/dL    EGFR Result 51.1 (L) >60.0 mL/min/1.73    BUN/Creatinine Ratio 16.5 7.0 - 25.0    Sodium 139 136 - 145 mmol/L    Potassium 4.1 3.5 - 5.2 mmol/L    Chloride 102 98 - 107 mmol/L    Total CO2 27.4 22.0 - 29.0 mmol/L    Calcium 10.1 8.6 - 10.5 mg/dL    Total Protein 6.8 6.0 - 8.5 g/dL    Albumin 4.2 3.5 - 5.2 g/dL     Globulin 2.6 gm/dL    A/G Ratio 1.6 g/dL    Total Bilirubin 0.6 0.0 - 1.2 mg/dL    Alkaline Phosphatase 53 39 - 117 U/L    AST (SGOT) 20 1 - 32 U/L    ALT (SGPT) 15 1 - 33 U/L     Office Visit with Bronson Fitzgerald MD (10/21/2024)     I reviewed an MRI of the lumbar spine done on 15 October of this year. This shows a widely patent canal and neuroforamina at T12-L1. At L1-2 there is a large abnormality centrally into the left. A contrasted MRI was done the next day. This does show some enhancement in that area but there is a central area of not enhancement much more consistent with a disc herniation than a tumor. There is stenosis at L2-3, L3-4 and L4-5. L5-S1 mostly looks okay.     I had a very long discussion of almost 30 minutes with the patient and her daughter. I explained the problem with severe stenosis and early cauda equina syndrome. I explained that the risk of paraparesis as well as loss of bowel or bladder control is substantial. I told her that while normally we would try physical therapy and epidural blocks in this particular situation it may be safer to just go ahead with surgery. They will discuss and decide what they would like to do.       Assessment & Plan   Diagnoses and all orders for this visit:    1. Essential hypertension (Primary)    2. Mixed hyperlipidemia    3. Type 2 diabetes mellitus without complication, without long-term current use of insulin    4. 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 Gastroesophageal reflux disease without esophagitis    6. Chronic constipation  -     docusate sodium (COLACE) 100 MG capsule; Take 1 capsule by mouth 2 (Two) Times a Day.  Dispense: 180 capsule; Refill: 3    7. Stage 3a chronic kidney disease    8. 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 Controlled. Continue current regimen.    6. Continue Mg and BID Miralax. Add Colace.    7. Stable.    8. She remains undecided  regarding surgery.      Medications, including side effects, were discussed with the patient. Patient verbalized understanding.  The plan of care was discussed. All questions were answered. Patient verbalized understanding.      Return in about 6 months (around 4/29/2025) for fasting labs one week prior to, Medicare Wellness.

## 2024-10-30 ENCOUNTER — TELEPHONE (OUTPATIENT)
Dept: NEUROSURGERY | Facility: CLINIC | Age: 84
End: 2024-10-30

## 2024-10-30 ENCOUNTER — TELEPHONE (OUTPATIENT)
Dept: INTERNAL MEDICINE | Facility: CLINIC | Age: 84
End: 2024-10-30
Payer: MEDICARE

## 2024-10-30 NOTE — TELEPHONE ENCOUNTER
Caller: Radha Silverio    Relationship: Self    Best call back number: [phone]     What form or medical record are you requesting: COPY OF MOST RECENT LABS    How would you like to receive the form or medical records (pick-up, mail, fax):     If mail, what is the address: [address] [place] [id number]     Additional notes: PATIENT FORGOT TO ASK FOR A COPY WHEN SHE LEFT VISIT

## 2024-10-30 NOTE — TELEPHONE ENCOUNTER
Caller: Radha Silverio    Relationship to patient: Self    Best call back number: [phone]    Type of visit: FOLLOW UP (SURGERY DISCUSSION)    Requested date: 11/7/24 AT 2:30 (AS PREVIOUSLY DISCUSSED WITH HARMONY)     If rescheduling, when is the original appointment: N/A     Additional notes: PATIENT STATES SHE SPOKE WITH HARMONY THIS MORNING, AND SHE IS CALLING BACK TO FINALIZE THIS APPT. UNABLE TO WARM TRANSFER; PLEASE RETURN PATIENT'S CALL.

## 2024-10-31 NOTE — TELEPHONE ENCOUNTER
OK FOR HUB TO READ.  LVM for patient letting her know that I have mailed her lab results per her request.

## 2024-11-06 NOTE — PROGRESS NOTES
Subjective   Patient ID: Radha Silverio is a 83 y.o. female is here today for follow-up to discuss results ofd her MRI L-spine.    Patient states that her symptoms are unchanged     History of Present Illness    This patient continues with pain in her back on the left side.  She has some numbness on both sides of her lower back and into her buttocks and her groin.  She has been having trouble with bowel retention and bladder incontinence since July.    The following portions of the patient's history were reviewed and updated as appropriate: allergies, current medications, past family history, past medical history, past social history, past surgical history, and problem list.    Review of Systems    I reviewed the review of systems listed by the patient and discussed by my MA    Objective     Vitals:    11/07/24 1453   PainSc: 0-No pain     There is no height or weight on file to calculate BMI.    Tobacco Use: Low Risk  (11/7/2024)    Patient History     Smoking Tobacco Use: Never     Smokeless Tobacco Use: Never     Passive Exposure: Never          Physical Exam  Neurological:      Mental Status: She is alert and oriented to person, place, and time.       Neurological Exam  Mental Status  Alert. Oriented to person, place, and time.          Assessment & Plan   Independent Review of Radiographic Studies:      I personally reviewed the images from the following studies.    I again reviewed her MRI done in October and show them to the patient and her daughter.  This shows a very large herniated disc at L1-2.    Medical Decision Making:      We had a very long discussion regarding the surgery.  Most of their questions were about afterwards which I said was an possible answer other than we will plan to keep her overnight at least.  We can then have her assessed by physical therapy in the next day and decide whether she is able to go home or not.  They agree with that plan.  I discussed the surgery and risk and  complications again with them and they would like to proceed.    She will need to be scheduled for a:  Left lumbar 1 to lumbar 2 laminectomy and discectomy with metrx     Diagnoses and all orders for this visit:    1. Intervertebral lumbar disc disorder with myelopathy, lumbar region (Primary)      Return for 2-3 week post op.

## 2024-11-07 ENCOUNTER — OFFICE VISIT (OUTPATIENT)
Dept: NEUROSURGERY | Facility: CLINIC | Age: 84
End: 2024-11-07
Payer: MEDICARE

## 2024-11-07 ENCOUNTER — PREP FOR SURGERY (OUTPATIENT)
Dept: OTHER | Facility: HOSPITAL | Age: 84
End: 2024-11-07
Payer: MEDICARE

## 2024-11-07 DIAGNOSIS — M51.06 INTERVERTEBRAL LUMBAR DISC DISORDER WITH MYELOPATHY, LUMBAR REGION: Primary | ICD-10-CM

## 2024-11-07 PROCEDURE — 99214 OFFICE O/P EST MOD 30 MIN: CPT | Performed by: NEUROLOGICAL SURGERY

## 2024-11-07 PROCEDURE — 1160F RVW MEDS BY RX/DR IN RCRD: CPT | Performed by: NEUROLOGICAL SURGERY

## 2024-11-07 PROCEDURE — 1159F MED LIST DOCD IN RCRD: CPT | Performed by: NEUROLOGICAL SURGERY

## 2024-11-11 RX ORDER — SIMVASTATIN 40 MG
TABLET ORAL
Qty: 90 TABLET | Refills: 0 | Status: SHIPPED | OUTPATIENT
Start: 2024-11-11

## 2024-11-11 RX ORDER — OMEPRAZOLE 40 MG/1
40 CAPSULE, DELAYED RELEASE ORAL DAILY
Qty: 90 CAPSULE | Refills: 0 | Status: SHIPPED | OUTPATIENT
Start: 2024-11-11

## 2024-11-11 NOTE — TELEPHONE ENCOUNTER
Rx Refill Note  Requested Prescriptions     Pending Prescriptions Disp Refills    omeprazole (priLOSEC) 40 MG capsule [Pharmacy Med Name: Omeprazole 40 MG Oral Capsule Delayed Release] 90 capsule 0     Sig: Take 1 capsule by mouth once daily    simvastatin (ZOCOR) 40 MG tablet [Pharmacy Med Name: Simvastatin 40 MG Oral Tablet] 90 tablet 0     Sig: TAKE 1 TABLET BY MOUTH ONCE DAILY IN THE EVENING      Last office visit with prescribing clinician: 10/29/2024   Last telemedicine visit with prescribing clinician: Visit date not found   Next office visit with prescribing clinician: 5/8/2025                         Would you like a call back once the refill request has been completed: [] Yes [] No    If the office needs to give you a call back, can they leave a voicemail: [] Yes [] No    Adele Melton MA  11/11/24, 11:17 EST

## 2024-11-18 ENCOUNTER — PRE-ADMISSION TESTING (OUTPATIENT)
Dept: PREADMISSION TESTING | Facility: HOSPITAL | Age: 84
End: 2024-11-18
Payer: MEDICARE

## 2024-11-18 VITALS
TEMPERATURE: 97.7 F | BODY MASS INDEX: 31.57 KG/M2 | RESPIRATION RATE: 18 BRPM | OXYGEN SATURATION: 97 % | SYSTOLIC BLOOD PRESSURE: 155 MMHG | HEIGHT: 66 IN | HEART RATE: 57 BPM | WEIGHT: 196.4 LBS | DIASTOLIC BLOOD PRESSURE: 83 MMHG

## 2024-11-18 LAB
ANION GAP SERPL CALCULATED.3IONS-SCNC: 13.3 MMOL/L (ref 5–15)
BUN SERPL-MCNC: 15 MG/DL (ref 8–23)
BUN/CREAT SERPL: 12.8 (ref 7–25)
CALCIUM SPEC-SCNC: 10.1 MG/DL (ref 8.6–10.5)
CHLORIDE SERPL-SCNC: 102 MMOL/L (ref 98–107)
CO2 SERPL-SCNC: 24.7 MMOL/L (ref 22–29)
CREAT SERPL-MCNC: 1.17 MG/DL (ref 0.57–1)
DEPRECATED RDW RBC AUTO: 40.2 FL (ref 37–54)
EGFRCR SERPLBLD CKD-EPI 2021: 47 ML/MIN/1.73
ERYTHROCYTE [DISTWIDTH] IN BLOOD BY AUTOMATED COUNT: 12.9 % (ref 12.3–15.4)
GLUCOSE SERPL-MCNC: 124 MG/DL (ref 65–99)
HCT VFR BLD AUTO: 40.3 % (ref 34–46.6)
HGB BLD-MCNC: 13.4 G/DL (ref 12–15.9)
MCH RBC QN AUTO: 28.8 PG (ref 26.6–33)
MCHC RBC AUTO-ENTMCNC: 33.3 G/DL (ref 31.5–35.7)
MCV RBC AUTO: 86.5 FL (ref 79–97)
PLATELET # BLD AUTO: 189 10*3/MM3 (ref 140–450)
PMV BLD AUTO: 10.6 FL (ref 6–12)
POTASSIUM SERPL-SCNC: 4.4 MMOL/L (ref 3.5–5.2)
RBC # BLD AUTO: 4.66 10*6/MM3 (ref 3.77–5.28)
SODIUM SERPL-SCNC: 140 MMOL/L (ref 136–145)
WBC NRBC COR # BLD AUTO: 4.33 10*3/MM3 (ref 3.4–10.8)

## 2024-11-18 PROCEDURE — 36415 COLL VENOUS BLD VENIPUNCTURE: CPT

## 2024-11-18 PROCEDURE — 80048 BASIC METABOLIC PNL TOTAL CA: CPT

## 2024-11-18 PROCEDURE — 85027 COMPLETE CBC AUTOMATED: CPT

## 2024-11-18 NOTE — DISCHARGE INSTRUCTIONS
Take the following medications the morning of surgery:  OMEPRAZOLE, METOPROLOL    If you are on prescription narcotic pain medication to control your pain you may also take that medication the morning of surgery.      General Instructions:     Do not eat solid food after midnight the night before surgery.  Clear liquids day of surgery are allowed but must be stopped at least two hours before your hospital arrival time.       Allowed clear liquids      Water, sodas, and tea or coffee with no cream or milk added.       12 to 20 ounces of a clear liquid that contains carbohydrates is recommended.  If non-diabetic, have Gatorade or Powerade.  If diabetic, have G2 or Powerade Zero.     Do not have liquids red in color.  Do not consume chicken, beef, pork or vegetable broth or bouillon cubes of any variety as they are not considered clear liquids and are not allowed.      Infants may have breast milk up to four hours before surgery.  Infants drinking formula may drink formula up to six hours before surgery.   Patients who avoid smoking, chewing tobacco and alcohol for 4 weeks prior to surgery have a reduced risk of post-operative complications.  Quit smoking as many days before surgery as you can.  Do not smoke, use chewing tobacco or drink alcohol the day of surgery.   If applicable bring your C-PAP/ BI-PAP machine in with you to preop day of surgery.  Bring any papers given to you in the doctor’s office.  Wear clean comfortable clothes.  Do not wear contact lenses, false eyelashes or make-up.  Bring a case for your glasses.   Bring crutches or walker if applicable.  Remove all piercings.  Leave jewelry and any other valuables at home.  Hair extensions with metal clips must be removed prior to surgery.  The Pre-Admission Testing nurse will instruct you to bring medications if unable to obtain an accurate list in Pre-Admission Testing.        If you were given a blood bank ID arm band remember to bring it with you the day  of surgery.    Preventing a Surgical Site Infection:  For 2 to 3 days before surgery, avoid shaving with a razor because the razor can irritate skin and make it easier to develop an infection.    Any areas of open skin can increase the risk of a post-operative wound infection by allowing bacteria to enter and travel throughout the body.  Notify your surgeon if you have any skin wounds / rashes even if it is not near the expected surgical site.  The area will need assessed to determine if surgery should be delayed until it is healed.  The night prior to surgery shower using a fresh bar of anti-bacterial soap (such as Dial) and clean washcloth.  Sleep in a clean bed with clean clothing.  Do not allow pets to sleep with you.  Shower on the morning of surgery using a fresh bar of anti-bacterial soap (such as Dial) and clean washcloth.  Dry with a clean towel and dress in clean clothing.  Ask your surgeon if you will be receiving antibiotics prior to surgery.  Make sure you, your family, and all healthcare providers clean their hands with soap and water or an alcohol based hand  before caring for you or your wound.      CHLORHEXIDINE CLOTH INSTRUCTIONS  The morning of surgery follow these instructions using the Chlorhexidine cloths you've been given.  These steps reduce bacteria on the body.  Do not use the cloths near your eyes, ears mouth, genitalia or on open wounds.  Throw the cloths away after use but do not try to flush them down a toilet.      Open and remove one cloth at a time from the package.    Leave the cloth unfolded and begin the bathing.  Massage the skin with the cloths using gentle pressure to remove bacteria.  Do not scrub harshly.   Follow the steps below with one 2% CHG cloth per area (6 total cloths).  One cloth for neck, shoulders and chest.  One cloth for both arms, hands, fingers and underarms (do underarms last).  One cloth for the abdomen followed by groin.  One cloth for right leg and  foot including between the toes.  One cloth for left leg and foot including between the toes.  The last cloth is to be used for the back of the neck, back and buttocks.    Allow the CHG to air dry 3 minutes on the skin which will give it time to work and decrease the chance of irritation.  The skin may feel sticky until it is dry.  Do not rinse with water or any other liquid or you will lose the beneficial effects of the CHG.  If mild skin irritation occurs, do rinse the skin to remove the CHG.  Report this to the nurse at time of admission.  Do not apply lotions, creams, ointments, deodorants or perfumes after using the clothes. Dress in clean clothes before coming to the hospital.      Day of surgery:  Your arrival time is approximately two hours before your scheduled surgery time.  Please note if you have an early arrival time the surgery doors do not open before 5:00 AM.  Upon arrival, a Pre-op nurse and Anesthesiologist will review your health history, obtain vital signs, and answer questions you may have.  The only belongings needed at this time will be a list of your home medications and if applicable your C-PAP/BI-PAP machine.  A Pre-op nurse will start an IV and you may receive medication in preparation for surgery, including something to help you relax.     Please be aware that surgery does come with discomfort.  We want to make every effort to control your discomfort so please discuss any uncontrolled symptoms with your nurse.   Your doctor will most likely have prescribed pain medications.      If you are going home after surgery you will receive individualized written care instructions before being discharged.  A responsible adult must drive you to and from the hospital on the day of your surgery and ideally stay with you through the night.   .  Discharge prescriptions can be filled by the hospital pharmacy during regular pharmacy hours.  If you are having surgery late in the day/evening your  prescription may be e-prescribed to your pharmacy.  Please verify your pharmacy hours or chose a 24 hour pharmacy to avoid not having access to your prescription because your pharmacy has closed for the day.    If you are staying overnight following surgery, you will be transported to your hospital room following the recovery period.  [facility] has all private rooms.    If you have any questions please call Pre-Admission Testing at [phone].  Deductibles and co-payments are collected on the day of service. Please be prepared to pay the required co-pay, deductible or deposit on the day of service as defined by your plan.    Call your surgeon immediately if you experience any of the following symptoms:  Sore Throat  Shortness of Breath or difficulty breathing  Cough  Chills  Body soreness or muscle pain  Headache  Fever  New loss of taste or smell  Do not arrive for your surgery ill.  Your procedure will need to be rescheduled to another time.  You will need to call your physician before the day of surgery to avoid any unnecessary exposure to hospital staff as well as other patients.

## 2024-11-25 ENCOUNTER — HOSPITAL ENCOUNTER (OUTPATIENT)
Facility: HOSPITAL | Age: 84
Discharge: HOME | End: 2024-11-27
Attending: NEUROLOGICAL SURGERY | Admitting: NEUROLOGICAL SURGERY
Payer: MEDICARE

## 2024-11-25 ENCOUNTER — ANESTHESIA (OUTPATIENT)
Dept: PERIOP | Facility: HOSPITAL | Age: 84
End: 2024-11-25
Payer: MEDICARE

## 2024-11-25 ENCOUNTER — APPOINTMENT (OUTPATIENT)
Dept: GENERAL RADIOLOGY | Facility: HOSPITAL | Age: 84
End: 2024-11-25
Payer: MEDICARE

## 2024-11-25 ENCOUNTER — PREP FOR SURGERY (OUTPATIENT)
Dept: OTHER | Facility: HOSPITAL | Age: 84
End: 2024-11-25
Payer: MEDICARE

## 2024-11-25 ENCOUNTER — ANESTHESIA EVENT (OUTPATIENT)
Dept: PERIOP | Facility: HOSPITAL | Age: 84
End: 2024-11-25
Payer: MEDICARE

## 2024-11-25 DIAGNOSIS — Z98.890 STATUS POST LUMBAR SURGERY: ICD-10-CM

## 2024-11-25 DIAGNOSIS — M51.362 DEGENERATION OF INTERVERTEBRAL DISC OF LUMBAR REGION WITH DISCOGENIC BACK PAIN AND LOWER EXTREMITY PAIN: ICD-10-CM

## 2024-11-25 DIAGNOSIS — M51.26 LUMBAR HERNIATED DISC: Primary | ICD-10-CM

## 2024-11-25 DIAGNOSIS — M51.06 INTERVERTEBRAL LUMBAR DISC DISORDER WITH MYELOPATHY, LUMBAR REGION: ICD-10-CM

## 2024-11-25 DIAGNOSIS — M51.362 DEGENERATION OF INTERVERTEBRAL DISC OF LUMBAR REGION WITH DISCOGENIC BACK PAIN AND LOWER EXTREMITY PAIN: Primary | ICD-10-CM

## 2024-11-25 LAB
GLUCOSE BLDC GLUCOMTR-MCNC: 121 MG/DL (ref 70–130)
GLUCOSE BLDC GLUCOMTR-MCNC: 152 MG/DL (ref 70–130)
QT INTERVAL: 453 MS
QTC INTERVAL: 472 MS

## 2024-11-25 PROCEDURE — G0378 HOSPITAL OBSERVATION PER HR: HCPCS

## 2024-11-25 PROCEDURE — 25010000002 LIDOCAINE 2% SOLUTION: Performed by: NURSE ANESTHETIST, CERTIFIED REGISTERED

## 2024-11-25 PROCEDURE — 25010000002 VANCOMYCIN 1 G RECONSTITUTED SOLUTION 1 EACH VIAL: Performed by: NEUROLOGICAL SURGERY

## 2024-11-25 PROCEDURE — 93005 ELECTROCARDIOGRAM TRACING: CPT | Performed by: NEUROLOGICAL SURGERY

## 2024-11-25 PROCEDURE — 82948 REAGENT STRIP/BLOOD GLUCOSE: CPT

## 2024-11-25 PROCEDURE — 63030 LAMOT DCMPRN NRV RT 1 LMBR: CPT | Performed by: NEUROLOGICAL SURGERY

## 2024-11-25 PROCEDURE — 63710000001 HYDROCODONE-ACETAMINOPHEN 5-325 MG TABLET: Performed by: NEUROLOGICAL SURGERY

## 2024-11-25 PROCEDURE — C1713 ANCHOR/SCREW BN/BN,TIS/BN: HCPCS | Performed by: NEUROLOGICAL SURGERY

## 2024-11-25 PROCEDURE — 25810000003 SODIUM CHLORIDE PER 500 ML: Performed by: NEUROLOGICAL SURGERY

## 2024-11-25 PROCEDURE — 76000 FLUOROSCOPY <1 HR PHYS/QHP: CPT

## 2024-11-25 PROCEDURE — 25010000002 PROPOFOL 200 MG/20ML EMULSION: Performed by: NURSE ANESTHETIST, CERTIFIED REGISTERED

## 2024-11-25 PROCEDURE — 93010 ELECTROCARDIOGRAM REPORT: CPT | Performed by: INTERNAL MEDICINE

## 2024-11-25 PROCEDURE — 25010000002 ONDANSETRON PER 1 MG: Performed by: NURSE ANESTHETIST, CERTIFIED REGISTERED

## 2024-11-25 PROCEDURE — A9270 NON-COVERED ITEM OR SERVICE: HCPCS | Performed by: NEUROLOGICAL SURGERY

## 2024-11-25 PROCEDURE — 25010000002 DEXAMETHASONE SODIUM PHOSPHATE 20 MG/5ML SOLUTION: Performed by: NURSE ANESTHETIST, CERTIFIED REGISTERED

## 2024-11-25 PROCEDURE — 25010000002 CEFAZOLIN PER 500 MG: Performed by: NEUROLOGICAL SURGERY

## 2024-11-25 PROCEDURE — 25810000003 LACTATED RINGERS PER 1000 ML: Performed by: ANESTHESIOLOGY

## 2024-11-25 PROCEDURE — 25010000002 FENTANYL CITRATE (PF) 50 MCG/ML SOLUTION: Performed by: ANESTHESIOLOGY

## 2024-11-25 PROCEDURE — 25010000002 SUGAMMADEX 200 MG/2ML SOLUTION: Performed by: NURSE ANESTHETIST, CERTIFIED REGISTERED

## 2024-11-25 DEVICE — FLOSEAL WITH RECOTHROM - 5ML
Type: IMPLANTABLE DEVICE | Site: SPINE LUMBAR | Status: FUNCTIONAL
Brand: FLOSEAL HEMOSTATIC MATRIX

## 2024-11-25 DEVICE — AVITENE ULTRAFOAM, 8 CM X 12.5 CM (3-1/8" X 5"), 100 SQ CM
Type: IMPLANTABLE DEVICE | Site: SPINE LUMBAR | Status: FUNCTIONAL
Brand: AVITENE

## 2024-11-25 DEVICE — SSC BONE WAX
Type: IMPLANTABLE DEVICE | Site: SPINE LUMBAR | Status: FUNCTIONAL
Brand: SSC BONE WAX

## 2024-11-25 RX ORDER — POLYETHYLENE GLYCOL 3350 17 G/17G
17 POWDER, FOR SOLUTION ORAL DAILY PRN
Status: CANCELLED | OUTPATIENT
Start: 2024-11-25

## 2024-11-25 RX ORDER — HYDRALAZINE HYDROCHLORIDE 20 MG/ML
5 INJECTION INTRAMUSCULAR; INTRAVENOUS
Status: DISCONTINUED | OUTPATIENT
Start: 2024-11-25 | End: 2024-11-25 | Stop reason: HOSPADM

## 2024-11-25 RX ORDER — SODIUM CHLORIDE 0.9 % (FLUSH) 0.9 %
10 SYRINGE (ML) INJECTION AS NEEDED
Status: CANCELLED | OUTPATIENT
Start: 2024-11-25

## 2024-11-25 RX ORDER — HYDROCODONE BITARTRATE AND ACETAMINOPHEN 5; 325 MG/1; MG/1
1 TABLET ORAL EVERY 4 HOURS PRN
Qty: 35 TABLET | Refills: 0 | Status: SHIPPED | OUTPATIENT
Start: 2024-11-25

## 2024-11-25 RX ORDER — HYDROCODONE BITARTRATE AND ACETAMINOPHEN 7.5; 325 MG/1; MG/1
1 TABLET ORAL EVERY 4 HOURS PRN
Status: DISCONTINUED | OUTPATIENT
Start: 2024-11-25 | End: 2024-11-25 | Stop reason: HOSPADM

## 2024-11-25 RX ORDER — SODIUM CHLORIDE 0.9 % (FLUSH) 0.9 %
3 SYRINGE (ML) INJECTION EVERY 12 HOURS SCHEDULED
Status: DISCONTINUED | OUTPATIENT
Start: 2024-11-25 | End: 2024-11-25 | Stop reason: HOSPADM

## 2024-11-25 RX ORDER — ROCURONIUM BROMIDE 10 MG/ML
INJECTION, SOLUTION INTRAVENOUS AS NEEDED
Status: DISCONTINUED | OUTPATIENT
Start: 2024-11-25 | End: 2024-11-25 | Stop reason: SURG

## 2024-11-25 RX ORDER — PROPOFOL 10 MG/ML
INJECTION, EMULSION INTRAVENOUS AS NEEDED
Status: DISCONTINUED | OUTPATIENT
Start: 2024-11-25 | End: 2024-11-25 | Stop reason: SURG

## 2024-11-25 RX ORDER — BISACODYL 10 MG
10 SUPPOSITORY, RECTAL RECTAL DAILY PRN
Status: CANCELLED | OUTPATIENT
Start: 2024-11-25

## 2024-11-25 RX ORDER — PROMETHAZINE HYDROCHLORIDE 25 MG/1
25 SUPPOSITORY RECTAL ONCE AS NEEDED
Status: DISCONTINUED | OUTPATIENT
Start: 2024-11-25 | End: 2024-11-25 | Stop reason: HOSPADM

## 2024-11-25 RX ORDER — HYDROMORPHONE HYDROCHLORIDE 1 MG/ML
0.25 INJECTION, SOLUTION INTRAMUSCULAR; INTRAVENOUS; SUBCUTANEOUS
Status: DISCONTINUED | OUTPATIENT
Start: 2024-11-25 | End: 2024-11-25 | Stop reason: HOSPADM

## 2024-11-25 RX ORDER — ACETAMINOPHEN 650 MG/1
650 SUPPOSITORY RECTAL EVERY 4 HOURS PRN
Status: CANCELLED | OUTPATIENT
Start: 2024-11-25

## 2024-11-25 RX ORDER — SODIUM CHLORIDE 0.9 % (FLUSH) 0.9 %
3-10 SYRINGE (ML) INJECTION AS NEEDED
Status: DISCONTINUED | OUTPATIENT
Start: 2024-11-25 | End: 2024-11-25 | Stop reason: HOSPADM

## 2024-11-25 RX ORDER — METHOCARBAMOL 500 MG/1
500 TABLET, FILM COATED ORAL EVERY 8 HOURS PRN
Status: CANCELLED | OUTPATIENT
Start: 2024-11-25

## 2024-11-25 RX ORDER — LIDOCAINE HYDROCHLORIDE 10 MG/ML
0.5 INJECTION, SOLUTION INFILTRATION; PERINEURAL ONCE AS NEEDED
Status: DISCONTINUED | OUTPATIENT
Start: 2024-11-25 | End: 2024-11-25 | Stop reason: HOSPADM

## 2024-11-25 RX ORDER — LIDOCAINE HYDROCHLORIDE 20 MG/ML
INJECTION, SOLUTION INFILTRATION; PERINEURAL AS NEEDED
Status: DISCONTINUED | OUTPATIENT
Start: 2024-11-25 | End: 2024-11-25 | Stop reason: SURG

## 2024-11-25 RX ORDER — MIDAZOLAM HYDROCHLORIDE 1 MG/ML
0.5 INJECTION, SOLUTION INTRAMUSCULAR; INTRAVENOUS
Status: DISCONTINUED | OUTPATIENT
Start: 2024-11-25 | End: 2024-11-25 | Stop reason: HOSPADM

## 2024-11-25 RX ORDER — SODIUM CHLORIDE 9 MG/ML
40 INJECTION, SOLUTION INTRAVENOUS AS NEEDED
Status: DISCONTINUED | OUTPATIENT
Start: 2024-11-25 | End: 2024-11-27 | Stop reason: HOSPADM

## 2024-11-25 RX ORDER — CEPHALEXIN 500 MG/1
500 CAPSULE ORAL 4 TIMES DAILY
Qty: 20 CAPSULE | Refills: 0 | Status: SHIPPED | OUTPATIENT
Start: 2024-11-25 | End: 2024-12-02

## 2024-11-25 RX ORDER — FAMOTIDINE 10 MG/ML
20 INJECTION, SOLUTION INTRAVENOUS ONCE
Status: COMPLETED | OUTPATIENT
Start: 2024-11-25 | End: 2024-11-25

## 2024-11-25 RX ORDER — SODIUM CHLORIDE 9 MG/ML
INJECTION, SOLUTION INTRAVENOUS AS NEEDED
Status: DISCONTINUED | OUTPATIENT
Start: 2024-11-25 | End: 2024-11-25 | Stop reason: HOSPADM

## 2024-11-25 RX ORDER — DIPHENHYDRAMINE HYDROCHLORIDE 50 MG/ML
12.5 INJECTION INTRAMUSCULAR; INTRAVENOUS
Status: DISCONTINUED | OUTPATIENT
Start: 2024-11-25 | End: 2024-11-25 | Stop reason: HOSPADM

## 2024-11-25 RX ORDER — METHOCARBAMOL 500 MG/1
500 TABLET, FILM COATED ORAL EVERY 8 HOURS PRN
Status: DISCONTINUED | OUTPATIENT
Start: 2024-11-25 | End: 2024-11-27 | Stop reason: HOSPADM

## 2024-11-25 RX ORDER — HYDROCODONE BITARTRATE AND ACETAMINOPHEN 5; 325 MG/1; MG/1
1 TABLET ORAL EVERY 4 HOURS PRN
Status: DISCONTINUED | OUTPATIENT
Start: 2024-11-25 | End: 2024-11-27 | Stop reason: HOSPADM

## 2024-11-25 RX ORDER — LABETALOL HYDROCHLORIDE 5 MG/ML
5 INJECTION, SOLUTION INTRAVENOUS
Status: DISCONTINUED | OUTPATIENT
Start: 2024-11-25 | End: 2024-11-25 | Stop reason: HOSPADM

## 2024-11-25 RX ORDER — FENTANYL CITRATE 50 UG/ML
25 INJECTION, SOLUTION INTRAMUSCULAR; INTRAVENOUS
Status: DISCONTINUED | OUTPATIENT
Start: 2024-11-25 | End: 2024-11-25 | Stop reason: HOSPADM

## 2024-11-25 RX ORDER — SODIUM CHLORIDE 0.9 % (FLUSH) 0.9 %
10 SYRINGE (ML) INJECTION AS NEEDED
Status: DISCONTINUED | OUTPATIENT
Start: 2024-11-25 | End: 2024-11-27 | Stop reason: HOSPADM

## 2024-11-25 RX ORDER — ACETAMINOPHEN 325 MG/1
650 TABLET ORAL EVERY 4 HOURS PRN
Status: CANCELLED | OUTPATIENT
Start: 2024-11-25

## 2024-11-25 RX ORDER — FLUMAZENIL 0.1 MG/ML
0.2 INJECTION INTRAVENOUS AS NEEDED
Status: DISCONTINUED | OUTPATIENT
Start: 2024-11-25 | End: 2024-11-25 | Stop reason: HOSPADM

## 2024-11-25 RX ORDER — DEXAMETHASONE SODIUM PHOSPHATE 4 MG/ML
INJECTION, SOLUTION INTRA-ARTICULAR; INTRALESIONAL; INTRAMUSCULAR; INTRAVENOUS; SOFT TISSUE AS NEEDED
Status: DISCONTINUED | OUTPATIENT
Start: 2024-11-25 | End: 2024-11-25 | Stop reason: SURG

## 2024-11-25 RX ORDER — PROMETHAZINE HYDROCHLORIDE 25 MG/1
25 TABLET ORAL ONCE AS NEEDED
Status: DISCONTINUED | OUTPATIENT
Start: 2024-11-25 | End: 2024-11-25 | Stop reason: HOSPADM

## 2024-11-25 RX ORDER — NALOXONE HCL 0.4 MG/ML
0.2 VIAL (ML) INJECTION AS NEEDED
Status: DISCONTINUED | OUTPATIENT
Start: 2024-11-25 | End: 2024-11-25 | Stop reason: HOSPADM

## 2024-11-25 RX ORDER — AMOXICILLIN 250 MG
2 CAPSULE ORAL 2 TIMES DAILY PRN
Status: CANCELLED | OUTPATIENT
Start: 2024-11-25

## 2024-11-25 RX ORDER — ACETAMINOPHEN 160 MG/5ML
650 SOLUTION ORAL EVERY 4 HOURS PRN
Status: DISCONTINUED | OUTPATIENT
Start: 2024-11-25 | End: 2024-11-27 | Stop reason: HOSPADM

## 2024-11-25 RX ORDER — ONDANSETRON 2 MG/ML
INJECTION INTRAMUSCULAR; INTRAVENOUS AS NEEDED
Status: DISCONTINUED | OUTPATIENT
Start: 2024-11-25 | End: 2024-11-25 | Stop reason: SURG

## 2024-11-25 RX ORDER — IPRATROPIUM BROMIDE AND ALBUTEROL SULFATE 2.5; .5 MG/3ML; MG/3ML
3 SOLUTION RESPIRATORY (INHALATION) ONCE AS NEEDED
Status: DISCONTINUED | OUTPATIENT
Start: 2024-11-25 | End: 2024-11-25 | Stop reason: HOSPADM

## 2024-11-25 RX ORDER — POLYETHYLENE GLYCOL 3350 17 G/17G
17 POWDER, FOR SOLUTION ORAL DAILY PRN
Status: DISCONTINUED | OUTPATIENT
Start: 2024-11-25 | End: 2024-11-27 | Stop reason: HOSPADM

## 2024-11-25 RX ORDER — ACETAMINOPHEN 650 MG/1
650 SUPPOSITORY RECTAL EVERY 4 HOURS PRN
Status: DISCONTINUED | OUTPATIENT
Start: 2024-11-25 | End: 2024-11-27 | Stop reason: HOSPADM

## 2024-11-25 RX ORDER — ONDANSETRON 2 MG/ML
4 INJECTION INTRAMUSCULAR; INTRAVENOUS ONCE AS NEEDED
Status: DISCONTINUED | OUTPATIENT
Start: 2024-11-25 | End: 2024-11-25 | Stop reason: HOSPADM

## 2024-11-25 RX ORDER — SODIUM CHLORIDE 0.9 % (FLUSH) 0.9 %
10 SYRINGE (ML) INJECTION EVERY 12 HOURS SCHEDULED
Status: CANCELLED | OUTPATIENT
Start: 2024-11-25

## 2024-11-25 RX ORDER — FENTANYL CITRATE 50 UG/ML
50 INJECTION, SOLUTION INTRAMUSCULAR; INTRAVENOUS ONCE AS NEEDED
Status: COMPLETED | OUTPATIENT
Start: 2024-11-25 | End: 2024-11-25

## 2024-11-25 RX ORDER — BISACODYL 10 MG
10 SUPPOSITORY, RECTAL RECTAL DAILY PRN
Status: DISCONTINUED | OUTPATIENT
Start: 2024-11-25 | End: 2024-11-27 | Stop reason: HOSPADM

## 2024-11-25 RX ORDER — HYDROCODONE BITARTRATE AND ACETAMINOPHEN 5; 325 MG/1; MG/1
1 TABLET ORAL EVERY 4 HOURS PRN
Status: CANCELLED | OUTPATIENT
Start: 2024-11-25 | End: 2024-11-30

## 2024-11-25 RX ORDER — SODIUM CHLORIDE 0.9 % (FLUSH) 0.9 %
10 SYRINGE (ML) INJECTION EVERY 12 HOURS SCHEDULED
Status: DISCONTINUED | OUTPATIENT
Start: 2024-11-25 | End: 2024-11-27 | Stop reason: HOSPADM

## 2024-11-25 RX ORDER — SODIUM CHLORIDE 9 MG/ML
40 INJECTION, SOLUTION INTRAVENOUS AS NEEDED
Status: CANCELLED | OUTPATIENT
Start: 2024-11-25

## 2024-11-25 RX ORDER — AMOXICILLIN 250 MG
2 CAPSULE ORAL 2 TIMES DAILY PRN
Status: DISCONTINUED | OUTPATIENT
Start: 2024-11-25 | End: 2024-11-27 | Stop reason: HOSPADM

## 2024-11-25 RX ORDER — PHENYLEPHRINE HCL IN 0.9% NACL 1 MG/10 ML
SYRINGE (ML) INTRAVENOUS AS NEEDED
Status: DISCONTINUED | OUTPATIENT
Start: 2024-11-25 | End: 2024-11-25 | Stop reason: SURG

## 2024-11-25 RX ORDER — ACETAMINOPHEN 325 MG/1
650 TABLET ORAL EVERY 4 HOURS PRN
Status: DISCONTINUED | OUTPATIENT
Start: 2024-11-25 | End: 2024-11-27 | Stop reason: HOSPADM

## 2024-11-25 RX ORDER — DROPERIDOL 2.5 MG/ML
0.62 INJECTION, SOLUTION INTRAMUSCULAR; INTRAVENOUS
Status: DISCONTINUED | OUTPATIENT
Start: 2024-11-25 | End: 2024-11-25 | Stop reason: HOSPADM

## 2024-11-25 RX ORDER — BISACODYL 5 MG/1
5 TABLET, DELAYED RELEASE ORAL DAILY PRN
Status: CANCELLED | OUTPATIENT
Start: 2024-11-25

## 2024-11-25 RX ORDER — SODIUM CHLORIDE, SODIUM LACTATE, POTASSIUM CHLORIDE, CALCIUM CHLORIDE 600; 310; 30; 20 MG/100ML; MG/100ML; MG/100ML; MG/100ML
9 INJECTION, SOLUTION INTRAVENOUS CONTINUOUS
Status: ACTIVE | OUTPATIENT
Start: 2024-11-25 | End: 2024-11-26

## 2024-11-25 RX ORDER — HYDROCODONE BITARTRATE AND ACETAMINOPHEN 5; 325 MG/1; MG/1
1 TABLET ORAL ONCE AS NEEDED
Status: DISCONTINUED | OUTPATIENT
Start: 2024-11-25 | End: 2024-11-25 | Stop reason: HOSPADM

## 2024-11-25 RX ORDER — EPHEDRINE SULFATE 50 MG/ML
5 INJECTION, SOLUTION INTRAVENOUS ONCE AS NEEDED
Status: DISCONTINUED | OUTPATIENT
Start: 2024-11-25 | End: 2024-11-25 | Stop reason: HOSPADM

## 2024-11-25 RX ORDER — ONDANSETRON 2 MG/ML
4 INJECTION INTRAMUSCULAR; INTRAVENOUS EVERY 6 HOURS PRN
Status: CANCELLED | OUTPATIENT
Start: 2024-11-25

## 2024-11-25 RX ORDER — ONDANSETRON 2 MG/ML
4 INJECTION INTRAMUSCULAR; INTRAVENOUS EVERY 6 HOURS PRN
Status: DISCONTINUED | OUTPATIENT
Start: 2024-11-25 | End: 2024-11-27 | Stop reason: HOSPADM

## 2024-11-25 RX ORDER — BISACODYL 5 MG/1
5 TABLET, DELAYED RELEASE ORAL DAILY PRN
Status: DISCONTINUED | OUTPATIENT
Start: 2024-11-25 | End: 2024-11-27 | Stop reason: HOSPADM

## 2024-11-25 RX ORDER — ACETAMINOPHEN 160 MG/5ML
650 SOLUTION ORAL EVERY 4 HOURS PRN
Status: CANCELLED | OUTPATIENT
Start: 2024-11-25

## 2024-11-25 RX ADMIN — LIDOCAINE HYDROCHLORIDE 100 MG: 20 INJECTION, SOLUTION INFILTRATION; PERINEURAL at 10:10

## 2024-11-25 RX ADMIN — FENTANYL CITRATE 50 MCG: 50 INJECTION, SOLUTION INTRAMUSCULAR; INTRAVENOUS at 10:24

## 2024-11-25 RX ADMIN — SUGAMMADEX 200 MG: 100 INJECTION, SOLUTION INTRAVENOUS at 11:49

## 2024-11-25 RX ADMIN — DEXAMETHASONE SODIUM PHOSPHATE 10 MG: 4 INJECTION, SOLUTION INTRAMUSCULAR; INTRAVENOUS at 10:17

## 2024-11-25 RX ADMIN — ROCURONIUM BROMIDE 30 MG: 10 INJECTION, SOLUTION INTRAVENOUS at 10:12

## 2024-11-25 RX ADMIN — HYDROCODONE BITARTRATE AND ACETAMINOPHEN 1 TABLET: 5; 325 TABLET ORAL at 14:32

## 2024-11-25 RX ADMIN — PROPOFOL 180 MG: 10 INJECTION, EMULSION INTRAVENOUS at 10:10

## 2024-11-25 RX ADMIN — Medication 10 ML: at 20:10

## 2024-11-25 RX ADMIN — Medication 200 MCG: at 10:33

## 2024-11-25 RX ADMIN — SODIUM CHLORIDE, SODIUM LACTATE, POTASSIUM CHLORIDE, CALCIUM CHLORIDE 9 ML/HR: 20; 30; 600; 310 INJECTION, SOLUTION INTRAVENOUS at 08:49

## 2024-11-25 RX ADMIN — ONDANSETRON 4 MG: 2 INJECTION INTRAMUSCULAR; INTRAVENOUS at 11:34

## 2024-11-25 RX ADMIN — SODIUM CHLORIDE 2 G: 900 INJECTION INTRAVENOUS at 10:00

## 2024-11-25 RX ADMIN — HYDROCODONE BITARTRATE AND ACETAMINOPHEN 1 TABLET: 5; 325 TABLET ORAL at 18:13

## 2024-11-25 RX ADMIN — FAMOTIDINE 20 MG: 10 INJECTION INTRAVENOUS at 08:46

## 2024-11-25 RX ADMIN — Medication 100 MCG: at 10:55

## 2024-11-25 NOTE — ANESTHESIA PROCEDURE NOTES
Airway  Urgency: elective    Date/Time: 11/25/2024 10:15 AM  Airway not difficult    General Information and Staff    Patient location during procedure: OR  CRNA/CAA: Bunny Jaffe, NITA    Indications and Patient Condition  Indications for airway management: airway protection    Preoxygenated: yes  MILS maintained throughout  Mask difficulty assessment: 1 - vent by mask    Final Airway Details  Final airway type: endotracheal airway      Successful airway: ETT  Cuffed: yes   Successful intubation technique: direct laryngoscopy  Facilitating devices/methods: intubating stylet  Endotracheal tube insertion site: oral  Blade: Ashok  Blade size: 3  ETT size (mm): 7.0  Cormack-Lehane Classification: grade I - full view of glottis  Placement verified by: chest auscultation and capnometry   Measured from: gums  ETT/EBT to gums (cm): 21  Number of attempts at approach: 1  Assessment: lips, teeth, and gum same as pre-op and atraumatic intubation

## 2024-11-25 NOTE — PLAN OF CARE
Pt arrived to floor this shift from surgery. Pt pain well managed per MAR and with ice pack. Pt ambulated to bed from stretcher. VSS this shift. Per Dr. Fitzgerald discharge instructions patient is to be laying down with the exception of using the commode.                 36.8

## 2024-11-25 NOTE — BRIEF OP NOTE
LUMBAR DISCECTOMY POSTERIOR WITH METRIX  Progress Note    Radha Silverio  11/25/2024    Pre-op Diagnosis:   Intervertebral lumbar disc disorder with myelopathy, lumbar region [M51.06]       Post-Op Diagnosis Codes:     * Intervertebral lumbar disc disorder with myelopathy, lumbar region [M51.06]    Procedure/CPT® Codes:        Procedure(s):  Left lumbar 1 to lumbar 2 laminectomy and discectomy with metrx              Surgeon(s):  Bronson Fitzgerald MD    Anesthesia: General    Staff:   Circulator: Adriana Brandon RN; Rina Bang RN  Radiology Technologist: Linden Hartman  Scrub Person: Trudi Rodriguez  Assistant: Trudi Pickett CSA  Assistant: Trudi Pickett CSA      Estimated Blood Loss: 100ml    Urine Voided: * No values recorded between 11/25/2024 10:05 AM and 11/25/2024 11:51 AM *    Specimens:                None          Drains: * No LDAs found *    Findings: Large disc herniation with Tarlov cyst        Complications: None      Bronson Fitzgerald MD     Date: 11/25/2024  Time: 11:52 EST

## 2024-11-25 NOTE — DISCHARGE INSTRUCTIONS
LUMBAR SURGERY - ALEKS SY M.D.  [address], [address]  [place] [address]  [phone]    Instructions & Care After Your Lumbar Surgery    1. No sitting except on the commode.  You may lie on a firm couch but not on a waterbed or recliner.  You may lie in any position that is comfortable, using only one pillow under your head. Either stand at a counter or lie on your side for meals. Three weeks after surgery you may begin sitting for 30 minutes 3 times per day.    2. No driving for three weeks.  You may ride in the car in a passenger seat that reclines or lying down in the back seat.      3. No bending. If you drop something allow someone else to pick it up.    4. Don’t lift anything heavier than a coffee cup or paperback book.    5. Gradually increase your activity each day.  You should get out of bed every hour during the day.  Walk outside as soon as you feel up to it.  Walk short distances frequently rather than making a long trip.  Frequency is more important than distance.  Your goal is to be walking 2 to 3 miles per day when you return for your post-operative visit. (Never do this in one trip.)    6. You may climb stairs.    7. Remove your bandage the second day after surgery and leave it off.  If you notice any redness, swelling or drainage, call the office.  There are steri-strips across the incision.  If these are still present ten days after surgery, remove them gently.      8. You may shower five days after surgery.  Keep the incision dry until then.  Don’t let the water beat directly on the incision and gently pat it dry.    9. Physical Therapy will be arranged at your post-operative visit if needed.    10. Your prescription for pain medication may be filled for half the original amount prior to your return office visit.  Due to changes in Federal Law in order to have this medication refilled you must contact the office four days prior to the date and make arrangements to pick the  prescription up in the office.  This prescription refill cannot be called in to the pharmacy. Your prescription will be ready for pick-up the day the refill is due.    Don’t be alarmed if you experience some of your pre-operative symptoms after going home.  This is not uncommon and normally goes away in a few days but may last longer.  If you have any questions or concerns, please call our office.

## 2024-11-25 NOTE — ANESTHESIA POSTPROCEDURE EVALUATION
Patient: Radha Silverio    Procedure Summary       Date: 11/25/24 Room / Location: [facility] OR [address] [place] [address] MAIN OR    Anesthesia Start: 1005 Anesthesia Stop: 1203    Procedure: Left lumbar 1 to lumbar 2 laminectomy and discectomy with metrx (Left: Spine Lumbar) Diagnosis:       Intervertebral lumbar disc disorder with myelopathy, lumbar region      (Intervertebral lumbar disc disorder with myelopathy, lumbar region [M51.06])    Surgeons: Bronson Fitzgerald MD Provider: Carlos Pollack MD    Anesthesia Type: general ASA Status: 3            Anesthesia Type: general    Vitals  Vitals Value Taken Time   /95 11/25/24 1400   Temp 36.6 °C (97.9 °F) 11/25/24 1400   Pulse 61 11/25/24 1407   Resp 16 11/25/24 1400   SpO2 97 % 11/25/24 1407   Vitals shown include unfiled device data.        Post Anesthesia Care and Evaluation    Patient location during evaluation: PACU  Patient participation: complete - patient participated  Level of consciousness: awake and alert  Pain management: adequate    Airway patency: patent  Anesthetic complications: No anesthetic complications  PONV Status: none  Cardiovascular status: acceptable and hemodynamically stable  Respiratory status: acceptable, nonlabored ventilation and spontaneous ventilation  Hydration status: acceptable

## 2024-11-25 NOTE — ANESTHESIA PREPROCEDURE EVALUATION
Anesthesia Evaluation     Patient summary reviewed and Nursing notes reviewed   NPO Solid Status: > 8 hours  NPO Liquid Status: > 2 hours           Airway   Mallampati: II  TM distance: <3 FB  Neck ROM: full  No difficulty expected  Dental      Comment: Many teeth missing, upper and lower, none loose per pt    Pulmonary - normal exam    breath sounds clear to auscultation  (+) ,shortness of breath  Cardiovascular     ECG reviewed  Rhythm: irregular  Rate: normal    (+) hypertension 2 medications or greater, dysrhythmias PAC, PVC, DVT, hyperlipidemia    ROS comment: Hx frequent PVCs and atrial bigeminy/EF >70% trace MR and trace TR by ECHO 1/24/ectopic atrial rhythm on last ECG 6/24/will get preop ECG today  PE comment: Feels like frequent PACs and PVCs, will get preop ECG today since last one was in 6/24    Neuro/Psych  (+) dizziness/light headedness, psychiatric history Anxiety  GI/Hepatic/Renal/Endo    (+) obesity, GERD, renal disease- CRI, diabetes mellitus type 2    Musculoskeletal         ROS comment: Lumbosacral DDD  Abdominal   (+) obese   Substance History      OB/GYN          Other   arthritis,                   Anesthesia Plan    ASA 3     general     intravenous induction     Anesthetic plan, risks, benefits, and alternatives have been provided, discussed and informed consent has been obtained with: patient.    CODE STATUS:

## 2024-11-25 NOTE — OP NOTE
Preop diagnosis: Herniated disc left L1-2 with radiculopathy and impending cauda equina syndrome    Postop diagnosis: same    Procedure performed: Left L1-2 laminectomy, medial facetectomy and discectomy using minimal access spinal technologies microsurgical technique microsurgical instrumentation    Spinal Surgery Levels Completed:1 Level     Surgeon: Bronson Fitzgerald M.D.    Assistant: Trudi Pickett CFA who was instrumental in helping with visualization of neural structures, hemostasis, and retraction of neural structures.  Her skilled assistance was necessary for the success of this case    Indications for the procedure:  This is a patient with severe pain in the legs.  Previous imaging had shown neural compression at the L1 2 levels.  As a result of this and the failure of conservative therapy the patient elected to proceed with surgery.    Operative summary:  After induction of general anesthesia the patient was intubated and placed on the operating table in the prone position on a Ralph table.  All pressure points were padded including peripheral points of entrapment.  The back was prepped with Chloraprep and then draped with Ioban, half sheets, and a split sheet.      The L1-2 level was localized with intraoperative fluoroscopy an incision was made on the left just above the pedicle.  Successive dilating tubes up to 18 mm by 6 cm were placed over that area.  Soft tissue was then removed from the supralaminar space.  The inferior laminar arch of L1 as well as the superior laminar arch of L2 and the medial aspect of facet were removed with the SolveBoard drill.  The remainder of the operation was done under high-power magnification of the operating microscope using microsurgical technique and microsurgical instrumentation.  The ligamentum flavum was opened and removed out to the level of the pedicle using the Kerrison rongeurs.  This exposed the lateral thecal sac and the nerve root of L2.  Once the lateral  recess was opened the dissection was carried up to the L1 pedicle completely decompressing the superior nerve root.    Once this was done the L2 nerve root was mobilized medially to expose the disc. There was evidence of a large disc herniation compressing the nerve just under the nerve root.  This was carefully teased out and then the disc space was incised and disc material was removed with the down-biting cervical curettes and the pituitary rongeurs.  Once the disc space was emptied as much as possible  bleeding was controlled with bipolar cautery.  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  This did result in some CSF leakage due to the cyst.  At the conclusion of the procedure there was no way to repair this that it was sealed with Tisseel.    Once the entire decompression was completed we were able to explore under the nerve root and the thecal sac using the Khan ball probe to be sure there was no evidence of residual compression.  There being none bleeding was controlled again using the bipolar cautery, FloSeal, and thrombin and Gelfoam.  The area was then copiously irrigated with vancomycin solution and the tube was removed. The paraspinous musculature was injected with 100 mL 1/8% Marcaine with 1:200,000 epinephrine solution.    Another gram of Kefzol was given prior to closure.    The incision was then closed in layers and dressed and the patient was taken to the recovery room in stable condition there were no apparent complications.  Sponge, instrument, and needle counts were correct at the end of the procedure.

## 2024-11-26 ENCOUNTER — TELEPHONE (OUTPATIENT)
Dept: INTERNAL MEDICINE | Facility: CLINIC | Age: 84
End: 2024-11-26

## 2024-11-26 LAB
BASOPHILS # BLD AUTO: 0.01 10*3/MM3 (ref 0–0.2)
BASOPHILS NFR BLD AUTO: 0.2 % (ref 0–1.5)
DEPRECATED RDW RBC AUTO: 41.6 FL (ref 37–54)
EOSINOPHIL # BLD AUTO: 0.01 10*3/MM3 (ref 0–0.4)
EOSINOPHIL NFR BLD AUTO: 0.2 % (ref 0.3–6.2)
ERYTHROCYTE [DISTWIDTH] IN BLOOD BY AUTOMATED COUNT: 12.8 % (ref 12.3–15.4)
HCT VFR BLD AUTO: 34.7 % (ref 34–46.6)
HGB BLD-MCNC: 11.6 G/DL (ref 12–15.9)
IMM GRANULOCYTES # BLD AUTO: 0.02 10*3/MM3 (ref 0–0.05)
IMM GRANULOCYTES NFR BLD AUTO: 0.3 % (ref 0–0.5)
LYMPHOCYTES # BLD AUTO: 1.27 10*3/MM3 (ref 0.7–3.1)
LYMPHOCYTES NFR BLD AUTO: 19.4 % (ref 19.6–45.3)
MCH RBC QN AUTO: 30 PG (ref 26.6–33)
MCHC RBC AUTO-ENTMCNC: 33.4 G/DL (ref 31.5–35.7)
MCV RBC AUTO: 89.7 FL (ref 79–97)
MONOCYTES # BLD AUTO: 0.63 10*3/MM3 (ref 0.1–0.9)
MONOCYTES NFR BLD AUTO: 9.6 % (ref 5–12)
NEUTROPHILS NFR BLD AUTO: 4.61 10*3/MM3 (ref 1.7–7)
NEUTROPHILS NFR BLD AUTO: 70.3 % (ref 42.7–76)
NRBC BLD AUTO-RTO: 0 /100 WBC (ref 0–0.2)
PLATELET # BLD AUTO: 177 10*3/MM3 (ref 140–450)
PMV BLD AUTO: 10.8 FL (ref 6–12)
RBC # BLD AUTO: 3.87 10*6/MM3 (ref 3.77–5.28)
WBC NRBC COR # BLD AUTO: 6.55 10*3/MM3 (ref 3.4–10.8)

## 2024-11-26 PROCEDURE — 85025 COMPLETE CBC W/AUTO DIFF WBC: CPT | Performed by: NEUROLOGICAL SURGERY

## 2024-11-26 PROCEDURE — A9270 NON-COVERED ITEM OR SERVICE: HCPCS | Performed by: NEUROLOGICAL SURGERY

## 2024-11-26 PROCEDURE — 63710000001 BISACODYL 5 MG TABLET DELAYED-RELEASE: Performed by: NEUROLOGICAL SURGERY

## 2024-11-26 PROCEDURE — G0378 HOSPITAL OBSERVATION PER HR: HCPCS

## 2024-11-26 PROCEDURE — 63710000001 HYDROCODONE-ACETAMINOPHEN 5-325 MG TABLET: Performed by: NEUROLOGICAL SURGERY

## 2024-11-26 PROCEDURE — 97162 PT EVAL MOD COMPLEX 30 MIN: CPT

## 2024-11-26 PROCEDURE — 63710000001 POLYETHYLENE GLYCOL 17 G PACK: Performed by: NEUROLOGICAL SURGERY

## 2024-11-26 PROCEDURE — 99024 POSTOP FOLLOW-UP VISIT: CPT | Performed by: NEUROLOGICAL SURGERY

## 2024-11-26 PROCEDURE — 63710000001 METHOCARBAMOL 500 MG TABLET: Performed by: NEUROLOGICAL SURGERY

## 2024-11-26 PROCEDURE — 97530 THERAPEUTIC ACTIVITIES: CPT

## 2024-11-26 PROCEDURE — 63710000001 SENNOSIDES-DOCUSATE 8.6-50 MG TABLET: Performed by: NEUROLOGICAL SURGERY

## 2024-11-26 RX ADMIN — HYDROCODONE BITARTRATE AND ACETAMINOPHEN 1 TABLET: 5; 325 TABLET ORAL at 19:58

## 2024-11-26 RX ADMIN — Medication 10 ML: at 19:59

## 2024-11-26 RX ADMIN — SENNOSIDES AND DOCUSATE SODIUM 2 TABLET: 50; 8.6 TABLET ORAL at 05:05

## 2024-11-26 RX ADMIN — METHOCARBAMOL 500 MG: 500 TABLET ORAL at 19:58

## 2024-11-26 RX ADMIN — Medication 10 ML: at 08:40

## 2024-11-26 RX ADMIN — Medication 10 ML: at 20:02

## 2024-11-26 RX ADMIN — HYDROCODONE BITARTRATE AND ACETAMINOPHEN 1 TABLET: 5; 325 TABLET ORAL at 05:15

## 2024-11-26 RX ADMIN — BISACODYL 5 MG: 5 TABLET, COATED ORAL at 10:31

## 2024-11-26 RX ADMIN — POLYETHYLENE GLYCOL 3350 17 G: 17 POWDER, FOR SOLUTION ORAL at 14:48

## 2024-11-26 NOTE — PROGRESS NOTES
LOS: 0 days   Patient Care Team:  Arielle Johnston APRN as PCP - General (Family Medicine)  Trudi Urbina MD as Consulting Physician (Cardiology)  Nam Johnson OD (Optometry)    Chief Complaint: Postop lumbar laminectomy and discectomy    Subjective     Patient feels pretty good.  She said she really does not have any pain in her leg.  Her back is not all that sore either.  She has not yet been up according to her.    Interval History:     History taken from: patient chart    Objective      She has good movement in both legs    Vital Signs  Temp:  [97.9 °F (36.6 °C)-98.6 °F (37 °C)] 98.1 °F (36.7 °C)  Heart Rate:  [57-77] 62  Resp:  [16] 16  BP: ()/(48-97) 96/49       Results Review:     I reviewed the patient's new clinical results.  She is afebrile.      Assessment & Plan       Intervertebral lumbar disc disorder with myelopathy, lumbar region    Lumbar herniated disc      I told the patient that from my point of view we will have her evaluated by physical therapy for safety.  Particularly with regard to stairs.  I do not think she needs a hospital bed.  I would prefer she just get up and move around at home.  She can be discharged as soon as we are sure it is safe.      Bronson Fitzgerald MD  11/26/24  08:17 EST

## 2024-11-26 NOTE — CASE MANAGEMENT/SOCIAL WORK
Continued Stay Note  [facility]     Patient Name: Radha Silverio  MRN: [id number]  Today's Date: 11/26/2024    Admit Date: 11/25/2024    Plan: home with [facility]    Discharge Plan       Row Name 11/26/24 1232       Plan    Plan home with [facility]     Patient/Family in Agreement with Plan yes    Plan Comments [facility] can't accept. List provided to pt and agreeable to [facility] referral who can accept. CCP to follow.                   Discharge Codes    No documentation.                 Expected Discharge Date and Time       Expected Discharge Date Expected Discharge Time    Nov 25, 2024  3:30 PM              SABRINA Uriarte

## 2024-11-26 NOTE — CASE MANAGEMENT/SOCIAL WORK
Discharge Planning Assessment  [facility]     Patient Name: Radha Silverio  MRN: [id number]  Today's Date: 11/26/2024    Admit Date: 11/25/2024    Plan: home with HH/referral to [facility]   Discharge Needs Assessment       Row Name 11/26/24 0925       Living Environment    People in Home child(carmen), adult    Unique Family Situation daughter just moved in with her    Current Living Arrangements home    Potentially Unsafe Housing Conditions none    In the past 12 months has the electric, gas, oil, or water company threatened to shut off services in your home? No    Primary Care Provided by self    Provides Primary Care For no one    Family Caregiver if Needed child(carmen), adult    Quality of Family Relationships helpful;involved    Able to Return to Prior Arrangements yes       Resource/Environmental Concerns    Resource/Environmental Concerns home accessibility    Home Accessibility Concerns stairs to enter home;stairs to access bedroom or bathroom       Transition Planning    Patient/Family Anticipates Transition to home with family;home with help/services    Patient/Family Anticipated Services at Transition home health care    Transportation Anticipated family or friend will provide       Discharge Needs Assessment    Readmission Within the Last 30 Days no previous admission in last 30 days    Equipment Currently Used at Home cane, straight    Equipment Needed After Discharge walker, rolling    Outpatient/Agency/Support Group Needs homecare agency    Discharge Facility/Level of Care Needs home with home health    Provided Post Acute Provider List? Yes    Post Acute Provider List Home Health    Delivered To Patient    Method of Delivery In person                   Discharge Plan       Row Name 11/26/24 0928       Plan    Plan home with HH/referral to [facility]    Patient/Family in Agreement with Plan yes    Plan Comments Spoke with pt bedside. Confirmed facesheet correct. Explaied role of CCP. Pt reports she lives alone but  her daughter just moved in with her. She lives in a house with 3 SHRUTHI and 13 to her bedroom. She has no HH/SNF history. Her PCP is Arielle PEDERSEN and uses [facility] in [place] with no issues. She has a cane she uses to ambulate but will need walker at d/c, referral to [facility]. Discussed d/c plan with pt she wants to go to SNF however she is walking 200 ft and is in observatoin status, she reports she can't private pay. Pt requested HH at d/c, reviewed list and would like referral to [facility]. CCP to follow.                  Continued Care and Services - Admitted Since 11/25/2024       Durable Medical Equipment       Service Provider Request Status Services Address Phone Fax Patient Preferred    [facility] Pending - Request Sent -- [address], [facility] [id number] [phone] --              Home Medical Care       Service Provider Request Status Services Address Phone Fax Patient Preferred    [facility] Pending - Request Sent -- [address], [facility] [address] [phone] --                  Expected Discharge Date and Time       Expected Discharge Date Expected Discharge Time    Nov 25, 2024  3:30 PM           Demographic Summary    No documentation.                  Functional Status    No documentation.                  Psychosocial    No documentation.                  Abuse/Neglect    No documentation.                  Legal    No documentation.                  Substance Abuse    No documentation.                  Patient Forms    No documentation.                     SABRINA Uriarte

## 2024-11-26 NOTE — PLAN OF CARE
Goal Outcome Evaluation:  Plan of Care Reviewed With: patient      Pt is 83 y/o F admitted to [facility] on 11/25/24 for L1/2 laminectomy with medial facetectomy and discectomy. She is POD1. At baseline pt is from home alone, 3 SHRUTHI no railing, indep with mobility, bed/bath up 13 steps with rail. States her daughter will be staying with her for a few weeks. Pt currently demos SBA for bed mobility, transfers and ambulation up to 200' with rwx - significant unsteadiness initially without AD that improves with rwx. CGA for step negotiation as increased cues for sequencing needed. Educated pt on spinal precautions and log roll technique with good carryover. Educated pt on various ways she could negotiate her steps to go in the home and encouraged her to only do the flight of stairs to bed room when necessary. Pt demos mobility below baseline and therefore would benefit from acute skilled PT to address functional mobility deficits. Anticipate d/c home with assist and home health. Pt requires 2 wheeled walker for safety at home.            Anticipated Discharge Disposition (PT): home with home health, home with assist

## 2024-11-26 NOTE — THERAPY EVALUATION
Patient Name: Radha Silverio  : 1940    MRN: [id number]                              Today's Date: 2024       Admit Date: 2024    Visit Dx:     ICD-10-CM ICD-9-CM   1. Intervertebral lumbar disc disorder with myelopathy, lumbar region  M51.06 722.73   2. Degeneration of intervertebral disc of lumbar region with discogenic back pain and lower extremity pain  M51.362 722.52     Patient Active Problem List   Diagnosis    Essential hypertension    Mixed hyperlipidemia    Chronic constipation    Acid reflux    Solis esophagus    Type 2 diabetes mellitus without complication, without long-term current use of insulin    Hemorrhoids    Frequent PVCs    Atrial bigeminy    Ectopic atrial rhythm    Primary osteoarthritis of knee    Primary osteoarthritis of right knee    Tinnitus of both ears    Left hip pain    Primary osteoarthritis of left hip    Greater trochanteric bursitis of left hip    Stage 3 chronic kidney disease    Female genital prolapse    Chronic gastritis without bleeding    Degenerative disc disease, lumbar    Weakness of left lower extremity    Neuritis or radiculitis due to rupture of lumbar intervertebral disc    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    Adenomatous polyp of colon    Intervertebral lumbar disc disorder with myelopathy, lumbar region    Lumbar herniated disc     Past Medical History:   Diagnosis Date    Anesthesia complication     DURING COLONOSCOPY PATIENT STATES SHE WAS STILL ABLE TO HEAR AND FEEL    Anxiety     Arthritis     Arthritis of back     Solis's esophagus     Cataract     Chronic constipation     Chronic cough 2018    Colon polyps     Diabetes type 2, controlled     Dizziness     DVT (deep venous thrombosis)     Dyspnea     Essential hypertension 2016    Frequent PVCs 2017    GERD (gastroesophageal reflux disease)     Hematochezia 2016    Hyperlipidemia 2016    Hypertension     Lumbosacral disc disease     Palpitations      Pelvic prolapse      Past Surgical History:   Procedure Laterality Date    BREAST BIOPSY Left     cyst at 9 y/o    COLONOSCOPY N/A 10/11/2016    Procedure: COLONOSCOPY TO CECUM, TO TERMINAL ILEUM WITH HOT SNARE POLYPECTOMY;  Surgeon: Palmira Calix MD;  Location:  [facility];  Service:     COLONOSCOPY N/A 10/05/2021    Procedure: COLONOSCOPY TO CECUM WITH COLD POLYPECTOMY;  Surgeon: Palmira Calix MD;  [facility];  Service: Gastroenterology;  Laterality: N/A;  HISTORY OF COLON POLYPS  COLON POLYP, HEMORRHOIDS, DIVERTICULOSIS    CYST REMOVAL Right     breast    ENDOSCOPY N/A 10/11/2016    Procedure: ESOPHAGOGASTRODUODENOSCOPY WITH BIOPSY;  Surgeon: Palmira Calix MD;  [facility];  Service:     ENDOSCOPY N/A 10/05/2021    Procedure: ESOPHAGOGASTRODUODENOSCOPY WITH COLD BIOPSIES;  Surgeon: Palmira Calix MD;  Location: [facility] ENDOSCOPY;  Service: Gastroenterology;  Laterality: N/A;  GERD  GASTRITIS    LAPAROSCOPIC CHOLECYSTECTOMY W/ CHOLANGIOGRAPHY      LUMBAR DISCECTOMY Left 11/25/2024    Procedure: Left lumbar 1 to lumbar 2 laminectomy and discectomy with metrx;  Surgeon: Bronson Fitzgerald MD;  Location: [facility] MAIN OR;  Service: Neurosurgery;  Laterality: Left;    MOLE REMOVAL      on foot     OOPHORECTOMY Left     B9    TUBAL ABDOMINAL LIGATION        General Information       Row Name 11/26/24 0909          Physical Therapy Time and Intention    Document Type evaluation  -ST     Mode of Treatment physical therapy  -       Row Name 11/26/24 0909          General Information    Patient Profile Reviewed yes  -ST     Prior Level of Function independent:  -ST     Existing Precautions/Restrictions fall;spinal  sitting only to use commode, no lifting more than a coffee cup per pt  -ST     Barriers to Rehab previous functional deficit  -ST       Row Name 11/26/24 0909          Living Environment    People in Home alone  daughter plans to stay with her  -ST       Row Name  11/26/24 0909          Home Main Entrance    Number of Stairs, Main Entrance two  -ST     Stair Railings, Main Entrance none  -[facility] Name 11/26/24 0909          Stairs Within Home, Primary    Number of Stairs, Within Home, Primary twelve  -ST     Stair Railings, Within Home, Primary railings safe and in good condition  -[facility] Name 11/26/24 0909          Cognition    Orientation Status (Cognition) oriented x 4  -[facility] Name 11/26/24 0909          Safety Issues/Impairments Affecting Functional Mobility    Safety Issues Affecting Function (Mobility) safety precautions follow-through/compliance  -     Impairments Affecting Function (Mobility) endurance/activity tolerance;pain  -ST     Comment, Safety Issues/Impairments (Mobility) gait belt, nonskid socks donned  -               User Key  (r) = Recorded By, (t) = Taken By, (c) = Cosigned By      Initials Name Provider Type    Kajal Salguero PT Physical Therapist                   Mobility       [facility] Name 11/26/24 0910          Bed Mobility    Bed Mobility supine-sit;sit-supine  -ST     Supine-Sit [place] (Bed Mobility) standby assist;verbal cues;nonverbal cues (demo/gesture)  -     Sit-Supine [place] (Bed Mobility) standby assist;verbal cues;nonverbal cues (demo/gesture)  -     Comment, (Bed Mobility) practiced log roll with flat bed no rails - cues for technique with good carryover  -[facility] Name 11/26/24 0910          Sit-Stand Transfer    Sit-Stand [place] (Transfers) standby assist;verbal cues  -     Assistive Device (Sit-Stand Transfers) walker, front-wheeled  -ST     Comment, (Sit-Stand Transfer) cues for hand placement  -[facility] Name 11/26/24 0910          Gait/Stairs (Locomotion)    [place] Level (Gait) standby assist  -     Assistive Device (Gait) walker, front-wheeled  -ST     Distance in Feet (Gait) 200  -ST     Deviations/Abnormal Patterns (Gait) gait speed decreased;stride length  decreased;tanner decreased  -ST     Bilateral Gait Deviations forward flexed posture;heel strike decreased  -ST     [place] Level (Stairs) stand by assist;contact guard;verbal cues;nonverbal cues (demo/gesture)  -ST     Handrail Location (Stairs) left side (ascending)  -ST     Number of Steps (Stairs) 2  -ST     Ascending Technique (Stairs) step-to-step  -ST     Descending Technique (Stairs) step-to-step  -ST     Comment, (Gait/Stairs) cues for posture; more comfortable with side stepping up steps  -ST               User Key  (r) = Recorded By, (t) = Taken By, (c) = Cosigned By      Initials Name Provider Type    Kajal Salguero PT Physical Therapist                   Obj/Interventions       Row Name 11/26/24 0911          Range of Motion Comprehensive    Comment, General Range of Motion bilat LE WFL  -ST       Row Name 11/26/24 0911          Strength Comprehensive (MMT)    Comment, General Manual [facility]       Row Name 11/26/24 0911          Balance    Comment, Balance no overt LOB, mild unsteadiness initially with steps d/t cues for sequencing  -ST               User Key  (r) = Recorded By, (t) = Taken By, (c) = Cosigned By      Initials Name Provider Type    Kajal Salguero, PT Physical Therapist                   Goals/Plan       Row Name 11/26/24 0912          Transfer Goal 1 (PT)    Activity/Assistive Device (Transfer Goal 1, PT) sit-to-stand/stand-to-sit;bed-to-chair/chair-to-bed  -ST     [place] Level/Cues Needed (Transfer Goal 1, PT) modified independence  -ST     Time Frame (Transfer Goal 1, PT) 3 days  -ST     Progress/Outcome (Transfer Goal 1, PT) new goal  -ST       Row Name 11/26/24 0912          Gait Training Goal 1 (PT)    Activity/Assistive Device (Gait Training Goal 1, PT) assistive device use;gait (walking locomotion)  -ST     [place] Level (Gait Training Goal 1, PT) modified independence  -ST     Distance (Gait Training Goal  1, PT) 500'  -ST     Time Frame (Gait Training Goal 1, PT) 3 days  -ST     Progress/Outcome (Gait Training Goal 1, PT) new goal  -ST               User Key  (r) = Recorded By, (t) = Taken By, (c) = Cosigned By      Initials Name Provider Type    Kajal Salguero, PT Physical Therapist                   Clinical Impression       Row Name 11/26/24 0912          Pain    Pretreatment Pain Rating 0/10 - no pain  -ST     Posttreatment Pain Rating 0/10 - no pain  -ST       Row Name 11/26/24 0912          Plan of Care Review    Plan of Care Reviewed With patient  -ST     Outcome Evaluation Pt is 83 y/o F admitted to [facility] on 11/25/24 for L1/2 laminectomy with medial facetectomy and discectomy. She is POD1. At baseline pt is from home alone, 3 SHRUTHI no railing, indep with mobility, bed/bath up 13 steps with rail. States her daughter will be staying with her for a few weeks. Pt currently demos SBA for bed mobility, transfers and ambulation up to 200' with rwx - significant unsteadiness initially without AD that improves with rwx. CGA for step negotiation as increased cues for sequencing needed. Educated pt on spinal precautions and log roll technique with good carryover. Educated pt on various ways she could negotiate her steps to go in the home and encouraged her to only do the flight of stairs to bed room when necessary. Pt demos mobility below baseline and therefore would benefit from acute skilled PT to address functional mobility deficits. Anticipate d/c home with assist and home health. Pt requires 2 wheeled walker for safety at home.  -ST       Row Name 11/26/24 0912          Therapy Assessment/Plan (PT)    Rehab Potential (PT) good  -ST     Criteria for Skilled Interventions Met (PT) yes  -ST     Therapy Frequency (PT) 6 times/wk  -ST     Predicted Duration of Therapy Intervention (PT) 3 days  -ST       Row Name 11/26/24 0912          Positioning and Restraints    Pre-Treatment Position in bed  -ST     Post  Treatment Position bed  -ST     In Bed supine;call light within reach;encouraged to call for assist;exit alarm on  -ST               User Key  (r) = Recorded By, (t) = Taken By, (c) = Cosigned By      Initials Name Provider Type    Kajal Salguero PT Physical Therapist                   Outcome Measures       Row Name 11/26/24 0913 11/26/24 0000       How much help from another person do you currently need...    Turning from your back to your side while in flat bed without using bedrails? 4  -ST 3  -PS    Moving from lying on back to sitting on the side of a flat bed without bedrails? 3  -ST 3  -PS    Moving to and from a bed to a chair (including a wheelchair)? 3  -ST 2  -PS    Standing up from a chair using your arms (e.g., wheelchair, bedside chair)? 3  -ST 2  -PS    Climbing 3-5 steps with a railing? 3  -ST 2  -PS    To walk in hospital room? 3  -ST 2  -PS    AM-PAC 6 Clicks Score (PT) 19  -ST 14  -PS              User Key  (r) = Recorded By, (t) = Taken By, (c) = Cosigned By      Initials Name Provider Type    Kajal Salguero PT Physical Therapist    Mayda Wheleer, RN Registered Nurse                                 Physical Therapy Education       Title: PT OT SLP Therapies (In Progress)       Topic: Physical Therapy (In Progress)       Point: Mobility training (Done)       Learning Progress Summary            Patient Acceptance, E,TB, VU,NR by  at 11/26/2024 0913                      Point: Home exercise program (Not Started)       Learner Progress:  Not documented in this visit.              Point: Body mechanics (Done)       Learning Progress Summary            Patient Acceptance, E,TB, VU,NR by  at 11/26/2024 0913                      Point: Precautions (Done)       Learning Progress Summary            Patient Acceptance, E,TB, VU,NR by  at 11/26/2024 0913                                      User Key       Initials Effective Dates Name Provider Type Discipline     09/22/22 -   Kajal Osborn PT Physical Therapist PT                  PT Recommendation and Plan     Outcome Evaluation: Pt is 83 y/o F admitted to [facility] on 11/25/24 for L1/2 laminectomy with medial facetectomy and discectomy. She is POD1. At baseline pt is from home alone, 3 SHRUTHI no railing, indep with mobility, bed/bath up 13 steps with rail. States her daughter will be staying with her for a few weeks. Pt currently demos SBA for bed mobility, transfers and ambulation up to 200' with rwx - significant unsteadiness initially without AD that improves with rwx. CGA for step negotiation as increased cues for sequencing needed. Educated pt on spinal precautions and log roll technique with good carryover. Educated pt on various ways she could negotiate her steps to go in the home and encouraged her to only do the flight of stairs to bed room when necessary. Pt demos mobility below baseline and therefore would benefit from acute skilled PT to address functional mobility deficits. Anticipate d/c home with assist and home health. Pt requires 2 wheeled walker for safety at home.     Time Calculation:         PT Charges       Row Name 11/26/24 0916             Time Calculation    Start Time 0827  -ST      Stop Time 0905  -ST      Time Calculation (min) 38 min  -ST      PT Received On 11/26/24  -ST      PT - Next Appointment 11/27/24  -ST      PT Goal Re-Cert Due Date 11/29/24  -ST         Time Calculation- PT    Total Timed Code Minutes- PT 30 minute(s)  -ST         Timed Charges    90743 - PT Therapeutic Activity Minutes 30  -ST         Total Minutes    Timed Charges Total Minutes 30  -ST       Total Minutes 30  -ST                User Key  (r) = Recorded By, (t) = Taken By, (c) = Cosigned By      Initials Name Provider Type    ST Kajal Osborn PT Physical Therapist                  Therapy Charges for Today       Code Description Service Date Service Provider Modifiers Qty    91803987173 HC PT THERAPEUTIC ACT EA 15 MIN  11/26/2024 Kajal Osborn, PT GP 2    66398983779 HC PT EVAL MOD COMPLEXITY 2 11/26/2024 Kajal Osborn, PT GP 1            PT G-Codes  AM-PAC 6 Clicks Score (PT): 19  PT Discharge Summary  Anticipated Discharge Disposition (PT): home with home health, home with assist    Kajal Osborn, PT  11/26/2024

## 2024-11-26 NOTE — PLAN OF CARE
Goal Outcome Evaluation:      VSS throughout shift. Pt without complaint of pain during shift. PRN bowel medication administered per pt request. Pt worked with PT this shift. No complaints at this time.

## 2024-11-26 NOTE — PLAN OF CARE
Problem: Adult Inpatient Plan of Care  Goal: Absence of Hospital-Acquired Illness or Injury  Intervention: Identify and Manage Fall Risk  Recent Flowsheet Documentation  Taken 11/26/2024 0400 by Mayda Myrick RN  Safety Promotion/Fall Prevention:   clutter free environment maintained   fall prevention program maintained   gait belt   room organization consistent   safety round/check completed  Taken 11/26/2024 0234 by Mayda Myrick RN  Safety Promotion/Fall Prevention:   fall prevention program maintained   room organization consistent   safety round/check completed  Taken 11/26/2024 0000 by Mayda Myrick RN  Safety Promotion/Fall Prevention:   safety round/check completed   room organization consistent   nonskid shoes/slippers when out of bed  Taken 11/25/2024 2245 by Mayda Myrick RN  Safety Promotion/Fall Prevention:   fall prevention program maintained   gait belt   safety round/check completed  Taken 11/25/2024 2000 by Mayda Myrick RN  Safety Promotion/Fall Prevention:   safety round/check completed   room organization consistent   nonskid shoes/slippers when out of bed  Intervention: Prevent Skin Injury  Recent Flowsheet Documentation  Taken 11/26/2024 0400 by Mayda Myrick RN  Body Position:   turned   weight shifting  Taken 11/26/2024 0234 by Mayda Myrick RN  Body Position:   turned   weight shifting  Taken 11/26/2024 0000 by Mayda Myrick RN  Body Position:   weight shifting   turned  Taken 11/25/2024 2245 by Mayda Myrick RN  Body Position:   turned   weight shifting  Taken 11/25/2024 2000 by Mayda Myrick RN  Body Position:   weight shifting   turned  Intervention: Prevent and Manage VTE (Venous Thromboembolism) Risk  Recent Flowsheet Documentation  Taken 11/26/2024 0000 by Mayda Myrick RN  VTE Prevention/Management:   SCDs (sequential compression devices) on   bilateral  Taken 11/25/2024 2000 by Mayda Myrick RN  VTE Prevention/Management:   SCDs (sequential  compression devices) on   bilateral  Intervention: Prevent Infection  Recent Flowsheet Documentation  Taken 11/26/2024 0400 by Mayda Myrick RN  Infection Prevention:   rest/sleep promoted   single patient room provided  Taken 11/26/2024 0234 by Mayda Myrick RN  Infection Prevention:   rest/sleep promoted   single patient room provided  Taken 11/26/2024 0000 by Mayda Myrick RN  Infection Prevention: rest/sleep promoted  Taken 11/25/2024 2245 by Mayda Myrick RN  Infection Prevention: rest/sleep promoted  Taken 11/25/2024 2000 by Mayda Myrick RN  Infection Prevention: rest/sleep promoted  Goal: Optimal Comfort and Wellbeing  Intervention: Provide Person-Centered Care  Recent Flowsheet Documentation  Taken 11/26/2024 0000 by Mayda Myrick RN  Trust Relationship/Rapport:   care explained   choices provided   emotional support provided   empathic listening provided  Taken 11/25/2024 2000 by Mayda Myrick RN  Trust Relationship/Rapport:   care explained   choices provided   emotional support provided   empathic listening provided     Problem: Adult Inpatient Plan of Care  Goal: Plan of Care Review  Outcome: Progressing  Flowsheets (Taken 11/26/2024 0456)  Progress: improving   Goal Outcome Evaluation:           Progress: improving        Pt pain well managed, did not have to administer any pain meds throughout the night, pt was able to void stand/pivot to commode, VSS q 4 neurochecks completed. Has not had BM since admit, will administer PRN Bowel meds this am.

## 2024-11-26 NOTE — TELEPHONE ENCOUNTER
Caller: MAYNOR WHITEHEAD    Relationship:     Best call back number: [phone]      What was the call regarding: NEEDING A PHYSICAL THERAPY ORDER FOR AN EVAL DUE TO DISCHARGING FROM HOSPITAL EFFECTIVE DATE THE DATE OF HOSPITAL DISCHARGE    PLEASE GIVE CALLBACK

## 2024-11-26 NOTE — DISCHARGE PLACEMENT REQUEST
"Shamar Wheeler (84 y.o. Female)       Date of Birth   1940    Social Security Number       Address   [address] [place] [address]    Home Phone   [phone]    MRN   [id number]       [facility]    Marital Status                               Admission Date   11/25/24    Admission Type   Elective    Admitting Provider   Bronson Fitzgerald MD    Attending Provider   Bronson Fitzgerald MD    Department, Room/Bed   [address], P588/1       Discharge Date       Discharge Disposition       Discharge Destination                                 Attending Provider: Bronson Fitzgerald MD    Allergies: No Known Allergies    Isolation: None   Infection: None   Code Status: CPR    Ht: 167.6 cm (66\")   Wt: 89.1 kg (196 lb 6.4 oz)    Admission Cmt: None   Principal Problem: Intervertebral lumbar disc disorder with myelopathy, lumbar region [M51.06]                   Active Insurance as of 11/25/2024       Primary Coverage       Payor Plan Insurance Group Employer/Plan Group    MEDICARE MEDICARE A & B        Payor Plan Address Payor Plan Phone Number Payor Plan Fax Number Effective Dates    [address] [phone]  11/1/2007 - None Entered    [facility] [id number]         Subscriber Name Subscriber Birth Date Member ID       SHAMAR WHEELER 1940 [id number]               Secondary Coverage       Payor Plan Insurance Group Employer/Plan Group    [facility] SUPP [id number]       Payor Plan Address Payor Plan Phone Number Payor Plan Fax Number Effective Dates    [address]   12/1/2016 - None Entered    [facility] [id number]         Subscriber Name Subscriber Birth Date Member ID       SHAMAR WHEELER 1940 [id number]                     Emergency Contacts        (Rel.) Home Phone Work Phone Mobile Phone    Ligia Wheeler (Daughter) [phone] -- [phone]    Te Wheeler (Son) [phone] -- --          "

## 2024-11-27 ENCOUNTER — READMISSION MANAGEMENT (OUTPATIENT)
Dept: CALL CENTER | Facility: HOSPITAL | Age: 84
End: 2024-11-27
Payer: MEDICARE

## 2024-11-27 VITALS
HEART RATE: 72 BPM | DIASTOLIC BLOOD PRESSURE: 87 MMHG | SYSTOLIC BLOOD PRESSURE: 149 MMHG | WEIGHT: 196 LBS | HEIGHT: 66 IN | RESPIRATION RATE: 18 BRPM | TEMPERATURE: 98.4 F | BODY MASS INDEX: 31.5 KG/M2 | OXYGEN SATURATION: 94 %

## 2024-11-27 PROCEDURE — G0378 HOSPITAL OBSERVATION PER HR: HCPCS

## 2024-11-27 PROCEDURE — 99024 POSTOP FOLLOW-UP VISIT: CPT | Performed by: NURSE PRACTITIONER

## 2024-11-27 PROCEDURE — 97530 THERAPEUTIC ACTIVITIES: CPT

## 2024-11-27 RX ORDER — METHOCARBAMOL 500 MG/1
500 TABLET, FILM COATED ORAL 4 TIMES DAILY PRN
Qty: 40 TABLET | Refills: 0 | Status: SHIPPED | OUTPATIENT
Start: 2024-11-27

## 2024-11-27 RX ADMIN — Medication 10 ML: at 08:06

## 2024-11-27 NOTE — THERAPY TREATMENT NOTE
Patient Name: Radha Silverio  : 1940    MRN: [id number]                              Today's Date: 2024       Admit Date: 2024    Visit Dx:     ICD-10-CM ICD-9-CM   1. Intervertebral lumbar disc disorder with myelopathy, lumbar region  M51.06 722.73   2. Degeneration of intervertebral disc of lumbar region with discogenic back pain and lower extremity pain  M51.362 722.52     Patient Active Problem List   Diagnosis    Essential hypertension    Mixed hyperlipidemia    Chronic constipation    Acid reflux    Solis esophagus    Type 2 diabetes mellitus without complication, without long-term current use of insulin    Hemorrhoids    Frequent PVCs    Atrial bigeminy    Ectopic atrial rhythm    Primary osteoarthritis of knee    Primary osteoarthritis of right knee    Tinnitus of both ears    Left hip pain    Primary osteoarthritis of left hip    Greater trochanteric bursitis of left hip    Stage 3 chronic kidney disease    Female genital prolapse    Chronic gastritis without bleeding    Degenerative disc disease, lumbar    Weakness of left lower extremity    Neuritis or radiculitis due to rupture of lumbar intervertebral disc    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    Adenomatous polyp of colon    Intervertebral lumbar disc disorder with myelopathy, lumbar region    Lumbar herniated disc     Past Medical History:   Diagnosis Date    Anesthesia complication     DURING COLONOSCOPY PATIENT STATES SHE WAS STILL ABLE TO HEAR AND FEEL    Anxiety     Arthritis     Arthritis of back     Solis's esophagus     Cataract     Chronic constipation     Chronic cough 2018    Colon polyps     Diabetes type 2, controlled     Dizziness     DVT (deep venous thrombosis)     Dyspnea     Essential hypertension 2016    Frequent PVCs 2017    GERD (gastroesophageal reflux disease)     Hematochezia 2016    Hyperlipidemia 2016    Hypertension     Lumbosacral disc disease     Palpitations      Pelvic prolapse      Past Surgical History:   Procedure Laterality Date    BREAST BIOPSY Left     cyst at 11 y/o    COLONOSCOPY N/A 10/11/2016    Procedure: COLONOSCOPY TO CECUM, TO TERMINAL ILEUM WITH HOT SNARE POLYPECTOMY;  Surgeon: Palmira Calix MD;  [facility];  Service:     COLONOSCOPY N/A 10/05/2021    Procedure: COLONOSCOPY TO CECUM WITH COLD POLYPECTOMY;  Surgeon: Palmira Calix MD;  Location: [facility] ENDOSCOPY;  Service: Gastroenterology;  Laterality: N/A;  HISTORY OF COLON POLYPS  COLON POLYP, HEMORRHOIDS, DIVERTICULOSIS    CYST REMOVAL Right     breast    ENDOSCOPY N/A 10/11/2016    Procedure: ESOPHAGOGASTRODUODENOSCOPY WITH BIOPSY;  Surgeon: Palmira Calix MD;  Location: [facility] ENDOSCOPY;  Service:     ENDOSCOPY N/A 10/05/2021    Procedure: ESOPHAGOGASTRODUODENOSCOPY WITH COLD BIOPSIES;  Surgeon: Palmira Calix MD;  Location: [facility] ENDOSCOPY;  Service: Gastroenterology;  Laterality: N/A;  GERD  GASTRITIS    LAPAROSCOPIC CHOLECYSTECTOMY W/ CHOLANGIOGRAPHY      LUMBAR DISCECTOMY Left 11/25/2024    Procedure: Left lumbar 1 to lumbar 2 laminectomy and discectomy with metrx;  Surgeon: Bronson Fitzgerald MD;  Location: [facility] MAIN OR;  Service: Neurosurgery;  Laterality: Left;    MOLE REMOVAL      on foot     OOPHORECTOMY Left     B9    TUBAL ABDOMINAL LIGATION        General Information       Row Name 11/27/24 1013          Physical Therapy Time and Intention    Document Type therapy note (daily note)  -ST     Mode of Treatment physical therapy  -       Row Name 11/27/24 1013          General Information    Patient Profile Reviewed yes  -ST     Existing Precautions/Restrictions fall;spinal  no sitting other than on commode, stand or lie down for meals. no lifting more than a coffee cup  -       Row Name 11/27/24 1013          Cognition    Orientation Status (Cognition) oriented x 4  -ST       Row Name 11/27/24 1013          Safety Issues/Impairments Affecting Functional  Mobility    Impairments Affecting Function (Mobility) endurance/activity tolerance;pain  -ST     Comment, Safety Issues/Impairments (Mobility) gait belt, nonskid socks donned  -ST               User Key  (r) = Recorded By, (t) = Taken By, (c) = Cosigned By      Initials Name Provider Type    Kajal Salguero PT Physical Therapist                   Mobility       Row Name 11/27/24 1014          Bed Mobility    Bed Mobility supine-sit;sit-supine  -ST     Supine-Sit [place] (Bed Mobility) standby assist;verbal cues;nonverbal cues (demo/gesture)  -ST     Sit-Supine [place] (Bed Mobility) standby assist;verbal cues;nonverbal cues (demo/gesture)  -ST     Comment, (Bed Mobility) cues for log roll  -ST       Row Name 11/27/24 1014          Sit-Stand Transfer    Sit-Stand [place] (Transfers) standby assist;verbal cues  -ST     Assistive Device (Sit-Stand Transfers) walker, front-wheeled  -ST     Comment, (Sit-Stand Transfer) cues for hand placement  -ST       Row Name 11/27/24 1014          Gait/Stairs (Locomotion)    [place] Level (Gait) standby assist  -ST     Assistive Device (Gait) walker, front-wheeled  -ST     Distance in Feet (Gait) 200  -ST     Deviations/Abnormal Patterns (Gait) gait speed decreased;stride length decreased;tanner decreased  -ST     Bilateral Gait Deviations forward flexed posture;heel strike decreased  -ST     [place] Level (Stairs) stand by assist;contact guard;verbal cues;nonverbal cues (demo/gesture)  -ST     Handrail Location (Stairs) left side (ascending)  -ST     Number of Steps (Stairs) 5  -ST     Ascending Technique (Stairs) step-to-step  -ST     Descending Technique (Stairs) step-to-step  -ST     Comment, (Gait/Stairs) cues to push with LE for steps and avoid pulling - improved today  -ST               User Key  (r) = Recorded By, (t) = Taken By, (c) = Cosigned By      Initials Name Provider Type    Kajal Salguero PT Physical Therapist                    Obj/Interventions       Row Name 11/27/24 1015          Balance    Comment, Balance SBA for dyanmic balance, no overt LOB or unsteadiness other than initial stand d/t stiffness  -ST               User Key  (r) = Recorded By, (t) = Taken By, (c) = Cosigned By      Initials Name Provider Type    ST Kajal Osborn, LILLIAN Physical Therapist                   Goals/Plan    No documentation.                  Clinical Impression       Row Name 11/27/24 1016          Pain    Pretreatment Pain Rating 0/10 - no pain  -ST     Posttreatment Pain Rating 0/10 - no pain  -ST     Pre/Posttreatment Pain Comment initially denies pain but then reports some soreness with mobility  -ST       Row Name 11/27/24 1016          Plan of Care Review    Plan of Care Reviewed With patient  -ST     Progress improving  -ST     Outcome Evaluation Pt seen for PT this AM - improved log roll and mobility techniques. tolerates 5 step with SBA and cues to avoid pulling and use LE to push herself up. cues for hand placement to stand for safety. ambulates up to 200' with SBA, no LOB. She continues to benefit from skilled PT to address  mobility deficits. Recommend home with assist and home health. Pt reports plan to stay with her son as he has less stairs in his house. Pt requires 2ww for safe household negotiation.  -       Row Name 11/27/24 1016          Therapy Assessment/Plan (PT)    Rehab Potential (PT) good  -ST     Criteria for Skilled Interventions Met (PT) yes  -ST     Therapy Frequency (PT) 6 times/wk  -       Row Name 11/27/24 1016          Positioning and Restraints    Pre-Treatment Position in bed  -ST     Post Treatment Position bed  -ST     In Bed notified nsg;supine;call light within reach;encouraged to call for assist;exit alarm on  -ST               User Key  (r) = Recorded By, (t) = Taken By, (c) = Cosigned By      Initials Name Provider Type    Kajal Salguero, PT Physical Therapist                   Outcome Measures        Row Name 11/27/24 1018 11/27/24 0810       How much help from another person do you currently need...    Turning from your back to your side while in flat bed without using bedrails? 4  -ST 4  -LC    Moving from lying on back to sitting on the side of a flat bed without bedrails? 3  -ST 3  -LC    Moving to and from a bed to a chair (including a wheelchair)? 3  -ST 3  -LC    Standing up from a chair using your arms (e.g., wheelchair, bedside chair)? 3  -ST 3  -LC    Climbing 3-5 steps with a railing? 3  -ST 3  -LC    To walk in hospital room? 3  -ST 3  -LC    AM-PAC 6 Clicks Score (PT) 19  -ST 19  -LC      Row Name 11/27/24 0045          How much help from another person do you currently need...    Turning from your back to your side while in flat bed without using bedrails? 3  -PS     Moving from lying on back to sitting on the side of a flat bed without bedrails? 3  -PS     Moving to and from a bed to a chair (including a wheelchair)? 2  -PS     Standing up from a chair using your arms (e.g., wheelchair, bedside chair)? 2  -PS     Climbing 3-5 steps with a railing? 2  -PS     To walk in hospital room? 2  -PS     AM-PAC 6 Clicks Score (PT) 14  -PS               User Key  (r) = Recorded By, (t) = Taken By, (c) = Cosigned By      Initials Name Provider Type    Alicia Lange, RN Registered Nurse    Kajal Salguero PT Physical Therapist    Mayda Wheeler RN Registered Nurse                                 Physical Therapy Education       Title: PT OT SLP Therapies (Done)       Topic: Physical Therapy (Done)       Point: Mobility training (Done)       Learning Progress Summary            Patient Acceptance, TB,E, NR,VU by  at 11/27/2024 1018    Comment: encouragement to ambulate throughout the day    Acceptance, E,TB, VU,NR by  at 11/26/2024 0913                      Point: Home exercise program (Done)       Learning Progress Summary            Patient Acceptance, TB,E, NR,VU by  at  11/27/2024 1018    Comment: encouragement to ambulate throughout the day                      Point: Body mechanics (Done)       Learning Progress Summary            Patient Acceptance, TB,E, NR,VU by  at 11/27/2024 1018    Comment: encouragement to ambulate throughout the day    Acceptance, E,TB, VU,NR by  at 11/26/2024 0913                      Point: Precautions (Done)       Learning Progress Summary            Patient Acceptance, TB,E, NR,VU by  at 11/27/2024 1018    Comment: encouragement to ambulate throughout the day    Acceptance, E,TB, VU,NR by  at 11/26/2024 0913                                      User Key       Initials Effective Dates Name Provider Type Discipline     09/22/22 -  Kajal Osborn PT Physical Therapist PT                  PT Recommendation and Plan     Progress: improving  Outcome Evaluation: Pt seen for PT this AM - improved log roll and mobility techniques. tolerates 5 step with SBA and cues to avoid pulling and use LE to push herself up. cues for hand placement to stand for safety. ambulates up to 200' with SBA, no LOB. She continues to benefit from skilled PT to address  mobility deficits. Recommend home with assist and home health. Pt reports plan to stay with her son as he has less stairs in his house. Pt requires 2ww for safe household negotiation.     Time Calculation:         PT Charges       Row Name 11/27/24 1018             Time Calculation    Start Time 0836  -ST      Stop Time 0859  -ST      Time Calculation (min) 23 min  -ST      PT Received On 11/27/24  -ST      PT - Next Appointment 11/28/24  -ST         Time Calculation- PT    Total Timed Code Minutes- PT 23 minute(s)  -ST         Timed Charges    44669 - PT Therapeutic Activity Minutes 23  -ST         Total Minutes    Timed Charges Total Minutes 23  -ST       Total Minutes 23  -ST                User Key  (r) = Recorded By, (t) = Taken By, (c) = Cosigned By      Initials Name Provider Type      Kajal Osborn, PT Physical Therapist                  Therapy Charges for Today       Code Description Service Date Service Provider Modifiers Qty    05925881495 HC PT THERAPEUTIC ACT EA 15 MIN 11/26/2024 Kajal Osborn, PT GP 2    37783970300 HC PT EVAL MOD COMPLEXITY 2 11/26/2024 Kajal Osborn, PT GP 1    06406375131 HC PT THERAPEUTIC ACT EA 15 MIN 11/27/2024 Kajal Osborn, PT GP 2            PT G-Codes  AM-PAC 6 Clicks Score (PT): 19  PT Discharge Summary  Anticipated Discharge Disposition (PT): home with home health, home with assist    Kajal Osborn, PT  11/27/2024

## 2024-11-27 NOTE — CASE MANAGEMENT/SOCIAL WORK
Case Management Discharge Note      Final Note: Home with family and [facility]. Walker delivered to bedside per [place]. Transported per private auto.    Provided Post Acute Provider List?: Yes  Post Acute Provider List: Home Health  Delivered To: Patient  Method of Delivery: In person    Selected Continued Care - Admitted Since 11/25/2024       Destination    No services have been selected for the patient.                Durable Medical Equipment       Service Provider Services Address Phone Fax Patient Preferred    [facility] Durable Medical Equipment [address], [facility] [address] [phone] --              Dialysis/Infusion    No services have been selected for the patient.                Home Medical Care Coordination complete.      Service Provider Services Address Phone Fax Patient Preferred    [facility] - [facility] [address]  [address], [place] [id number] [phone] --              Therapy    No services have been selected for the patient.                Community Resources    No services have been selected for the patient.                Community & [facility]    No services have been selected for the patient.                    Transportation Services  Private: Car    Final Discharge Disposition Code: 06 - home with home health care

## 2024-11-27 NOTE — OUTREACH NOTE
Prep Survey      Flowsheet Row Responses   [facility] patient discharged from? [place]   Is LACE score < 7 ? Yes   Eligibility [facility]   Date of Admission 11/25/24   Date of Discharge 11/27/24   Discharge Disposition Home-Health Care [facility]   Discharge diagnosis Intervertebral lumbar disc disorder with myelopathy, lumbar region-Left lumbar 1 to lumbar 2 laminectomy and discectomy with metrx   Does the patient have one of the following disease processes/diagnoses(primary or secondary)? General Surgery   Does the patient have Home health ordered? Yes   What is the Home health agency?  [facility]   Is there a DME ordered? Yes   What DME was ordered? Walker   Prep survey completed? Yes            Adriana NAZARIO - Registered Nurse

## 2024-11-27 NOTE — DISCHARGE SUMMARY
Radharomelia Silverio  1940    Patient Care Team:  Arielle Johnston APRN as PCP - General (Family Medicine)  Trudi Urbina MD as Consulting Physician (Cardiology)  Nam Johnson OD (Optometry)    Date of Admit: 11/25/2024    Date of Discharge:  11/27/2024    Discharge Diagnosis:  Intervertebral lumbar disc disorder with myelopathy, lumbar region    Lumbar herniated disc      Procedures Performed  Procedure(s):  Left lumbar 1 to lumbar 2 laminectomy and discectomy with metrx       Complications: None    Consultants:   Consults       No orders found from 10/27/2024 to 11/26/2024.            Condition on Discharge: stable    Discharge disposition: home      Brief HPI: Patient evaluated in office for complaints of left-sided back pain with numbness on both sides of her low back and into her buttocks and in her groin. Imaging revealed neural compression at L1-2. RBAs of treatment were discussed including the above procedure. Patient consented to above procedure.    Hospital Course: Patient admitted for above procedure. The procedure itself was without complication. The patient was transferred to [facility] following recovery.  Patient had a uncomplicated postop recovery stay.  Her procedure was initially scheduled as outpatient, however family was very concerned about patient going home with the amount of steps that she has to go up and down.  Patient will be staying with one of her children.  She has worked with PT, we will discharge her with a rolling walker.  She is tolerating diet and voiding without difficulty.  Postop labs have been stable.  Lumbar surgical incision is well-appearing.  She has appointment with Dr Fitzgerald on 12/12/24.  She is stable and ready for discharge today.    Discharge Physical Exam:    Temp:  [97.7 °F (36.5 °C)-98.8 °F (37.1 °C)] 98.4 °F (36.9 °C)  Heart Rate:  [] 72  Resp:  [16-18] 18  BP: (109-149)/(50-87) 149/87    Current labs:  Lab Results (last 24 hours)       ** No  results found for the last 24 hours. **              General Appearance No acute distress   HEENT NC/AT;    Neurological Awake, Alert, and oriented x 3   Motor Strength normal, tone normal to BLE's   Sensory Intact to light touch BLE's   Gait and station Per PT note 11/27-ambulates up to 200' with SBA, no LOB    Back Midline lumbar incision is well-appearing, well-approximated with Steri-Strips in place.  No surrounding erythema, drainage or swelling.   Extremities Moves all extremities well, no edema, no cyanosis, no redness         Discharge Medications  MARY has been reviewed and narcotic consent is on file in the patient's chart.     Your medication list        START taking these medications        Instructions Last Dose Given Next Dose Due   cephalexin 500 MG capsule  Commonly known as: Keflex      Take 1 capsule by mouth 4 (Four) Times a Day for 5 days.       HYDROcodone-acetaminophen 5-325 MG per tablet  Commonly known as: NORCO      Take 1 tablet by mouth Every 4 (Four) Hours As Needed for Moderate Pain (pain).              CHANGE how you take these medications        Instructions Last Dose Given Next Dose Due   simvastatin 40 MG tablet  Commonly known as: ZOCOR  What changed: when to take this      TAKE 1 TABLET BY MOUTH ONCE DAILY IN THE EVENING              CONTINUE taking these medications        Instructions Last Dose Given Next Dose Due   aspirin 81 MG EC tablet      Take 1 tablet by mouth Daily. HOLD FOR SURGERY       calcium carbonate 1250 (500 Ca) MG chewable tablet  Commonly known as: OS-MARIS      Chew 2 tablets 2 (Two) Times a Day.       cholecalciferol 10 MCG (400 UNIT) tablet  Commonly known as: VITAMIN D3      Take 1 tablet by mouth Daily.       docusate sodium 100 MG capsule  Commonly known as: COLACE      Take 1 capsule by mouth 2 (Two) Times a Day.       lisinopril-hydrochlorothiazide 20-12.5 MG per tablet  Commonly known as: PRINZIDE,ZESTORETIC      Take 1 tablet by mouth Daily.        Magnesium Oxide -Mg Supplement 500 MG tablet      Take 1 tablet by mouth Every Night.       metFORMIN 500 MG tablet  Commonly known as: GLUCOPHAGE      TAKE 1 TABLET BY MOUTH TWICE DAILY WITH MEALS       metoprolol succinate XL 25 MG 24 hr tablet  Commonly known as: TOPROL-XL      Take 1 tablet by mouth Daily.       MIRALAX PO      Take 1 Capful by mouth 2 (Two) Times a Day. Has not been taking but is going to be starting again soon       omeprazole 40 MG capsule  Commonly known as: priLOSEC      Take 1 capsule by mouth once daily                 Where to Get Your Medications        These medications were sent to [facility] [place] [id number]      Hours: Monday to Friday 7 AM to 6 PM, Saturday & Sunday 8 AM to 4:30 PM (Closed 12 PM to 12:30 PM) Phone: [phone]   cephalexin 500 MG capsule  HYDROcodone-acetaminophen 5-325 MG per tablet         Discharge Diet:   Diet Instructions       Diet: Regular/House Diet; Thin (IDDSI 0)      Discharge Diet: Regular/House Diet    Fluid Consistency: Thin (IDDSI 0)          Diet Order   Procedures    Diet: Regular/House, Diabetic; Consistent Carbohydrate; Texture: Mechanical Ground (NDD 2); Fluid Consistency: Thin (IDDSI 0)       Activity at Discharge:   Activity Instructions       Discharge Activity      1) No driving until cleared by us and no longer taking narcotics.   2) Off work/school until cleared by us.  3) May shower but no submerging wound in tub, pool, etc.  4) Do not lift / push / pull more then 5 lbs.  5.) Ice to back as much as possible  6.) No overhead activity/ No bending/twisting/impact activity    Other Restrictions (Specify)      Pelvic Rest              Call for: questions or concerns    Follow-up Appointments  Future Appointments   Date Time Provider Department Center   12/12/2024 12:15 PM Bronson Fitzgerald MD [facility] ST[facility]   4/29/2025 10:00 AM [facility]   5/8/2025 10:00 AM Arielle Johnston  "Rojas, APRN [facility] L[facility]   6/10/2025 11:30 AM Apryl Og APRN [facility]      Contact information for follow-up providers       Bronson Fitzgerald MD Follow up.    Specialty: Neurosurgery  Contact information:  [address] [facility] [address]  [address]  [facility] [id number] [phone]               Arielle Johnston APRN .    Specialties: Family Medicine, Internal Medicine, Emergency Medicine  Contact information:  [address]  [facility] [id number] [phone]                       Contact information for after-discharge care       Home Medical Care       [facility] - [facility] .    Service: Home Health Services  Contact information:  [address]  [facility] [address]  [phone]                                 Additional Instructions for the Follow-ups that You Need to Schedule       Dressing Change Instructions   As directed      Keep dressing on until Friday    Order Comments: Keep dressing on until Friday         Notify Physician or Go To The ED For the Following Conditions   As directed      Including but not limited to fever >100.5, chills, wound concerns (redness, swelling, drainage), new symptoms of numbness, tingling, weakness; new or uncontrolled pain despite using prescribed medications    Order Comments: Including but not limited to fever >100.5, chills, wound concerns (redness, swelling, drainage), new symptoms of numbness, tingling, weakness; new or uncontrolled pain despite using prescribed medications                 Test Results Pending at Discharge     None    I discussed the discharge instructions with patient, nursing staff, and SLAVA Shahid  11/27/24  10:52 EST      \"Dictated utilizing Dragon dictation\".      "

## 2024-11-27 NOTE — OUTREACH NOTE
Prep Survey      Flowsheet Row Responses   [facility] patient discharged from? [place]   Is LACE score < 7 ? Yes   Eligibility [facility]   Date of Admission 11/25/24   Date of Discharge 11/27/24   Discharge Disposition Home-Health Care [facility]   Discharge diagnosis Intervertebral lumbar disc disorder with myelopathy, lumbar region, Left lumbar 1 to lumbar 2 laminectomy and discectomy with metrx   Does the patient have one of the following disease processes/diagnoses(primary or secondary)? General Surgery   Does the patient have Home health ordered? Yes   What is the Home health agency?  [place]    Is there a DME ordered? Yes   What DME was ordered? walker from [place]   Prep survey completed? Yes            Ashley GALVAN - Registered Nurse

## 2024-11-27 NOTE — PLAN OF CARE
Goal Outcome Evaluation:  Plan of Care Reviewed With: patient        Progress: improving  Outcome Evaluation: Pt seen for PT this AM - improved log roll and mobility techniques. tolerates 5 step with SBA and cues to avoid pulling and use LE to push herself up. cues for hand placement to stand for safety. ambulates up to 200' with SBA, no LOB. She continues to benefit from skilled PT to address  mobility deficits. Recommend home with assist and home health. Pt reports plan to stay with her son as he has less stairs in his house. Pt requires 2ww for safe household negotiation.    Anticipated Discharge Disposition (PT): home with home health, home with assist

## 2024-11-29 ENCOUNTER — TRANSITIONAL CARE MANAGEMENT TELEPHONE ENCOUNTER (OUTPATIENT)
Dept: CALL CENTER | Facility: HOSPITAL | Age: 84
End: 2024-11-29
Payer: MEDICARE

## 2024-11-29 NOTE — OUTREACH NOTE
Call Center TCM Note      Flowsheet Row Responses   [facility] patient discharged from? [place]   Does the patient have one of the following disease processes/diagnoses(primary or secondary)? General Surgery   TCM attempt successful? Yes   Call start time 1514   Call end time 1521   List who call center can speak with pt   Meds reviewed with patient/caregiver? Yes   Does the patient have all medications related to this admission filled (includes all antibiotics, pain medications, etc.) Yes   Is the patient taking all medications as directed (includes completed medication regime)? Yes   Comments Pt to f/u with pcp as needed. Pt has surgical f/u appointment scheduled.   Does the patient have an appointment with their PCP within 7-14 days of discharge? No   Nursing Interventions Patient desires to follow up with specialty only, Routed TCM call to PCP office   Has home health visited the patient within 72 hours of discharge? Yes   What DME was ordered? Walker   Psychosocial issues? No   Did the patient receive a copy of their discharge instructions? Yes   Nursing interventions Reviewed instructions with patient   What is the patient's perception of their health status since discharge? Improving   Is the patient /caregiver able to teach back basic post-op care? Drive as instructed by MD in discharge instructions, Take showers only when approved by MD-sponge bathe until then, No tub bath, swimming, or hot tub until instructed by MD, Keep incision areas clean,dry and protected, Do not remove steri-strips, Lifting as instructed by MD in discharge instructions   Is the patient/caregiver able to teach back signs and symptoms of incisional infection? Increased redness, swelling or pain at the incisonal site, Increased drainage or bleeding, Incisional warmth, Pus or odor from incision, Fever   Is the patient/caregiver able to teach back steps to recovery at home? Set small, achievable goals for return to baseline  health, Rest and rebuild strength, gradually increase activity   Is the patient/caregiver able to teach back the hierarchy of who to call/visit for symptoms/problems? PCP, Specialist, Home health nurse, Urgent Care, ED, 911 Yes   TCM call completed? Yes   Wrap up additional comments Pt reports improving slowly. Dressing intact. Pt is using a walker for assistance. PT is visiting.   Call end time 1521   Would this patient benefit from a Referral to [facility] Social Work? No   Is the patient interested in additional calls from an ambulatory ? No            Adriana Carballo RN    11/29/2024, 15:23 EST

## 2024-12-02 ENCOUNTER — TELEPHONE (OUTPATIENT)
Dept: NEUROSURGERY | Facility: CLINIC | Age: 84
End: 2024-12-02
Payer: MEDICARE

## 2024-12-02 NOTE — TELEPHONE ENCOUNTER
Numbness in both feet, legs up to to knees from surgery . Left leg from ankle to knee.  Patient stated last nite she is having aching. She stated it is now gone. Patient is also having with left hip.

## 2024-12-02 NOTE — TELEPHONE ENCOUNTER
"S/w patient and her daughter and informed per Dr. Fitzgerald,     \"This patient had an impending cauda equina syndrome.  I would expect those symptoms.\"      "

## 2024-12-03 ENCOUNTER — HOSPITAL ENCOUNTER (INPATIENT)
Facility: HOSPITAL | Age: 84
LOS: 2 days | Discharge: HOME | DRG: 563 | End: 2024-12-05
Attending: EMERGENCY MEDICINE | Admitting: INTERNAL MEDICINE
Payer: MEDICARE

## 2024-12-03 ENCOUNTER — APPOINTMENT (OUTPATIENT)
Dept: GENERAL RADIOLOGY | Facility: HOSPITAL | Age: 84
DRG: 563 | End: 2024-12-03
Payer: MEDICARE

## 2024-12-03 DIAGNOSIS — M25.462 EFFUSION, LEFT KNEE: ICD-10-CM

## 2024-12-03 DIAGNOSIS — G89.18 ACUTE POST-OPERATIVE PAIN: ICD-10-CM

## 2024-12-03 DIAGNOSIS — M25.562 ACUTE PAIN OF LEFT KNEE: Primary | ICD-10-CM

## 2024-12-03 DIAGNOSIS — Z78.9 DECREASED ACTIVITIES OF DAILY LIVING (ADL): ICD-10-CM

## 2024-12-03 LAB
ALBUMIN SERPL-MCNC: 3.7 G/DL (ref 3.5–5.2)
ALBUMIN/GLOB SERPL: 1.3 G/DL
ALP SERPL-CCNC: 50 U/L (ref 39–117)
ALT SERPL W P-5'-P-CCNC: 32 U/L (ref 1–33)
ANION GAP SERPL CALCULATED.3IONS-SCNC: 8.8 MMOL/L (ref 5–15)
AST SERPL-CCNC: 33 U/L (ref 1–32)
BASOPHILS # BLD AUTO: 0.03 10*3/MM3 (ref 0–0.2)
BASOPHILS NFR BLD AUTO: 0.7 % (ref 0–1.5)
BILIRUB SERPL-MCNC: 0.4 MG/DL (ref 0–1.2)
BUN SERPL-MCNC: 18 MG/DL (ref 8–23)
BUN/CREAT SERPL: 18.6 (ref 7–25)
CALCIUM SPEC-SCNC: 9.5 MG/DL (ref 8.6–10.5)
CHLORIDE SERPL-SCNC: 100 MMOL/L (ref 98–107)
CO2 SERPL-SCNC: 28.2 MMOL/L (ref 22–29)
CREAT SERPL-MCNC: 0.97 MG/DL (ref 0.57–1)
DEPRECATED RDW RBC AUTO: 42.6 FL (ref 37–54)
EGFRCR SERPLBLD CKD-EPI 2021: 58.5 ML/MIN/1.73
EOSINOPHIL # BLD AUTO: 0.11 10*3/MM3 (ref 0–0.4)
EOSINOPHIL NFR BLD AUTO: 2.5 % (ref 0.3–6.2)
ERYTHROCYTE [DISTWIDTH] IN BLOOD BY AUTOMATED COUNT: 13.3 % (ref 12.3–15.4)
GLOBULIN UR ELPH-MCNC: 2.9 GM/DL
GLUCOSE BLDC GLUCOMTR-MCNC: 188 MG/DL (ref 70–130)
GLUCOSE SERPL-MCNC: 211 MG/DL (ref 65–99)
HCT VFR BLD AUTO: 41.5 % (ref 34–46.6)
HGB BLD-MCNC: 13.2 G/DL (ref 12–15.9)
IMM GRANULOCYTES # BLD AUTO: 0.01 10*3/MM3 (ref 0–0.05)
IMM GRANULOCYTES NFR BLD AUTO: 0.2 % (ref 0–0.5)
LYMPHOCYTES # BLD AUTO: 1.37 10*3/MM3 (ref 0.7–3.1)
LYMPHOCYTES NFR BLD AUTO: 30.8 % (ref 19.6–45.3)
MCH RBC QN AUTO: 28.1 PG (ref 26.6–33)
MCHC RBC AUTO-ENTMCNC: 31.8 G/DL (ref 31.5–35.7)
MCV RBC AUTO: 88.5 FL (ref 79–97)
MONOCYTES # BLD AUTO: 0.4 10*3/MM3 (ref 0.1–0.9)
MONOCYTES NFR BLD AUTO: 9 % (ref 5–12)
NEUTROPHILS NFR BLD AUTO: 2.53 10*3/MM3 (ref 1.7–7)
NEUTROPHILS NFR BLD AUTO: 56.8 % (ref 42.7–76)
NRBC BLD AUTO-RTO: 0 /100 WBC (ref 0–0.2)
PLATELET # BLD AUTO: 259 10*3/MM3 (ref 140–450)
PMV BLD AUTO: 11.1 FL (ref 6–12)
POTASSIUM SERPL-SCNC: 4.9 MMOL/L (ref 3.5–5.2)
PROT SERPL-MCNC: 6.6 G/DL (ref 6–8.5)
RBC # BLD AUTO: 4.69 10*6/MM3 (ref 3.77–5.28)
SODIUM SERPL-SCNC: 137 MMOL/L (ref 136–145)
WBC NRBC COR # BLD AUTO: 4.45 10*3/MM3 (ref 3.4–10.8)

## 2024-12-03 PROCEDURE — 73562 X-RAY EXAM OF KNEE 3: CPT

## 2024-12-03 PROCEDURE — 99285 EMERGENCY DEPT VISIT HI MDM: CPT

## 2024-12-03 PROCEDURE — 82948 REAGENT STRIP/BLOOD GLUCOSE: CPT

## 2024-12-03 PROCEDURE — 36415 COLL VENOUS BLD VENIPUNCTURE: CPT | Performed by: PHYSICIAN ASSISTANT

## 2024-12-03 PROCEDURE — 80053 COMPREHEN METABOLIC PANEL: CPT | Performed by: PHYSICIAN ASSISTANT

## 2024-12-03 PROCEDURE — 85025 COMPLETE CBC W/AUTO DIFF WBC: CPT | Performed by: PHYSICIAN ASSISTANT

## 2024-12-03 RX ORDER — ASPIRIN 81 MG/1
81 TABLET ORAL DAILY
Status: DISCONTINUED | OUTPATIENT
Start: 2024-12-04 | End: 2024-12-05 | Stop reason: HOSPADM

## 2024-12-03 RX ORDER — POLYETHYLENE GLYCOL 3350 17 G/17G
17 POWDER, FOR SOLUTION ORAL DAILY PRN
Status: DISCONTINUED | OUTPATIENT
Start: 2024-12-03 | End: 2024-12-05 | Stop reason: HOSPADM

## 2024-12-03 RX ORDER — ACETAMINOPHEN 160 MG/5ML
650 SOLUTION ORAL EVERY 4 HOURS PRN
Status: DISCONTINUED | OUTPATIENT
Start: 2024-12-03 | End: 2024-12-05 | Stop reason: HOSPADM

## 2024-12-03 RX ORDER — ONDANSETRON 2 MG/ML
4 INJECTION INTRAMUSCULAR; INTRAVENOUS EVERY 6 HOURS PRN
Status: DISCONTINUED | OUTPATIENT
Start: 2024-12-03 | End: 2024-12-05 | Stop reason: HOSPADM

## 2024-12-03 RX ORDER — NICOTINE POLACRILEX 4 MG
15 LOZENGE BUCCAL
Status: DISCONTINUED | OUTPATIENT
Start: 2024-12-03 | End: 2024-12-05 | Stop reason: HOSPADM

## 2024-12-03 RX ORDER — ACETAMINOPHEN 325 MG/1
650 TABLET ORAL EVERY 4 HOURS PRN
Status: DISCONTINUED | OUTPATIENT
Start: 2024-12-03 | End: 2024-12-05 | Stop reason: HOSPADM

## 2024-12-03 RX ORDER — IBUPROFEN 600 MG/1
1 TABLET ORAL
Status: DISCONTINUED | OUTPATIENT
Start: 2024-12-03 | End: 2024-12-05 | Stop reason: HOSPADM

## 2024-12-03 RX ORDER — DICLOFENAC SODIUM 30 MG/G
2 GEL TOPICAL ONCE
Status: COMPLETED | OUTPATIENT
Start: 2024-12-03 | End: 2024-12-03

## 2024-12-03 RX ORDER — BISACODYL 5 MG/1
5 TABLET, DELAYED RELEASE ORAL DAILY PRN
Status: DISCONTINUED | OUTPATIENT
Start: 2024-12-03 | End: 2024-12-05 | Stop reason: HOSPADM

## 2024-12-03 RX ORDER — AMOXICILLIN 250 MG
2 CAPSULE ORAL 2 TIMES DAILY PRN
Status: DISCONTINUED | OUTPATIENT
Start: 2024-12-03 | End: 2024-12-05 | Stop reason: HOSPADM

## 2024-12-03 RX ORDER — HYDROCHLOROTHIAZIDE 12.5 MG/1
12.5 TABLET ORAL
Status: DISCONTINUED | OUTPATIENT
Start: 2024-12-04 | End: 2024-12-05 | Stop reason: HOSPADM

## 2024-12-03 RX ORDER — METHOCARBAMOL 500 MG/1
500 TABLET, FILM COATED ORAL 4 TIMES DAILY PRN
Status: DISCONTINUED | OUTPATIENT
Start: 2024-12-03 | End: 2024-12-05 | Stop reason: HOSPADM

## 2024-12-03 RX ORDER — BISACODYL 10 MG
10 SUPPOSITORY, RECTAL RECTAL DAILY PRN
Status: DISCONTINUED | OUTPATIENT
Start: 2024-12-03 | End: 2024-12-05 | Stop reason: HOSPADM

## 2024-12-03 RX ORDER — SODIUM CHLORIDE 0.9 % (FLUSH) 0.9 %
10 SYRINGE (ML) INJECTION AS NEEDED
Status: DISCONTINUED | OUTPATIENT
Start: 2024-12-03 | End: 2024-12-05 | Stop reason: HOSPADM

## 2024-12-03 RX ORDER — HYDROCODONE BITARTRATE AND ACETAMINOPHEN 5; 325 MG/1; MG/1
1 TABLET ORAL EVERY 4 HOURS PRN
Status: DISCONTINUED | OUTPATIENT
Start: 2024-12-03 | End: 2024-12-05 | Stop reason: HOSPADM

## 2024-12-03 RX ORDER — INSULIN LISPRO 100 [IU]/ML
2-7 INJECTION, SOLUTION INTRAVENOUS; SUBCUTANEOUS
Status: DISCONTINUED | OUTPATIENT
Start: 2024-12-03 | End: 2024-12-05 | Stop reason: HOSPADM

## 2024-12-03 RX ORDER — LISINOPRIL 20 MG/1
20 TABLET ORAL
Status: DISCONTINUED | OUTPATIENT
Start: 2024-12-04 | End: 2024-12-05 | Stop reason: HOSPADM

## 2024-12-03 RX ORDER — DEXTROSE MONOHYDRATE 25 G/50ML
25 INJECTION, SOLUTION INTRAVENOUS
Status: DISCONTINUED | OUTPATIENT
Start: 2024-12-03 | End: 2024-12-05 | Stop reason: HOSPADM

## 2024-12-03 RX ORDER — DOCUSATE SODIUM 100 MG/1
100 CAPSULE, LIQUID FILLED ORAL DAILY
Status: DISCONTINUED | OUTPATIENT
Start: 2024-12-04 | End: 2024-12-05 | Stop reason: HOSPADM

## 2024-12-03 RX ORDER — HYDROCODONE BITARTRATE AND ACETAMINOPHEN 5; 325 MG/1; MG/1
1 TABLET ORAL ONCE
Status: COMPLETED | OUTPATIENT
Start: 2024-12-03 | End: 2024-12-03

## 2024-12-03 RX ORDER — OMEGA-3S/DHA/EPA/FISH OIL/D3 300MG-1000
400 CAPSULE ORAL DAILY
Status: DISCONTINUED | OUTPATIENT
Start: 2024-12-04 | End: 2024-12-05 | Stop reason: HOSPADM

## 2024-12-03 RX ORDER — CALCIUM CARBONATE 500(1250)
1000 TABLET ORAL DAILY
Status: DISCONTINUED | OUTPATIENT
Start: 2024-12-04 | End: 2024-12-05 | Stop reason: HOSPADM

## 2024-12-03 RX ORDER — ATORVASTATIN CALCIUM 20 MG/1
20 TABLET, FILM COATED ORAL DAILY
Status: DISCONTINUED | OUTPATIENT
Start: 2024-12-04 | End: 2024-12-05 | Stop reason: HOSPADM

## 2024-12-03 RX ORDER — PANTOPRAZOLE SODIUM 40 MG/1
40 TABLET, DELAYED RELEASE ORAL
Status: DISCONTINUED | OUTPATIENT
Start: 2024-12-04 | End: 2024-12-05 | Stop reason: HOSPADM

## 2024-12-03 RX ORDER — METOPROLOL SUCCINATE 25 MG/1
25 TABLET, EXTENDED RELEASE ORAL DAILY
Status: DISCONTINUED | OUTPATIENT
Start: 2024-12-04 | End: 2024-12-05 | Stop reason: HOSPADM

## 2024-12-03 RX ORDER — ACETAMINOPHEN 650 MG/1
650 SUPPOSITORY RECTAL EVERY 4 HOURS PRN
Status: DISCONTINUED | OUTPATIENT
Start: 2024-12-03 | End: 2024-12-05 | Stop reason: HOSPADM

## 2024-12-03 RX ADMIN — POLYETHYLENE GLYCOL 3350 17 G: 17 POWDER, FOR SOLUTION ORAL at 21:57

## 2024-12-03 RX ADMIN — DICLOFENAC SODIUM 2 G: 30 GEL TOPICAL at 12:40

## 2024-12-03 RX ADMIN — HYDROCODONE BITARTRATE AND ACETAMINOPHEN 1 TABLET: 5; 325 TABLET ORAL at 11:13

## 2024-12-03 RX ADMIN — SENNOSIDES AND DOCUSATE SODIUM 2 TABLET: 50; 8.6 TABLET ORAL at 21:58

## 2024-12-03 NOTE — PROGRESS NOTES
Clinical Pharmacy Services: Medication History    Radha Silverio is a 84 y.o. 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    Allergies as of 12/03/2024    (No Known Allergies)       Medication information was obtained from: Patient & Family Member   Pharmacy and Phone Number:     Prior to Admission Medications       Prescriptions Last Dose Informant Patient Reported? Taking?    aspirin 81 MG EC tablet  Self Yes Yes    Take 1 tablet by mouth Daily. HOLD FOR SURGERY    calcium carbonate (OS-MARIS) 1250 (500 Ca) MG chewable tablet  Self Yes Yes    Chew 2 tablets 2 (Two) Times a Day.    Cholecalciferol 10 MCG (400 UNIT) tablet  Self Yes Yes    Take 1 tablet by mouth Daily.    docusate sodium (COLACE) 100 MG capsule  Self No Yes    Take 1 capsule by mouth 2 (Two) Times a Day.    Patient taking differently:  Take 1 capsule by mouth Daily.    HYDROcodone-acetaminophen (NORCO) 5-325 MG per tablet  Self No Yes    Take 1 tablet by mouth Every 4 (Four) Hours As Needed for Moderate Pain (pain).    lisinopril-hydrochlorothiazide (PRINZIDE,ZESTORETIC) 20-12.5 MG per tablet  Self No Yes    Take 1 tablet by mouth Daily.    Magnesium Oxide -Mg Supplement 500 MG tablet  Self Yes Yes    Take 1 tablet by mouth Every Night.    metFORMIN (GLUCOPHAGE) 500 MG tablet  Self No Yes    TAKE 1 TABLET BY MOUTH TWICE DAILY WITH MEALS    Patient taking differently:   Take 1 tablet by mouth 2 (Two) Times a Day.    methocarbamol (ROBAXIN) 500 MG tablet  Self No Yes    Take 1 tablet by mouth 4 (Four) Times a Day As Needed for Muscle Spasms.    metoprolol succinate XL (TOPROL-XL) 25 MG 24 hr tablet  Self No Yes    Take 1 tablet by mouth Daily.    omeprazole (priLOSEC) 40 MG capsule  Self No Yes    Take 1 capsule by mouth once daily    Polyethylene Glycol 3350 (MIRALAX PO)  Self Yes Yes    Take 1 Capful by mouth As Needed. Has not been taking but is going to be starting again soon    simvastatin (ZOCOR) 40 MG tablet  Self No Yes    TAKE 1 TABLET BY MOUTH ONCE DAILY IN THE EVENING    Patient taking differently:  Take 1 tablet by mouth Every Night.    cephalexin (Keflex) 500 MG capsule   No No    Take 1 capsule by mouth 4 (Four) Times a Day for 5 days.              Medication notes:     This medication list is complete to the best of my knowledge as of 12/3/2024    Please call if questions.    Flaquito Garcia  Medication History Technician   [phone]    12/3/2024 16:26 EST

## 2024-12-03 NOTE — ED NOTES
Nursing report ED to floor  Radha Silverio  84 y.o.  female    HPI :  HPI  Stated Reason for Visit: back pain, knee pain  History Obtained From: EMS    Chief Complaint  Chief Complaint   Patient presents with    Back Pain    Knee Pain       Admitting doctor:   Viki Barclay MD    Admitting diagnosis:   The primary encounter diagnosis was Acute pain of left knee. Diagnoses of Effusion, left knee and Acute post-operative pain were also pertinent to this visit.    Code status:   Current Code Status       Date Active Code Status Order ID Comments User Context       12/3/2024 1455 CPR (Attempt to Resuscitate) [id number]  Viki Barclay MD ED        Question Answer    Code Status (Patient has no pulse and is not breathing) CPR (Attempt to Resuscitate)    Medical Interventions (Patient has pulse or is breathing) Full Support                    Allergies:   Patient has no known allergies.    Isolation:   No active isolations    Intake and Output  No intake or output data in the 24 hours ending 12/03/24 1550    Weight:       12/03/24  1003   Weight: 88.9 kg (195 lb 15.8 oz)       Most recent vitals:   Vitals:    12/03/24 1301 12/03/24 1308 12/03/24 1401 12/03/24 1438   BP: 147/79  146/72    Pulse:  61 61 63   Resp:       Temp:       SpO2:  98% 96% 98%   Weight:       Height:           Active LDAs/IV Access:   Lines, Drains & Airways       Active LDAs       Name Placement date Placement time Site Days    Peripheral IV 12/03/24 1435 Left Antecubital 12/03/24  1435  Antecubital  less than 1                    Labs (abnormal labs have a star):   Labs Reviewed   CBC WITH AUTO DIFFERENTIAL - Normal   COMPREHENSIVE METABOLIC PANEL   CBC AND DIFFERENTIAL    Narrative:     The following orders were created for panel order CBC & Differential.  Procedure                               Abnormality         Status                     ---------                               -----------         ------                     CBC  Auto Differential[[id number]]        Normal              Final result                 Please view results for these tests on the individual orders.       EKG:   No orders to display       Meds given in ED:   Medications   sodium chloride 0.9 % flush 10 mL (has no administration in time range)   acetaminophen (TYLENOL) tablet 650 mg (has no administration in time range)     Or   acetaminophen (TYLENOL) 160 MG/5ML oral solution 650 mg (has no administration in time range)     Or   acetaminophen (TYLENOL) suppository 650 mg (has no administration in time range)   ondansetron (ZOFRAN) injection 4 mg (has no administration in time range)   sennosides-docusate (PERICOLACE) 8.6-50 MG per tablet 2 tablet (has no administration in time range)     And   polyethylene glycol (MIRALAX) packet 17 g (has no administration in time range)     And   bisacodyl (DULCOLAX) EC tablet 5 mg (has no administration in time range)     And   bisacodyl (DULCOLAX) suppository 10 mg (has no administration in time range)   Diclofenac Sodium 3 % gel 2 g (2 g Topical Given 12/3/24 1240)   HYDROcodone-acetaminophen (NORCO) 5-325 MG per tablet 1 tablet (1 tablet Oral Given 12/3/24 1113)       Imaging results:  XR Knee 3 View Left    Result Date: 12/3/2024   As described.    This report was finalized on 12/3/2024 11:40 AM by Dr. Santosh Carias M.D on Workstation: [id number]       Ambulatory status:   - assist Xs 1    Social issues:   Social History     Socioeconomic History    Marital status:    Tobacco Use    Smoking status: Never     Passive exposure: Never    Smokeless tobacco: Never    Tobacco comments:     no caffiene   Vaping Use    Vaping status: Never Used   Substance and Sexual Activity    Alcohol use: No    Drug use: No    Sexual activity: Not Currently     Partners: Male     Birth control/protection: Post-menopausal, Surgical     Comment: BTL       Peripheral Neurovascular  Peripheral Neurovascular (Adult)  Peripheral Neurovascular  WDL: .WDL except, neurovascular assessment lower, pulse assessment  Pulse Assessment: dorsalis pedis  LLE Neurovascular Assessment  Temperature LLE: warm  Color LLE: no discoloration  Sensation LLE: no numbness, no tenderness  RLE Neurovascular Assessment  Temperature RLE: warm  Color RLE: no discoloration  Sensation RLE: no tenderness, no numbness    Neuro Cognitive  Neuro Cognitive (Adult)  Cognitive/Neuro/Behavioral WDL: WDL, level of consciousness, orientation  Level of Consciousness: Alert  Orientation: oriented x 4    Learning  Learning Assessment  Learning Readiness and Ability: no barriers identified  Education Provided  Person Taught: patient, family member/friend  Teaching Method: verbal instruction  Teaching Focus: symptom/problem overview, diagnostic test, medical device/equipment use, medication administration  Education Outcome Evaluation: acceptance expressed, verbalizes understanding    Respiratory  Respiratory WDL  Respiratory WDL: WDL    Abdominal Pain       Pain Assessments  Pain (Adult)  (0-10) Pain Rating: Rest: 0  (0-10) Pain Rating: Activity: 5  Pain Location: back, knee  Pain Side/Orientation: left  Pain Radiation to: leg, left  Pain Management Interventions: pain medication given  Response to Pain Interventions: intervention not effective per patient    NIH Stroke Scale       Palmira Joyce RN  12/03/24 15:50 EST

## 2024-12-03 NOTE — ED NOTES
Patient arrives via [facility] from home for complaints of left knee and back pain. Patient felt a pop in her left knee, numbness and tingling noted. Patient had lumbar surgery last week. Patient was told by her doctor to come to the ED for worsening pain. Alert and oriented x4.

## 2024-12-03 NOTE — ED PROVIDER NOTES
" EMERGENCY DEPARTMENT ENCOUNTER      Room Number:  02/02  PCP: Arielle Johnston APRN  Independent Historians: Patient/Family  Patient Care Team:  Arielle Johnston APRN as PCP - General (Family Medicine)  Trudi Urbina MD as Consulting Physician (Cardiology)  Nam Johnson OD (Optometry)       HPI:  Chief Complaint: Knee pain    A complete HPI/ROS/PMH/PSH/SH/FH are unobtainable due to: None    Chronic or social conditions impacting patient care (Social Determinants of Health): None      Context: Radha Silverio is a 84 y.o. female with a PMH significant for hypertension, hyperlipidemia, knee arthritis, chronic kidney disease, lumbar degenerative disc disease who presents to the ED c/o acute left knee pain.  The patient reports that she was sitting on the toilet this morning and upon standing she felt abrupt onset of intense left knee pain and felt a \"pop\".  Since then she has had pain with range of motion and some increased difficulty bearing weight on the affected knee secondary to pain.  She has not had any medications to alleviate symptoms prior to arrival.  Pain does seem worse with range of motion.  No associated swelling or deformity.  She also reports that she is approximately 1 week status post lumbar laminectomy.  She has been having some postoperative low back pain with some intermittent paresthesias in her legs but has talked to her doctor and was told that this is normal.  She has no new concerns with her back today.  Reports that she would not of come to the ER today for back pain and is only concerned about her knee.      Upon review of prior external notes (non-ED) -and- Review of prior external test results outside of this encounter it appears the patient was evaluated in the office with neurosurgery for lumbar degenerative disc disease on 11/7/2024.  The patient had a normal TSH and creatinine on 4/18/2024.      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/11/2016    Type 2 diabetes mellitus without complication, without long-term current use of insulin 12/06/2016    Hemorrhoids 12/06/2016    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    Atrial bigeminy 06/15/2017    Ectopic atrial rhythm 06/15/2017    Primary osteoarthritis of knee 12/08/2017    Primary osteoarthritis of right knee 12/13/2017    Tinnitus of both ears 04/26/2018    Left hip pain 10/04/2018    Primary osteoarthritis of left hip 10/11/2018    Greater trochanteric bursitis of left hip 10/11/2018    Stage 3 chronic kidney disease 10/25/2018    Female genital prolapse 12/25/2018    Chronic gastritis without bleeding 04/12/2019    Degenerative disc disease, lumbar 11/21/2019    Weakness of left lower extremity 11/21/2019    Neuritis or radiculitis due to rupture of lumbar intervertebral disc 12/17/2019    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 07/07/2021    Adenomatous polyp of colon 07/07/2021    Intervertebral lumbar disc disorder with myelopathy, lumbar region 10/21/2024    Lumbar herniated disc 11/25/2024     Resolved Ambulatory Problems     Diagnosis Date Noted    Dysuria 05/24/2016    Urinary tract infection without hematuria 05/24/2016    Hematochezia 12/06/2016    Atypical chest pain 12/06/2016    Shortness of breath 06/08/2017    Foreign body of left index finger with infection 04/26/2018    Abnormal urinalysis 06/20/2018    Dependent edema 06/20/2018    Chronic cough 06/20/2018    Vaginal prolapse 10/25/2018    History of Helicobacter pylori infection 04/12/2019     Past Medical History:   Diagnosis Date    Anesthesia complication     Anxiety     Arthritis     Arthritis of back     Solis's esophagus     Cataract     Colon polyps     Diabetes type 2, controlled     Dizziness     DVT (deep venous thrombosis)     Dyspnea     GERD (gastroesophageal reflux  disease)     Hyperlipidemia 05/24/2016    Hypertension     Lumbosacral disc disease     Palpitations     Pelvic prolapse          PAST SURGICAL HISTORY  Past Surgical History:   Procedure Laterality Date    BREAST BIOPSY Left     cyst at 9 y/o    COLONOSCOPY N/A 10/11/2016    Procedure: COLONOSCOPY TO CECUM, TO TERMINAL ILEUM WITH HOT SNARE POLYPECTOMY;  Surgeon: Palmira Calix MD;  Location: [facility] ENDOSCOPY;  Service:     COLONOSCOPY N/A 10/05/2021    Procedure: COLONOSCOPY TO CECUM WITH COLD POLYPECTOMY;  Surgeon: Palmira Calix MD;  Location: [facility] ENDOSCOPY;  Service: Gastroenterology;  Laterality: N/A;  HISTORY OF COLON POLYPS  COLON POLYP, HEMORRHOIDS, DIVERTICULOSIS    CYST REMOVAL Right     breast    ENDOSCOPY N/A 10/11/2016    Procedure: ESOPHAGOGASTRODUODENOSCOPY WITH BIOPSY;  Surgeon: Palmira Calix MD;  Location: [facility] ENDOSCOPY;  Service:     ENDOSCOPY N/A 10/05/2021    Procedure: ESOPHAGOGASTRODUODENOSCOPY WITH COLD BIOPSIES;  Surgeon: Palmira Calix MD;  Location: [facility] ENDOSCOPY;  Service: Gastroenterology;  Laterality: N/A;  GERD  GASTRITIS    LAPAROSCOPIC CHOLECYSTECTOMY W/ CHOLANGIOGRAPHY      LUMBAR DISCECTOMY Left 11/25/2024    Procedure: Left lumbar 1 to lumbar 2 laminectomy and discectomy with metrx;  Surgeon: Bronson Fitzgerald MD;  Location: [facility] OR;  Service: Neurosurgery;  Laterality: Left;    MOLE REMOVAL      on foot     OOPHORECTOMY Left     B9    TUBAL ABDOMINAL LIGATION           FAMILY HISTORY  Family History   Problem Relation Age of Onset    Diabetes Mother     Heart disease Father     Diabetes Sister     Heart disease Sister     Breast cancer Sister     Cancer Sister     Stroke Sister     Breast cancer Sister     Diabetes Brother     Heart disease Brother     Cancer Brother     Ovarian cancer Neg Hx     Colon cancer Neg Hx     Deep vein thrombosis Neg Hx     Pulmonary embolism Neg Hx     Malig Hyperthermia Neg Hx          SOCIAL HISTORY  Social History      Socioeconomic History    Marital status:    Tobacco Use    Smoking status: Never     Passive exposure: Never    Smokeless tobacco: Never    Tobacco comments:     no caffiene   Vaping Use    Vaping status: Never Used   Substance and Sexual Activity    Alcohol use: No    Drug use: No    Sexual activity: Not Currently     Partners: Male     Birth control/protection: Post-menopausal, Surgical     Comment: BTL         ALLERGIES  Patient has no known allergies.      REVIEW OF SYSTEMS  Included in HPI  All systems reviewed and negative except for those discussed in HPI.      PHYSICAL EXAM    I have reviewed the triage vital signs and nursing notes.    ED Triage Vitals [12/03/24 1003]   Temp Heart Rate Resp BP SpO2   98.2 °F (36.8 °C) 67 16 135/81 98 %      Temp src Heart Rate Source Patient Position BP Location FiO2 (%)   -- -- -- -- --       Physical Exam  Constitutional:       General: She is not in acute distress.     Appearance: She is well-developed.   HENT:      Head: Normocephalic and atraumatic.   Eyes:      General: No scleral icterus.     Conjunctiva/sclera: Conjunctivae normal.   Neck:      Trachea: No tracheal deviation.   Cardiovascular:      Rate and Rhythm: Normal rate and regular rhythm.   Pulmonary:      Effort: Pulmonary effort is normal.      Breath sounds: Normal breath sounds.   Abdominal:      Palpations: Abdomen is soft.      Tenderness: There is no abdominal tenderness.   Musculoskeletal:         General: No deformity.      Cervical back: Normal range of motion.      Left knee: No swelling. Decreased range of motion. No tenderness.   Lymphadenopathy:      Cervical: No cervical adenopathy.   Skin:     General: Skin is warm and dry.   Neurological:      Mental Status: She is alert and oriented to person, place, and time.   Psychiatric:         Behavior: Behavior normal.         Vital signs and nursing notes reviewed.      PPE: I wore a surgical mask throughout my encounters with the pt. I  performed hand hygiene on entry into the pt room and upon exit.     LAB RESULTS  No results found for this or any previous visit (from the past 24 hours).      RADIOLOGY  XR Knee 3 View Left    Result Date: 12/3/2024  XR KNEE 3 VW LEFT-  INDICATIONS: Pain.  TECHNIQUE: 4 VIEWS OF THE LEFT KNEE  COMPARISON: None available  FINDINGS:  Moderate medial tibiofemoral joint space narrowing is seen. Moderate degenerative spurring is noted. Trace knee effusion. No acute fracture, erosion, or dislocation is identified. Follow-up/further evaluation can be obtained as indications persist.       As described.    This report was finalized on 12/3/2024 11:40 AM by Dr. Santosh Carias M.D on Workstation: [id number]         MEDICATIONS GIVEN IN ER  Medications   sodium chloride 0.9 % flush 10 mL (has no administration in time range)   Diclofenac Sodium 3 % gel 2 g (2 g Topical Given 12/3/24 1240)   HYDROcodone-acetaminophen (NORCO) 5-325 MG per tablet 1 tablet (1 tablet Oral Given 12/3/24 1113)         ORDERS PLACED DURING THIS VISIT:  Orders Placed This Encounter   Procedures    [facility] 05.  Knee Immobilizer, 12.  Standard Walker Folding; Prevents Accomplishing MRADLs; Able to Safely Use Equipment; Mobility Deficit Can Be Sufficiently Resolved By Use of Equipment    XR Knee 3 View Left    Comprehensive Metabolic Panel    CBC Auto Differential    Diet: Cardiac, Diabetic, Regular/House; Healthy Heart (2-3 Na+); Consistent Carbohydrate; Texture: Mechanical Ground (NDD 2); Fluid Consistency: Thin (IDDSI 0)    [facility] (on-call MD unless specified) Details    Insert Peripheral IV    Inpatient Admission    CBC & Differential         OUTPATIENT MEDICATION MANAGEMENT:  Current Facility-Administered Medications Ordered in Epic   Medication Dose Route Frequency Provider Last Rate Last Admin    sodium chloride 0.9 % flush 10 mL  10 mL Intravenous PRN Bunny Quiroz PA         Current Outpatient Medications Ordered in Epic    Medication Sig Dispense Refill    aspirin 81 MG EC tablet Take 1 tablet by mouth Daily. HOLD FOR SURGERY      calcium carbonate (OS-MARIS) 1250 (500 Ca) MG chewable tablet Chew 2 tablets 2 (Two) Times a Day.      Cholecalciferol 10 MCG (400 UNIT) tablet Take 1 tablet by mouth Daily.      docusate sodium (COLACE) 100 MG capsule Take 1 capsule by mouth 2 (Two) Times a Day. (Patient taking differently: Take 1 capsule by mouth Daily.) 180 capsule 3    HYDROcodone-acetaminophen (NORCO) 5-325 MG per tablet Take 1 tablet by mouth Every 4 (Four) Hours As Needed for Moderate Pain (pain). 35 tablet 0    lisinopril-hydrochlorothiazide (PRINZIDE,ZESTORETIC) 20-12.5 MG per tablet Take 1 tablet by mouth Daily. 90 tablet 3    Magnesium Oxide -Mg Supplement 500 MG tablet Take 1 tablet by mouth Every Night.      metFORMIN (GLUCOPHAGE) 500 MG tablet TAKE 1 TABLET BY MOUTH TWICE DAILY WITH MEALS 180 tablet 3    methocarbamol (ROBAXIN) 500 MG tablet Take 1 tablet by mouth 4 (Four) Times a Day As Needed for Muscle Spasms. 40 tablet 0    metoprolol succinate XL (TOPROL-XL) 25 MG 24 hr tablet Take 1 tablet by mouth Daily. 90 tablet 3    omeprazole (priLOSEC) 40 MG capsule Take 1 capsule by mouth once daily 90 capsule 0    Polyethylene Glycol 3350 (MIRALAX PO) Take 1 Capful by mouth 2 (Two) Times a Day. Has not been taking but is going to be starting again soon      simvastatin (ZOCOR) 40 MG tablet TAKE 1 TABLET BY MOUTH ONCE DAILY IN THE EVENING (Patient taking differently: Take 1 tablet by mouth Every Night.) 90 tablet 0              PROGRESS, DATA ANALYSIS, CONSULTS, AND MEDICAL DECISION MAKING  All labs have been independently interpreted by me.  All radiology studies have been reviewed by me. All EKG's have been independently viewed and interpreted by me.  Discussion below represents my analysis of pertinent findings related to patient's condition, differential diagnosis, treatment plan and final disposition.      DIFFERENTIAL  DIAGNOSIS INCLUDE BUT NOT LIMITED TO:     Acute low back pain, postoperative pain, knee effusion, knee contusion    Clinical Scores: N/A      ED Course as of 12/03/24 1400   Tue Dec 03, 2024   1135 XR Knee 3 View Left  Per my independent interpretation of the knee x-ray, no signs of fracture or dislocation, medial compartment narrowing [DC-2]   1239 Patient has been reassessed at this time.  After pain control in the ER and placement of a knee immobilizer and with the use of a walker, she had significant difficulty standing, bearing weight, ambulating.  She was very unsteady and the family expressed concern for her safety at home.  Plan for admission for PT consultation and probable acute rehab placement.  Patient agreeable and all questions answered. [DC]   1359 I discussed the case with MD Ning with [facility] at this time regarding the patient.  I discussed work-up, results, concerns.  I discussed the consulting provider's desire for med surg admit.   [DC]      ED Course User Index  [DC] Bunny Quiroz PA  [DC-2] Dat Terrazas MD            1401 I rechecked the patient.  I discussed the patient's labs, radiology findings (including all incidental findings), diagnosis, and plan for admission. The patient understands and agrees with the plan.      AS OF 14:00 EST VITALS:    BP - 147/79  HR - 61  TEMP - 98.2 °F (36.8 °C)  O2 SATS - 98%    COMPLEXITY OF CARE  The patient requires admission.      DIAGNOSIS  Final diagnoses:   Acute pain of left knee   Effusion, left knee   Acute post-operative pain         DISPOSITION  ED Disposition       ED Disposition   Decision to Admit    Condition   --    Comment   Level of Care: Med/Surg [1]   Diagnosis: Acute pain of left knee [[id number]]   Admitting Physician: MELIA JOHNSTON [[id number]]   Attending Physician: MELIA JOHNSTON [[id number]]   Certification: I Certify That Inpatient Hospital Services Are Medically Necessary For Greater Than 2 Midnights                  Please  note that portions of this document were completed with a voice recognition program.    Note Disclaimer: At [facility], we believe that sharing information builds trust and better relationships. You are receiving this note because you recently visited [facility]. It is possible you will see health information before a provider has talked with you about it. This kind of information can be easy to misunderstand. To help you fully understand what it means for your health, we urge you to discuss this note with your provider.         Bunny Quiroz PA  12/03/24 0645

## 2024-12-03 NOTE — CASE MANAGEMENT/SOCIAL WORK
Discharge Planning Assessment  [facility]     Patient Name: Radha Silverio  MRN: [id number]  Today's Date: 12/3/2024    Admit Date: 12/3/2024        Discharge Needs Assessment    No documentation.                  Discharge Plan       Row Name 12/03/24 1241       Plan    Plan Comments Spoke with patient and her daughter at bedside, per MD request, regarding concerns over ability to safely return home. Patient had lumbar spine surgery on 11/25 and has since been staying with her son. Home health has been following patient for physical therapy. Patient presents to ER today with chief complaint of severe right knee pain which started acutely when getting up from toileting this morning. Imaging negative for acute process in knee. I assisted the ER tech in getting patient up to attempt ambulation at bedside today. Patient was able to take a few steps in the room, but was returned to bed because she felt as though her knee was going to buckle, despite having walker and hinged knee brace. Patient and daughter both very concerned about ability to safely return home. Will be admitted for pain control and PT evaluation/placement options.                  Continued Care and Services - Admitted Since 12/3/2024    No active coordination exists for this encounter.       Selected Continued Care - Prior Encounters Includes continued care and service providers with selected services from prior encounters from 9/4/2024 to 12/3/2024      Discharged on 11/27/2024 Admission date: 11/25/2024 - Discharge disposition: Home or Self Care      Home Medical Care       Service Provider Services Address Phone Fax Patient Preferred    [facility] - [facility] [address] [facility] [address] [place] [address] [phone] --                             Demographic Summary    No documentation.                  Functional Status    No documentation.                  Psychosocial    No  documentation.                  Abuse/Neglect    No documentation.                  Legal    No documentation.                  Substance Abuse    No documentation.                  Patient Forms    No documentation.                     Jarrod Massey RN

## 2024-12-03 NOTE — ED PROVIDER NOTES
Pt presents to the ED c/o left knee pain.  Is 1 week status post lumbar laminectomy, has some low back pain but nothing significant, complain mostly of the new knee pain.  Mobility is decreased due to the pain.     On exam,   General: No acute distress, nontoxic  HEENT: EOMI  Pulm: Symmetric chest rise, nonlabored breathing  CV: Regular rate and rhythm  GI: Nondistended  MSK: No deformity, mild tenderness at the medial joint line doubt significant effusion or erythema  Skin: Warm, dry  Neuro: Awake, alert, oriented x 4, moving all extremities, no focal deficits  Psych: Calm, cooperative    Vital signs and nursing notes reviewed.           Plan: Plan for x-ray of the knee, suspect more likely a arthritic flare but will eval for any signs of unexpected fracture    ED Course as of 12/03/24 1424   Tue Dec 03, 2024   1135 XR Knee 3 View Left  Per my independent interpretation of the knee x-ray, no signs of fracture or dislocation, medial compartment narrowing [DC-2]   1239 Patient has been reassessed at this time.  After pain control in the ER and placement of a knee immobilizer and with the use of a walker, she had significant difficulty standing, bearing weight, ambulating.  She was very unsteady and the family expressed concern for her safety at home.  Plan for admission for PT consultation and probable acute rehab placement.  Patient agreeable and all questions answered. [DC]   1935 I discussed the case with MD Ning with [facility] at this time regarding the patient.  I discussed work-up, results, concerns.  I discussed the consulting provider's desire for med surg admit.   [DC]      ED Course User Index  [DC] Bunny Quiroz PA  [DC-2] Dat Terrazas MD       Arthritic changes, ambulation extremely difficult given pain despite knee immobilizer and a walker, will plan for hospitalization as she will need physical therapy and potential and short-term rehab placement.     MD Attestation Note    SHARED VISIT: This visit  was performed by BOTH a physician and an APC. The substantive portion of the medical decision making was performed by this attesting physician who made or approved the management plan and takes responsibility for patient management. All studies in the APC note (if performed) were independently interpreted by me.                   Dat Terrazas MD  12/03/24 6108

## 2024-12-04 LAB
ANION GAP SERPL CALCULATED.3IONS-SCNC: 13 MMOL/L (ref 5–15)
BASOPHILS # BLD AUTO: 0.02 10*3/MM3 (ref 0–0.2)
BASOPHILS NFR BLD AUTO: 0.5 % (ref 0–1.5)
BUN SERPL-MCNC: 17 MG/DL (ref 8–23)
BUN/CREAT SERPL: 18.3 (ref 7–25)
CALCIUM SPEC-SCNC: 9.1 MG/DL (ref 8.6–10.5)
CHLORIDE SERPL-SCNC: 101 MMOL/L (ref 98–107)
CO2 SERPL-SCNC: 27 MMOL/L (ref 22–29)
CREAT SERPL-MCNC: 0.93 MG/DL (ref 0.57–1)
DEPRECATED RDW RBC AUTO: 40.2 FL (ref 37–54)
EGFRCR SERPLBLD CKD-EPI 2021: 61.5 ML/MIN/1.73
EOSINOPHIL # BLD AUTO: 0.18 10*3/MM3 (ref 0–0.4)
EOSINOPHIL NFR BLD AUTO: 4.9 % (ref 0.3–6.2)
ERYTHROCYTE [DISTWIDTH] IN BLOOD BY AUTOMATED COUNT: 12.8 % (ref 12.3–15.4)
GLUCOSE BLDC GLUCOMTR-MCNC: 114 MG/DL (ref 70–130)
GLUCOSE BLDC GLUCOMTR-MCNC: 137 MG/DL (ref 70–130)
GLUCOSE BLDC GLUCOMTR-MCNC: 143 MG/DL (ref 70–130)
GLUCOSE BLDC GLUCOMTR-MCNC: 151 MG/DL (ref 70–130)
GLUCOSE SERPL-MCNC: 139 MG/DL (ref 65–99)
HCT VFR BLD AUTO: 36.6 % (ref 34–46.6)
HGB BLD-MCNC: 11.8 G/DL (ref 12–15.9)
IMM GRANULOCYTES # BLD AUTO: 0.01 10*3/MM3 (ref 0–0.05)
IMM GRANULOCYTES NFR BLD AUTO: 0.3 % (ref 0–0.5)
LYMPHOCYTES # BLD AUTO: 1.31 10*3/MM3 (ref 0.7–3.1)
LYMPHOCYTES NFR BLD AUTO: 35.3 % (ref 19.6–45.3)
MCH RBC QN AUTO: 28.2 PG (ref 26.6–33)
MCHC RBC AUTO-ENTMCNC: 32.2 G/DL (ref 31.5–35.7)
MCV RBC AUTO: 87.4 FL (ref 79–97)
MONOCYTES # BLD AUTO: 0.43 10*3/MM3 (ref 0.1–0.9)
MONOCYTES NFR BLD AUTO: 11.6 % (ref 5–12)
NEUTROPHILS NFR BLD AUTO: 1.76 10*3/MM3 (ref 1.7–7)
NEUTROPHILS NFR BLD AUTO: 47.4 % (ref 42.7–76)
NRBC BLD AUTO-RTO: 0 /100 WBC (ref 0–0.2)
PLATELET # BLD AUTO: 209 10*3/MM3 (ref 140–450)
PMV BLD AUTO: 10.3 FL (ref 6–12)
POTASSIUM SERPL-SCNC: 4.1 MMOL/L (ref 3.5–5.2)
RBC # BLD AUTO: 4.19 10*6/MM3 (ref 3.77–5.28)
SODIUM SERPL-SCNC: 141 MMOL/L (ref 136–145)
WBC NRBC COR # BLD AUTO: 3.71 10*3/MM3 (ref 3.4–10.8)

## 2024-12-04 PROCEDURE — 85025 COMPLETE CBC W/AUTO DIFF WBC: CPT | Performed by: INTERNAL MEDICINE

## 2024-12-04 PROCEDURE — 97162 PT EVAL MOD COMPLEX 30 MIN: CPT

## 2024-12-04 PROCEDURE — 97530 THERAPEUTIC ACTIVITIES: CPT

## 2024-12-04 PROCEDURE — 80048 BASIC METABOLIC PNL TOTAL CA: CPT | Performed by: INTERNAL MEDICINE

## 2024-12-04 PROCEDURE — 99221 1ST HOSP IP/OBS SF/LOW 40: CPT | Performed by: NURSE PRACTITIONER

## 2024-12-04 PROCEDURE — 97166 OT EVAL MOD COMPLEX 45 MIN: CPT

## 2024-12-04 PROCEDURE — 82948 REAGENT STRIP/BLOOD GLUCOSE: CPT

## 2024-12-04 RX ADMIN — LISINOPRIL 20 MG: 20 TABLET ORAL at 10:18

## 2024-12-04 RX ADMIN — HYDROCHLOROTHIAZIDE 12.5 MG: 12.5 TABLET ORAL at 10:18

## 2024-12-04 RX ADMIN — ATORVASTATIN CALCIUM 20 MG: 20 TABLET, FILM COATED ORAL at 10:19

## 2024-12-04 RX ADMIN — PANTOPRAZOLE SODIUM 40 MG: 40 TABLET, DELAYED RELEASE ORAL at 05:32

## 2024-12-04 RX ADMIN — MAGNESIUM OXIDE 400 MG (241.3 MG MAGNESIUM) TABLET 400 MG: TABLET at 10:18

## 2024-12-04 RX ADMIN — ACETAMINOPHEN 650 MG: 325 TABLET ORAL at 00:17

## 2024-12-04 RX ADMIN — DOCUSATE SODIUM 100 MG: 100 CAPSULE, LIQUID FILLED ORAL at 10:19

## 2024-12-04 RX ADMIN — ASPIRIN 81 MG: 81 TABLET, COATED ORAL at 10:18

## 2024-12-04 RX ADMIN — METOPROLOL SUCCINATE 25 MG: 25 TABLET, EXTENDED RELEASE ORAL at 10:18

## 2024-12-04 RX ADMIN — CHOLECALCIFEROL (VITAMIN D3) 10 MCG (400 UNIT) TABLET 400 UNITS: at 10:19

## 2024-12-04 RX ADMIN — Medication 1000 MG: at 10:18

## 2024-12-04 NOTE — PROGRESS NOTES
Name: Radha Silverio ADMIT: 12/3/2024   : 1940  PCP: Arielle Johnston APRN    MRN: [id number] LOS: 1 days   AGE/SEX: 84 y.o. female  ROOM: [facility]     Subjective   Subjective   Patient is lying on the bed and does not appear to be manage distress.  Continues to complain of left knee pain mainly when she bears weight.  Denies nausea, vomiting, chest pain, shortness of breath.       Objective   Objective   Vital Signs  Temp:  [98.2 °F (36.8 °C)-98.5 °F (36.9 °C)] 98.5 °F (36.9 °C)  Heart Rate:  [64-72] 68  Resp:  [16-17] 17  BP: (129-159)/(81-85) 129/85  SpO2:  [99 %-100 %] 100 %  on   ;   Device (Oxygen Therapy): room air  Body mass index is 31.65 kg/m².  Physical Exam  HEENT: PERRLA, extraocular moods intact, Scleras no icterus  Neck: Supple, no JVD  Cardiovascular: Regular rate and rhythm with normal S1-S2  Respiratory: Fairly clear to auscultation anteriorly with no wheezes  GI: Soft, nontender, bowel sounds.  Extremities: No edema, palpable pedal pulses, left knee has a brace in place  Neurologic: Grossly nonfocal, no facial asymmetry    Results Review     I reviewed the patient's new clinical results.  Results from last 7 days   Lab Units 24  0415 24  1435   WBC 10*3/mm3 3.71 4.45   HEMOGLOBIN g/dL 11.8* 13.2   PLATELETS 10*3/mm3 209 259     Results from last 7 days   Lab Units 24  0415 24  1556   SODIUM mmol/L 141 137   POTASSIUM mmol/L 4.1 4.9   CHLORIDE mmol/L 101 100   CO2 mmol/L 27.0 28.2   BUN mg/dL 17 18   CREATININE mg/dL 0.93 0.97   GLUCOSE mg/dL 139* 211*   EGFR mL/min/1.73 61.5 58.5*     Results from last 7 days   Lab Units 24  1556   ALBUMIN g/dL 3.7   BILIRUBIN mg/dL 0.4   ALK PHOS U/L 50   AST (SGOT) U/L 33*   ALT (SGPT) U/L 32     Results from last 7 days   Lab Units 24  0415 24  1556   CALCIUM mg/dL 9.1 9.5   ALBUMIN g/dL  --  3.7       Glucose   Date/Time Value Ref Range Status   2024 1122 143 (H) 70 - 130 mg/dL Final    12/04/2024 0721 151 (H) 70 - 130 mg/dL Final   12/03/2024 2057 188 (H) 70 - 130 mg/dL Final       XR Knee 3 View Left    Result Date: 12/3/2024   As described.    This report was finalized on 12/3/2024 11:40 AM by Dr. Santosh Carias M.D on Workstation: [id number]       I have personally reviewed all medications:  Scheduled Medications  aspirin, 81 mg, Oral, Daily  atorvastatin, 20 mg, Oral, Daily  calcium carbonate (oyster shell), 1,000 mg, Oral, Daily  cholecalciferol, 400 Units, Oral, Daily  docusate sodium, 100 mg, Oral, Daily  lisinopril, 20 mg, Oral, Q24H   And  hydroCHLOROthiazide, 12.5 mg, Oral, Q24H  insulin lispro, 2-7 Units, Subcutaneous, 4x Daily AC & at Bedtime  magnesium oxide, 400 mg, Oral, Daily  metoprolol succinate XL, 25 mg, Oral, Daily  pantoprazole, 40 mg, Oral, Q AM    Infusions   Diet  Diet: Diabetic; Consistent Carbohydrate; Texture: Regular (IDDSI 7); Fluid Consistency: Thin (IDDSI 0)    I have personally reviewed:  [x]  Laboratory   [x]  Microbiology   [x]  Radiology   [x]  EKG/Telemetry  [x]  Cardiology/Vascular   []  Pathology    []  Records       Assessment/Plan     Active Hospital Problems    Diagnosis  POA    **Acute pain of left knee [M25.562]  Yes      Resolved Hospital Problems   No resolved problems to display.       84 y.o. female admitted with Acute pain of left knee.    1. Acute left knee pain, orthopedic did evaluate and patient has moderate to severe arthritis along with possible meniscus tear.  Options include NSAIDs versus Medrol Dosepak versus steroid injection.  In the interim continue with analgesics and PT/OT consult has been obtained.  2.  Back pain, underwent left lumbar 1 to lumbar 2 laminectomy and discectomy in last week of November.  Do continue with analgesics for pain control.  3.  Hypertension, on lisinopril/HCTZ and metoprolol which we will continue to monitor blood pressure closely.  4.  GERD, nausea suppressive therapy.  5.  Hyperlipidemia, on statins.  6.   Diabetes mellitus, on corrective dose insulin which will be continued.  7.  CKD stage III, creatinine is close to baseline.          Santosh Romano MD  [facility]  12/04/24  16:00 EST

## 2024-12-04 NOTE — PLAN OF CARE
Goal Outcome Evaluation:  Plan of Care Reviewed With: patient           Outcome Evaluation: Radha Silverio is an 84 year old female seen for physical therapy evaluation after being admitted for acute L knee pain. She has spinal surgery 1 week ago (Nov 25th). She notes that she stood from the toilet, heard a pop and had medial L knee pain. Today, she is wearing L knee hinge brace/sleeve and reports pain at 0/10. She lives in a multi level home with 3 SHRUTHI (unsafe railing), with 13 Steps within home. She notes that she lives alone. She uses a RW following spinal sx, and does not wear LSO. Discussed with RN, no order for LSO at this time. She has no hx of falls. She performs BM with standby A via log roll technique. STS from EOB with CGA and verbal cues for hand placement, and then ambulates 40 ft with slow/shuffled steps, RW, and no overt LOB. previously just t/f'ing to BSC, however pt. would likely be able to ambulate to the bathroom with Ax1. PT recommending home with HH/outpatient therapy at this time if appropriate.    Anticipated Discharge Disposition (PT): home with outpatient therapy services, home with home health

## 2024-12-04 NOTE — H&P
HISTORY AND PHYSICAL   [facility]        Date of Admission: 12/3/2024  Patient Identification:  Name: Radha Silverio  Age: 84 y.o.  Sex: female  :  1940  MRN: [id number]                     Primary Care Physician: Arielle Johnston APRN    Chief Complaint:  84 year old female presented to the emergency room with pain of her left knee; it started when she got up from the toilet; she felt a pop and has had difficulty bearing weight and decreased range of motion since then; she had back surgery last week and has had some back pain and numbness of her legs; no fever or chills; when ambulation was attempted in the ED she was not able to perform well;     History of Present Illness:   As above    Past Medical History:  Past Medical History:   Diagnosis Date    Anesthesia complication     DURING COLONOSCOPY PATIENT STATES SHE WAS STILL ABLE TO HEAR AND FEEL    Anxiety     Arthritis     Arthritis of back     Solis's esophagus     Cataract     Chronic constipation     Chronic cough 2018    Colon polyps     Diabetes type 2, controlled     Dizziness     DVT (deep venous thrombosis)     Dyspnea     Essential hypertension 2016    Frequent PVCs 2017    GERD (gastroesophageal reflux disease)     Hematochezia 2016    Hyperlipidemia 2016    Hypertension     Lumbosacral disc disease     Palpitations     Pelvic prolapse      Past Surgical History:  Past Surgical History:   Procedure Laterality Date    BREAST BIOPSY Left     cyst at 9 y/o    COLONOSCOPY N/A 10/11/2016    Procedure: COLONOSCOPY TO CECUM, TO TERMINAL ILEUM WITH HOT SNARE POLYPECTOMY;  Surgeon: Palmira Calix MD;  Location: [facility] ENDOSCOPY;  Service:     COLONOSCOPY N/A 10/05/2021    Procedure: COLONOSCOPY TO CECUM WITH COLD POLYPECTOMY;  Surgeon: Palmira Calix MD;  Location: [facility] ENDOSCOPY;  Service: Gastroenterology;  Laterality: N/A;  HISTORY OF COLON POLYPS  COLON POLYP, HEMORRHOIDS,  DIVERTICULOSIS    CYST REMOVAL Right     breast    ENDOSCOPY N/A 10/11/2016    Procedure: ESOPHAGOGASTRODUODENOSCOPY WITH BIOPSY;  Surgeon: Palmira Calix MD;  Location: [facility] ENDOSCOPY;  Service:     ENDOSCOPY N/A 10/05/2021    Procedure: ESOPHAGOGASTRODUODENOSCOPY WITH COLD BIOPSIES;  Surgeon: Palmira Calix MD;  Location: [facility] ENDOSCOPY;  Service: Gastroenterology;  Laterality: N/A;  GERD  GASTRITIS    LAPAROSCOPIC CHOLECYSTECTOMY W/ CHOLANGIOGRAPHY      LUMBAR DISCECTOMY Left 11/25/2024    Procedure: Left lumbar 1 to lumbar 2 laminectomy and discectomy with metrx;  Surgeon: Bronson Fitzgerald MD;  Location: [facility] MAIN OR;  Service: Neurosurgery;  Laterality: Left;    MOLE REMOVAL      on foot     OOPHORECTOMY Left     B9    TUBAL ABDOMINAL LIGATION        Home Meds:  Medications Prior to Admission   Medication Sig Dispense Refill Last Dose/Taking    aspirin 81 MG EC tablet Take 1 tablet by mouth Daily. HOLD FOR SURGERY   12/2/2024 at  8:00 AM    calcium carbonate (OS-MARIS) 1250 (500 Ca) MG chewable tablet Chew 2 tablets 2 (Two) Times a Day.   12/2/2024 at  8:00 AM    Cholecalciferol 10 MCG (400 UNIT) tablet Take 1 tablet by mouth Daily.   12/2/2024 Morning    docusate sodium (COLACE) 100 MG capsule Take 1 capsule by mouth 2 (Two) Times a Day. (Patient taking differently: Take 1 capsule by mouth Daily.) 180 capsule 3 Taking Differently    HYDROcodone-acetaminophen (NORCO) 5-325 MG per tablet Take 1 tablet by mouth Every 4 (Four) Hours As Needed for Moderate Pain (pain). 35 tablet 0 Taking As Needed    lisinopril-hydrochlorothiazide (PRINZIDE,ZESTORETIC) 20-12.5 MG per tablet Take 1 tablet by mouth Daily. 90 tablet 3 12/2/2024 Morning    Magnesium Oxide -Mg Supplement 500 MG tablet Take 1 tablet by mouth Every Night.   12/2/2024 Bedtime    metFORMIN (GLUCOPHAGE) 500 MG tablet TAKE 1 TABLET BY MOUTH TWICE DAILY WITH MEALS (Patient taking differently: Take 1 tablet by mouth 2 (Two) Times a Day.) 180 tablet 3  2024 Evening    methocarbamol (ROBAXIN) 500 MG tablet Take 1 tablet by mouth 4 (Four) Times a Day As Needed for Muscle Spasms. 40 tablet 0 Taking As Needed    metoprolol succinate XL (TOPROL-XL) 25 MG 24 hr tablet Take 1 tablet by mouth Daily. 90 tablet 3 2024 Morning    omeprazole (priLOSEC) 40 MG capsule Take 1 capsule by mouth once daily 90 capsule 0 2024 Morning    Polyethylene Glycol 3350 (MIRALAX PO) Take 1 Capful by mouth As Needed. Has not been taking but is going to be starting again soon   Taking As Needed    simvastatin (ZOCOR) 40 MG tablet TAKE 1 TABLET BY MOUTH ONCE DAILY IN THE EVENING (Patient taking differently: Take 1 tablet by mouth Every Night.) 90 tablet 0 2024 Evening    [] cephalexin (Keflex) 500 MG capsule Take 1 capsule by mouth 4 (Four) Times a Day for 5 days. 20 capsule 0        Allergies:  No Known Allergies  Immunizations:  Immunization History   Administered Date(s) Administered    Arexvy (RSV, Adults 60+ yrs) 12/15/2023    COVID-19 (MODERNA) 12YRS+ (SPIKEVAX) 10/25/2023    COVID-19 (PFIZER) 12YRS+ (COMIRNATY) 10/03/2024    COVID-19 (PFIZER) BIVALENT 12+YRS 10/27/2022    COVID-19 (PFIZER) Purple Cap Monovalent 2021, 2021, 10/06/2021    Covid-19 (Pfizer) Gray Cap Monovalent 2022    FLUAD TRI 65YR+ 10/15/2019    Fluad Quad 65+ 2020    Fluzone High-Dose 65+YRS 10/06/2016, 10/10/2017, 10/04/2018, 10/03/2024    Fluzone High-Dose 65+yrs 10/01/2021, 10/18/2022, 10/10/2023    Hepatitis A 2018    Pneumococcal Conjugate 13-Valent (PCV13) 2016    Pneumococcal Polysaccharide (PPSV23) 2008    Shingrix 2023, 2024    Td (TDVAX) 1999    Tdap 10/22/2024     Social History:   Social History     Social History Narrative     passed 2007    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    2 children      Social History     Socioeconomic History    Marital status:    Tobacco Use    Smoking status: Never     Passive exposure:  "Never    Smokeless tobacco: Never    Tobacco comments:     no caffiene   Vaping Use    Vaping status: Never Used   Substance and Sexual Activity    Alcohol use: No    Drug use: No    Sexual activity: Not Currently     Partners: Male     Birth control/protection: Post-menopausal, Surgical     Comment: BTL       Family History:  Family History   Problem Relation Age of Onset    Diabetes Mother     Heart disease Father     Diabetes Sister     Heart disease Sister     Breast cancer Sister     Cancer Sister     Stroke Sister     Breast cancer Sister     Diabetes Brother     Heart disease Brother     Cancer Brother     Ovarian cancer Neg Hx     Colon cancer Neg Hx     Deep vein thrombosis Neg Hx     Pulmonary embolism Neg Hx     Malig Hyperthermia Neg Hx         Review of Systems  See history of present illness and past medical history.  Patient denies headache, dizziness, syncope, falls, trauma, change in vision, change in hearing, change in taste, changes in weight, changes in appetite, focal weakness, numbness, or paresthesia.  Patient denies chest pain, palpitations, dyspnea, orthopnea, PND, cough, sinus pressure, rhinorrhea, epistaxis, hemoptysis, nausea, vomiting,hematemesis, diarrhea, constipation or hematochezia.  Denies cold or heat intolerance, polydipsia, polyuria, polyphagia. Denies hematuria, pyuria, dysuria, hesitancy, frequency or urgency. Denies consumption of raw and under cooked meats foods or change in water source.  Denies fever, chills, sweats, night sweats.  Denies missing any routine medications. Remainder of ROS is negative.    Objective:  T Max 24 hrs: Temp (24hrs), Av.2 °F (36.8 °C), Min:98.2 °F (36.8 °C), Max:98.2 °F (36.8 °C)    Vitals Ranges:   Temp:  [98.2 °F (36.8 °C)] 98.2 °F (36.8 °C)  Heart Rate:  [60-70] 64  Resp:  [16] 16  BP: (135-150)/(72-83) 146/81      Exam:  /81   Pulse 64   Temp 98.2 °F (36.8 °C)   Resp 16   Ht 167.6 cm (65.98\")   Wt 88.9 kg (195 lb 15.8 oz)   LMP  " (LMP Unknown)   SpO2 99%   BMI 31.65 kg/m²     General Appearance:    Alert, cooperative, no distress, appears stated age   Head:    Normocephalic, without obvious abnormality, atraumatic   Eyes:    PERRL, conjunctivae/corneas clear, EOM's intact, both eyes   Ears:    Normal external ear canals, both ears   Nose:   Nares normal, septum midline, mucosa normal, no drainage    or sinus tenderness   Throat:   Lips, mucosa, and tongue normal   Neck:   Supple, symmetrical, trachea midline, no adenopathy;     thyroid:  no enlargement/tenderness/nodules; no carotid    bruit or JVD   Back:     Symmetric, no curvature, ROM normal, no CVA tenderness   Lungs:     Decreased breath sounds bilaterally, respirations unlabored   Chest Wall:    No tenderness or deformity    Heart:    Regular rate and rhythm, S1 and S2 normal, no murmur, rub   or gallop   Abdomen:     Soft, nontender, bowel sounds active all four quadrants,     no masses, no hepatomegaly, no splenomegaly   Extremities:   Extremities normal, atraumatic, no cyanosis or edema                       .    Data Review:  Labs in chart were reviewed.  WBC   Date Value Ref Range Status   12/03/2024 4.45 3.40 - 10.80 10*3/mm3 Final     Hemoglobin   Date Value Ref Range Status   12/03/2024 13.2 12.0 - 15.9 g/dL Final     Hematocrit   Date Value Ref Range Status   12/03/2024 41.5 34.0 - 46.6 % Final     Platelets   Date Value Ref Range Status   12/03/2024 259 140 - 450 10*3/mm3 Final     Sodium   Date Value Ref Range Status   12/03/2024 137 136 - 145 mmol/L Final     Potassium   Date Value Ref Range Status   12/03/2024 4.9 3.5 - 5.2 mmol/L Final     Comment:     Slight hemolysis detected by analyzer. Result may be falsely elevated.     Chloride   Date Value Ref Range Status   12/03/2024 100 98 - 107 mmol/L Final     CO2   Date Value Ref Range Status   12/03/2024 28.2 22.0 - 29.0 mmol/L Final     BUN   Date Value Ref Range Status   12/03/2024 18 8 - 23 mg/dL Final     Creatinine  "  Date Value Ref Range Status   12/03/2024 0.97 0.57 - 1.00 mg/dL Final     Glucose   Date Value Ref Range Status   12/03/2024 211 (H) 65 - 99 mg/dL Final     Calcium   Date Value Ref Range Status   12/03/2024 9.5 8.6 - 10.5 mg/dL Final     AST (SGOT)   Date Value Ref Range Status   12/03/2024 33 (H) 1 - 32 U/L Final     ALT (SGPT)   Date Value Ref Range Status   12/03/2024 32 1 - 33 U/L Final     Alkaline Phosphatase   Date Value Ref Range Status   12/03/2024 50 39 - 117 U/L Final     No results found for: \"APTT\", \"INR\"             Imaging Results (All)       Procedure Component Value Units Date/Time    XR Knee 3 View Left [[id number]] Collected: 12/03/24 1139     Updated: 12/03/24 1143    Narrative:      XR KNEE 3 VW LEFT-     INDICATIONS: Pain.     TECHNIQUE: 4 VIEWS OF THE LEFT KNEE     COMPARISON: None available     FINDINGS:     Moderate medial tibiofemoral joint space narrowing is seen. Moderate  degenerative spurring is noted. Trace knee effusion. No acute fracture,  erosion, or dislocation is identified. Follow-up/further evaluation can  be obtained as indications persist.       Impression:         As described.           This report was finalized on 12/3/2024 11:40 AM by Dr. Santosh Carias M.D on Workstation: [id number]                 Assessment:  Active Hospital Problems    Diagnosis  POA    **Acute pain of left knee [M25.562]  Yes      Resolved Hospital Problems   No resolved problems to display.   Back pain  Hypertension  Diabetes  Ckd3  hyperlipidemia    Plan:  Will ask ortho to see her  Neurosurgery to see  May need rehab  Pain control  Accu checks, sliding scale  Dw patient and ed provider    Viki Barclay MD  12/3/2024  20:17 EST    "

## 2024-12-04 NOTE — THERAPY EVALUATION
Patient Name: Radha Silverio  : 1940    MRN: [id number]                              Today's Date: 2024       Admit Date: 12/3/2024    Visit Dx:     ICD-10-CM ICD-9-CM   1. Acute pain of left knee  M25.562 719.46   2. Effusion, left knee  M25.462 719.06   3. Acute post-operative pain  G89.18 338.18     Patient Active Problem List   Diagnosis    Essential hypertension    Mixed hyperlipidemia    Chronic constipation    Acid reflux    Solis esophagus    Type 2 diabetes mellitus without complication, without long-term current use of insulin    Hemorrhoids    Frequent PVCs    Atrial bigeminy    Ectopic atrial rhythm    Primary osteoarthritis of knee    Primary osteoarthritis of right knee    Tinnitus of both ears    Left hip pain    Primary osteoarthritis of left hip    Greater trochanteric bursitis of left hip    Stage 3 chronic kidney disease    Female genital prolapse    Chronic gastritis without bleeding    Degenerative disc disease, lumbar    Weakness of left lower extremity    Neuritis or radiculitis due to rupture of lumbar intervertebral disc    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    Adenomatous polyp of colon    Intervertebral lumbar disc disorder with myelopathy, lumbar region    Lumbar herniated disc    Acute pain of left knee     Past Medical History:   Diagnosis Date    Anesthesia complication     DURING COLONOSCOPY PATIENT STATES SHE WAS STILL ABLE TO HEAR AND FEEL    Anxiety     Arthritis     Arthritis of back     Solis's esophagus     Cataract     Chronic constipation     Chronic cough 2018    Colon polyps     Diabetes type 2, controlled     Dizziness     DVT (deep venous thrombosis)     Dyspnea     Essential hypertension 2016    Frequent PVCs 2017    GERD (gastroesophageal reflux disease)     Hematochezia 2016    Hyperlipidemia 2016    Hypertension     Lumbosacral disc disease     Palpitations     Pelvic prolapse      Past Surgical History:   Procedure  Laterality Date    BREAST BIOPSY Left     cyst at 11 y/o    COLONOSCOPY N/A 10/11/2016    Procedure: COLONOSCOPY TO CECUM, TO TERMINAL ILEUM WITH HOT SNARE POLYPECTOMY;  Surgeon: Palmira Calix MD;  [facility];  Service:     COLONOSCOPY N/A 10/05/2021    Procedure: COLONOSCOPY TO CECUM WITH COLD POLYPECTOMY;  Surgeon: Palmira Calix MD;  Location: [facility] ENDOSCOPY;  Service: Gastroenterology;  Laterality: N/A;  HISTORY OF COLON POLYPS  COLON POLYP, HEMORRHOIDS, DIVERTICULOSIS    CYST REMOVAL Right     breast    ENDOSCOPY N/A 10/11/2016    Procedure: ESOPHAGOGASTRODUODENOSCOPY WITH BIOPSY;  Surgeon: Palmira Calix MD;  [facility];  Service:     ENDOSCOPY N/A 10/05/2021    Procedure: ESOPHAGOGASTRODUODENOSCOPY WITH COLD BIOPSIES;  Surgeon: Palmira Calix MD;  Location: [facility] ENDOSCOPY;  Service: Gastroenterology;  Laterality: N/A;  GERD  GASTRITIS    LAPAROSCOPIC CHOLECYSTECTOMY W/ CHOLANGIOGRAPHY      LUMBAR DISCECTOMY Left 11/25/2024    Procedure: Left lumbar 1 to lumbar 2 laminectomy and discectomy with metrx;  Surgeon: Bronson Fitzgerald MD;  Location: [facility] MAIN OR;  Service: Neurosurgery;  Laterality: Left;    MOLE REMOVAL      on foot     OOPHORECTOMY Left     B9    TUBAL ABDOMINAL LIGATION        General Information       Row Name 12/04/24 1030          Physical Therapy Time and Intention    Document Type evaluation  -ER     Mode of Treatment individual therapy;physical therapy  -ER       Row Name 12/04/24 1030          General Information    Patient Profile Reviewed yes  -ER     Prior Level of Function independent:  -ER     Existing Precautions/Restrictions --  L knee hinge brace  -ER     Barriers to Rehab medically complex;previous functional deficit  -ER       Row Name 12/04/24 1030          Living Environment    People in Home alone  -ER       Row Name 12/04/24 1030          Home Main Entrance    Number of Stairs, Main Entrance three  -ER     Stair Railings,  Main Entrance railings not in good condition, need repair for safe use  -ER       Row Name 12/04/24 1030          Stairs Within Home, Primary    Number of Stairs, Within Home, Primary twelve  -ER     Stair Railings, Within Home, Primary railings safe and in good condition  -ER       Row Name 12/04/24 1030          Cognition    Orientation Status (Cognition) oriented x 4  -ER       Row Name 12/04/24 1030          Safety Issues/Impairments Affecting Functional Mobility    Impairments Affecting Function (Mobility) endurance/activity tolerance;pain  -ER     Comment, Safety Issues/Impairments (Mobility) reports knee pain is 0/10 when wearing hinge brace, does not rate back pain following sx 1 week ago  -ER               User Key  (r) = Recorded By, (t) = Taken By, (c) = Cosigned By      Initials Name Provider Type    ER Holly Mckeon, PT Physical Therapist                   Mobility       Row Name 12/04/24 1031          Bed Mobility    Bed Mobility bed mobility (all) activities  -ER     All Activities, [place] (Bed Mobility) standby assist  -ER       Row Name 12/04/24 1031          Sit-Stand Transfer    Sit-Stand [place] (Transfers) contact guard  -ER     Assistive Device (Sit-Stand Transfers) walker, front-wheeled  -ER       Row Name 12/04/24 1031          Gait/Stairs (Locomotion)    [place] Level (Gait) supervision  -ER     Assistive Device (Gait) walker, front-wheeled  -ER     Patient was able to Ambulate yes  -ER     Distance in Feet (Gait) 40  -ER     Bilateral Gait Deviations forward flexed posture;heel strike decreased  -ER     Comment, (Gait/Stairs) shuffle steps, slow gait speed  -ER       Row Name 12/04/24 1031          Mobility    Extremity Weight-bearing Status left lower extremity  -ER     Left Lower Extremity (Weight-bearing Status) full weight-bearing (FWB);weight-bearing as tolerated (WBAT)  -ER               User Key  (r) = Recorded By, (t) = Taken By, (c) = Cosigned By      Initials Name  Provider Type    ER Holly Mckeon, PT Physical Therapist                   Obj/Interventions       Row Name 12/04/24 1032          Range of Motion Comprehensive    Comment, General Range of Motion Hesitation with L LE movement, 95 in sitting with hinge brace  -ER       Row Name 12/04/24 1032          Strength Comprehensive (MMT)    Comment, General Manual Muscle Testing (MMT) Assessment LLE ~4-/5, RLE 4/5  -ER       Row Name 12/04/24 1032          Balance    Balance Assessment sitting static balance;sitting dynamic balance;standing dynamic balance;standing static balance  -ER     Static Sitting Balance standby assist  -ER     Dynamic Sitting Balance standby assist  -ER     Position, Sitting Balance sitting edge of bed;unsupported  -ER     Static Standing Balance supervision  -ER     Dynamic Standing Balance contact guard  -ER     Position/Device Used, Standing Balance walker, front-wheeled  -ER     Balance Interventions sitting;standing;sit to stand;supported;static;dynamic  -ER       Row Name 12/04/24 1032          Sensory Assessment (Somatosensory)    Sensory Assessment (Somatosensory) sensation intact  -ER               User Key  (r) = Recorded By, (t) = Taken By, (c) = Cosigned By      Initials Name Provider Type    ER Holly Mckeon, PT Physical Therapist                   Goals/Plan       Row Name 12/04/24 1037          Bed Mobility Goal 1 (PT)    Activity/Assistive Device (Bed Mobility Goal 1, PT) bed mobility activities, all  -ER     [place] Level/Cues Needed (Bed Mobility Goal 1, PT) modified independence  -ER     Time Frame (Bed Mobility Goal 1, PT) 2 weeks  -ER       Row Name 12/04/24 1037          Transfer Goal 1 (PT)    Activity/Assistive Device (Transfer Goal 1, PT) transfers, all  -ER     [place] Level/Cues Needed (Transfer Goal 1, PT) standby assist  -ER     Time Frame (Transfer Goal 1, PT) 2 weeks  -ER       Row Name 12/04/24 1037          Gait Training Goal 1 (PT)    Activity/Assistive Device  (Gait Training Goal 1, PT) gait (walking locomotion)  -ER     [place] Level (Gait Training Goal 1, PT) standby assist  -ER     Time Frame (Gait Training Goal 1, PT) 2 weeks  -ER       Row Name 12/04/24 1037          Stairs Goal 1 (PT)    Activity/Assistive Device (Stairs Goal 1, PT) stairs, all skills  -ER     [place] Level/Cues Needed (Stairs Goal 1, PT) supervision required  -ER     Number of Stairs (Stairs Goal 1, PT) 5  -ER     Time Frame (Stairs Goal 1, PT) 2 weeks  -ER       Row Name 12/04/24 1037          Therapy Assessment/Plan (PT)    Planned Therapy Interventions (PT) balance training;bed mobility training;gait training;home exercise program;patient/family education;strengthening;orthotic fitting/training;stair training;ROM (range of motion);postural re-education;transfer training;neuromuscular re-education  -ER               User Key  (r) = Recorded By, (t) = Taken By, (c) = Cosigned By      Initials Name Provider Type    ER Holly Mckeon, PT Physical Therapist                   Clinical Impression       Row Name 12/04/24 1033          Pain    Pretreatment Pain Rating 0/10 - no pain  -ER     Posttreatment Pain Rating 0/10 - no pain  -ER     Pain Location knee  -ER     Pain Side/Orientation left  -ER     Pain Management Interventions exercise or physical activity utilized;activity modification encouraged  -ER     Response to Pain Interventions activity participation with decreased pain  -ER       Row Name 12/04/24 1033          Plan of Care Review    Plan of Care Reviewed With patient  -ER     Outcome Evaluation Radha Silverio is an 84 year old female seen for physical therapy evaluation after being admitted for acute L knee pain. She has spinal surgery 1 week ago (Nov 25th). She notes that she stood from the toilet, heard a pop and had medial L knee pain. Today, she is wearing L knee hinge brace/sleeve and reports pain at 0/10. She lives in a multi level home with 3 SHRUTHI (unsafe railing), with 13  Steps within home. She notes that she lives alone. She uses a RW following spinal sx, and does not wear LSO. Discussed with RN, no order for LSO at this time. She has no hx of falls. She performs BM with standby A via log roll technique. STS from EOB with CGA and verbal cues for hand placement, and then ambulates 40 ft with slow/shuffled steps, RW, and no overt LOB. previously just t/f'ing to [facility], however pt. would likely be able to ambulate to the bathroom with Ax1. PT recommending home with HH/outpatient therapy at this time if appropriate.  -ER       Row Name 12/04/24 1033          Therapy Assessment/Plan (PT)    Criteria for Skilled Interventions Met (PT) yes  -ER     Therapy Frequency (PT) 5 times/wk  -ER       Row Name 12/04/24 1033          Positioning and Restraints    Pre-Treatment Position in bed  -ER     Post Treatment Position bed  -ER     In Bed notified nsg;call light within reach;encouraged to call for assist;exit alarm on  -ER               User Key  (r) = Recorded By, (t) = Taken By, (c) = Cosigned By      Initials Name Provider Type    Holly Christy, PT Physical Therapist                   Outcome Measures       Row Name 12/04/24 1038 12/04/24 1015       How much help from another person do you currently need...    Turning from your back to your side while in flat bed without using bedrails? 3  -ER 3  -KM    Moving from lying on back to sitting on the side of a flat bed without bedrails? 3  -ER 3  -KM    Moving to and from a bed to a chair (including a wheelchair)? 3  -ER 3  -KM    Standing up from a chair using your arms (e.g., wheelchair, bedside chair)? 3  -ER 3  -KM    Climbing 3-5 steps with a railing? 3  -ER 3  -KM    To walk in hospital room? 3  -ER 3  -KM    AM-PAC 6 Clicks Score (PT) 18  -ER 18  -KM              User Key  (r) = Recorded By, (t) = Taken By, (c) = Cosigned By      Initials Name Provider Type    Bettie Martinez, RN Registered Nurse    Holly Christy, LILLIAN Physical Therapist                                  Physical Therapy Education       Title: PT OT SLP Therapies (Done)       Topic: Physical Therapy (Done)       Point: Mobility training (Done)       Learning Progress Summary            Patient Acceptance, E, VU by ER at 12/4/2024 1038                      Point: Home exercise program (Done)       Learning Progress Summary            Patient Acceptance, E, VU by ER at 12/4/2024 1038                      Point: Body mechanics (Done)       Learning Progress Summary            Patient Acceptance, E, VU by ER at 12/4/2024 1038                      Point: Precautions (Done)       Learning Progress Summary            Patient Acceptance, E, VU by ER at 12/4/2024 1038                                      User Key       Initials Effective Dates Name Provider Type Discipline    ER 10/15/23 -  Holly Mckeon, PT Physical Therapist PT                  PT Recommendation and Plan  Planned Therapy Interventions (PT): balance training, bed mobility training, gait training, home exercise program, patient/family education, strengthening, orthotic fitting/training, stair training, ROM (range of motion), postural re-education, transfer training, neuromuscular re-education  Outcome Evaluation: Radha Silverio is an 84 year old female seen for physical therapy evaluation after being admitted for acute L knee pain. She has spinal surgery 1 week ago (Nov 25th). She notes that she stood from the toilet, heard a pop and had medial L knee pain. Today, she is wearing L knee hinge brace/sleeve and reports pain at 0/10. She lives in a multi level home with 3 SHRUTHI (unsafe railing), with 13 Steps within home. She notes that she lives alone. She uses a RW following spinal sx, and does not wear LSO. Discussed with RN, no order for LSO at this time. She has no hx of falls. She performs BM with standby A via log roll technique. STS from EOB with CGA and verbal cues for hand placement, and then ambulates 40 ft with  slow/shuffled steps, RW, and no overt LOB. [place] [facility] however pt. would likely be able to ambulate to the bathroom with Ax1. PT recommending home with HH/outpatient therapy at this time if appropriate.     Time Calculation:         PT Charges       Row Name 12/04/24 1038             Time Calculation    Start Time 0932  -ER      Stop Time 0958  -ER      Time Calculation (min) 26 min  -ER      PT Received On 12/04/24  -ER      PT - Next Appointment 12/05/24  -ER      PT Goal Re-Cert Due Date 12/18/24  -ER         Time Calculation- PT    Total Timed Code Minutes- PT 23 minute(s)  -ER         Timed Charges    64635 - PT Therapeutic Activity Minutes 23  -ER         Total Minutes    Timed Charges Total Minutes 23  -ER       Total Minutes 23  -ER                User Key  (r) = Recorded By, (t) = Taken By, (c) = Cosigned By      Initials Name Provider Type    ER Holly Mckeon, PT Physical Therapist                  Therapy Charges for Today       Code Description Service Date Service Provider Modifiers Qty    51389382396 HC PT THERAPEUTIC ACT EA 15 MIN 12/4/2024 Holly Mckeon, PT GP 2    29053538572 HC PT EVAL MOD COMPLEXITY 3 12/4/2024 Holly Mckeon, PT GP 1            PT G-Codes  AM-PAC 6 Clicks Score (PT): 18  PT Discharge Summary  Anticipated Discharge Disposition (PT): home with outpatient therapy services, home with home health    Holly Mckeon PT  12/4/2024

## 2024-12-04 NOTE — CONSULTS
[facility] THORACIC/LUMBAR NEUROSURGERY PROGRESS NOTE      CC: Left knee pain      Subjective     Interval History: Patient is s/p Left L1-2 laminectomy, medial facetectomy and discectomy using minimal access spinal technologies microsurgical technique microsurgical instrumentation on 11/25/24 by Dr Fitzgerald.  Patient patient reports that yesterday around 1 AM she was attempting to get herself off the toilet when she felt a pop in her left knee.  States she was able to get herself back into bed however the pain worsened and therefore she presented to the hospital.  From a surgery standpoint she is doing very well, she has no complaint of back pain and does not report any weakness in her lower extremities.  She has not fallen or had injury.  She denies fevers,, bowel or bladder she does report that her knee pain has improved today and she has been able to work with physical therapy.        Objective     Vital signs in last 24 hours:  Heart Rate:  [60-70] 64  Resp:  [16] 16  BP: (138-150)/(72-83) 146/81    Intake/Output this shift:  No intake/output data recorded.    LABS:  Results from last 7 days   Lab Units 12/04/24  0415 12/03/24  1435   WBC 10*3/mm3 3.71 4.45   HEMOGLOBIN g/dL 11.8* 13.2   HEMATOCRIT % 36.6 41.5   PLATELETS 10*3/mm3 209 259     Results from last 7 days   Lab Units 12/04/24  0415 12/03/24  1556   SODIUM mmol/L 141 137   POTASSIUM mmol/L 4.1 4.9   CHLORIDE mmol/L 101 100   CO2 mmol/L 27.0 28.2   BUN mg/dL 17 18   CREATININE mg/dL 0.93 0.97   CALCIUM mg/dL 9.1 9.5   BILIRUBIN mg/dL  --  0.4   ALK PHOS U/L  --  50   ALT (SGPT) U/L  --  32   AST (SGOT) U/L  --  33*   GLUCOSE mg/dL 139* 211*         IMAGING STUDIES:    Left Knee:    FINDINGS:     Moderate medial tibiofemoral joint space narrowing is seen. Moderate  degenerative spurring is noted. Trace knee effusion. No acute fracture,  erosion, or dislocation is identified. Follow-up/further evaluation can  be obtained as indications persist    I personally  viewed the patient's chart, it was also reviewed by and discussed with Dr Fitzgerald    Meds reviewed/changed: Yes  Aspirin 81 mg p.o. daily  Lipitor 20 mg p.o. daily  Lisinopril 20 mg p.o. every 24 hours  Hydrochlorothiazide 12.5 mg p.o. every 24 hours  Humalog 2-7 units SQ 4 times daily  Metoprolol 25 mg p.o. daily  Protonix 40 mg p.o. every morning  Tylenol 650 mg p.o. every 4 hours as needed        Physical Exam:    General:  Awake & alert  Back: Midline lumbar surgical incision is well appearing, well-approximated with Steri-Strips in place, there is no surrounding erythema, swelling or drainage  Motor:  Normal muscle strength, bulk and tone in lower extremities.  No fasciculations, rigidity, spasticity, or abnormal movements  Sensation:  Normal to light touch bilateral LE's  Station and Gait:  Per PT note 12/4-STS from EOB with CGA and verbal cues for hand placement, and then ambulates 40 ft with slow/shuffled steps, RW, and no overt LOB.   Extremities:  Left knee- non-tender, no swelling, deformity noted.  Normal ROM, Knee sleeve in place      Assessment & Plan     ASSESSMENT:      Acute pain of left knee     Patient is s/p Left L1-2 laminectomy, medial facetectomy and discectomy using minimal access spinal technologies microsurgical technique microsurgical instrumentation on 11/25/24 by Dr Fitzgerald.  Presents with c/o left knee pain since yesterday.  No injury. Reports improvement in pain since admission.  Healing well from surgical aspect.  Patient is ok for discharge from [facility] standpoint when cleared by other services.  She has f/u appt with Dr Fitzgerald on 12/12.  She knows to call the office with any questions or concerns.    PLAN:   -[facility] to sign off  -Keep f/u appt 12/12    I discussed the patient's findings and my recommendations with patient, family, and Dr Fitzgerald    During patient visit, I utilized appropriate personal protective equipment including gloves.  Appropriate PPE was worn during the entire visit.  Hand  "hygiene was completed before and after.      LOS: 1 day       Marizol Mak, APRN  12/4/2024  10:17 EST    \"Dictated utilizing Dragon dictation\".      "

## 2024-12-04 NOTE — PLAN OF CARE
Problem: Adult Inpatient Plan of Care  Goal: Plan of Care Review  Outcome: Progressing  Goal: Patient-Specific Goal (Individualized)  Outcome: Progressing  Goal: Absence of Hospital-Acquired Illness or Injury  Outcome: Progressing  Goal: Optimal Comfort and Wellbeing  Outcome: Progressing  Goal: Readiness for Transition of Care  Outcome: Progressing  Intervention: Mutually Develop Transition Plan  Recent Flowsheet Documentation  Taken 12/3/2024 2145 by Bronson Wallis, RN  Transportation Anticipated: family or friend will provide  Transportation Concerns: none  Patient/Family Anticipated Services at Transition: none  Patient/Family Anticipates Transition to: home  Taken 12/3/2024 2140 by Bronson Wallis, RN  Equipment Currently Used at Home: walker, rolling   Goal Outcome Evaluation:

## 2024-12-04 NOTE — PLAN OF CARE
Goal Outcome Evaluation:  Plan of Care Reviewed With: patient, daughter        Progress: no change  Outcome Evaluation: Patient is 84 year old female admitted to [facility] on 12/3/2024 with reports of new onset left knee pain after standing from commode and hearing a pop followed by left lateral knee pain. Patient PMH significant for spinal surgery x1 week prior. At baseline, patient is independent with B and IADLs and does not consistently use an assistive device for functional mobility. Patient has experienced decline in function from independent baseline, presenting with deficits related to balance, strength, tolerance, transfers and mobility that impede patient independence with activities of daily living.  Patient would benefit from skilled Occupational Therapy intervention to maximize patient safety and promote return to baseline independence. Recommend home with outpatient therapy services at time of discharge from acute care setting.    Anticipated Discharge Disposition (OT): home with outpatient therapy services

## 2024-12-04 NOTE — PLAN OF CARE
Problem: Adult Inpatient Plan of Care  Goal: Plan of Care Review  12/3/2024 2226 by Bronson Wallis RN  Outcome: Progressing  12/3/2024 2155 by Bronson Wallis RN  Outcome: Progressing  Goal: Patient-Specific Goal (Individualized)  12/3/2024 2226 by Bronson Wallis RN  Outcome: Progressing  12/3/2024 2155 by Bronson Wallis RN  Outcome: Progressing  Goal: Absence of Hospital-Acquired Illness or Injury  12/3/2024 2226 by Bronson Wallis RN  Outcome: Progressing  12/3/2024 2155 by Bronson Wallis RN  Outcome: Progressing  Goal: Optimal Comfort and Wellbeing  12/3/2024 2226 by Bronson Wallis RN  Outcome: Progressing  12/3/2024 2155 by Bronson Wallis RN  Outcome: Progressing  Goal: Readiness for Transition of Care  12/3/2024 2226 by Bronson Wallis RN  Outcome: Progressing  12/3/2024 2155 by Bronson Wallis RN  Outcome: Progressing  Intervention: Mutually Develop Transition Plan  Recent Flowsheet Documentation  Taken 12/3/2024 2145 by Bronson Wallis RN  Transportation Anticipated: family or friend will provide  Transportation Concerns: none  Patient/Family Anticipated Services at Transition: none  Patient/Family Anticipates Transition to: home  Taken 12/3/2024 2140 by Bronson Wallis RN  Equipment Currently Used at Home: walker, rolling     Problem: Pain Acute  Goal: Optimal Pain Control and Function  Outcome: Progressing   Goal Outcome Evaluation:

## 2024-12-04 NOTE — THERAPY EVALUATION
Patient Name: Radha Silverio  : 1940    MRN: [id number]                              Today's Date: 2024       Admit Date: 12/3/2024    Visit Dx:     ICD-10-CM ICD-9-CM   1. Acute pain of left knee  M25.562 719.46   2. Effusion, left knee  M25.462 719.06   3. Acute post-operative pain  G89.18 338.18   4. Decreased activities of daily living (ADL)  Z78.9 V49.89     Patient Active Problem List   Diagnosis    Essential hypertension    Mixed hyperlipidemia    Chronic constipation    Acid reflux    Solis esophagus    Type 2 diabetes mellitus without complication, without long-term current use of insulin    Hemorrhoids    Frequent PVCs    Atrial bigeminy    Ectopic atrial rhythm    Primary osteoarthritis of knee    Primary osteoarthritis of right knee    Tinnitus of both ears    Left hip pain    Primary osteoarthritis of left hip    Greater trochanteric bursitis of left hip    Stage 3 chronic kidney disease    Female genital prolapse    Chronic gastritis without bleeding    Degenerative disc disease, lumbar    Weakness of left lower extremity    Neuritis or radiculitis due to rupture of lumbar intervertebral disc    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    Adenomatous polyp of colon    Intervertebral lumbar disc disorder with myelopathy, lumbar region    Lumbar herniated disc    Acute pain of left knee     Past Medical History:   Diagnosis Date    Anesthesia complication     DURING COLONOSCOPY PATIENT STATES SHE WAS STILL ABLE TO HEAR AND FEEL    Anxiety     Arthritis     Arthritis of back     Solis's esophagus     Cataract     Chronic constipation     Chronic cough 2018    Colon polyps     Diabetes type 2, controlled     Dizziness     DVT (deep venous thrombosis)     Dyspnea     Essential hypertension 2016    Frequent PVCs 2017    GERD (gastroesophageal reflux disease)     Hematochezia 2016    Hyperlipidemia 2016    Hypertension     Lumbosacral disc disease      Palpitations     Pelvic prolapse      Past Surgical History:   Procedure Laterality Date    BREAST BIOPSY Left     cyst at 9 y/o    COLONOSCOPY N/A 10/11/2016    Procedure: COLONOSCOPY TO CECUM, TO TERMINAL ILEUM WITH HOT SNARE POLYPECTOMY;  Surgeon: Palmira Calix MD;  [facility];  Service:     COLONOSCOPY N/A 10/05/2021    Procedure: COLONOSCOPY TO CECUM WITH COLD POLYPECTOMY;  Surgeon: Palmira Calix MD;  Location: [facility] ENDOSCOPY;  Service: Gastroenterology;  Laterality: N/A;  HISTORY OF COLON POLYPS  COLON POLYP, HEMORRHOIDS, DIVERTICULOSIS    CYST REMOVAL Right     breast    ENDOSCOPY N/A 10/11/2016    Procedure: ESOPHAGOGASTRODUODENOSCOPY WITH BIOPSY;  Surgeon: Palmira Calix MD;  Location: [facility] ENDOSCOPY;  Service:     ENDOSCOPY N/A 10/05/2021    Procedure: ESOPHAGOGASTRODUODENOSCOPY WITH COLD BIOPSIES;  Surgeon: Palmira Calix MD;  Location: [facility] ENDOSCOPY;  Service: Gastroenterology;  Laterality: N/A;  GERD  GASTRITIS    LAPAROSCOPIC CHOLECYSTECTOMY W/ CHOLANGIOGRAPHY      LUMBAR DISCECTOMY Left 11/25/2024    Procedure: Left lumbar 1 to lumbar 2 laminectomy and discectomy with metrx;  Surgeon: Bronson Fitzgerald MD;  Location: [facility] MAIN OR;  Service: Neurosurgery;  Laterality: Left;    MOLE REMOVAL      on foot     OOPHORECTOMY Left     B9    TUBAL ABDOMINAL LIGATION        General Information       Row Name 12/04/24 1421          OT Time and Intention    Subjective Information no complaints  -ES     Document Type evaluation  -ES     Mode of Treatment individual therapy;occupational therapy  -ES     Patient Effort good  -ES       Row Name 12/04/24 1421          General Information    Patient Profile Reviewed yes  -ES     Prior Level of Function independent:;ADL's;all household mobility;community mobility  Patient reports she is independent with ADLs at baseline. SPC as needed for functional mobility. Walk in shower, standing shower completion. [place] in stance at sinkside.  No home O2 use. Patient denies recent falls.  -ES     Existing Precautions/Restrictions fall  L knee hinge brace donned with functional mobility  -ES       Row Name 12/04/24 1421          Living Environment    People in Home alone  -ES       Row Name 12/04/24 1421          Home Main Entrance    Number of Stairs, Main Entrance three  -ES       Row Name 12/04/24 1421          Stairs Within Home, Primary    Stairs, Within Home, Primary flight of stairs to basement and flight of stairs to seocnd level of home where primary bed and bath is located.  -ES     Number of Stairs, Within Home, Primary twelve  -ES       Row Name 12/04/24 1421          Cognition    Orientation Status (Cognition) oriented x 3  patient pleasant and cooperative, agreeable to therapy evaluation. Patient daughter present.  -ES       Row Name 12/04/24 1421          Safety Issues/Impairments Affecting Functional Mobility    Impairments Affecting Function (Mobility) endurance/activity tolerance;pain;balance;strength  -ES               User Key  (r) = Recorded By, (t) = Taken By, (c) = Cosigned By      Initials Name Provider Type    ES Gin Sampson, RIMAR/L, CSRS Occupational Therapist                     Mobility/ADL's       Row Name 12/04/24 1425          Bed Mobility    Bed Mobility supine-sit;sit-supine  -ES     Supine-Sit [place] (Bed Mobility) standby assist;verbal cues  -ES     Sit-Supine [place] (Bed Mobility) standby assist;verbal cues  -ES     Bed Mobility, Safety Issues decreased use of legs for bridging/pushing  -ES     Comment, (Bed Mobility) patient provided education and training on log roll technique prior to bed mobility for spinal protection and patient comfort secondary to recent spinal surgery.  -ES       Row Name 12/04/24 1425          Transfers    Transfers sit-stand transfer;stand-sit transfer  -ES       Row Name 12/04/24 1425          Sit-Stand Transfer    Sit-Stand [place] (Transfers) contact guard;1 person assist   -ES     Assistive Device (Sit-Stand Transfers) walker, front-wheeled  -ES       Row Name 12/04/24 1425          Stand-Sit Transfer    Stand-Sit [place] (Transfers) contact guard;1 person assist  -ES     Assistive Device (Stand-Sit Transfers) walker, front-wheeled  -ES       Row Name 12/04/24 1425          Functional Mobility    Functional Mobility- Ind. Level contact guard assist;1 person  -ES     Functional Mobility- Device walker, front-wheeled  -ES     Functional Mobility- Comment Patient performs short distance functional mobility in room in preperation for household distance mobility at time of discharge. Patient CGA with use of rolling walker, L knee hinge brace donned.  -ES       Row Name 12/04/24 1425          Activities of Daily Living    BADL Assessment/Intervention bathing;upper body dressing;lower body dressing;grooming;feeding;toileting  -ES       Row Name 12/04/24 1425          Mobility    Extremity Weight-bearing Status left lower extremity  -ES     Left Lower Extremity (Weight-bearing Status) weight-bearing as tolerated (WBAT)  with knee hinge braced donned with mobility  -ES       Row Name 12/04/24 1425          Bathing Assessment/Intervention    [place] Level (Bathing) bathing skills;minimum assist (75% patient effort)  -ES       Row Name 12/04/24 1425          Upper Body Dressing Assessment/Training    [place] Level (Upper Body Dressing) upper body dressing skills;set up  -ES       Row Name 12/04/24 1425          Lower Body Dressing Assessment/Training    [place] Level (Lower Body Dressing) lower body dressing skills;maximum assist (25% patient effort)  -ES       Row Name 12/04/24 1425          Grooming Assessment/Training    [place] Level (Grooming) grooming skills;set up;standby assist  -ES       Row Name 12/04/24 1425          Self-Feeding Assessment/Training    [place] Level (Feeding) feeding skills;set up  -ES       Row Name 12/04/24 1425          Toileting  Assessment/Training    [place] Level (Toileting) toileting skills;minimum assist (75% patient effort)  -ES               User Key  (r) = Recorded By, (t) = Taken By, (c) = Cosigned By      Initials Name Provider Type    Gin Resendiz, OTR/L, DENNISES Occupational Therapist                   Obj/Interventions       Row Name 12/04/24 1429          Vision Assessment/Intervention    Visual Impairment/Limitations WFL  -ES       Row Name 12/04/24 1429          Range of Motion Comprehensive    General Range of Motion bilateral upper extremity ROM WNL  -ES       Row Name 12/04/24 1429          Strength Comprehensive (MMT)    General Manual Muscle Testing (MMT) Assessment lower extremity strength deficits identified  -ES     Comment, General Manual Muscle Testing (MMT) Assessment BUEs 4/5  -ES       Row Name 12/04/24 1429          Motor Skills    Motor Skills functional endurance  -ES     Functional Endurance fair  -ES       Row Name 12/04/24 1429          Balance    Balance Assessment sitting dynamic balance;standing dynamic balance  -ES     Dynamic Sitting Balance supervision  -ES     Position, Sitting Balance unsupported;sitting edge of bed  -ES     Dynamic Standing Balance contact guard;1-person assist  -ES     Position/Device Used, Standing Balance supported;walker, front-wheeled  -ES     Balance Interventions standing;dynamic;occupation based/functional task  -ES               User Key  (r) = Recorded By, (t) = Taken By, (c) = Cosigned By      Initials Name Provider Type    Gin Resendiz, RIMAR/L, SILVIO Occupational Therapist                   Goals/Plan       Row Name 12/04/24 1440          Bed Mobility Goal 1 (OT)    Activity/Assistive Device (Bed Mobility Goal 1, OT) bed mobility activities, all  -ES     [place] Level/Cues Needed (Bed Mobility Goal 1, OT) independent  -ES     Time Frame (Bed Mobility Goal 1, OT) short term goal (STG);2 weeks  -ES     Progress/Outcomes (Bed Mobility Goal 1, OT) goal ongoing   -ES       Row Name 12/04/24 1440          Transfer Goal 1 (OT)    Activity/Assistive Device (Transfer Goal 1, OT) transfers, all;walker, rolling  -ES     [place] Level/Cues Needed (Transfer Goal 1, OT) modified independence  -ES     Time Frame (Transfer Goal 1, OT) short term goal (STG);2 weeks  -ES     Progress/Outcome (Transfer Goal 1, OT) goal ongoing  -ES       Row Name 12/04/24 1440          Bathing Goal 1 (OT)    Activity/Device (Bathing Goal 1, OT) bathing skills, all  -ES     [place] Level/Cues Needed (Bathing Goal 1, OT) independent  -ES     Time Frame (Bathing Goal 1, OT) short term goal (STG);2 weeks  -ES     Progress/Outcomes (Bathing Goal 1, OT) goal ongoing  -ES       Row Name 12/04/24 1440          Dressing Goal 1 (OT)    Activity/Device (Dressing Goal 1, OT) dressing skills, all  -ES     [place]/Cues Needed (Dressing Goal 1, OT) independent  -ES     Time Frame (Dressing Goal 1, OT) short term goal (STG);2 weeks  -ES     Progress/Outcome (Dressing Goal 1, OT) goal ongoing  -ES       Row Name 12/04/24 1440          Toileting Goal 1 (OT)    Activity/Device (Toileting Goal 1, OT) toileting skills, all  -ES     [place] Level/Cues Needed (Toileting Goal 1, OT) independent  -ES     Time Frame (Toileting Goal 1, OT) short term goal (STG);2 weeks  -ES     Progress/Outcome (Toileting Goal 1, OT) goal ongoing  -ES       Row Name 12/04/24 1440          Grooming Goal 1 (OT)    Activity/Device (Grooming Goal 1, OT) grooming skills, all  -ES     [place] (Grooming Goal 1, OT) independent  -ES     Time Frame (Grooming Goal 1, OT) short term goal (STG);2 weeks  -ES     Progress/Outcome (Grooming Goal 1, OT) goal ongoing  -ES       Row Name 12/04/24 1440          Problem Specific Goal 1 (OT)    Problem Specific Goal 1 (OT) Patient will demonstrate good graded dynamic standing balance in preperation for independent ADL routine completion at time of discharge  -ES     Time Frame (Problem Specific  Goal 1, OT) short term goal (STG);2 weeks  -ES     Progress/Outcome (Problem Specific Goal 1, OT) goal ongoing  -ES       Row Name 12/04/24 1440          Therapy Assessment/Plan (OT)    Planned Therapy Interventions (OT) activity tolerance training;BADL retraining;functional balance retraining;occupation/activity based interventions;patient/caregiver education/training;strengthening exercise;transfer/mobility retraining  -ES               User Key  (r) = Recorded By, (t) = Taken By, (c) = Cosigned By      Initials Name Provider Type    ES Gin Sampson, OTR/L, CSRS Occupational Therapist                   Clinical Impression       Row Name 12/04/24 1433          Pain Assessment    Pretreatment Pain Rating 0/10 - no pain  -ES     Posttreatment Pain Rating 0/10 - no pain  -ES       Row Name 12/04/24 1433          Plan of Care Review    Plan of Care Reviewed With patient;daughter  -ES     Progress no change  -ES     Outcome Evaluation Patient is 84 year old female admitted to [facility] on 12/3/2024 with reports of new onset left knee pain after standing from commode and hearing a pop followed by left lateral knee pain. Patient PMH significant for spinal surgery x1 week prior. At baseline, patient is independent with B and IADLs and does not consistently use an assistive device for functional mobility. Patient has experienced decline in function from independent baseline, presenting with deficits related to balance, strength, tolerance, transfers and mobility that impede patient independence with activities of daily living.  Patient would benefit from skilled Occupational Therapy intervention to maximize patient safety and promote return to baseline independence. Recommend home with outpatient therapy services at time of discharge from acute care setting.  -ES       Row Name 12/04/24 1436          Therapy Assessment/Plan (OT)    Rehab Potential (OT) good  -ES     Criteria for Skilled Therapeutic  Interventions Met (OT) yes;meets criteria;skilled treatment is necessary  -ES     Therapy Frequency (OT) 5 times/wk  -ES       Row Name 12/04/24 1433          Therapy Plan Review/Discharge Plan (OT)    Anticipated Discharge Disposition (OT) home with outpatient therapy services  -ES       Row Name 12/04/24 1433          Positioning and Restraints    Pre-Treatment Position in bed  -ES     Post Treatment Position bed  -ES     In Bed supine;call light within reach;encouraged to call for assist;exit alarm on  patient request return to supine at conclusion of therapy session secondary to fear of back pain with prolongued upright sitting  -ES               User Key  (r) = Recorded By, (t) = Taken By, (c) = Cosigned By      Initials Name Provider Type    Gin Resendiz, OTR/L, CSRS Occupational Therapist                   Outcome Measures       Row Name 12/04/24 1446          How much help from another is currently needed...    Putting on and taking off regular lower body clothing? 2  -ES     Bathing (including washing, rinsing, and drying) 2  -ES     Toileting (which includes using toilet bed pan or urinal) 3  -ES     Putting on and taking off regular upper body clothing 3  -ES     Taking care of personal grooming (such as brushing teeth) 3  -ES     Eating meals 4  -ES     AM-PAC 6 Clicks Score (OT) 17  -ES       Row Name 12/04/24 1038 12/04/24 1015       How much help from another person do you currently need...    Turning from your back to your side while in flat bed without using bedrails? 3  -ER 3  -KM    Moving from lying on back to sitting on the side of a flat bed without bedrails? 3  -ER 3  -KM    Moving to and from a bed to a chair (including a wheelchair)? 3  -ER 3  -KM    Standing up from a chair using your arms (e.g., wheelchair, bedside chair)? 3  -ER 3  -KM    Climbing 3-5 steps with a railing? 3  -ER 3  -KM    To walk in hospital room? 3  -ER 3  -KM    AM-PAC 6 Clicks Score (PT) 18  -ER 18  -KM      Adeel  Name 12/04/24 1446          Functional Assessment    Outcome Measure Options AM-PAC 6 Clicks Daily Activity (OT)  -ES               User Key  (r) = Recorded By, (t) = Taken By, (c) = Cosigned By      Initials Name Provider Type    Bettie Martinez, RN Registered Nurse    Gin Resendiz OTR/L, CSRS Occupational Therapist    Holly Christy PT Physical Therapist                    Occupational Therapy Education       Title: PT OT SLP Therapies (Done)       Topic: Occupational Therapy (Done)       Point: ADL training (Done)       Description:   Instruct learner(s) on proper safety adaptation and remediation techniques during self care or transfers.   Instruct in proper use of assistive devices.                  Learning Progress Summary            Patient Acceptance, E, VU by BRUNO at 12/4/2024 1448    Comment: patient and daughter provided education on role of OT in acute care setting and discharge planning process for time of discharge                      Point: Precautions (Done)       Description:   Instruct learner(s) on prescribed precautions during self-care and functional transfers.                  Learning Progress Summary            Patient Acceptance, E, VU by BRUNO at 12/4/2024 1448    Comment: patient and daughter provided education on role of OT in acute care setting and discharge planning process for time of discharge                      Point: Body mechanics (Done)       Description:   Instruct learner(s) on proper positioning and spine alignment during self-care, functional mobility activities and/or exercises.                  Learning Progress Summary            Patient Acceptance, E, VU by BRUNO at 12/4/2024 1448    Comment: patient and daughter provided education on role of OT in acute care setting and discharge planning process for time of discharge                                      User Key       Initials Effective Dates Name Provider Type Discipline    BRUNO 02/22/23 -  Gin Sampson OTR/PRISCILA, SILVIO  Occupational Therapist OT                  OT Recommendation and Plan  Planned Therapy Interventions (OT): activity tolerance training, BADL retraining, functional balance retraining, occupation/activity based interventions, patient/caregiver education/training, strengthening exercise, transfer/mobility retraining  Therapy Frequency (OT): 5 times/wk  Plan of Care Review  Plan of Care Reviewed With: patient, daughter  Progress: no change  Outcome Evaluation: Patient is 84 year old female admitted to [facility] on 12/3/2024 with reports of new onset left knee pain after standing from commode and hearing a pop followed by left lateral knee pain. Patient PMH significant for spinal surgery x1 week prior. At baseline, patient is independent with B and IADLs and does not consistently use an assistive device for functional mobility. Patient has experienced decline in function from independent baseline, presenting with deficits related to balance, strength, tolerance, transfers and mobility that impede patient independence with activities of daily living.  Patient would benefit from skilled Occupational Therapy intervention to maximize patient safety and promote return to baseline independence. Recommend home with outpatient therapy services at time of discharge from acute care setting.     Time Calculation:   Evaluation Complexity (OT)  Review Occupational Profile/Medical/Therapy History Complexity: expanded/moderate complexity  Assessment, Occupational Performance/Identification of Deficit Complexity: 3-5 performance deficits  Clinical Decision Making Complexity (OT): detailed assessment/moderate complexity  Overall Complexity of Evaluation (OT): moderate complexity     Time Calculation- OT       Row Name 12/04/24 1450             Time Calculation- OT    OT Start Time 1356  -ES      OT Stop Time 1415  -ES      OT Time Calculation (min) 19 min  -ES      Total Timed Code Minutes- OT 19 minute(s)  -ES      OT  Received On 12/04/24  -ES      OT - Next Appointment 12/05/24  -ES      OT Goal Re-Cert Due Date 12/18/24  -ES         Untimed Charges    OT Eval/Re-eval Minutes 19  -ES         Total Minutes    Untimed Charges Total Minutes 19  -ES       Total Minutes 19  -ES                User Key  (r) = Recorded By, (t) = Taken By, (c) = Cosigned By      Initials Name Provider Type    ES Gin Sampson, OTR/L, CSRS Occupational Therapist                  Therapy Charges for Today       Code Description Service Date Service Provider Modifiers Qty    51101903558 HC OT EVAL MOD COMPLEXITY 3 12/4/2024 Gin Sampson, OTR/L, CSRS GO 1                 KACY Bernal/L, CSRS  12/4/2024

## 2024-12-04 NOTE — PROGRESS NOTES
[facility] ORTHO     [phone]  [facility]  Orthopedics  [address], [address]  [place] [address] [phone]  [facility]  Orthopedics  [address], [address]  [place] [address]        Orthopaedic Consultation        Patient: Radha Silverio     Date of Admission: 12/3/2024 10:05 AM     YOB: 1940     Medical Record Number: [id number]     Attending Physician:  Santosh Romano MD     Consulting Physician:  Dr. Isaias MAGALLANES/Alfonso Sanchez PA-C          Chief Complaints: Left knee pain    I have reviewed the patient's physical therapy notes.  Patient was assist x 1.  She was able to ambulate with physical therapy and has noted improvement of her symptoms.  During physical therapy evaluation patient had complaints of 0 out of 10 pain with the left knee hinged brace.  Patient was able to ambulate 40 feet with a slow shuffled gait.  She was able to likely ambulate to the bathroom with assist x 1.  Physical therapy recommending home with home health care.  From orthopedic standpoint we will sign off.  Patient can follow-up in our office on an outpatient basis.  Okay to discharge from orthopedic standpoint once cleared from the hospitalist and other treatment teams.  Please call if needed.      Thank you very much for this consultation.  Thank you very much for allowing us to be a part of the patient's care.  Please call if needed.     Date: 12/4/2024  Alfonso Sanchez PA-C

## 2024-12-04 NOTE — CONSULTS
[facility] ORTHO     [phone]  [facility]  Orthopedics  [address], [address]  [place] [address] [phone]  [facility]  Orthopedics  [address]  [place] [address]      Orthopaedic Consultation      Patient: Radha Silverio    Date of Admission: 12/3/2024 10:05 AM    YOB: 1940    Medical Record Number: [id number]    Attending Physician:  Santosh Romano MD    Consulting Physician:  Alfonso Sanchez PA-C        Chief Complaints: Left knee pain    History of Present Illness: 84 y.o. female admitted to [facility] with left knee pain.  She is status post recent back surgery.  She presented to [facility] with complaints of acute left knee pain.  Patient states that early in the morning on Tuesday at about 1 AM.  She was in the restroom.  She went to get up from a toilet seat.  She states that it was a very low toilet seat.  She felt a pop in her knee.  She has had some back pain and numbness in her legs.  Her surgical team has been consulted for this.  She did try to ambulate in the emergency room.  However she was having difficulty with ambulating.  She was given a hinged knee brace.  Therefore she was subsequently admitted to the hospital.  X-rays were obtained.  Showing moderate degenerative osteoarthritis of the left knee.  With no fractures or dislocations.  Orthopedics has been consulted for management.  She localizes her pain on the medial side of the knee.  She states that when she is sitting she does not have any pain.  Only when trying to bear weight and walk.    Allergies: No Known Allergies    Medications:   Home Medications:  Medications Prior to Admission   Medication Sig Dispense Refill Last Dose/Taking    aspirin 81 MG EC tablet Take 1 tablet by mouth Daily. HOLD FOR SURGERY   12/2/2024 at  8:00 AM    calcium carbonate (OS-MARIS) 1250 (500 Ca) MG chewable tablet Chew 2 tablets 2  (Two) Times a Day.   2024 at  8:00 AM    Cholecalciferol 10 MCG (400 UNIT) tablet Take 1 tablet by mouth Daily.   2024 Morning    docusate sodium (COLACE) 100 MG capsule Take 1 capsule by mouth 2 (Two) Times a Day. (Patient taking differently: Take 1 capsule by mouth Daily.) 180 capsule 3 Taking Differently    HYDROcodone-acetaminophen (NORCO) 5-325 MG per tablet Take 1 tablet by mouth Every 4 (Four) Hours As Needed for Moderate Pain (pain). 35 tablet 0 Taking As Needed    lisinopril-hydrochlorothiazide (PRINZIDE,ZESTORETIC) 20-12.5 MG per tablet Take 1 tablet by mouth Daily. 90 tablet 3 2024 Morning    Magnesium Oxide -Mg Supplement 500 MG tablet Take 1 tablet by mouth Every Night.   2024 Bedtime    metFORMIN (GLUCOPHAGE) 500 MG tablet TAKE 1 TABLET BY MOUTH TWICE DAILY WITH MEALS (Patient taking differently: Take 1 tablet by mouth 2 (Two) Times a Day.) 180 tablet 3 2024 Evening    methocarbamol (ROBAXIN) 500 MG tablet Take 1 tablet by mouth 4 (Four) Times a Day As Needed for Muscle Spasms. 40 tablet 0 Taking As Needed    metoprolol succinate XL (TOPROL-XL) 25 MG 24 hr tablet Take 1 tablet by mouth Daily. 90 tablet 3 2024 Morning    omeprazole (priLOSEC) 40 MG capsule Take 1 capsule by mouth once daily 90 capsule 0 2024 Morning    Polyethylene Glycol 3350 (MIRALAX PO) Take 1 Capful by mouth As Needed. Has not been taking but is going to be starting again soon   Taking As Needed    simvastatin (ZOCOR) 40 MG tablet TAKE 1 TABLET BY MOUTH ONCE DAILY IN THE EVENING (Patient taking differently: Take 1 tablet by mouth Every Night.) 90 tablet 0 2024 Evening    [] cephalexin (Keflex) 500 MG capsule Take 1 capsule by mouth 4 (Four) Times a Day for 5 days. 20 capsule 0        Current Medications:  Scheduled Meds:aspirin, 81 mg, Oral, Daily  atorvastatin, 20 mg, Oral, Daily  calcium carbonate (oyster shell), 1,000 mg, Oral, Daily  cholecalciferol, 400 Units, Oral, Daily  docusate  sodium, 100 mg, Oral, Daily  lisinopril, 20 mg, Oral, Q24H   And  hydroCHLOROthiazide, 12.5 mg, Oral, Q24H  insulin lispro, 2-7 Units, Subcutaneous, 4x Daily AC & at Bedtime  magnesium oxide, 400 mg, Oral, Daily  metoprolol succinate XL, 25 mg, Oral, Daily  pantoprazole, 40 mg, Oral, Q AM      Continuous Infusions:   PRN Meds:.  acetaminophen **OR** acetaminophen **OR** acetaminophen    senna-docusate sodium **AND** polyethylene glycol **AND** bisacodyl **AND** bisacodyl    dextrose    dextrose    glucagon (human recombinant)    HYDROcodone-acetaminophen    methocarbamol    ondansetron    [COMPLETED] Insert Peripheral IV **AND** sodium chloride    Past Medical History:   Diagnosis Date    Anesthesia complication     DURING COLONOSCOPY PATIENT STATES SHE WAS STILL ABLE TO HEAR AND FEEL    Anxiety     Arthritis     Arthritis of back     Solis's esophagus     Cataract     Chronic constipation     Chronic cough 06/20/2018    Colon polyps     Diabetes type 2, controlled     Dizziness     DVT (deep venous thrombosis)     Dyspnea     Essential hypertension 05/24/2016    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    GERD (gastroesophageal reflux disease)     Hematochezia 12/06/2016    Hyperlipidemia 05/24/2016    Hypertension     Lumbosacral disc disease     Palpitations     Pelvic prolapse      Past Surgical History:   Procedure Laterality Date    BREAST BIOPSY Left     cyst at 11 y/o    COLONOSCOPY N/A 10/11/2016    Procedure: COLONOSCOPY TO CECUM, TO TERMINAL ILEUM WITH HOT SNARE POLYPECTOMY;  Surgeon: Palmira Calix MD;  Location: [facility] ENDOSCOPY;  Service:     COLONOSCOPY N/A 10/05/2021    Procedure: COLONOSCOPY TO CECUM WITH COLD POLYPECTOMY;  Surgeon: Palmira Calix MD;  Location: [facility] ENDOSCOPY;  Service: Gastroenterology;  Laterality: N/A;  HISTORY OF COLON POLYPS  COLON POLYP, HEMORRHOIDS, DIVERTICULOSIS    CYST REMOVAL Right     breast    ENDOSCOPY N/A 10/11/2016    Procedure: ESOPHAGOGASTRODUODENOSCOPY WITH BIOPSY;   Surgeon: Palmira Calix MD;  Location: [facility] ENDOSCOPY;  Service:     ENDOSCOPY N/A 10/05/2021    Procedure: ESOPHAGOGASTRODUODENOSCOPY WITH COLD BIOPSIES;  Surgeon: Palmira Calix MD;  Location: [facility] ENDOSCOPY;  Service: Gastroenterology;  Laterality: N/A;  GERD  GASTRITIS    LAPAROSCOPIC CHOLECYSTECTOMY W/ CHOLANGIOGRAPHY      LUMBAR DISCECTOMY Left 11/25/2024    Procedure: Left lumbar 1 to lumbar 2 laminectomy and discectomy with metrx;  Surgeon: Bronson Fitzgerald MD;  Location: [facility] MAIN OR;  Service: Neurosurgery;  Laterality: Left;    MOLE REMOVAL      on foot     OOPHORECTOMY Left     B9    TUBAL ABDOMINAL LIGATION       Social History     Occupational History    Occupation: homemaker   Tobacco Use    Smoking status: Never     Passive exposure: Never    Smokeless tobacco: Never    Tobacco comments:     no caffiene   Vaping Use    Vaping status: Never Used   Substance and Sexual Activity    Alcohol use: No    Drug use: No    Sexual activity: Not Currently     Partners: Male     Birth control/protection: Post-menopausal, Surgical     Comment: BTL      Social History     Social History Narrative     passed 2007    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  Constitutional: Negative for fatigue, fever, or weight loss  HEENT: No active headache.  Pulmonary: Patient denies SOA.  Cardiovascular: Patient denies any chest pain.  Gastrointestinal:   Patient denies active vomiting or diarrhea.  Musculoskeletal: Positive for left knee pain.  Neurological: Patient denies active dizziness or loss of consciousness.  Skin: Patient denies any active bleeding.    Vital signs in last 24 hours:  Temp:  [98.2 °F (36.8 °C)] 98.2 °F (36.8 °C)  Heart Rate:  [60-70] 64  Resp:  [16] 16  BP: (135-150)/(72-83) 146/81  Vitals:    12/03/24 1601 12/03/24 2102 12/04/24 0133 12/04/24 0500   BP: 146/81      Pulse: 64      Resp:  16 16 16   Temp:       TempSrc:  Oral Oral Oral   SpO2: 99%      Weight:       Height:              Physical Exam: 84 y.o. female         General Appearance:  Alert, cooperative, in no acute distress    HEENT:    Atraumatic, Pupils are equal   Neck:   Cervical spine midline, no appreciable JVD   Lungs:     Breathing non-labored and chest rise symmetric    Heart:   Abdomen:     Rectal:       Extremities:   Pulses  Neurovascular:   Skin:   Musculoskeletal:      Pulse regular    Soft, Non-tender or distended    Deferred        No clubbing, cyanosis, or edema    Intact    Cranial nerves 2 - 12 grossly intact, sensation intact    No skin lesions  Focused physical examination of the patient's left knee was performed.  Patient is resting comfortably in her hospital bed.  She is sitting at the side of her bed eating breakfast this morning.  She has a hinged knee brace to the patient's left knee.  This was removed for the exam.  No overlying skin changes.  No ecchymosis.  Tenderness over the medial joint line.  Nontender laterally or anteriorly on the knee.  No palpable defects or deformities.  Her extensor mechanism is intact.  Range of motion of the knee is 5 to 120 degrees.  Some crepitation noted on range of motion.  She has no pain on range of motion of the knee.  She does have some medial compartment opening with a solid endpoint.  Knee is otherwise stable ligamentous exam.  Compartments are soft.  Sensations intact distally.  Foot warm and well-perfused.      Diagnostic Tests:    Results from last 7 days   Lab Units 12/04/24  0415   WBC 10*3/mm3 3.71   HEMOGLOBIN g/dL 11.8*   HEMATOCRIT % 36.6   PLATELETS 10*3/mm3 209     Results from last 7 days   Lab Units 12/04/24  0415   SODIUM mmol/L 141   POTASSIUM mmol/L 4.1   CHLORIDE mmol/L 101   CO2 mmol/L 27.0   BUN mg/dL 17   CREATININE mg/dL 0.93   GLUCOSE mg/dL 139*   CALCIUM mg/dL 9.1           Study Result    Narrative & Impression   XR KNEE 3 VW LEFT-     INDICATIONS: Pain.     TECHNIQUE: 4 VIEWS OF THE LEFT KNEE     COMPARISON: None available     FINDINGS:     Moderate medial tibiofemoral joint space narrowing is seen. Moderate  degenerative spurring is noted. Trace knee effusion. No acute fracture,  erosion, or dislocation is identified. Follow-up/further evaluation can  be obtained as indications persist.     IMPRESSION:     As described.           This report was finalized on 12/3/2024 11:40 AM by Dr. Santosh Carias M.D on Workstation: [id number]       Assessment:    Acute pain of left knee        Plan:      I did have an extensive discussion with the patient regards her situation in the hospital today.  I have reviewed the above.  Have reviewed the x-rays.  She does have moderate osteoarthritic changes of the left knee particularly of the medial compartment.  The majority of her pain is on the medial side of the knee.  She may have just exacerbated the osteoarthritis when getting up from the toilet seat.  She may also have injured her meniscus when she felt a pop in the knee.  However given the amount of arthritis in the knee.  Options would be to continue with the knee brace and see how she does with physical therapy.  From orthopedic standpoint.  Hopefully the extra added support with the hinged knee brace and physical therapy will help to alleviate the discomfort within the knee.  If no significant improvement of her symptoms.  Other considerations would be anti-inflammatories however given her recent back  surgery would want to have clearance from the medical team as well as her surgical team on if it would be allowable for her to have anti-inflammatories.  Other option would be to place the patient on a Medrol Dosepak or perform an intra-articular corticosteroid injection into the left knee.  Patient is diabetic.  She would need strict glucose monitoring due to her diabetes.    Initially we will see how she does with physical therapy and the knee brace.  From orthopedic standpoint once the patient is stable and if she is able to ambulate and tolerate physical therapy well and her pain continues to improve then would defer any further treatment.  However if she does not do better with physical therapy and the knee brace then would consider other options.  Will also consult Occupational Therapy as her ADLs are below baseline currently.    All findings will be discussed my supervising physician Dr Esparza.     All questions answered.  Thank you very much for this consultation.  Thank you very much for allowing us to be a part of the patient's care.  Please call if needed.    Date: 12/4/2024  Alfonso Sanchez PA-C

## 2024-12-04 NOTE — PLAN OF CARE
Goal Outcome Evaluation:  Plan of Care Reviewed With: patient        Progress: no change  Outcome Evaluation: Patient A & O. Up with assist x 1. Makes needs known. Hinge brace in place to knee. Home with HH at d/c.  No s/s of distress noted.

## 2024-12-05 ENCOUNTER — READMISSION MANAGEMENT (OUTPATIENT)
Dept: CALL CENTER | Facility: HOSPITAL | Age: 84
End: 2024-12-05
Payer: MEDICARE

## 2024-12-05 VITALS
TEMPERATURE: 97.9 F | HEIGHT: 66 IN | OXYGEN SATURATION: 96 % | BODY MASS INDEX: 31.5 KG/M2 | SYSTOLIC BLOOD PRESSURE: 130 MMHG | RESPIRATION RATE: 16 BRPM | DIASTOLIC BLOOD PRESSURE: 73 MMHG | HEART RATE: 71 BPM | WEIGHT: 195.99 LBS

## 2024-12-05 LAB
ANION GAP SERPL CALCULATED.3IONS-SCNC: 8.6 MMOL/L (ref 5–15)
BASOPHILS # BLD AUTO: 0.02 10*3/MM3 (ref 0–0.2)
BASOPHILS NFR BLD AUTO: 0.5 % (ref 0–1.5)
BUN SERPL-MCNC: 16 MG/DL (ref 8–23)
BUN/CREAT SERPL: 16.5 (ref 7–25)
CALCIUM SPEC-SCNC: 9.5 MG/DL (ref 8.6–10.5)
CHLORIDE SERPL-SCNC: 102 MMOL/L (ref 98–107)
CO2 SERPL-SCNC: 26.4 MMOL/L (ref 22–29)
CREAT SERPL-MCNC: 0.97 MG/DL (ref 0.57–1)
DEPRECATED RDW RBC AUTO: 38.3 FL (ref 37–54)
EGFRCR SERPLBLD CKD-EPI 2021: 58.5 ML/MIN/1.73
EOSINOPHIL # BLD AUTO: 0.19 10*3/MM3 (ref 0–0.4)
EOSINOPHIL NFR BLD AUTO: 4.5 % (ref 0.3–6.2)
ERYTHROCYTE [DISTWIDTH] IN BLOOD BY AUTOMATED COUNT: 12.4 % (ref 12.3–15.4)
GLUCOSE BLDC GLUCOMTR-MCNC: 140 MG/DL (ref 70–130)
GLUCOSE BLDC GLUCOMTR-MCNC: 147 MG/DL (ref 70–130)
GLUCOSE SERPL-MCNC: 148 MG/DL (ref 65–99)
HCT VFR BLD AUTO: 37.6 % (ref 34–46.6)
HGB BLD-MCNC: 12.5 G/DL (ref 12–15.9)
IMM GRANULOCYTES # BLD AUTO: 0.01 10*3/MM3 (ref 0–0.05)
IMM GRANULOCYTES NFR BLD AUTO: 0.2 % (ref 0–0.5)
LYMPHOCYTES # BLD AUTO: 1.72 10*3/MM3 (ref 0.7–3.1)
LYMPHOCYTES NFR BLD AUTO: 40.9 % (ref 19.6–45.3)
MCH RBC QN AUTO: 28.5 PG (ref 26.6–33)
MCHC RBC AUTO-ENTMCNC: 33.2 G/DL (ref 31.5–35.7)
MCV RBC AUTO: 85.6 FL (ref 79–97)
MONOCYTES # BLD AUTO: 0.46 10*3/MM3 (ref 0.1–0.9)
MONOCYTES NFR BLD AUTO: 10.9 % (ref 5–12)
NEUTROPHILS NFR BLD AUTO: 1.81 10*3/MM3 (ref 1.7–7)
NEUTROPHILS NFR BLD AUTO: 43 % (ref 42.7–76)
NRBC BLD AUTO-RTO: 0 /100 WBC (ref 0–0.2)
PLATELET # BLD AUTO: 212 10*3/MM3 (ref 140–450)
PMV BLD AUTO: 10.7 FL (ref 6–12)
POTASSIUM SERPL-SCNC: 4 MMOL/L (ref 3.5–5.2)
RBC # BLD AUTO: 4.39 10*6/MM3 (ref 3.77–5.28)
SODIUM SERPL-SCNC: 137 MMOL/L (ref 136–145)
WBC NRBC COR # BLD AUTO: 4.21 10*3/MM3 (ref 3.4–10.8)

## 2024-12-05 PROCEDURE — 97535 SELF CARE MNGMENT TRAINING: CPT

## 2024-12-05 PROCEDURE — 80048 BASIC METABOLIC PNL TOTAL CA: CPT | Performed by: INTERNAL MEDICINE

## 2024-12-05 PROCEDURE — 82948 REAGENT STRIP/BLOOD GLUCOSE: CPT

## 2024-12-05 PROCEDURE — 97530 THERAPEUTIC ACTIVITIES: CPT

## 2024-12-05 PROCEDURE — 85025 COMPLETE CBC W/AUTO DIFF WBC: CPT | Performed by: INTERNAL MEDICINE

## 2024-12-05 RX ADMIN — METOPROLOL SUCCINATE 25 MG: 25 TABLET, EXTENDED RELEASE ORAL at 10:15

## 2024-12-05 RX ADMIN — ACETAMINOPHEN 650 MG: 325 TABLET ORAL at 10:16

## 2024-12-05 RX ADMIN — CHOLECALCIFEROL (VITAMIN D3) 10 MCG (400 UNIT) TABLET 400 UNITS: at 10:15

## 2024-12-05 RX ADMIN — SENNOSIDES AND DOCUSATE SODIUM 2 TABLET: 50; 8.6 TABLET ORAL at 10:17

## 2024-12-05 RX ADMIN — PANTOPRAZOLE SODIUM 40 MG: 40 TABLET, DELAYED RELEASE ORAL at 05:04

## 2024-12-05 RX ADMIN — ASPIRIN 81 MG: 81 TABLET, COATED ORAL at 10:17

## 2024-12-05 RX ADMIN — LISINOPRIL 20 MG: 20 TABLET ORAL at 10:15

## 2024-12-05 RX ADMIN — DOCUSATE SODIUM 100 MG: 100 CAPSULE, LIQUID FILLED ORAL at 10:15

## 2024-12-05 RX ADMIN — HYDROCHLOROTHIAZIDE 12.5 MG: 12.5 TABLET ORAL at 10:15

## 2024-12-05 NOTE — PLAN OF CARE
Problem: Adult Inpatient Plan of Care  Goal: Plan of Care Review  12/5/2024 0005 by Bronson Wallis RN  Outcome: Progressing  12/5/2024 0005 by Bronson Wallis RN  Outcome: Not Progressing  Goal: Patient-Specific Goal (Individualized)  12/5/2024 0005 by Bronson Wallis RN  Outcome: Progressing  12/5/2024 0005 by Bronson Wallis RN  Outcome: Not Progressing  Goal: Absence of Hospital-Acquired Illness or Injury  12/5/2024 0005 by Bronson Wallis RN  Outcome: Progressing  12/5/2024 0005 by Bronson Wallis RN  Outcome: Not Progressing  Intervention: Identify and Manage Fall Risk  Recent Flowsheet Documentation  Taken 12/5/2024 0000 by Bronson Wallis RN  Safety Promotion/Fall Prevention: safety round/check completed  Taken 12/4/2024 2200 by Bronson Wallis RN  Safety Promotion/Fall Prevention: safety round/check completed  Taken 12/4/2024 2000 by Bronson Wallis RN  Safety Promotion/Fall Prevention: room organization consistent  Intervention: Prevent Skin Injury  Recent Flowsheet Documentation  Taken 12/4/2024 2000 by Bronson Wallis RN  Body Position: position changed independently  Intervention: Prevent Infection  Recent Flowsheet Documentation  Taken 12/5/2024 0000 by Bronson Wallis RN  Infection Prevention: rest/sleep promoted  Taken 12/4/2024 2200 by Bronson Wallis RN  Infection Prevention: rest/sleep promoted  Taken 12/4/2024 2000 by Bronson Wallis RN  Infection Prevention: rest/sleep promoted  Goal: Optimal Comfort and Wellbeing  12/5/2024 0005 by Bronson Wallis RN  Outcome: Progressing  12/5/2024 0005 by Bronson Wallis RN  Outcome: Not Progressing  Intervention: Monitor Pain and Promote Comfort  Recent Flowsheet Documentation  Taken 12/5/2024 0000 by Bronson Wallis RN  Pain Management Interventions: position adjusted  Taken 12/4/2024 2000 by Bronson Wallis RN  Pain Management Interventions: pillow support provided  Intervention: Provide Person-Centered Care  Recent Flowsheet  Documentation  Taken 12/4/2024 2000 by Bronson Wallis RN  Trust Relationship/Rapport: care explained  Goal: Readiness for Transition of Care  12/5/2024 0005 by Bronson Wallis RN  Outcome: Progressing  12/5/2024 0005 by Bronson Wallis RN  Outcome: Not Progressing     Problem: Pain Acute  Goal: Optimal Pain Control and Function  12/5/2024 0005 by Bronson Wallis RN  Outcome: Progressing  12/5/2024 0005 by Bronson Wallis RN  Outcome: Not Progressing  Intervention: Optimize Psychosocial Wellbeing  Recent Flowsheet Documentation  Taken 12/5/2024 0000 by Bronson Wallis RN  Supportive Measures: active listening utilized  Taken 12/4/2024 2000 by Bronson Wallis RN  Supportive Measures: active listening utilized  Intervention: Develop Pain Management Plan  Recent Flowsheet Documentation  Taken 12/5/2024 0000 by Bronson Wallis RN  Pain Management Interventions: position adjusted  Taken 12/4/2024 2000 by Bronson Wallis RN  Pain Management Interventions: pillow support provided  Intervention: Prevent or Manage Pain  Recent Flowsheet Documentation  Taken 12/5/2024 0000 by Bronson Wallis RN  Sleep/Rest Enhancement: awakenings minimized  Medication Review/Management: medications reviewed  Taken 12/4/2024 2200 by Bronson Wallis RN  Medication Review/Management: medications reviewed  Taken 12/4/2024 2000 by Bronson Wallis RN  Bowel Elimination Promotion: commode/bedpan at bedside  Sleep/Rest Enhancement: awakenings minimized  Medication Review/Management: medications reviewed   Goal Outcome Evaluation:

## 2024-12-05 NOTE — PLAN OF CARE
Goal Outcome Evaluation:         Upon entering room, pt. supine in bed, awake/alert, and agreeable to work with P.T. this date despite c/o lower back pain.  This AM, pt. able to ambulate 120 feet, SBA x 1, with use of Rwx.  Pt. requires SBA x 1 for bed mobility and SBA x 1 for sit <-> stand transfers.  Coleen decreased during ambulation but no balance issues noted.  Will continue to progress functional mobility as tolerated.

## 2024-12-05 NOTE — DISCHARGE SUMMARY
Patient Name: Radha Silverio  : 1940  MRN: [id number]    Date of Admission: 12/3/2024  Date of Discharge:  2024  Primary Care Physician: Arielle Johnston APRN      Chief Complaint:   Back Pain and Knee Pain      Discharge Diagnoses     Active Hospital Problems    Diagnosis  POA    **Acute pain of left knee [M25.562]  Yes      Resolved Hospital Problems   No resolved problems to display.        Hospital Course   84 year old female presented to the emergency room with pain of her left knee; it started when she got up from the toilet; she felt a pop and has had difficulty bearing weight and decreased range of motion since then; she had back surgery last week and has had some back pain and numbness of her legs; no fever or chills; when ambulation was attempted in the ED she was not able to perform well.      1. Acute left knee pain, orthopedic did evaluate and patient has moderate to severe arthritis along with possible meniscus tear.  Options include NSAIDs versus Medrol Dosepak versus steroid injection.  Patient chose to go the route of analysis for pain control for the moment and feeling much better today and able to bear weight and ambulate on her left knee.  Would like to follow-up with orthopedics as an outpatient basis.    2.  Back pain, underwent left lumbar 1 to lumbar 2 laminectomy and discectomy in last week of November.  Do continue with analgesics for pain control.    3.  Hypertension, on lisinopril/HCTZ and metoprolol which we will continue.  Advised patient to keep a log of blood pressure at home.    4.  GERD, nausea suppressive therapy.    5.  Hyperlipidemia, on statins.    6.  Diabetes mellitus, resume home medications upon discharge.    7.  CKD stage III, creatinine is close to baseline.     Copy text on this note has been reviewed by me on 2024    Day of Discharge         Physical Exam:  Temp:  [97.5 °F (36.4 °C)-97.9 °F (36.6 °C)] 97.9 °F (36.6 °C)  Heart Rate:  [53-74]  71  Resp:  [16-17] 16  BP: (116-153)/(64-79) 130/73  Body mass index is 31.65 kg/m².  Physical Exam  HEENT: PERRLA, extraocular moods intact, Scleras no icterus  Neck: Supple, no JVD  Cardiovascular: Regular rate and rhythm with normal S1-S2  Respiratory: Fairly clear to auscultation anteriorly with no wheezes  GI: Soft, nontender, bowel sounds.  Extremities: No edema, palpable pedal pulses, left knee has a brace in place  Neurologic: Grossly nonfocal, no facial asymmetry      Consultants     Consult Orders (all) (From admission, onward)       Start     Ordered    12/03/24 2147  Inpatient Case Management  Consult  Once,   Status:  Canceled        Provider:  (Not yet assigned)    12/03/24 2147 12/03/24 2023  Inpatient Orthopedic Surgery Consult  Once        Specialty:  Orthopedic Surgery  Provider:  Inocente Esparza MD    12/03/24 2022 12/03/24 2022  Inpatient Neurosurgery Consult  Once        Specialty:  Neurosurgery  Provider:  Bronson Fitzgerald MD    12/03/24 2022 12/03/24 1235  [facility] (on-call MD unless specified) Details  Once,   Status:  Canceled        Specialty:  Hospitalist  Provider:  (Not yet assigned)    12/03/24 1234                  Procedures     * Surgery not found *    Imaging Results (All)       Procedure Component Value Units Date/Time    XR Knee 3 View Left [[id number]] Collected: 12/03/24 1139     Updated: 12/03/24 1143    Narrative:      XR KNEE 3 VW LEFT-     INDICATIONS: Pain.     TECHNIQUE: 4 VIEWS OF THE LEFT KNEE     COMPARISON: None available     FINDINGS:     Moderate medial tibiofemoral joint space narrowing is seen. Moderate  degenerative spurring is noted. Trace knee effusion. No acute fracture,  erosion, or dislocation is identified. Follow-up/further evaluation can  be obtained as indications persist.       Impression:         As described.           This report was finalized on 12/3/2024 11:40 AM by Dr. Santosh Carias M.D on Workstation: [employer]                Results for orders placed during the hospital encounter of 01/12/24    Adult Transthoracic Echo Complete W/ Cont if Necessary Per Protocol    Interpretation Summary    Left ventricular systolic function is hyperdynamic (EF > 70%). Calculated left ventricular EF = 72% Normal left ventricular cavity size and wall thickness noted. All left ventricular wall segments contract normally. Left ventricular diastolic function is consistent with (grade I) impaired relaxation.    The left atrial cavity is borderline dilated.    The right atrial cavity is mildly dilated.    Trace mitral valve regurgitation is present.    Estimated right ventricular systolic pressure from tricuspid regurgitation is normal (<35 mmHg). Calculated right ventricular systolic pressure from tricuspid regurgitation is 33 mmHg.    Trace tricuspid valve regurgitation is present. Estimated right ventricular systolic pressure from tricuspid regurgitation is normal (<35 mmHg). Calculated right ventricular systolic pressure from tricuspid regurgitation is 33 mmHg.    There is a trivial pericardial effusion.    Pertinent Labs     Results from last 7 days   Lab Units 12/05/24 0319 12/04/24 0415 12/03/24  1435   WBC 10*3/mm3 4.21 3.71 4.45   HEMOGLOBIN g/dL 12.5 11.8* 13.2   PLATELETS 10*3/mm3 212 209 259     Results from last 7 days   Lab Units 12/05/24 0319 12/04/24 0415 12/03/24  1556   SODIUM mmol/L 137 141 137   POTASSIUM mmol/L 4.0 4.1 4.9   CHLORIDE mmol/L 102 101 100   CO2 mmol/L 26.4 27.0 28.2   BUN mg/dL 16 17 18   CREATININE mg/dL 0.97 0.93 0.97   GLUCOSE mg/dL 148* 139* 211*   EGFR mL/min/1.73 58.5* 61.5 58.5*     Results from last 7 days   Lab Units 12/03/24  1556   ALBUMIN g/dL 3.7   BILIRUBIN mg/dL 0.4   ALK PHOS U/L 50   AST (SGOT) U/L 33*   ALT (SGPT) U/L 32     Results from last 7 days   Lab Units 12/05/24 0319 12/04/24 0415 12/03/24  1556   CALCIUM mg/dL 9.5 9.1 9.5   ALBUMIN g/dL  --   --  3.7               Invalid input(s):  "\"LDLCALC\"          Test Results Pending at Discharge     Pending Results       None              Discharge Details        Discharge Medications        Changes to Medications        Instructions Start Date   docusate sodium 100 MG capsule  Commonly known as: COLACE  What changed: when to take this   100 mg, Oral, 2 Times Daily      metFORMIN 500 MG tablet  Commonly known as: GLUCOPHAGE  What changed: when to take this   TAKE 1 TABLET BY MOUTH TWICE DAILY WITH MEALS      simvastatin 40 MG tablet  Commonly known as: ZOCOR  What changed: when to take this   TAKE 1 TABLET BY MOUTH ONCE DAILY IN THE EVENING             Continue These Medications        Instructions Start Date   aspirin 81 MG EC tablet   81 mg, Daily      calcium carbonate 1250 (500 Ca) MG chewable tablet  Commonly known as: OS-MARIS   1,000 mg, 2 Times Daily      cholecalciferol 10 MCG (400 UNIT) tablet  Commonly known as: VITAMIN D3   400 Units, Daily      HYDROcodone-acetaminophen 5-325 MG per tablet  Commonly known as: NORCO   1 tablet, Oral, Every 4 Hours PRN      lisinopril-hydrochlorothiazide 20-12.5 MG per tablet  Commonly known as: PRINZIDE,ZESTORETIC   1 tablet, Oral, Daily      Magnesium Oxide -Mg Supplement 500 MG tablet   500 mg, Nightly      methocarbamol 500 MG tablet  Commonly known as: ROBAXIN   500 mg, Oral, 4 Times Daily PRN      metoprolol succinate XL 25 MG 24 hr tablet  Commonly known as: TOPROL-XL   25 mg, Oral, Daily      MIRALAX PO   1 Capful, As Needed      omeprazole 40 MG capsule  Commonly known as: priLOSEC   40 mg, Oral, Daily             Stop These Medications      cephalexin 500 MG capsule  Commonly known as: Keflex              No Known Allergies    Discharge Disposition:  Home or Self Care      Discharge Diet:  No active diet order      Discharge Activity:   Activity Instructions       Activity as Tolerated     Additional Activity Instructions:    Follow all instructions, restrictions and follow up for previous lumbar " surgery.            CODE STATUS:    Code Status and Medical Interventions: CPR (Attempt to Resuscitate); Full Support   Ordered at: 12/03/24 1455     Code Status (Patient has no pulse and is not breathing):    CPR (Attempt to Resuscitate)     Medical Interventions (Patient has pulse or is breathing):    Full Support       Future Appointments   Date Time Provider Department Center   12/12/2024 12:15 PM Bronson Fitzgerald MD [facility]   4/29/2025 10:00 AM [facility]   5/8/2025 10:00 AM Arielle Johnston APRN [facility]   6/10/2025 11:30 AM Apryl Og APRN [facility]     Additional Instructions for the Follow-ups that You Need to Schedule       Discharge Follow-up with PCP   As directed       Currently Documented PCP:    Arielle Johnston APRN    PCP Phone Number:    [phone]     Follow Up Details: 1 week        Discharge Follow-up with Specified Provider: ; 2 Weeks   As directed      To:    Follow Up: 2 Weeks               Follow-up Information       Inocente Esparza MD. Schedule an appointment as soon as possible for a visit in 2 week(s).    Specialty: Orthopedic Surgery  Contact information:  [address]  [address]  [facility] [address] [phone]               Arielle Johnston APRN .    Specialties: Family Medicine, Internal Medicine, Emergency Medicine  Why: 1 week  Contact information:  [address]  [address]  [facility] [id number] [phone]               Arielle Johnston APRN .    Specialties: Family Medicine, Internal Medicine, Emergency Medicine  Contact information:  [address]  [address]  [facility] [id number] [phone]                             Additional Instructions for the Follow-ups that You Need to Schedule       Discharge Follow-up with PCP   As directed       Currently Documented PCP:    Arielle Johnston APRN    PCP Phone Number:    [phone]     Follow Up Details: 1 week         Discharge Follow-up with Specified Provider: ; 2 Weeks   As directed      To:    Follow Up: 2 Weeks            Time Spent on Discharge:  Greater than 30 minutes      Santosh Romano MD  [facility]  12/05/24  16:21 EST

## 2024-12-05 NOTE — PROGRESS NOTES
Continued Stay Note  [facility]     Patient Name: Radha Silverio  MRN: [id number]  Today's Date: 12/5/2024    Admit Date: 12/3/2024        Discharge Plan       Row Name 12/05/24 1610       Plan    Final Discharge Disposition Code 01 - home or self-care    Final Note D/c home                   Discharge Codes    No documentation.                 Expected Discharge Date and Time       Expected Discharge Date Expected Discharge Time    Dec 5, 2024               Arielle Araya RN

## 2024-12-05 NOTE — THERAPY TREATMENT NOTE
Patient Name: Radha Silverio  : 1940    MRN: [id number]                              Today's Date: 2024       Admit Date: 12/3/2024    Visit Dx:     ICD-10-CM ICD-9-CM   1. Acute pain of left knee  M25.562 719.46   2. Effusion, left knee  M25.462 719.06   3. Acute post-operative pain  G89.18 338.18   4. Decreased activities of daily living (ADL)  Z78.9 V49.89     Patient Active Problem List   Diagnosis    Essential hypertension    Mixed hyperlipidemia    Chronic constipation    Acid reflux    Solis esophagus    Type 2 diabetes mellitus without complication, without long-term current use of insulin    Hemorrhoids    Frequent PVCs    Atrial bigeminy    Ectopic atrial rhythm    Primary osteoarthritis of knee    Primary osteoarthritis of right knee    Tinnitus of both ears    Left hip pain    Primary osteoarthritis of left hip    Greater trochanteric bursitis of left hip    Stage 3 chronic kidney disease    Female genital prolapse    Chronic gastritis without bleeding    Degenerative disc disease, lumbar    Weakness of left lower extremity    Neuritis or radiculitis due to rupture of lumbar intervertebral disc    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    Adenomatous polyp of colon    Intervertebral lumbar disc disorder with myelopathy, lumbar region    Lumbar herniated disc    Acute pain of left knee     Past Medical History:   Diagnosis Date    Anesthesia complication     DURING COLONOSCOPY PATIENT STATES SHE WAS STILL ABLE TO HEAR AND FEEL    Anxiety     Arthritis     Arthritis of back     Solis's esophagus     Cataract     Chronic constipation     Chronic cough 2018    Colon polyps     Diabetes type 2, controlled     Dizziness     DVT (deep venous thrombosis)     Dyspnea     Essential hypertension 2016    Frequent PVCs 2017    GERD (gastroesophageal reflux disease)     Hematochezia 2016    Hyperlipidemia 2016    Hypertension     Lumbosacral disc disease      Palpitations     Pelvic prolapse      Past Surgical History:   Procedure Laterality Date    BREAST BIOPSY Left     cyst at 9 y/o    COLONOSCOPY N/A 10/11/2016    Procedure: COLONOSCOPY TO CECUM, TO TERMINAL ILEUM WITH HOT SNARE POLYPECTOMY;  Surgeon: Palmira Calix MD;  [facility];  Service:     COLONOSCOPY N/A 10/05/2021    Procedure: COLONOSCOPY TO CECUM WITH COLD POLYPECTOMY;  Surgeon: Palmira Calix MD;  Location: [facility] ENDOSCOPY;  Service: Gastroenterology;  Laterality: N/A;  HISTORY OF COLON POLYPS  COLON POLYP, HEMORRHOIDS, DIVERTICULOSIS    CYST REMOVAL Right     breast    ENDOSCOPY N/A 10/11/2016    Procedure: ESOPHAGOGASTRODUODENOSCOPY WITH BIOPSY;  Surgeon: Palmira Calix MD;  Location: [facility] ENDOSCOPY;  Service:     ENDOSCOPY N/A 10/05/2021    Procedure: ESOPHAGOGASTRODUODENOSCOPY WITH COLD BIOPSIES;  Surgeon: Palmira Calix MD;  Location: [facility] ENDOSCOPY;  Service: Gastroenterology;  Laterality: N/A;  GERD  GASTRITIS    LAPAROSCOPIC CHOLECYSTECTOMY W/ CHOLANGIOGRAPHY      LUMBAR DISCECTOMY Left 11/25/2024    Procedure: Left lumbar 1 to lumbar 2 laminectomy and discectomy with metrx;  Surgeon: Bronson Fitzgerald MD;  Location: [facility] MAIN OR;  Service: Neurosurgery;  Laterality: Left;    MOLE REMOVAL      on foot     OOPHORECTOMY Left     B9    TUBAL ABDOMINAL LIGATION        General Information       Row Name 12/05/24 1115          Physical Therapy Time and Intention    Document Type therapy note (daily note)  -MS     Mode of Treatment physical therapy;individual therapy  -MS       Row Name 12/05/24 1115          General Information    Patient Profile Reviewed yes  -MS     Existing Precautions/Restrictions --   Exit alarm  -MS     Barriers to Rehab none identified  -MS       Row Name 12/05/24 1115          Cognition    Orientation Status (Cognition) oriented x 3  -MS       Row Name 12/05/24 1115          Safety Issues/Impairments Affecting Functional Mobility    Comment,  Safety Issues/Impairments (Mobility) Gait belt used for safety.  -MS               User Key  (r) = Recorded By, (t) = Taken By, (c) = Cosigned By      Initials Name Provider Type    Anthony Miles PT Physical Therapist                   Mobility       Row Name 12/05/24 1116          Bed Mobility    Supine-Sit May (Bed Mobility) standby assist  -MS     Sit-Supine May (Bed Mobility) standby assist  -MS     Comment, (Bed Mobility) via logroll  -MS       Row Name 12/05/24 1116          Sit-Stand Transfer    Sit-Stand May (Transfers) standby assist  -MS     Assistive Device (Sit-Stand Transfers) walker, front-wheeled  -MS       Row Name 12/05/24 1116          Gait/Stairs (Locomotion)    May Level (Gait) standby assist  -MS     Assistive Device (Gait) walker, front-wheeled  -MS     Distance in Feet (Gait) 120  -MS     Deviations/Abnormal Patterns (Gait) tanner decreased  -MS               User Key  (r) = Recorded By, (t) = Taken By, (c) = Cosigned By      Initials Name Provider Type    Anthony Miles, PT Physical Therapist                   Obj/Interventions    No documentation.                  Goals/Plan    No documentation.                  Clinical Impression       Row Name 12/05/24 1116          Pain    Pretreatment Pain Rating 3/10  -MS     Posttreatment Pain Rating 3/10  -MS     Pain Location back  -MS     Pain Side/Orientation lower  -MS       Row Name 12/05/24 1116          Positioning and Restraints    Pre-Treatment Position in bed  -MS     Post Treatment Position bed  -MS     In Bed notified nsg;supine;call light within reach;encouraged to call for assist;exit alarm on  -MS               User Key  (r) = Recorded By, (t) = Taken By, (c) = Cosigned By      Initials Name Provider Type    Anthony Miles, PT Physical Therapist                   Outcome Measures       Row Name 12/05/24 1117 12/05/24 0820       How much help from another person do you currently  need...    Turning from your back to your side while in flat bed without using bedrails? 4  -MS 4  -PP    Moving from lying on back to sitting on the side of a flat bed without bedrails? 3  -MS 3  -PP    Moving to and from a bed to a chair (including a wheelchair)? 3  -MS 3  -PP    Standing up from a chair using your arms (e.g., wheelchair, bedside chair)? 3  -MS 3  -PP    Climbing 3-5 steps with a railing? 3  -MS 2  -PP    To walk in hospital room? 3  -MS 3  -PP    AM-PAC 6 Clicks Score (PT) 19  -MS 18  -PP    Highest Level of Mobility Goal 6 --> Walk 10 steps or more  -MS 6 --> Walk 10 steps or more  -PP      Row Name 12/05/24 1117          Functional Assessment    Outcome Measure Options AM-PAC 6 Clicks Basic Mobility (PT)  -MS               User Key  (r) = Recorded By, (t) = Taken By, (c) = Cosigned By      Initials Name Provider Type    Anthony Miles, PT Physical Therapist    Jacinda Bejarano, RN Registered Nurse                                 Physical Therapy Education       Title: PT OT SLP Therapies (Done)       Topic: Physical Therapy (Resolved)       Point: Mobility training (Resolved)       Learning Progress Summary            Patient Acceptance, E,D, VU by MS at 12/5/2024 1117    Acceptance, E, VU by ER at 12/4/2024 1038                      Point: Home exercise program (Resolved)       Learning Progress Summary            Patient Acceptance, E,D, VU by MS at 12/5/2024 1117    Acceptance, E, VU by ER at 12/4/2024 1038                      Point: Body mechanics (Resolved)       Learning Progress Summary            Patient Acceptance, E,D, VU by MS at 12/5/2024 1117    Acceptance, E, VU by ER at 12/4/2024 1038                      Point: Precautions (Resolved)       Learning Progress Summary            Patient Acceptance, E,D, VU by MS at 12/5/2024 1117    Acceptance, E, VU by ER at 12/4/2024 1038                                      User Key       Initials Effective Dates Name Provider Type  Discipline    MS 06/16/21 -  Anthony Meraz PT Physical Therapist PT    ER 10/15/23 -  Holly Mckeon PT Physical Therapist PT                  PT Recommendation and Plan           Time Calculation:         PT Charges       Row Name 12/05/24 1119             Time Calculation    Start Time 0935  -MS      Stop Time 0950  -MS      Time Calculation (min) 15 min  -MS      PT Received On 12/05/24  -MS      PT - Next Appointment 12/06/24  -MS         Time Calculation- PT    Total Timed Code Minutes- PT 14 minute(s)  -MS                User Key  (r) = Recorded By, (t) = Taken By, (c) = Cosigned By      Initials Name Provider Type    MS Anthony Meraz, PT Physical Therapist                  Therapy Charges for Today       Code Description Service Date Service Provider Modifiers Qty    [id number] HC PT THERAPEUTIC ACT EA 15 MIN 12/5/2024 Anthony Meraz PT GP 1            PT G-Codes  Outcome Measure Options: AM-PAC 6 Clicks Basic Mobility (PT)  AM-PAC 6 Clicks Score (PT): 19  AM-PAC 6 Clicks Score (OT): 17       Anthony Meraz PT  12/5/2024

## 2024-12-05 NOTE — OUTREACH NOTE
Prep Survey      Flowsheet Row Responses   [facility] patient discharged from? [place]   Is LACE score < 7 ? No   Eligibility [facility]   Date of Admission 12/03/24   Date of Discharge 12/05/24   Discharge Disposition Home or Self Care   Discharge diagnosis Acute pain of left knee   Does the patient have one of the following disease processes/diagnoses(primary or secondary)? Other   Does the patient have Home health ordered? No   Is there a DME ordered? No   Medication alerts for this patient see AVS   Prep survey completed? Yes            Iliana CHAN - Registered Nurse

## 2024-12-05 NOTE — NURSING NOTE
Patient able to verbalize understanding of follow up importance with Isaias, PCP and continue to follow Dr Fitzgerald post-surgical instructions from lumbar sx. Reviewed [employer] set-up and access code. Awaiting daughter to transport.

## 2024-12-06 ENCOUNTER — TRANSITIONAL CARE MANAGEMENT TELEPHONE ENCOUNTER (OUTPATIENT)
Dept: CALL CENTER | Facility: HOSPITAL | Age: 84
End: 2024-12-06
Payer: MEDICARE

## 2024-12-06 NOTE — OUTREACH NOTE
Call Center TCM Note      Flowsheet Row Responses   [facility] [place] patient discharged from? [place]   Does the patient have one of the following disease processes/diagnoses(primary or secondary)? Other   TCM attempt successful? Yes   Call start time 1332   Call end time 1337   Discharge diagnosis Acute pain of left knee   Person spoke with today (if not patient) and relationship dtr   Meds reviewed with patient/caregiver? Yes   Is the patient having any side effects they believe may be caused by any medication additions or changes? No   Does the patient have all medications ordered at discharge? N/A   Is the patient taking all medications as directed (includes completed medication regime)? Yes   Does the patient have an appointment with their PCP within 7-14 days of discharge? No   Nursing Interventions Routed TCM call to PCP office, PCP office requested to make appointment - message sent   What is the Home health agency?  [facility]   Has home health visited the patient within 72 hours of discharge? Yes   What DME was ordered? Walker   Has all DME been delivered? Yes   Psychosocial issues? No   Did the patient receive a copy of their discharge instructions? Yes   Nursing interventions Reviewed instructions with patient   What is the patient's perception of their health status since discharge? Improving   Is the patient/caregiver able to teach back signs and symptoms related to disease process for when to call PCP? Yes   Is the patient/caregiver able to teach back signs and symptoms related to disease process for when to call 911? Yes   Is the patient/caregiver able to teach back the hierarchy of who to call/visit for symptoms/problems? PCP, Specialist, Home health nurse, Urgent Care, ED, 911 Yes   If the patient is a current smoker, are they able to teach back resources for cessation? Not a smoker   TCM call completed? Yes   Wrap up additional comments Dtr states pt is living  with brother. Tried calling pt X2 no answer. Left message with dtr but dtr wanted me to speak with pt. Asked dtr if she could have pt make a PCP fu appt, and reminded dtr of pt 's post-op appt. HH visits.   Call end time 1337   Would this patient benefit from a Referral to [facility] Social Work? No   Is the patient interested in additional calls from an ambulatory ? No            Ju Rehman RN    12/6/2024, 13:40 EST

## 2024-12-06 NOTE — PROGRESS NOTES
Subjective   Patient ID: Radha Silverio is a 84 y.o. female is here today for 2-3 week PO follow-up LT L1-L2 LAMI/DISC W/ [facility] 11/25     Today patient states slight discomfort in low back and some numbness in legs.    History of Present Illness    This patient returns today.  She is doing well.  She has almost no pain at all.  He has some numbness in her legs.    The following portions of the patient's history were reviewed and updated as appropriate: allergies, current medications, past family history, past medical history, past social history, past surgical history, and problem list.      Objective     Vitals:    12/12/24 1214   BP: 122/82   Pulse: 72   SpO2: 95%   PainSc:   2     There is no height or weight on file to calculate BMI.    Tobacco Use: Low Risk  (12/10/2024)    Patient History     Smoking Tobacco Use: Never     Smokeless Tobacco Use: Never     Passive Exposure: Never          Physical Exam    Neurological:      Mental Status: He is alert and oriented to person, place, and time.       Neurological Exam    Mental Status  Alert. Oriented to person, place, and time.            Assessment & Plan   Independent Review of Radiographic Studies:      I personally reviewed the images from the following studies.    There is no new imaging to review    Medical Decision Making:      I told the patient and her daughter that we should go ahead and start her on some formal outpatient physical therapy.  This will work with her walking as well as strengthening in her legs.  We will see her back in a month.    Diagnoses and all orders for this visit:    1. Follow-up examination following surgery (Primary)  -     Ambulatory Referral to Physical Therapy for Evaluation & Treatment      Return in about 4 weeks (around 1/9/2025).

## 2024-12-10 ENCOUNTER — OFFICE VISIT (OUTPATIENT)
Dept: INTERNAL MEDICINE | Facility: CLINIC | Age: 84
End: 2024-12-10
Payer: MEDICARE

## 2024-12-10 VITALS
WEIGHT: 187 LBS | SYSTOLIC BLOOD PRESSURE: 110 MMHG | OXYGEN SATURATION: 97 % | TEMPERATURE: 98.6 F | HEART RATE: 73 BPM | BODY MASS INDEX: 30.2 KG/M2 | DIASTOLIC BLOOD PRESSURE: 70 MMHG

## 2024-12-10 DIAGNOSIS — M25.562 ACUTE PAIN OF LEFT KNEE: ICD-10-CM

## 2024-12-10 DIAGNOSIS — M51.26 LUMBAR DISC HERNIATION: Primary | ICD-10-CM

## 2024-12-10 DIAGNOSIS — M54.50 CHRONIC BILATERAL LOW BACK PAIN WITHOUT SCIATICA: ICD-10-CM

## 2024-12-10 DIAGNOSIS — G44.89 OTHER HEADACHE SYNDROME: ICD-10-CM

## 2024-12-10 DIAGNOSIS — G89.29 CHRONIC BILATERAL LOW BACK PAIN WITHOUT SCIATICA: ICD-10-CM

## 2024-12-10 PROCEDURE — 3074F SYST BP LT 130 MM HG: CPT | Performed by: INTERNAL MEDICINE

## 2024-12-10 PROCEDURE — 99495 TRANSJ CARE MGMT MOD F2F 14D: CPT | Performed by: INTERNAL MEDICINE

## 2024-12-10 PROCEDURE — 1111F DSCHRG MED/CURRENT MED MERGE: CPT | Performed by: INTERNAL MEDICINE

## 2024-12-10 PROCEDURE — 3078F DIAST BP <80 MM HG: CPT | Performed by: INTERNAL MEDICINE

## 2024-12-10 PROCEDURE — 1159F MED LIST DOCD IN RCRD: CPT | Performed by: INTERNAL MEDICINE

## 2024-12-10 PROCEDURE — 1160F RVW MEDS BY RX/DR IN RCRD: CPT | Performed by: INTERNAL MEDICINE

## 2024-12-10 PROCEDURE — 1126F AMNT PAIN NOTED NONE PRSNT: CPT | Performed by: INTERNAL MEDICINE

## 2024-12-10 NOTE — PROGRESS NOTES
Radha Silverio is a 84 y.o. female, who presents with a chief complaint of   Chief Complaint   Patient presents with    Hospital Follow Up Visit     Had back and knee pain was DC from ER 12/5           HPI     Ms. Silverio presents for hospital follow-up. She was admitted from 12/3-12/5 at [facility] for pain management after injuring her left knee.  She was found to have severe arthritis of the left knee in addition to a possible meniscal tear. Patient had also not fully recovered from her back syrgery for L1-L2 laminectomy on 11/25 and endorsed numbness of the legs and severe back pain as well. Ortho was consulted while inpatient and helped to manage her pain. She is to follow-up with them outpatient to continue being evaluated for her possible meniscal tear.     At time of discharge, she was told to continue her previously prescribed Norco and Methocarbamol. She has run out of Norco but does not feel she needs a refill as she has not been having a lot of pain. She has a couple of Robaxin left. She mostly endorses persistent numbness of her lower back below the surgical site with some radiation into the left leg. She is still able to ambulate with a walker. She has been set-up with PT who she saw yesterday and began working on exercises for her knee and back. She will be following with Neurosurgery on 12/12 and Ortho on 12/17.     She has also been noting some recent headaches localized to the right side of her head. They are not associated with light or sound sensitivity, nausea, or vomiting. She has not been taking anything to relieve the pain. She notes that they come on at night when she is lying in bed.     The following portions of the patient's history were reviewed and updated as appropriate: allergies, current medications, past family history, past medical history, past social history, past surgical history and problem list.    Allergies: Patient has no known allergies.    Review of  Systems   Respiratory:  Negative for chest tightness, shortness of breath and wheezing.    Cardiovascular:  Negative for chest pain and palpitations.   Gastrointestinal:  Positive for constipation. Negative for diarrhea.   Neurological:  Positive for numbness and headaches.             Wt Readings from Last 3 Encounters:   12/10/24 84.8 kg (187 lb)   12/03/24 88.9 kg (195 lb 15.8 oz)   11/26/24 88.9 kg (196 lb)     Temp Readings from Last 3 Encounters:   12/10/24 98.6 °F (37 °C) (Infrared)   12/05/24 97.9 °F (36.6 °C) (Oral)   11/27/24 98.4 °F (36.9 °C) (Oral)     BP Readings from Last 3 Encounters:   12/10/24 110/70   12/05/24 130/73   11/27/24 149/87     Pulse Readings from Last 3 Encounters:   12/10/24 73   12/05/24 71   11/27/24 72     Body mass index is 30.2 kg/m².  SpO2 Readings from Last 3 Encounters:   12/10/24 97%   12/05/24 96%   11/27/24 94%          Physical Exam  Vitals reviewed.   Constitutional:       General: She is not in acute distress.     Appearance: Normal appearance. She is not ill-appearing or toxic-appearing.   HENT:      Head: Normocephalic.      Right Ear: External ear normal.      Left Ear: External ear normal.      Nose: Nose normal.      Mouth/Throat:      Mouth: Mucous membranes are moist.   Eyes:      General: No scleral icterus.        Right eye: No discharge.         Left eye: No discharge.      Extraocular Movements: Extraocular movements intact.      Conjunctiva/sclera: Conjunctivae normal.      Pupils: Pupils are equal, round, and reactive to light.   Cardiovascular:      Rate and Rhythm: Normal rate and regular rhythm.      Pulses: Normal pulses.      Heart sounds: Normal heart sounds.   Pulmonary:      Effort: Pulmonary effort is normal. No respiratory distress.      Breath sounds: Normal breath sounds. No wheezing.   Abdominal:      General: Bowel sounds are normal. There is no distension.      Palpations: Abdomen is soft.      Tenderness: There is no abdominal tenderness.  There is no guarding.   Musculoskeletal:         General: Normal range of motion.      Cervical back: Normal range of motion.      Comments: 5/5 strength of RLE  4/5 strength of LLE   Skin:     General: Skin is warm and dry.      Capillary Refill: Capillary refill takes less than 2 seconds.      Comments: Well healing surgical scar along the lumbar spine  Left knee in brace   Neurological:      General: No focal deficit present.      Mental Status: She is alert and oriented to person, place, and time.      Sensory: No sensory deficit.   Psychiatric:         Mood and Affect: Mood normal.         Behavior: Behavior normal.         Results for orders placed or performed during the hospital encounter of 12/03/24   CBC Auto Differential    Collection Time: 12/03/24  2:35 PM    Specimen: Blood   Result Value Ref Range    WBC 4.45 3.40 - 10.80 10*3/mm3    RBC 4.69 3.77 - 5.28 10*6/mm3    Hemoglobin 13.2 12.0 - 15.9 g/dL    Hematocrit 41.5 34.0 - 46.6 %    MCV 88.5 79.0 - 97.0 fL    MCH 28.1 26.6 - 33.0 pg    MCHC 31.8 31.5 - 35.7 g/dL    RDW 13.3 12.3 - 15.4 %    RDW-SD 42.6 37.0 - 54.0 fl    MPV 11.1 6.0 - 12.0 fL    Platelets 259 140 - 450 10*3/mm3    Neutrophil % 56.8 42.7 - 76.0 %    Lymphocyte % 30.8 19.6 - 45.3 %    Monocyte % 9.0 5.0 - 12.0 %    Eosinophil % 2.5 0.3 - 6.2 %    Basophil % 0.7 0.0 - 1.5 %    Immature Grans % 0.2 0.0 - 0.5 %    Neutrophils, Absolute 2.53 1.70 - 7.00 10*3/mm3    Lymphocytes, Absolute 1.37 0.70 - 3.10 10*3/mm3    Monocytes, Absolute 0.40 0.10 - 0.90 10*3/mm3    Eosinophils, Absolute 0.11 0.00 - 0.40 10*3/mm3    Basophils, Absolute 0.03 0.00 - 0.20 10*3/mm3    Immature Grans, Absolute 0.01 0.00 - 0.05 10*3/mm3    nRBC 0.0 0.0 - 0.2 /100 WBC   Comprehensive Metabolic Panel    Collection Time: 12/03/24  3:56 PM    Specimen: Arm, Right; Blood   Result Value Ref Range    Glucose 211 (H) 65 - 99 mg/dL    BUN 18 8 - 23 mg/dL    Creatinine 0.97 0.57 - 1.00 mg/dL    Sodium 137 136 - 145 mmol/L     Potassium 4.9 3.5 - 5.2 mmol/L    Chloride 100 98 - 107 mmol/L    CO2 28.2 22.0 - 29.0 mmol/L    Calcium 9.5 8.6 - 10.5 mg/dL    Total Protein 6.6 6.0 - 8.5 g/dL    Albumin 3.7 3.5 - 5.2 g/dL    ALT (SGPT) 32 1 - 33 U/L    AST (SGOT) 33 (H) 1 - 32 U/L    Alkaline Phosphatase 50 39 - 117 U/L    Total Bilirubin 0.4 0.0 - 1.2 mg/dL    Globulin 2.9 gm/dL    A/G Ratio 1.3 g/dL    BUN/Creatinine Ratio 18.6 7.0 - 25.0    Anion Gap 8.8 5.0 - 15.0 mmol/L    eGFR 58.5 (L) >60.0 mL/min/1.73   POC Glucose Once    Collection Time: 12/03/24  8:57 PM    Specimen: Blood   Result Value Ref Range    Glucose 188 (H) 70 - 130 mg/dL   Basic Metabolic Panel    Collection Time: 12/04/24  4:15 AM    Specimen: Blood   Result Value Ref Range    Glucose 139 (H) 65 - 99 mg/dL    BUN 17 8 - 23 mg/dL    Creatinine 0.93 0.57 - 1.00 mg/dL    Sodium 141 136 - 145 mmol/L    Potassium 4.1 3.5 - 5.2 mmol/L    Chloride 101 98 - 107 mmol/L    CO2 27.0 22.0 - 29.0 mmol/L    Calcium 9.1 8.6 - 10.5 mg/dL    BUN/Creatinine Ratio 18.3 7.0 - 25.0    Anion Gap 13.0 5.0 - 15.0 mmol/L    eGFR 61.5 >60.0 mL/min/1.73   CBC Auto Differential    Collection Time: 12/04/24  4:15 AM    Specimen: Blood   Result Value Ref Range    WBC 3.71 3.40 - 10.80 10*3/mm3    RBC 4.19 3.77 - 5.28 10*6/mm3    Hemoglobin 11.8 (L) 12.0 - 15.9 g/dL    Hematocrit 36.6 34.0 - 46.6 %    MCV 87.4 79.0 - 97.0 fL    MCH 28.2 26.6 - 33.0 pg    MCHC 32.2 31.5 - 35.7 g/dL    RDW 12.8 12.3 - 15.4 %    RDW-SD 40.2 37.0 - 54.0 fl    MPV 10.3 6.0 - 12.0 fL    Platelets 209 140 - 450 10*3/mm3    Neutrophil % 47.4 42.7 - 76.0 %    Lymphocyte % 35.3 19.6 - 45.3 %    Monocyte % 11.6 5.0 - 12.0 %    Eosinophil % 4.9 0.3 - 6.2 %    Basophil % 0.5 0.0 - 1.5 %    Immature Grans % 0.3 0.0 - 0.5 %    Neutrophils, Absolute 1.76 1.70 - 7.00 10*3/mm3    Lymphocytes, Absolute 1.31 0.70 - 3.10 10*3/mm3    Monocytes, Absolute 0.43 0.10 - 0.90 10*3/mm3    Eosinophils, Absolute 0.18 0.00 - 0.40 10*3/mm3    Basophils,  Absolute 0.02 0.00 - 0.20 10*3/mm3    Immature Grans, Absolute 0.01 0.00 - 0.05 10*3/mm3    nRBC 0.0 0.0 - 0.2 /100 WBC   POC Glucose Once    Collection Time: 12/04/24  7:21 AM    Specimen: Blood   Result Value Ref Range    Glucose 151 (H) 70 - 130 mg/dL   POC Glucose Once    Collection Time: 12/04/24 11:22 AM    Specimen: Blood   Result Value Ref Range    Glucose 143 (H) 70 - 130 mg/dL   POC Glucose Once    Collection Time: 12/04/24  4:20 PM    Specimen: Blood   Result Value Ref Range    Glucose 114 70 - 130 mg/dL   POC Glucose Once    Collection Time: 12/04/24  8:33 PM    Specimen: Blood   Result Value Ref Range    Glucose 137 (H) 70 - 130 mg/dL   Basic Metabolic Panel    Collection Time: 12/05/24  3:19 AM    Specimen: Blood   Result Value Ref Range    Glucose 148 (H) 65 - 99 mg/dL    BUN 16 8 - 23 mg/dL    Creatinine 0.97 0.57 - 1.00 mg/dL    Sodium 137 136 - 145 mmol/L    Potassium 4.0 3.5 - 5.2 mmol/L    Chloride 102 98 - 107 mmol/L    CO2 26.4 22.0 - 29.0 mmol/L    Calcium 9.5 8.6 - 10.5 mg/dL    BUN/Creatinine Ratio 16.5 7.0 - 25.0    Anion Gap 8.6 5.0 - 15.0 mmol/L    eGFR 58.5 (L) >60.0 mL/min/1.73   CBC Auto Differential    Collection Time: 12/05/24  3:19 AM    Specimen: Blood   Result Value Ref Range    WBC 4.21 3.40 - 10.80 10*3/mm3    RBC 4.39 3.77 - 5.28 10*6/mm3    Hemoglobin 12.5 12.0 - 15.9 g/dL    Hematocrit 37.6 34.0 - 46.6 %    MCV 85.6 79.0 - 97.0 fL    MCH 28.5 26.6 - 33.0 pg    MCHC 33.2 31.5 - 35.7 g/dL    RDW 12.4 12.3 - 15.4 %    RDW-SD 38.3 37.0 - 54.0 fl    MPV 10.7 6.0 - 12.0 fL    Platelets 212 140 - 450 10*3/mm3    Neutrophil % 43.0 42.7 - 76.0 %    Lymphocyte % 40.9 19.6 - 45.3 %    Monocyte % 10.9 5.0 - 12.0 %    Eosinophil % 4.5 0.3 - 6.2 %    Basophil % 0.5 0.0 - 1.5 %    Immature Grans % 0.2 0.0 - 0.5 %    Neutrophils, Absolute 1.81 1.70 - 7.00 10*3/mm3    Lymphocytes, Absolute 1.72 0.70 - 3.10 10*3/mm3    Monocytes, Absolute 0.46 0.10 - 0.90 10*3/mm3    Eosinophils, Absolute 0.19  0.00 - 0.40 10*3/mm3    Basophils, Absolute 0.02 0.00 - 0.20 10*3/mm3    Immature Grans, Absolute 0.01 0.00 - 0.05 10*3/mm3    nRBC 0.0 0.0 - 0.2 /100 WBC   POC Glucose Once    Collection Time: 12/05/24  7:11 AM    Specimen: Blood   Result Value Ref Range    Glucose 140 (H) 70 - 130 mg/dL   POC Glucose Once    Collection Time: 12/05/24 11:29 AM    Specimen: Blood   Result Value Ref Range    Glucose 147 (H) 70 - 130 mg/dL     Result Review :                  Assessment and Plan    Diagnoses and all orders for this visit:    1. Lumbar disc herniation (Primary)    2. Chronic bilateral low back pain without sciatica    3. Acute pain of left knee    4. Other headache syndrome         Ms. Silverio is an 85yo female wiho presents for hospital follow-up for left acute knee pain and chronic low back pain s/p laminectomy 11/25. Patient is recovering well from a pain stand-point after her back surgery and despite her possible left knee meniscal tear. Exam notable for intact sensation of the lower and upper extremities with minor weakness noted on the LLE on strength exam. Advised her to keep her subspecialty appointments with Neurosurgery and Ortho and encouraged her to continue working with PT.     In terms of her headaches, I advised her to keep a headache diary when these occur. I also encouraged her to try Tylenol to see if they are relieved with OTC analgesics. Also ok for voltaren gel for hip and knee pain. Patient to bring diary at next appointment.     I have seen and examined the patient independently.  I have reviewed the resident's findings as noted above and added to the note where appropriate.  Agree with above.    Christina Azar MD  Attending Physician  Internal Medicine and Pediatrics          Outpatient Medications Prior to Visit   Medication Sig Dispense Refill    aspirin 81 MG EC tablet Take 1 tablet by mouth Daily. HOLD FOR SURGERY      calcium carbonate (OS-MARIS) 1250 (500 Ca) MG chewable tablet Chew 2  tablets 2 (Two) Times a Day.      Cholecalciferol 10 MCG (400 UNIT) tablet Take 1 tablet by mouth Daily.      docusate sodium (COLACE) 100 MG capsule Take 1 capsule by mouth 2 (Two) Times a Day. (Patient taking differently: Take 1 capsule by mouth Daily.) 180 capsule 3    HYDROcodone-acetaminophen (NORCO) 5-325 MG per tablet Take 1 tablet by mouth Every 4 (Four) Hours As Needed for Moderate Pain (pain). 35 tablet 0    lisinopril-hydrochlorothiazide (PRINZIDE,ZESTORETIC) 20-12.5 MG per tablet Take 1 tablet by mouth Daily. 90 tablet 3    Magnesium Oxide -Mg Supplement 500 MG tablet Take 1 tablet by mouth Every Night.      metFORMIN (GLUCOPHAGE) 500 MG tablet TAKE 1 TABLET BY MOUTH TWICE DAILY WITH MEALS (Patient taking differently: Take 1 tablet by mouth 2 (Two) Times a Day.) 180 tablet 3    methocarbamol (ROBAXIN) 500 MG tablet Take 1 tablet by mouth 4 (Four) Times a Day As Needed for Muscle Spasms. 40 tablet 0    metoprolol succinate XL (TOPROL-XL) 25 MG 24 hr tablet Take 1 tablet by mouth Daily. 90 tablet 3    omeprazole (priLOSEC) 40 MG capsule Take 1 capsule by mouth once daily 90 capsule 0    Polyethylene Glycol 3350 (MIRALAX PO) Take 1 Capful by mouth As Needed. Has not been taking but is going to be starting again soon      simvastatin (ZOCOR) 40 MG tablet TAKE 1 TABLET BY MOUTH ONCE DAILY IN THE EVENING (Patient taking differently: Take 1 tablet by mouth Every Night.) 90 tablet 0     No facility-administered medications prior to visit.     No orders of the defined types were placed in this encounter.    [unfilled]  There are no discontinued medications.      Return for Next scheduled follow up.    Patient was given instructions and counseling regarding her condition or for health maintenance advice. Please see specific information pulled into the AVS if appropriate.

## 2024-12-12 ENCOUNTER — OFFICE VISIT (OUTPATIENT)
Dept: NEUROSURGERY | Facility: CLINIC | Age: 84
End: 2024-12-12
Payer: MEDICARE

## 2024-12-12 VITALS — DIASTOLIC BLOOD PRESSURE: 82 MMHG | HEART RATE: 72 BPM | OXYGEN SATURATION: 95 % | SYSTOLIC BLOOD PRESSURE: 122 MMHG

## 2024-12-12 DIAGNOSIS — Z09 FOLLOW-UP EXAMINATION FOLLOWING SURGERY: Primary | ICD-10-CM

## 2024-12-12 PROCEDURE — 3074F SYST BP LT 130 MM HG: CPT | Performed by: NEUROLOGICAL SURGERY

## 2024-12-12 PROCEDURE — 99024 POSTOP FOLLOW-UP VISIT: CPT | Performed by: NEUROLOGICAL SURGERY

## 2024-12-12 PROCEDURE — 3079F DIAST BP 80-89 MM HG: CPT | Performed by: NEUROLOGICAL SURGERY

## 2025-01-14 ENCOUNTER — TRANSCRIBE ORDERS (OUTPATIENT)
Dept: ADMINISTRATIVE | Facility: HOSPITAL | Age: 85
End: 2025-01-14
Payer: MEDICARE

## 2025-01-14 DIAGNOSIS — Z12.31 BREAST CANCER SCREENING BY MAMMOGRAM: Primary | ICD-10-CM

## 2025-01-23 RX ORDER — SIMVASTATIN 40 MG
40 TABLET ORAL EVERY EVENING
Qty: 90 TABLET | Refills: 0 | Status: SHIPPED | OUTPATIENT
Start: 2025-01-23

## 2025-01-23 RX ORDER — OMEPRAZOLE 40 MG/1
40 CAPSULE, DELAYED RELEASE ORAL DAILY
Qty: 90 CAPSULE | Refills: 0 | Status: SHIPPED | OUTPATIENT
Start: 2025-01-23

## 2025-01-23 NOTE — TELEPHONE ENCOUNTER
Caller: Radha Silverio    Relationship: Self    Best call back number: [phone]    What was the call regarding: PATIENT IS CALLING SHE HAS TO SWITCH PHARMACY DUE TO INSURANCE     PATIENT WOULD LIKE [employer] [employer] TO SEND THE PHARMACY THE LISTED 5 MEDICATIONS BELOW SO SHE CAN HAVE THEM DELIVERED TO HER HOME     PHONE # [phone] [facility] PRESCRIPTION DRUG PLAN         lisinopril-hydrochlorothiazide (PRINZIDE,ZESTORETIC) 20-12.5 MG per tablet       metoprolol succinate XL (TOPROL-XL) 25 MG 24 hr tablet         omeprazole (priLOSEC) 40 MG capsule       simvastatin (ZOCOR) 40 MG tablet     metFORMIN (GLUCOPHAGE) 500 MG tablet

## 2025-01-23 NOTE — TELEPHONE ENCOUNTER
Please resend due to pharmacy change    Rx Refill Note  Requested Prescriptions     Pending Prescriptions Disp Refills    lisinopril-hydrochlorothiazide (PRINZIDE,ZESTORETIC) 20-12.5 MG per tablet 90 tablet 3     Sig: Take 1 tablet by mouth Daily.    metoprolol succinate XL (TOPROL-XL) 25 MG 24 hr tablet 90 tablet 3     Sig: Take 1 tablet by mouth Daily.     Signed Prescriptions Disp Refills    omeprazole (priLOSEC) 40 MG capsule 90 capsule 0     Sig: Take 1 capsule by mouth Daily.     Authorizing Provider: [employer]     Ordering User: CAROLA MULLEN    simvastatin (ZOCOR) 40 MG tablet 90 tablet 0     Sig: Take 1 tablet by mouth Every Evening.     Authorizing Provider: [employer]     Ordering User: CAROLA MULLEN    metFORMIN (GLUCOPHAGE) 500 MG tablet 180 tablet 3     Sig: Take 1 tablet by mouth 2 (Two) Times a Day With Meals.     Authorizing Provider: [employer]     Ordering User: CAROLA MULLEN      Last office visit with prescribing clinician: 10/29/2024   Last telemedicine visit with prescribing clinician: Visit date not found   Next office visit with prescribing clinician: 5/8/2025                         Would you like a call back once the refill request has been completed: [] Yes [] No    If the office needs to give you a call back, can they leave a voicemail: [] Yes [] No    Carola Mullen MA  01/23/25, 16:23 EST

## 2025-01-24 RX ORDER — LISINOPRIL AND HYDROCHLOROTHIAZIDE 12.5; 2 MG/1; MG/1
1 TABLET ORAL DAILY
Qty: 90 TABLET | Refills: 3 | Status: SHIPPED | OUTPATIENT
Start: 2025-01-24

## 2025-01-24 RX ORDER — METOPROLOL SUCCINATE 25 MG/1
25 TABLET, EXTENDED RELEASE ORAL DAILY
Qty: 90 TABLET | Refills: 3 | Status: SHIPPED | OUTPATIENT
Start: 2025-01-24

## 2025-01-30 ENCOUNTER — OFFICE VISIT (OUTPATIENT)
Dept: NEUROSURGERY | Facility: CLINIC | Age: 85
End: 2025-01-30
Payer: MEDICARE

## 2025-01-30 DIAGNOSIS — Z09 FOLLOW-UP EXAMINATION FOLLOWING SURGERY: Primary | ICD-10-CM

## 2025-01-30 PROCEDURE — 1160F RVW MEDS BY RX/DR IN RCRD: CPT | Performed by: NEUROLOGICAL SURGERY

## 2025-01-30 PROCEDURE — 1159F MED LIST DOCD IN RCRD: CPT | Performed by: NEUROLOGICAL SURGERY

## 2025-01-30 PROCEDURE — 99024 POSTOP FOLLOW-UP VISIT: CPT | Performed by: NEUROLOGICAL SURGERY

## 2025-01-30 NOTE — PROGRESS NOTES
Subjective   Patient ID: Radha Silverio is a 84 y.o. female is here today for 1 month PO follow-up. Patient had a Left lumbar 1 to lumbar 2 laminectomy and discectomy with [facility] on 11/25/2024    Today patient states that she  has mild intermittent low back pain     History of Present Illness  History of Present Illness  The patient is an 84-year-old female who had a left L1-2 laminectomy and discectomy on 11/25/2024.    She reports a general improvement in her condition, with enhanced mobility. However, she experienced fatigue in her back yesterday after prolonged sitting, which she attributes to her sedentary lifestyle. She also mentions persistent numbness radiating down the posterior aspect of her thigh, but notes that the numbness in her pelvic region and buttocks has largely resolved. She expresses concern about the possibility of these symptoms being related to her back condition.    The following portions of the patient's history were reviewed and updated as appropriate: allergies, current medications, past family history, past medical history, past social history, past surgical history, and problem list.      Objective     There were no vitals filed for this visit.  There is no height or weight on file to calculate BMI.    Tobacco Use: Low Risk  (1/30/2025)    Patient History     Smoking Tobacco Use: Never     Smokeless Tobacco Use: Never     Passive Exposure: Never          Physical Exam    Neurological:      Mental Status: He is alert and oriented to person, place, and time.           Mental Status  Alert. Oriented to person, place, and time.      Assessment & Plan   Independent Review of Radiographic Studies:      I personally reviewed the images from the following studies.    Results        Diagnoses and all orders for this visit:    1. Follow-up examination following surgery (Primary)      Assessment & Plan  1. Postoperative status following left L1-2 laminectomy and discectomy.  Her current symptoms,  including back pain and numbness, are likely due to arthritis and her sedentary lifestyle. The numbness she experiences is expected to improve over time, as it involves the smallest nerve fibers which take the longest to heal. She is advised to gradually increase her physical activity by walking 300 to 400 yards twice daily for a month, then extending the distance to her neighbor's mailbox the following month. This regimen should be followed consistently throughout the summer to enhance her mobility. She is also cleared to resume driving. If any complications arise, she should contact the office immediately.    PROCEDURE  The patient underwent a left L1-2 laminectomy and discectomy on 11/25/2024.    Return if symptoms worsen or fail to improve.    Patient or patient representative verbalized consent for the use of Ambient Listening during the visit with  Bronson Fitzgerald MD for chart documentation. 1/30/2025  13:38 EST

## 2025-02-14 ENCOUNTER — HOSPITAL ENCOUNTER (OUTPATIENT)
Dept: MAMMOGRAPHY | Facility: HOSPITAL | Age: 85
Discharge: HOME | End: 2025-02-14
Admitting: NURSE PRACTITIONER
Payer: MEDICARE

## 2025-02-14 DIAGNOSIS — Z12.31 BREAST CANCER SCREENING BY MAMMOGRAM: ICD-10-CM

## 2025-02-14 PROCEDURE — 77067 SCR MAMMO BI INCL CAD: CPT

## 2025-02-14 PROCEDURE — 77063 BREAST TOMOSYNTHESIS BI: CPT

## 2025-02-19 ENCOUNTER — TELEPHONE (OUTPATIENT)
Dept: INTERNAL MEDICINE | Facility: CLINIC | Age: 85
End: 2025-02-19
Payer: MEDICARE

## 2025-04-17 RX ORDER — SIMVASTATIN 40 MG
40 TABLET ORAL EVERY EVENING
Qty: 90 TABLET | Refills: 3 | Status: SHIPPED | OUTPATIENT
Start: 2025-04-17

## 2025-04-17 RX ORDER — OMEPRAZOLE 40 MG/1
40 CAPSULE, DELAYED RELEASE ORAL DAILY
Qty: 90 CAPSULE | Refills: 3 | Status: SHIPPED | OUTPATIENT
Start: 2025-04-17

## 2025-04-17 NOTE — TELEPHONE ENCOUNTER
Rx Refill Note  Requested Prescriptions     Pending Prescriptions Disp Refills    omeprazole (priLOSEC) 40 MG capsule [Pharmacy Med Name: OMEPRAZOLE DR CAPS 40MG] 90 capsule 3     Sig: TAKE 1 CAPSULE DAILY    simvastatin (ZOCOR) 40 MG tablet [Pharmacy Med Name: SIMVASTATIN TABS 40MG] 90 tablet 3     Sig: TAKE 1 TABLET EVERY EVENING      Last office visit with prescribing clinician: 10/29/2024   Last telemedicine visit with prescribing clinician: Visit date not found   Next office visit with prescribing clinician: 5/8/2025                         Would you like a call back once the refill request has been completed: [] Yes [] No    If the office needs to give you a call back, can they leave a voicemail: [] Yes [] No    Lele Jaffe MA  04/17/25, 08:15 EDT

## 2025-04-29 DIAGNOSIS — N18.31 STAGE 3A CHRONIC KIDNEY DISEASE: Chronic | ICD-10-CM

## 2025-04-29 DIAGNOSIS — E78.2 MIXED HYPERLIPIDEMIA: ICD-10-CM

## 2025-04-29 DIAGNOSIS — I10 ESSENTIAL HYPERTENSION: Primary | Chronic | ICD-10-CM

## 2025-04-29 DIAGNOSIS — E11.9 TYPE 2 DIABETES MELLITUS WITHOUT COMPLICATION, WITHOUT LONG-TERM CURRENT USE OF INSULIN: Chronic | ICD-10-CM

## 2025-04-30 LAB
ALBUMIN SERPL-MCNC: 4.5 G/DL (ref 3.5–5.2)
ALBUMIN/CREAT UR: 7 MG/G CREAT (ref 0–29)
ALBUMIN/GLOB SERPL: 1.7 G/DL
ALP SERPL-CCNC: 62 U/L (ref 39–117)
ALT SERPL-CCNC: 21 U/L (ref 1–33)
APPEARANCE UR: CLEAR
AST SERPL-CCNC: 23 U/L (ref 1–32)
BILIRUB SERPL-MCNC: 0.5 MG/DL (ref 0–1.2)
BILIRUB UR QL STRIP: NEGATIVE
BUN SERPL-MCNC: 19 MG/DL (ref 8–23)
BUN/CREAT SERPL: 17.4 (ref 7–25)
CALCIUM SERPL-MCNC: 10 MG/DL (ref 8.6–10.5)
CHLORIDE SERPL-SCNC: 100 MMOL/L (ref 98–107)
CHOLEST SERPL-MCNC: 190 MG/DL (ref 0–200)
CHOLEST/HDLC SERPL: 2.32 {RATIO}
CO2 SERPL-SCNC: 27 MMOL/L (ref 22–29)
COLOR UR: YELLOW
CREAT SERPL-MCNC: 1.09 MG/DL (ref 0.57–1)
CREAT UR-MCNC: 103.4 MG/DL
EGFRCR SERPLBLD CKD-EPI 2021: 50.8 ML/MIN/1.73
ERYTHROCYTE [DISTWIDTH] IN BLOOD BY AUTOMATED COUNT: 13.6 % (ref 12.3–15.4)
GLOBULIN SER CALC-MCNC: 2.6 GM/DL
GLUCOSE SERPL-MCNC: 154 MG/DL (ref 65–99)
GLUCOSE UR QL STRIP: NEGATIVE
HBA1C MFR BLD: 7.2 % (ref 4.8–5.6)
HCT VFR BLD AUTO: 41.8 % (ref 34–46.6)
HDLC SERPL-MCNC: 82 MG/DL (ref 40–60)
HGB BLD-MCNC: 13.4 G/DL (ref 12–15.9)
HGB UR QL STRIP: NEGATIVE
KETONES UR QL STRIP: NEGATIVE
LDLC SERPL CALC-MCNC: 94 MG/DL (ref 0–100)
LEUKOCYTE ESTERASE UR QL STRIP: ABNORMAL
MCH RBC QN AUTO: 28.3 PG (ref 26.6–33)
MCHC RBC AUTO-ENTMCNC: 32.1 G/DL (ref 31.5–35.7)
MCV RBC AUTO: 88.4 FL (ref 79–97)
MICROALBUMIN UR-MCNC: 7 UG/ML
NITRITE UR QL STRIP: NEGATIVE
PH UR STRIP: 6.5 [PH] (ref 5–8)
PLATELET # BLD AUTO: 188 10*3/MM3 (ref 140–450)
POTASSIUM SERPL-SCNC: 4.4 MMOL/L (ref 3.5–5.2)
PROT SERPL-MCNC: 7.1 G/DL (ref 6–8.5)
PROT UR QL STRIP: NEGATIVE
RBC # BLD AUTO: 4.73 10*6/MM3 (ref 3.77–5.28)
SODIUM SERPL-SCNC: 140 MMOL/L (ref 136–145)
SP GR UR STRIP: 1.01 (ref 1–1.03)
TRIGL SERPL-MCNC: 74 MG/DL (ref 0–150)
TSH SERPL DL<=0.005 MIU/L-ACNC: 2 UIU/ML (ref 0.27–4.2)
UROBILINOGEN UR STRIP-MCNC: ABNORMAL MG/DL
VLDLC SERPL CALC-MCNC: 14 MG/DL (ref 5–40)
WBC # BLD AUTO: 4.98 10*3/MM3 (ref 3.4–10.8)

## 2025-05-08 ENCOUNTER — OFFICE VISIT (OUTPATIENT)
Dept: INTERNAL MEDICINE | Facility: CLINIC | Age: 85
End: 2025-05-08
Payer: MEDICARE

## 2025-05-08 VITALS
DIASTOLIC BLOOD PRESSURE: 80 MMHG | HEIGHT: 66 IN | WEIGHT: 196.6 LBS | BODY MASS INDEX: 31.6 KG/M2 | HEART RATE: 71 BPM | TEMPERATURE: 97.7 F | SYSTOLIC BLOOD PRESSURE: 128 MMHG | OXYGEN SATURATION: 98 %

## 2025-05-08 DIAGNOSIS — N18.31 STAGE 3A CHRONIC KIDNEY DISEASE: Chronic | ICD-10-CM

## 2025-05-08 DIAGNOSIS — K21.9 GASTROESOPHAGEAL REFLUX DISEASE WITHOUT ESOPHAGITIS: Chronic | ICD-10-CM

## 2025-05-08 DIAGNOSIS — E78.2 MIXED HYPERLIPIDEMIA: ICD-10-CM

## 2025-05-08 DIAGNOSIS — M51.362 DEGENERATION OF INTERVERTEBRAL DISC OF LUMBAR REGION WITH DISCOGENIC BACK PAIN AND LOWER EXTREMITY PAIN: ICD-10-CM

## 2025-05-08 DIAGNOSIS — K22.70 BARRETT'S ESOPHAGUS WITHOUT DYSPLASIA: Chronic | ICD-10-CM

## 2025-05-08 DIAGNOSIS — Z91.89 SEDENTARY LIFESTYLE: ICD-10-CM

## 2025-05-08 DIAGNOSIS — N39.46 MIXED STRESS AND URGE URINARY INCONTINENCE: ICD-10-CM

## 2025-05-08 DIAGNOSIS — M15.0 PRIMARY OSTEOARTHRITIS INVOLVING MULTIPLE JOINTS: ICD-10-CM

## 2025-05-08 DIAGNOSIS — E11.9 TYPE 2 DIABETES MELLITUS WITHOUT COMPLICATION, WITHOUT LONG-TERM CURRENT USE OF INSULIN: Chronic | ICD-10-CM

## 2025-05-08 DIAGNOSIS — R53.81 PHYSICAL DECONDITIONING: ICD-10-CM

## 2025-05-08 DIAGNOSIS — K59.09 CHRONIC CONSTIPATION: ICD-10-CM

## 2025-05-08 DIAGNOSIS — E11.9 ENCOUNTER FOR DIABETIC FOOT EXAM: ICD-10-CM

## 2025-05-08 DIAGNOSIS — I10 ESSENTIAL HYPERTENSION: Chronic | ICD-10-CM

## 2025-05-08 DIAGNOSIS — Z00.00 ENCOUNTER FOR MEDICARE ANNUAL WELLNESS EXAM: Primary | ICD-10-CM

## 2025-05-08 RX ORDER — GLIPIZIDE 2.5 MG/1
2.5 TABLET, EXTENDED RELEASE ORAL
Qty: 90 TABLET | Refills: 1 | Status: SHIPPED | OUTPATIENT
Start: 2025-05-08

## 2025-05-08 NOTE — PATIENT INSTRUCTIONS
If you would like to proceed with pelvic floor therapy to strengthen your bladder control, please call the office.    If you would like to proceed with physical therapy for overall strength and conditioning, please call the office.    You should be taking a daily supplement of calcium and Vitamin D. 600mg of Calcium twice daily and 2000units (IU) of Vitamin D.

## 2025-05-08 NOTE — ASSESSMENT & PLAN NOTE
- uncontrolled  - continue metformin  - add glipizide      Orders:    glipizide (GLUCOTROL XL) 2.5 MG 24 hr tablet; Take 1 tablet by mouth Daily With Breakfast.

## 2025-05-08 NOTE — PROGRESS NOTES
Subjective   The ABCs of the Annual Wellness Visit  Medicare Wellness Visit      Radha Silverio is a 84 y.o. patient who presents for a Medicare Wellness Visit.    The following portions of the patient's history were reviewed and   updated as appropriate: allergies, current medications, past family history, past medical history, past social history, past surgical history, and problem list.    Compared to one year ago, the patient's physical   health is worse.  Compared to one year ago, the patient's mental   health is the same.    Recent Hospitalizations:  This patient has had a [facility] admission record on file within the last 365 days.  Current Medical Providers:  Patient Care Team:  Arielle Johnston APRN as PCP - General (Family Medicine)  Trudi Urbina MD as Consulting Physician (Cardiology)  Nam Johnson OD (Optometry)  Bronson Fitzgerald MD as Surgeon (Neurosurgery)  Palmira Calix MD as Consulting Physician (Gastroenterology)    Outpatient Medications Marked as Taking for the 5/8/25 encounter (Office Visit) with Arielle Johnston APRN   Medication Sig Dispense Refill    aspirin 81 MG EC tablet Take 1 tablet by mouth Daily. HOLD FOR SURGERY      calcium carbonate (OS-MARIS) 1250 (500 Ca) MG chewable tablet Chew 2 tablets 2 (Two) Times a Day.      Cholecalciferol 10 MCG (400 UNIT) tablet Take 1 tablet by mouth Daily.      docusate sodium (COLACE) 100 MG capsule Take 1 capsule by mouth 2 (Two) Times a Day. (Patient taking differently: Take 1 capsule by mouth Daily As Needed for Constipation.) 180 capsule 3    lisinopril-hydrochlorothiazide (PRINZIDE,ZESTORETIC) 20-12.5 MG per tablet Take 1 tablet by mouth Daily. 90 tablet 3    Magnesium Oxide -Mg Supplement 500 MG tablet Take 1 tablet by mouth Every Night.      metFORMIN (GLUCOPHAGE) 500 MG tablet Take 1 tablet by mouth 2 (Two) Times a Day With Meals. 180 tablet 3    methocarbamol (ROBAXIN) 500 MG tablet Take 1 tablet by mouth 4  (Four) Times a Day As Needed for Muscle Spasms. 40 tablet 0    metoprolol succinate XL (TOPROL-XL) 25 MG 24 hr tablet Take 1 tablet by mouth Daily. 90 tablet 3    omeprazole (priLOSEC) 40 MG capsule TAKE 1 CAPSULE DAILY 90 capsule 3    Polyethylene Glycol 3350 (MIRALAX PO) Take 1 Capful by mouth As Needed. Has not been taking but is going to be starting again soon      simvastatin (ZOCOR) 40 MG tablet TAKE 1 TABLET EVERY EVENING 90 tablet 3         No opioid medication identified on active medication list. I have reviewed chart for other potential  high risk medication/s and harmful drug interactions in the elderly.      Aspirin is on active medication list. Aspirin use is indicated based on review of current medical condition/s. Pros and cons of this therapy have been discussed today. Benefits of this medication outweigh potential harm.  Patient has been encouraged to continue taking this medication.  .      Patient Active Problem List   Diagnosis    Essential hypertension    Mixed hyperlipidemia    Chronic constipation    Acid reflux    Solis esophagus    Type 2 diabetes mellitus without complication, without long-term current use of insulin    Hemorrhoids    Frequent PVCs    Atrial bigeminy    Ectopic atrial rhythm    Primary osteoarthritis of knee    Primary osteoarthritis of right knee    Tinnitus of both ears    Left hip pain    Primary osteoarthritis of left hip    Greater trochanteric bursitis of left hip    Stage 3 chronic kidney disease    Female genital prolapse    Chronic gastritis without bleeding    Degenerative disc disease, lumbar    Weakness of left lower extremity    Neuritis or radiculitis due to rupture of lumbar intervertebral disc    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  ACP discussion was held with the patient during this visit. Patient has an advance directive in EMR which is still valid.             Objective   Vitals:    25 1002 25 1052   BP: 154/82 128/80   BP Location: Left arm    Patient Position: Sitting    Cuff Size: Adult    Pulse: 71    Temp: 97.7 °F (36.5 °C)    TempSrc: Infrared    SpO2: 98%    Weight: 89.2 kg (196 lb 9.6 oz)    Height: 167.6 cm (66\")    PainSc: 0-No pain        Estimated body mass index is 31.73 kg/m² as calculated from the following:    Height as of this encounter: 167.6 cm (66\").    Weight as of this encounter: 89.2 kg (196 lb 9.6 oz).             Does the patient have evidence of cognitive impairment? No      Lab Results   Component Value Date    CHLPL 190 2025    TRIG 74 2025    HDL 82 (H) 2025    LDL 94 2025    VLDL 14 2025    HGBA1C 7.20 (H) 2025                                                                                                Health  Risk Assessment    Smoking Status:  Social History     Tobacco Use   Smoking Status Never    Passive exposure: Never   Smokeless Tobacco Never   Tobacco Comments    no caffiene     Alcohol Consumption:  Social History     Substance and Sexual Activity   Alcohol Use No       Fall Risk Screen  STEADI Fall Risk Assessment was completed, and patient is at LOW risk for falls.Assessment completed on:2025    Depression Screening   Little interest or pleasure in doing things? Not at all   Feeling down, depressed, or hopeless? Not at all   PHQ-2 Total Score 0      Health Habits and Functional and Cognitive Screenin/8/2025    10:04 AM   Functional & Cognitive Status   Do you have difficulty preparing food and eating? No   Do you have difficulty bathing yourself, getting dressed or grooming yourself? No   Do you have difficulty using the toilet? No   Do you have difficulty moving around from place to place? No   Do " you have trouble with steps or getting out of a bed or a chair? No   Current Diet Well Balanced Diet   Dental Exam Not up to date   Eye Exam Not up to date   Exercise (times per week) 0 times per week   Current Exercises Include No Regular Exercise   Do you need help using the phone?  No   Are you deaf or do you have serious difficulty hearing?  No   Do you need help to go to places out of walking distance? No   Do you need help shopping? No   Do you need help preparing meals?  No   Do you need help with housework?  No   Do you need help with laundry? No   Do you need help taking your medications? No   Do you need help managing money? No   Do you ever drive or ride in a car without wearing a seat belt? No   Have you felt unusual stress, anger or loneliness in the last month? No   Who do you live with? Alone   If you need help, do you have trouble finding someone available to you? No   Have you been bothered in the last four weeks by sexual problems? No   Do you have difficulty concentrating, remembering or making decisions? Yes           Age-appropriate Screening Schedule:  Refer to the list below for future screening recommendations based on patient's age, sex and/or medical conditions. Orders for these recommended tests are listed in the plan section. The patient has been provided with a written plan.    Health Maintenance List  Health Maintenance   Topic Date Due    DIABETIC EYE EXAM  05/23/2025    INFLUENZA VACCINE  07/01/2025    HEMOGLOBIN A1C  10/29/2025    LIPID PANEL  04/29/2026    URINE MICROALBUMIN-CREATININE RATIO (uACR)  04/29/2026    ANNUAL WELLNESS VISIT  05/08/2026    DIABETIC FOOT EXAM  05/08/2026    DXA SCAN  05/21/2026    COLORECTAL CANCER SCREENING  10/05/2026    TDAP/TD VACCINES (3 - Td or Tdap) 10/22/2034    RSV Vaccine - Adults  Completed    Pneumococcal Vaccine 50+  Completed    ZOSTER VACCINE  Completed    COVID-19 Vaccine  Discontinued    MAMMOGRAM  Discontinued                                "                                                                                                                 CMS Preventative Services Quick Reference  Risk Factors Identified During Encounter  See below    The above risks/problems have been discussed with the patient.  Pertinent information has been shared with the patient in the After Visit Summary.  An After Visit Summary and PPPS were made available to the patient.    Follow Up:   Next Medicare Wellness visit to be scheduled in 1 year.         Additional E&M Note during same encounter follows:  Patient has additional, significant, and separately identifiable condition(s)/problem(s) that require work above and beyond the Medicare Wellness Visit     Chief Complaint  Medicare Wellness-subsequent    Subjective   HPI  Radha is also being seen today for additional medical problem/s.    Patient presents for ongoing management of her chronic health conditions.    These include HTN, HLD, DM2, GERD w/ Solis's, CKD, chronic constipation, DDD.     Her lifestyle is sedentary. Her diet is poor.    She also c/o chronic progressive urinary incontinence.          Objective   Vital Signs:  /80   Pulse 71   Temp 97.7 °F (36.5 °C) (Infrared)   Ht 167.6 cm (66\")   Wt 89.2 kg (196 lb 9.6 oz)   SpO2 98%   BMI 31.73 kg/m²   Physical Exam  Constitutional:       General: She is not in acute distress.     Appearance: Normal appearance. She is well-developed.   HENT:      Head: Normocephalic.      Right Ear: Hearing, tympanic membrane, ear canal and external ear normal.      Left Ear: Hearing, tympanic membrane, ear canal and external ear normal.      Nose: Nose normal. No mucosal edema or rhinorrhea.      Mouth/Throat:      Mouth: Mucous membranes are moist.      Pharynx: Oropharynx is clear. Uvula midline.   Eyes:      General: Lids are normal.      Extraocular Movements: Extraocular movements intact.      Conjunctiva/sclera: Conjunctivae normal.      Pupils: Pupils " are equal, round, and reactive to light.   Neck:      Thyroid: No thyroid mass or thyromegaly.   Cardiovascular:      Rate and Rhythm: Regular rhythm.      Pulses: Normal pulses.      Heart sounds: S1 normal and S2 normal. No murmur heard.     No friction rub. No gallop.   Pulmonary:      Effort: Pulmonary effort is normal.      Breath sounds: Normal breath sounds. No wheezing, rhonchi or rales.   Abdominal:      General: Bowel sounds are normal.      Palpations: Abdomen is soft.      Tenderness: There is no abdominal tenderness. There is no guarding.      Hernia: No hernia is present.   Musculoskeletal:         General: No deformity. Normal range of motion.      Cervical back: Normal range of motion and neck supple.      Right foot: Normal range of motion. No deformity.      Left foot: Normal range of motion. No deformity.   Feet:      Right foot:      Protective Sensation: 10 sites tested.  6 sites sensed.      Skin integrity: Skin integrity normal.      Toenail Condition: Right toenails are normal.      Left foot:      Protective Sensation: 10 sites tested.  7 sites sensed.      Skin integrity: Skin integrity normal.      Toenail Condition: Left toenails are normal.   Lymphadenopathy:      Cervical: No cervical adenopathy.   Skin:     General: Skin is warm and dry.      Findings: No lesion or rash.   Neurological:      General: No focal deficit present.      Mental Status: She is alert and oriented to person, place, and time.      Cranial Nerves: No cranial nerve deficit.      Sensory: No sensory deficit.      Motor: Motor function is intact.      Coordination: Coordination is intact.      Gait: Gait normal.      Deep Tendon Reflexes: Reflexes are normal and symmetric.   Psychiatric:         Attention and Perception: She is attentive.         Mood and Affect: Mood and affect normal.         Speech: Speech normal.         Behavior: Behavior normal.         Thought Content: Thought content normal.         The  following data was reviewed by: SLAVA Sandoval on 05/08/2025:  Recent Results (from the past 2 weeks)   CBC (No Diff)    Collection Time: 04/29/25  9:58 AM    Specimen: Blood   Result Value Ref Range    WBC 4.98 3.40 - 10.80 10*3/mm3    RBC 4.73 3.77 - 5.28 10*6/mm3    Hemoglobin 13.4 12.0 - 15.9 g/dL    Hematocrit 41.8 34.0 - 46.6 %    MCV 88.4 79.0 - 97.0 fL    MCH 28.3 26.6 - 33.0 pg    MCHC 32.1 31.5 - 35.7 g/dL    RDW 13.6 12.3 - 15.4 %    Platelets 188 140 - 450 10*3/mm3   Comprehensive Metabolic Panel    Collection Time: 04/29/25  9:58 AM    Specimen: Blood   Result Value Ref Range    Glucose 154 (H) 65 - 99 mg/dL    BUN 19 8 - 23 mg/dL    Creatinine 1.09 (H) 0.57 - 1.00 mg/dL    EGFR Result 50.8 (L) >60.0 mL/min/1.73    BUN/Creatinine Ratio 17.4 7.0 - 25.0    Sodium 140 136 - 145 mmol/L    Potassium 4.4 3.5 - 5.2 mmol/L    Chloride 100 98 - 107 mmol/L    Total CO2 27.0 22.0 - 29.0 mmol/L    Calcium 10.0 8.6 - 10.5 mg/dL    Total Protein 7.1 6.0 - 8.5 g/dL    Albumin 4.5 3.5 - 5.2 g/dL    Globulin 2.6 gm/dL    A/G Ratio 1.7 g/dL    Total Bilirubin 0.5 0.0 - 1.2 mg/dL    Alkaline Phosphatase 62 39 - 117 U/L    AST (SGOT) 23 1 - 32 U/L    ALT (SGPT) 21 1 - 33 U/L   Hemoglobin A1c    Collection Time: 04/29/25  9:58 AM    Specimen: Blood   Result Value Ref Range    Hemoglobin A1C 7.20 (H) 4.80 - 5.60 %   Lipid Panel With / Chol / HDL Ratio    Collection Time: 04/29/25  9:58 AM    Specimen: Blood   Result Value Ref Range    Total Cholesterol 190 0 - 200 mg/dL    Triglycerides 74 0 - 150 mg/dL    HDL Cholesterol 82 (H) 40 - 60 mg/dL    VLDL Cholesterol Madhav 14 5 - 40 mg/dL    LDL Chol Calc (NIH) 94 0 - 100 mg/dL    Chol/HDL Ratio 2.32    TSH    Collection Time: 04/29/25  9:58 AM    Specimen: Blood   Result Value Ref Range    TSH 2.000 0.270 - 4.200 uIU/mL   Urinalysis without microscopic (no culture) - Urine, Clean Catch    Collection Time: 04/29/25  9:58 AM    Specimen: Urine, Clean Catch   Result Value  Ref Range    Specific Gravity, UA 1.015 1.005 - 1.030    pH, UA 6.5 5.0 - 8.0    Color, UA Yellow     Appearance, UA Clear Clear    Leukocytes, UA See below: (A) Negative    Protein Negative Negative    Glucose, UA Negative Negative    Ketones Negative Negative    Blood, UA Negative Negative    Bilirubin, UA Negative Negative    Urobilinogen, UA Comment     Nitrite, UA Negative Negative   Microalbumin / Creatinine Urine Ratio - Urine, Clean Catch    Collection Time: 04/29/25  9:58 AM    Specimen: Urine, Clean Catch   Result Value Ref Range    Creatinine, Urine 103.4 Not Estab. mg/dL    Microalbumin, Urine 7.0 Not Estab. ug/mL    Microalbumin/Creatinine Ratio 7 0 - 29 mg/g creat            Assessment and Plan      Encounter for Medicare annual wellness exam  Preventative counseling completed including relevant screenings, appropriate vaccinations, healthy nutrition, and appropriate physical activity. Age-appropriate Screening & Interventions recommended by USPSTF were reviewed with the patient, and Health Maintenance was updated in Epic.         Encounter for diabetic foot exam         Essential hypertension  - continue current regimen         Mixed hyperlipidemia  - continue current regimen         Type 2 diabetes mellitus without complication, without long-term current use of insulin  - uncontrolled  - continue metformin  - add glipizide      Orders:    glipizide (GLUCOTROL XL) 2.5 MG 24 hr tablet; Take 1 tablet by mouth Daily With Breakfast.    Gastroesophageal reflux disease without esophagitis  - continue current regimen       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         Stage 3a chronic kidney disease  - stable  - monitor         Chronic constipation  - continue current regimen       Degeneration of intervertebral disc of lumbar region with discogenic back pain and lower extremity pain  - offered PT       Primary osteoarthritis involving multiple joints  - offered PT       Physical deconditioning  - we  discussed PT, she will think about this       Sedentary lifestyle  - we discussed PT, she will think about this       Mixed stress and urge urinary incontinence  - we discussed pelvic floor therapy, she will think about this               Follow Up   Return in about 6 months (around 11/8/2025) for fasting labs one week prior to.  Patient was given instructions and counseling regarding her condition or for health maintenance advice. Please see specific information pulled into the AVS if appropriate.

## 2025-06-20 NOTE — PROGRESS NOTES
RM:________                            : 1940  AGE: 84 y.o.      WT: ____________ HT: ______ BP: __________ HR ______   02% _______                   VISIT:   F/U   HOSP  CC __________________________ OTHER _________                                                  PACER/ ICD        EKG TODAY:  Y /  N        SMOKING: Y / N   PPD ________      EXERCISE: ____________________________________________________      SLEEP STUDY : Y / N     POS / NEG    CPAP / BIPAP     WEARING:  Y / N       DIAGNOSIS: ___________________________________   REFILLS  Y / N      CP _____  SOA______ EDEMA_____ DIZZINESS____ PALPITATIONS____

## 2025-06-24 ENCOUNTER — OFFICE VISIT (OUTPATIENT)
Dept: CARDIOLOGY | Age: 85
End: 2025-06-24
Payer: MEDICARE

## 2025-06-24 VITALS
BODY MASS INDEX: 31.82 KG/M2 | HEIGHT: 66 IN | HEART RATE: 65 BPM | DIASTOLIC BLOOD PRESSURE: 80 MMHG | WEIGHT: 198 LBS | SYSTOLIC BLOOD PRESSURE: 126 MMHG

## 2025-06-24 DIAGNOSIS — I49.3 FREQUENT PVCS: Primary | ICD-10-CM

## 2025-06-24 DIAGNOSIS — R07.89 CHEST PAIN, ATYPICAL: ICD-10-CM

## 2025-06-24 PROCEDURE — 1159F MED LIST DOCD IN RCRD: CPT | Performed by: NURSE PRACTITIONER

## 2025-06-24 PROCEDURE — 99214 OFFICE O/P EST MOD 30 MIN: CPT | Performed by: NURSE PRACTITIONER

## 2025-06-24 PROCEDURE — 1160F RVW MEDS BY RX/DR IN RCRD: CPT | Performed by: NURSE PRACTITIONER

## 2025-06-24 PROCEDURE — 3079F DIAST BP 80-89 MM HG: CPT | Performed by: NURSE PRACTITIONER

## 2025-06-24 PROCEDURE — 93000 ELECTROCARDIOGRAM COMPLETE: CPT | Performed by: NURSE PRACTITIONER

## 2025-06-24 PROCEDURE — 3074F SYST BP LT 130 MM HG: CPT | Performed by: NURSE PRACTITIONER

## 2025-06-24 NOTE — PROGRESS NOTES
"Date of Office Visit: 25  Encounter Provider: SLAVA Quinonez  Place of Service: [facility] CARDIOLOGY  Patient Name: Radha Silverio  :1940    Chief Complaint   Patient presents with    Essential hypertension    Atrial bigeminy    Follow-up   :     HPI: Radha Silverio is a 84 y.o. female  with  frequent premature atrial contractions, premature ventricular contraction, hypertension, hyperlipidemia, vertigo, diabetes mellitus and GERD.  She is followed by Dr. Trudi Urbina.  I will visit with her for the first time today and have reviewed her medical record.  She was found to have 23% atrial ectopics in 2017.  She had PVCs accounting for 1.3% of Holter monitor.  Echocardiogram showed normal left ventricular systolic function, mild to moderate tricuspid valve regurgitation with RVSP moderately elevated at 49.5 mmHg.  She was treated with metoprolol.  Follow-up stress echo 2019 showed no evidence of myocardial ischemia.  She had frequent PVCs with stress.     She had issues with recurrent vertigo and was in vestibular physical therapy.  She also reported dizziness with getting up with position changes so I decreased the hydrochlorothiazide component from 25 mg to 12.5 mg.  Echocardiogram 2024 showed preserved LV systolic function and no significant valve disease.    She ultimately was sent to vestibular physical therapy and that helped to improve dizziness diarrhea did not resolve.    She presents today for reassessment.  She has balance issues but no dizziness.  No palpitations.  She had some swelling in her feet which will fleeting.  She reports she swelled for about 2 days and it went away.  She has shortness of breath with going to the mailbox and climbing stairs in her house but she states this is chronic and because she is \"moving slower \".  She has no chest pain or tightness or pressure.  She reports some left arm discomfort where it feels " "\"like a funny feeling\".  She denies that the arm is shaking and she denies sharp pain but it worries her about her heart.  This sensation can recur if she \"bears down\" and pushes her elbow into a flat surface.  She had a fall about 3 weeks ago but no injury.  No structured exercise.      Allergies   Allergen Reactions    Glipizide Other (See Comments)     Skin peeling on hands            Family and social history reviewed.     Review of Systems   Cardiovascular:  Positive for dyspnea on exertion and leg swelling.   Neurological:  Positive for loss of balance.     All other systems were reviewed and are negative          Objective:     Vitals:    06/24/25 0926   BP: 126/80   BP Location: Left arm   Patient Position: Sitting   Cuff Size: Adult   Pulse: 65   Weight: 89.8 kg (198 lb)   Height: 167.6 cm (66\")     Body mass index is 31.96 kg/m².    PHYSICAL EXAM:  Pulmonary:      Effort: Pulmonary effort is normal.      Breath sounds: Normal breath sounds.   Cardiovascular:      Normal rate. Regular rhythm.           ECG 12 Lead    Date/Time: 6/24/2025 9:46 AM  Performed by: Apryl Og APRN    Authorized by: Apryl Og APRN  Comparison: compared with previous ECG   Similar to previous ECG  Rhythm: sinus rhythm  Rate: normal  T flattening: aVL  Comments: No change             Current Outpatient Medications   Medication Sig Dispense Refill    aspirin 81 MG EC tablet Take 1 tablet by mouth Daily. HOLD FOR SURGERY      calcium carbonate (OS-MARIS) 1250 (500 Ca) MG chewable tablet Chew 2 tablets 2 (Two) Times a Day.      Cholecalciferol 10 MCG (400 UNIT) tablet Take 1 tablet by mouth Daily.      docusate sodium (COLACE) 100 MG capsule Take 1 capsule by mouth 2 (Two) Times a Day. (Patient taking differently: Take 1 capsule by mouth Daily As Needed for Constipation.) 180 capsule 3    lisinopril-hydrochlorothiazide (PRINZIDE,ZESTORETIC) 20-12.5 MG per tablet Take 1 tablet by mouth Daily. 90 tablet 3    Magnesium Oxide -Mg " Supplement 500 MG tablet Take 1 tablet by mouth Every Night.      metFORMIN (GLUCOPHAGE) 500 MG tablet Take 1 tablet by mouth 2 (Two) Times a Day With Meals. 180 tablet 3    methocarbamol (ROBAXIN) 500 MG tablet Take 1 tablet by mouth 4 (Four) Times a Day As Needed for Muscle Spasms. 40 tablet 0    metoprolol succinate XL (TOPROL-XL) 25 MG 24 hr tablet Take 1 tablet by mouth Daily. 90 tablet 3    omeprazole (priLOSEC) 40 MG capsule TAKE 1 CAPSULE DAILY 90 capsule 3    Polyethylene Glycol 3350 (MIRALAX PO) Take 1 Capful by mouth As Needed. Has not been taking but is going to be starting again soon      simvastatin (ZOCOR) 40 MG tablet TAKE 1 TABLET EVERY EVENING 90 tablet 3     No current facility-administered medications for this visit.     Assessment:       Diagnosis Plan   1. Frequent PVCs  ECG 12 Lead    Stress Test With Myocardial Perfusion One Day      2. Chest pain, atypical  Stress Test With Myocardial Perfusion One Day           Orders Placed This Encounter   Procedures    Stress Test With Myocardial Perfusion One Day     Standing Status:   Future     Expected Date:   7/1/2025     Expiration Date:   6/24/2026     What stress agent will be used?:   Regadenoson (Lexiscan)     Difficulty walking criteria?:   Poor Exercise Tolerance     Reason for exam?:   Chest Pain     Release to patient:   Routine Release [[id number]]    ECG 12 Lead     This order was created via procedure documentation     Release to patient:   Routine Release [[id number]]         Plan:       1.  84-year-old female with frequent PVCs during stress echo February 2019.  She is having some left arm discomfort which is atypical.  I recommend nuclear stress test for further evaluation.-Blood pressure stable  2.  Hypertension-blood pressure appears well-controlled  3.  Hyperlipidemia on simvastatin.  Her LDL was 94 in April.  4.  Diabetes mellitus- -well-controlled.  Her A1c was 7.20 in April  5.  History of atrial ectopic beats-no palpitations 6.   Dizziness and balance issues.  Slightly improved with decrease hydrochlorothiazide.   Treated with vestibular physical therapy for vertigo.   7. GERD on therapy  8.  Trace MR on echocardiogram January 2024.  With history of moderate TR with elevated RVSP in 2017.  9.  Recent fall 3 weeks ago.  No injury.  10.  Chronic dyspnea on exertion-denies change in the last 3 months.  We we will check nuclear stress test.  11.  Mild renal insufficiency with a GFR of 50.8 April 2025 with creatinine 1.09, BUN 19 and normal electrolytes.    She prefers the [place] location.  With recommendation will be made after nuclear stress test.          It has been a pleasure to participate in this patient's care.      Thank you,  SLAVA Quinonez      **I used Dragon to dictate this note:**

## 2025-06-25 ENCOUNTER — TELEPHONE (OUTPATIENT)
Dept: CARDIOLOGY | Age: 85
End: 2025-06-25
Payer: MEDICARE

## 2025-06-25 NOTE — TELEPHONE ENCOUNTER
We do not have a flip phone. Please call ALPA'S PHONE NUMBER [phone] to relay this message. Is there a place up front that could also be checked?

## 2025-06-25 NOTE — TELEPHONE ENCOUNTER
I checked with  and checked my exam rooms.  I did not find a phone.  I called to inform patient's daughter.  She verbalized understanding./ ROSANA

## 2025-06-25 NOTE — TELEPHONE ENCOUNTER
----- Message from Jon CHAN sent at 6/25/2025  4:01 PM EDT -----  Regarding: LOST ITEM  PATIENT'S DAUGHTER ALPA CALLED. PATIENT LOST FLIP PHONE AND THEY THINK IT WAS LEFT THERE PLEASE CALL AND ADVISE. WAS SEEN 06.24.25 AT 9:30 AM. ALPA'S PHONE NUMBER [phone].

## 2025-07-02 ENCOUNTER — RESULTS FOLLOW-UP (OUTPATIENT)
Dept: CARDIOLOGY | Age: 85
End: 2025-07-02
Payer: MEDICARE

## 2025-07-02 ENCOUNTER — HOSPITAL ENCOUNTER (OUTPATIENT)
Dept: CARDIOLOGY | Facility: HOSPITAL | Age: 85
Discharge: HOME | End: 2025-07-02
Admitting: NURSE PRACTITIONER
Payer: MEDICARE

## 2025-07-02 VITALS — WEIGHT: 197.97 LBS | BODY MASS INDEX: 31.82 KG/M2 | HEIGHT: 66 IN

## 2025-07-02 DIAGNOSIS — R07.89 CHEST PAIN, ATYPICAL: ICD-10-CM

## 2025-07-02 DIAGNOSIS — I49.3 FREQUENT PVCS: ICD-10-CM

## 2025-07-02 LAB
BH CV NUCLEAR PRIOR STUDY: 2
BH CV REST NUCLEAR ISOTOPE DOSE: 23.5 MCI
BH CV STRESS BP STAGE 1: NORMAL
BH CV STRESS COMMENTS STAGE 1: NORMAL
BH CV STRESS DOSE REGADENOSON STAGE 1: 0.4
BH CV STRESS DURATION MIN STAGE 1: 0
BH CV STRESS DURATION SEC STAGE 1: 10
BH CV STRESS HR STAGE 1: 84
BH CV STRESS NUCLEAR ISOTOPE DOSE: 23.5 MCI
BH CV STRESS O2 STAGE 1: 98
BH CV STRESS PROTOCOL 1: NORMAL
BH CV STRESS RECOVERY BP: NORMAL MMHG
BH CV STRESS RECOVERY HR: 76 BPM
BH CV STRESS RECOVERY O2: 98 %
BH CV STRESS STAGE 1: 1
MAXIMAL PREDICTED HEART RATE: 138 BPM
PERCENT MAX PREDICTED HR: 60.87 %
SPECT HRT GATED+EF W RNC IV: 63 %
STRESS BASELINE BP: NORMAL MMHG
STRESS BASELINE HR: 61 BPM
STRESS O2 SAT REST: 96 %
STRESS PERCENT HR: 72 %
STRESS POST EXERCISE DUR MIN: 0 MIN
STRESS POST EXERCISE DUR SEC: 10 SEC
STRESS POST O2 SAT PEAK: 98 %
STRESS POST PEAK BP: NORMAL MMHG
STRESS POST PEAK HR: 84 BPM
STRESS TARGET HR: 117 BPM

## 2025-07-02 PROCEDURE — 34310000005 RUBIDIUM CHLORIDE: Performed by: NURSE PRACTITIONER

## 2025-07-02 PROCEDURE — A9555 RB82 RUBIDIUM: HCPCS | Performed by: NURSE PRACTITIONER

## 2025-07-02 PROCEDURE — 78492 MYOCRD IMG PET MLT RST&STRS: CPT

## 2025-07-02 PROCEDURE — 25010000002 REGADENOSON 0.4 MG/5ML SOLUTION: Performed by: NURSE PRACTITIONER

## 2025-07-02 PROCEDURE — 93017 CV STRESS TEST TRACING ONLY: CPT

## 2025-07-02 RX ORDER — REGADENOSON 0.08 MG/ML
0.4 INJECTION, SOLUTION INTRAVENOUS
Status: COMPLETED | OUTPATIENT
Start: 2025-07-02 | End: 2025-07-02

## 2025-07-02 RX ADMIN — REGADENOSON 0.4 MG: 0.08 INJECTION, SOLUTION INTRAVENOUS at 11:28

## 2025-07-02 NOTE — TELEPHONE ENCOUNTER
Please inform patient or her daughter, Teal but the stress test shows no evidence of tightly blocked coronary artery and is low risk.  Okay to schedule 6-month follow-up

## 2025-08-27 ENCOUNTER — TELEPHONE (OUTPATIENT)
Dept: INTERNAL MEDICINE | Facility: CLINIC | Age: 85
End: 2025-08-27
Payer: MEDICARE

## 2025-08-29 ENCOUNTER — OFFICE VISIT (OUTPATIENT)
Dept: INTERNAL MEDICINE | Facility: CLINIC | Age: 85
End: 2025-08-29
Payer: MEDICARE

## 2025-08-29 VITALS
WEIGHT: 194.8 LBS | HEIGHT: 66 IN | OXYGEN SATURATION: 95 % | SYSTOLIC BLOOD PRESSURE: 120 MMHG | DIASTOLIC BLOOD PRESSURE: 82 MMHG | HEART RATE: 74 BPM | TEMPERATURE: 98.2 F | BODY MASS INDEX: 31.31 KG/M2

## 2025-08-29 DIAGNOSIS — M50.30 DDD (DEGENERATIVE DISC DISEASE), CERVICAL: ICD-10-CM

## 2025-08-29 DIAGNOSIS — M25.562 CHRONIC PAIN OF LEFT KNEE: ICD-10-CM

## 2025-08-29 DIAGNOSIS — M25.512 CHRONIC LEFT SHOULDER PAIN: ICD-10-CM

## 2025-08-29 DIAGNOSIS — G89.29 CHRONIC LEFT SHOULDER PAIN: ICD-10-CM

## 2025-08-29 DIAGNOSIS — G89.29 CHRONIC PAIN OF LEFT KNEE: ICD-10-CM
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
## 2025-08-29 RX ORDER — SENNOSIDES 8.6 MG
650 CAPSULE ORAL EVERY 8 HOURS PRN
Start: 2025-08-29

## (undated) DEVICE — BITEBLOCK OMNI BLOC

## (undated) DEVICE — ANTIBACTERIAL UNDYED BRAIDED (POLYGLACTIN 910), SYNTHETIC ABSORBABLE SURGICAL SUTURE: Brand: COATED VICRYL

## (undated) DEVICE — TUBING, SUCTION, 1/4" X 10', STRAIGHT: Brand: MEDLINE

## (undated) DEVICE — ANTIBACTERIAL UNDYED BRAIDED (POLYGLACTIN 910), SYNTHETIC ABSORBABLE SUTURE: Brand: COATED VICRYL

## (undated) DEVICE — SENSR O2 OXIMAX FNGR A/ 18IN NONSTR

## (undated) DEVICE — GLV SURG BIOGEL LTX PF 7

## (undated) DEVICE — SYR CONTRL LUERLOK 10CC

## (undated) DEVICE — SEALANT WND FIBRIN TISSEEL PREFIL/SYR/PRIMAFZ 4ML

## (undated) DEVICE — TOOL MR8-15BA50T MR8 15CM BAL SYMTRI 5MM: Brand: MIDAS REX MR8

## (undated) DEVICE — SOL NACL 0.9PCT 100ML SGL

## (undated) DEVICE — SYR LL TP 10ML STRL

## (undated) DEVICE — MSK ENDO PORT O2 POM ELITE CURAPLEX A/

## (undated) DEVICE — LABEL SHEET CUSTOM 2X2 YELLOW: Brand: MEDLINE INDUSTRIES, INC.

## (undated) DEVICE — GLV SURG SENSICARE W/ALOE PF LF 7.5 STRL

## (undated) DEVICE — PK NEURO SPINE 40

## (undated) DEVICE — SPONGE,NEURO,.75"X.75",XR,STRL,LF,10/PK: Brand: MEDLINE

## (undated) DEVICE — SPNG GZ WOVN 4X4IN 12PLY 10/BX STRL

## (undated) DEVICE — ADHS LIQ MASTISOL 2/3ML

## (undated) DEVICE — Device

## (undated) DEVICE — APPL CHLORAPREP HI/LITE 26ML ORNG

## (undated) DEVICE — KT ORCA ORCAPOD DISP STRL

## (undated) DEVICE — DRP MICROSCP LEICA 137X381CM

## (undated) DEVICE — 3M™ STERI-STRIP™ ANTIMICROBIAL SKIN CLOSURES 1/2 INCH X 4 INCHES 50/CARTON 4 CARTONS/CASE A1847: Brand: 3M™ STERI-STRIP™

## (undated) DEVICE — LN SMPL CO2 SHTRM SD STREAM W/M LUER

## (undated) DEVICE — ADAPT CLN BIOGUARD AIR/H2O DISP

## (undated) DEVICE — NEEDLE, QUINCKE, 20GX3.5": Brand: MEDLINE

## (undated) DEVICE — SINGLE-USE BIOPSY FORCEPS: Brand: RADIAL JAW 4

## (undated) DEVICE — CONN TBG Y 5 IN 1 LF STRL

## (undated) DEVICE — COVER,C-ARM,41X74: Brand: MEDLINE